# Patient Record
Sex: FEMALE | Race: WHITE | NOT HISPANIC OR LATINO | Employment: OTHER | URBAN - METROPOLITAN AREA
[De-identification: names, ages, dates, MRNs, and addresses within clinical notes are randomized per-mention and may not be internally consistent; named-entity substitution may affect disease eponyms.]

---

## 2018-11-26 ENCOUNTER — HOSPITAL ENCOUNTER (INPATIENT)
Facility: HOSPITAL | Age: 63
LOS: 3 days | Discharge: HOME | DRG: 392 | End: 2018-11-29
Attending: EMERGENCY MEDICINE | Admitting: INTERNAL MEDICINE
Payer: MEDICARE

## 2018-11-26 ENCOUNTER — APPOINTMENT (INPATIENT)
Dept: MRI IMAGING | Facility: HOSPITAL | Age: 63
DRG: 392 | End: 2018-11-26
Payer: MEDICARE

## 2018-11-26 ENCOUNTER — APPOINTMENT (EMERGENCY)
Dept: CT IMAGING | Facility: HOSPITAL | Age: 63
DRG: 392 | End: 2018-11-26
Payer: MEDICARE

## 2018-11-26 DIAGNOSIS — R11.2 INTRACTABLE VOMITING WITH NAUSEA, UNSPECIFIED VOMITING TYPE: ICD-10-CM

## 2018-11-26 DIAGNOSIS — R10.9 ABDOMINAL PAIN: ICD-10-CM

## 2018-11-26 DIAGNOSIS — K86.89 PANCREATIC MASS: ICD-10-CM

## 2018-11-26 DIAGNOSIS — G43.909 HEADACHE, MIGRAINE: ICD-10-CM

## 2018-11-26 DIAGNOSIS — R10.9 ABDOMINAL PAIN: Primary | ICD-10-CM

## 2018-11-26 PROBLEM — D72.829 LEUKOCYTOSIS: Status: ACTIVE | Noted: 2018-11-26

## 2018-11-26 PROBLEM — E86.0 DEHYDRATION: Status: ACTIVE | Noted: 2018-11-26

## 2018-11-26 LAB
ALBUMIN SERPL BCP-MCNC: 3.3 G/DL (ref 3.5–5)
ALP SERPL-CCNC: 72 U/L (ref 46–116)
ALT SERPL W P-5'-P-CCNC: 64 U/L (ref 12–78)
ANION GAP SERPL CALCULATED.3IONS-SCNC: 10 MMOL/L (ref 4–13)
APTT PPP: 32 SECONDS (ref 26–38)
AST SERPL W P-5'-P-CCNC: 27 U/L (ref 5–45)
BACTERIA UR QL AUTO: ABNORMAL /HPF
BASE EX.OXY STD BLDV CALC-SCNC: 67.3 % (ref 60–80)
BASE EXCESS BLDV CALC-SCNC: -0.4 MMOL/L
BASOPHILS # BLD AUTO: 0.05 THOUSANDS/ΜL (ref 0–0.1)
BASOPHILS NFR BLD AUTO: 0 % (ref 0–1)
BILIRUB SERPL-MCNC: 0.39 MG/DL (ref 0.2–1)
BILIRUB UR QL STRIP: NEGATIVE
BUN SERPL-MCNC: 15 MG/DL (ref 5–25)
CALCIUM SERPL-MCNC: 9.1 MG/DL (ref 8.3–10.1)
CHLORIDE SERPL-SCNC: 103 MMOL/L (ref 100–108)
CLARITY UR: CLEAR
CO2 SERPL-SCNC: 26 MMOL/L (ref 21–32)
COLOR UR: YELLOW
COLOR, POC: YELLOW
CREAT SERPL-MCNC: 0.69 MG/DL (ref 0.6–1.3)
EOSINOPHIL # BLD AUTO: 0.04 THOUSAND/ΜL (ref 0–0.61)
EOSINOPHIL NFR BLD AUTO: 0 % (ref 0–6)
ERYTHROCYTE [DISTWIDTH] IN BLOOD BY AUTOMATED COUNT: 13.5 % (ref 11.6–15.1)
GFR SERPL CREATININE-BSD FRML MDRD: 92 ML/MIN/1.73SQ M
GLUCOSE SERPL-MCNC: 120 MG/DL (ref 65–140)
GLUCOSE UR STRIP-MCNC: NEGATIVE MG/DL
HCO3 BLDV-SCNC: 25.1 MMOL/L (ref 24–30)
HCT VFR BLD AUTO: 41.6 % (ref 34.8–46.1)
HGB BLD-MCNC: 13.2 G/DL (ref 11.5–15.4)
HGB UR QL STRIP.AUTO: ABNORMAL
IMM GRANULOCYTES # BLD AUTO: 0.05 THOUSAND/UL (ref 0–0.2)
IMM GRANULOCYTES NFR BLD AUTO: 0 % (ref 0–2)
INR PPP: 1 (ref 0.86–1.17)
KETONES UR STRIP-MCNC: ABNORMAL MG/DL
LEUKOCYTE ESTERASE UR QL STRIP: NEGATIVE
LIPASE SERPL-CCNC: 339 U/L (ref 73–393)
LYMPHOCYTES # BLD AUTO: 0.75 THOUSANDS/ΜL (ref 0.6–4.47)
LYMPHOCYTES NFR BLD AUTO: 6 % (ref 14–44)
MAGNESIUM SERPL-MCNC: 1.9 MG/DL (ref 1.6–2.6)
MCH RBC QN AUTO: 29.8 PG (ref 26.8–34.3)
MCHC RBC AUTO-ENTMCNC: 31.7 G/DL (ref 31.4–37.4)
MCV RBC AUTO: 94 FL (ref 82–98)
MONOCYTES # BLD AUTO: 0.77 THOUSAND/ΜL (ref 0.17–1.22)
MONOCYTES NFR BLD AUTO: 6 % (ref 4–12)
NEUTROPHILS # BLD AUTO: 11.99 THOUSANDS/ΜL (ref 1.85–7.62)
NEUTS SEG NFR BLD AUTO: 88 % (ref 43–75)
NITRITE UR QL STRIP: NEGATIVE
NON-SQ EPI CELLS URNS QL MICRO: ABNORMAL /HPF
NRBC BLD AUTO-RTO: 0 /100 WBCS
O2 CT BLDV-SCNC: 12.3 ML/DL
PCO2 BLDV: 44.5 MM HG (ref 42–50)
PH BLDV: 7.37 [PH] (ref 7.3–7.4)
PH UR STRIP.AUTO: 7 [PH] (ref 4.5–8)
PLATELET # BLD AUTO: 259 THOUSANDS/UL (ref 149–390)
PMV BLD AUTO: 9.1 FL (ref 8.9–12.7)
PO2 BLDV: 33.3 MM HG (ref 35–45)
POTASSIUM SERPL-SCNC: 3.6 MMOL/L (ref 3.5–5.3)
PROT SERPL-MCNC: 7.4 G/DL (ref 6.4–8.2)
PROT UR STRIP-MCNC: NEGATIVE MG/DL
PROTHROMBIN TIME: 13.3 SECONDS (ref 11.8–14.2)
RBC # BLD AUTO: 4.43 MILLION/UL (ref 3.81–5.12)
RBC #/AREA URNS AUTO: ABNORMAL /HPF
SODIUM SERPL-SCNC: 139 MMOL/L (ref 136–145)
SP GR UR STRIP.AUTO: 1.01 (ref 1–1.03)
UROBILINOGEN UR QL STRIP.AUTO: 0.2 E.U./DL
WBC # BLD AUTO: 13.65 THOUSAND/UL (ref 4.31–10.16)
WBC #/AREA URNS AUTO: ABNORMAL /HPF

## 2018-11-26 PROCEDURE — 80053 COMPREHEN METABOLIC PANEL: CPT | Performed by: EMERGENCY MEDICINE

## 2018-11-26 PROCEDURE — 99222 1ST HOSP IP/OBS MODERATE 55: CPT | Performed by: INTERNAL MEDICINE

## 2018-11-26 PROCEDURE — 36415 COLL VENOUS BLD VENIPUNCTURE: CPT | Performed by: EMERGENCY MEDICINE

## 2018-11-26 PROCEDURE — 99223 1ST HOSP IP/OBS HIGH 75: CPT | Performed by: INTERNAL MEDICINE

## 2018-11-26 PROCEDURE — 83735 ASSAY OF MAGNESIUM: CPT | Performed by: EMERGENCY MEDICINE

## 2018-11-26 PROCEDURE — 74183 MRI ABD W/O CNTR FLWD CNTR: CPT

## 2018-11-26 PROCEDURE — 96376 TX/PRO/DX INJ SAME DRUG ADON: CPT

## 2018-11-26 PROCEDURE — 85025 COMPLETE CBC W/AUTO DIFF WBC: CPT | Performed by: EMERGENCY MEDICINE

## 2018-11-26 PROCEDURE — 83690 ASSAY OF LIPASE: CPT | Performed by: EMERGENCY MEDICINE

## 2018-11-26 PROCEDURE — 96375 TX/PRO/DX INJ NEW DRUG ADDON: CPT

## 2018-11-26 PROCEDURE — 96374 THER/PROPH/DIAG INJ IV PUSH: CPT

## 2018-11-26 PROCEDURE — 99285 EMERGENCY DEPT VISIT HI MDM: CPT

## 2018-11-26 PROCEDURE — 85610 PROTHROMBIN TIME: CPT | Performed by: EMERGENCY MEDICINE

## 2018-11-26 PROCEDURE — 96361 HYDRATE IV INFUSION ADD-ON: CPT

## 2018-11-26 PROCEDURE — C9113 INJ PANTOPRAZOLE SODIUM, VIA: HCPCS | Performed by: INTERNAL MEDICINE

## 2018-11-26 PROCEDURE — 85730 THROMBOPLASTIN TIME PARTIAL: CPT | Performed by: EMERGENCY MEDICINE

## 2018-11-26 PROCEDURE — A9585 GADOBUTROL INJECTION: HCPCS | Performed by: INTERNAL MEDICINE

## 2018-11-26 PROCEDURE — 81001 URINALYSIS AUTO W/SCOPE: CPT

## 2018-11-26 PROCEDURE — 74177 CT ABD & PELVIS W/CONTRAST: CPT

## 2018-11-26 PROCEDURE — 82805 BLOOD GASES W/O2 SATURATION: CPT | Performed by: EMERGENCY MEDICINE

## 2018-11-26 RX ORDER — MOXIFLOXACIN HYDROCHLORIDE 400 MG/1
1 TABLET ORAL DAILY
Status: ON HOLD | COMMUNITY
Start: 2014-03-17 | End: 2018-11-26

## 2018-11-26 RX ORDER — MORPHINE SULFATE 4 MG/ML
4 INJECTION, SOLUTION INTRAMUSCULAR; INTRAVENOUS ONCE
Status: COMPLETED | OUTPATIENT
Start: 2018-11-26 | End: 2018-11-26

## 2018-11-26 RX ORDER — RIZATRIPTAN BENZOATE 10 MG/1
TABLET ORAL AS NEEDED
COMMUNITY
End: 2019-07-15

## 2018-11-26 RX ORDER — ONDANSETRON 2 MG/ML
4 INJECTION INTRAMUSCULAR; INTRAVENOUS ONCE
Status: COMPLETED | OUTPATIENT
Start: 2018-11-26 | End: 2018-11-26

## 2018-11-26 RX ORDER — MORPHINE SULFATE 4 MG/ML
4 INJECTION, SOLUTION INTRAMUSCULAR; INTRAVENOUS ONCE AS NEEDED
Status: DISCONTINUED | OUTPATIENT
Start: 2018-11-26 | End: 2018-11-26

## 2018-11-26 RX ORDER — ONDANSETRON 2 MG/ML
4 INJECTION INTRAMUSCULAR; INTRAVENOUS ONCE AS NEEDED
Status: DISCONTINUED | OUTPATIENT
Start: 2018-11-26 | End: 2018-11-26

## 2018-11-26 RX ORDER — ACETAMINOPHEN 325 MG/1
650 TABLET ORAL EVERY 4 HOURS PRN
Status: DISCONTINUED | OUTPATIENT
Start: 2018-11-26 | End: 2018-11-29 | Stop reason: HOSPADM

## 2018-11-26 RX ORDER — SODIUM CHLORIDE 9 MG/ML
100 INJECTION, SOLUTION INTRAVENOUS CONTINUOUS
Status: DISCONTINUED | OUTPATIENT
Start: 2018-11-26 | End: 2018-11-29 | Stop reason: HOSPADM

## 2018-11-26 RX ORDER — IBUPROFEN 600 MG/1
1 TABLET ORAL EVERY 8 HOURS PRN
COMMUNITY
End: 2018-11-29 | Stop reason: HOSPADM

## 2018-11-26 RX ORDER — PANTOPRAZOLE SODIUM 40 MG/1
40 INJECTION, POWDER, FOR SOLUTION INTRAVENOUS EVERY 12 HOURS SCHEDULED
Status: DISCONTINUED | OUTPATIENT
Start: 2018-11-26 | End: 2018-11-29 | Stop reason: HOSPADM

## 2018-11-26 RX ORDER — NAPROXEN SODIUM 550 MG/1
TABLET ORAL AS NEEDED
COMMUNITY
End: 2018-11-29 | Stop reason: HOSPADM

## 2018-11-26 RX ORDER — METOCLOPRAMIDE HYDROCHLORIDE 5 MG/ML
10 INJECTION INTRAMUSCULAR; INTRAVENOUS EVERY 6 HOURS PRN
Status: DISCONTINUED | OUTPATIENT
Start: 2018-11-26 | End: 2018-11-29 | Stop reason: HOSPADM

## 2018-11-26 RX ADMIN — SODIUM CHLORIDE 1000 ML: 0.9 INJECTION, SOLUTION INTRAVENOUS at 09:20

## 2018-11-26 RX ADMIN — ACETAMINOPHEN 650 MG: 325 TABLET, FILM COATED ORAL at 21:03

## 2018-11-26 RX ADMIN — ONDANSETRON 4 MG: 2 INJECTION INTRAMUSCULAR; INTRAVENOUS at 09:21

## 2018-11-26 RX ADMIN — SODIUM CHLORIDE 125 ML/HR: 0.9 INJECTION, SOLUTION INTRAVENOUS at 14:23

## 2018-11-26 RX ADMIN — MORPHINE SULFATE 4 MG: 4 INJECTION, SOLUTION INTRAMUSCULAR; INTRAVENOUS at 12:02

## 2018-11-26 RX ADMIN — ENOXAPARIN SODIUM 40 MG: 40 INJECTION SUBCUTANEOUS at 14:29

## 2018-11-26 RX ADMIN — ONDANSETRON 4 MG: 2 INJECTION INTRAMUSCULAR; INTRAVENOUS at 12:02

## 2018-11-26 RX ADMIN — IOHEXOL 100 ML: 350 INJECTION, SOLUTION INTRAVENOUS at 11:24

## 2018-11-26 RX ADMIN — GADOBUTROL 7 ML: 604.72 INJECTION INTRAVENOUS at 23:19

## 2018-11-26 RX ADMIN — METOCLOPRAMIDE 10 MG: 5 INJECTION, SOLUTION INTRAMUSCULAR; INTRAVENOUS at 23:26

## 2018-11-26 RX ADMIN — MORPHINE SULFATE 4 MG: 4 INJECTION, SOLUTION INTRAMUSCULAR; INTRAVENOUS at 09:23

## 2018-11-26 RX ADMIN — PANTOPRAZOLE SODIUM 40 MG: 40 INJECTION, POWDER, FOR SOLUTION INTRAVENOUS at 14:29

## 2018-11-26 RX ADMIN — SODIUM CHLORIDE 125 ML/HR: 0.9 INJECTION, SOLUTION INTRAVENOUS at 23:04

## 2018-11-26 RX ADMIN — METOCLOPRAMIDE 10 MG: 5 INJECTION, SOLUTION INTRAMUSCULAR; INTRAVENOUS at 16:21

## 2018-11-26 NOTE — H&P
H&P Exam - Brigette Parks 58 y o  female MRN: [id number]    Unit/Bed#: [place] 2 -01 Encounter: [id number]      Cc: abdominal pain, n/v x 2 days    History of Present Illness     HPI:  Brigette Parks is a 58 y o  female who presents to the ER for evaluation of a 2 day history of abdominal pain, nausea, and vomiting  History taken from patient and her  at the bedside  Patient relates that she has been her new her usual state of health  Prior to 2 days ago she was not having any abdominal discomfort  She notes she has mild GERD which is controlled  She denies any worsening in the intensity, severity, or frequency of her GERD  She denies any postprandial abdominal pain  Patient is 2 days ago she began having abdominal pain  She notes generalized anterior abdominal pain that radiates across to both flanks, as well as straight through to her lower back bilaterally  She also noted nausea and vomiting  She relates she has not had any p o  Intake for the past 2 days  Her mouth feels dry and she feels dehydrated  She notes she is vomiting up green liquid  She denies any hematemesis or coffee-ground emesis  Patient denies any diarrhea or constipation  She notes she is passing her bowels without any difficulty  She denies any melena or hematochezia  Patient is taking NSAIDs  She takes ibuprofen once, or multiple times a day as needed, as well as Naprosyn once or multiple times a day as needed for her arthritis pain  She also takes Tylenol arthritis  She denies any alcohol use  Patient denies any postprandial abdominal pain, or abdominal pain with fasting, prior to this current episode  She denies any previous history of peptic ulcer disease  Patient relates she has a history of migraines  She takes Maxalt as needed  She denies any current migraine  She denies any current pain anywhere else  She denies any headache, chest pain, or any other back pain    She notes at baseline she has a history of chronic low back pain, which is not currently present  Patient relates she continues to have nausea  She relates that 2 doses of Zofran she received in the ER did not help her nausea  Patient relates the morphine did help her abdominal pain, however made her sleepy  Patient denies any recent fevers  She notes she did have chills x2 days  She denies any bleeding, melena, hematochezia, hematemesis  She notes she is passing her urine without any difficulty  She denies any shortness of breath, chest congestion or cough  She denies any focal weakness or numbness  She noted generalized weakness and lightheadedness with ambulating today  She denies any sick contacts          Historical Information   Past Medical History:   Diagnosis Date    Arthritis     GERD (gastroesophageal reflux disease)     Headache, migraine     Low back pain      Patient Active Problem List   Diagnosis    Headache, migraine    Low back pain    GERD (gastroesophageal reflux disease)    Arthritis    Abdominal pain    Pancreatic mass    Nausea & vomiting    Leukocytosis     Past Surgical History:   Procedure Laterality Date     SECTION      EYE SURGERY         Social History   History   Alcohol Use No     History   Drug Use No     History   Smoking Status    Never Smoker   Smokeless Tobacco    Never Used       Family History:   Family History   Problem Relation Age of Onset    Hypertension Mother     Diabetes Father        Meds/Allergies       Current Facility-Administered Medications:     acetaminophen (TYLENOL) tablet 650 mg, 650 mg, Oral, Q4H PRN, Aubrey Russell MD    enoxaparin (LOVENOX) subcutaneous injection 40 mg, 40 mg, Subcutaneous, Daily, Aubrey Russell MD    metoclopramide (REGLAN) injection 10 mg, 10 mg, Intravenous, Q6H PRN, Aubrey Russell MD    morphine injection 2 mg, 2 mg, Intravenous, Q4H PRN, Aubrey Russell MD    pantoprazole (PROTONIX) injection 40 mg, 40 mg, Intravenous, Q12H [address], Tim Calhoun MD    sodium chloride 0 9 % infusion, 125 mL/hr, Intravenous, Continuous, Glenys Mcintyre MD, Last Rate: 125 mL/hr at 18 1423, 125 mL/hr at 18 1423    Allergies   Allergen Reactions    Erythromycin        Review of Systems  A detailed 12 point review of systems was conducted and is negative apart from those mentioned in the HPI  Objective   Vitals: Blood pressure 134/76, pulse 80, temperature 98 3 °F (36 8 °C), temperature source Tympanic, resp  rate 16, weight 76 7 kg (169 lb), SpO2 94 %  Physical Exam   HEENT: PERRLA, EOMI, sclera anicteric, dry mucous membranes, tongue mucosa dry without lesions  Neck: supple, no JVD, lymphadenopathy, thyromegaly  Heart: Regular rate and rhythm, S1S2 present  No murmur, rub or gallop  Lungs; Clear to auscultation bilaterally  No wheezing, crackles or rhonchi  No accessory muscle use or respiratory distress  Abdomen: soft, non-tender with gentle palpation, non-distended, NABS  No guarding or rebound  No peritoneal sound or mass  Extremities: no clubbing, cyanosis, or edema  2+ pedal pulses bilaterally  Full range of motion  Neurologic:  Cranial nerves II-XII intact  Strength  globally intact  Speech fluent and goal directed  Awake, alert  Fluent speech  Skin: warm and dry  No petechiae, purpura or rash  Lab Results:     Results from last 7 days  Lab Units 18  0916   WBC Thousand/uL 13 65*   HEMOGLOBIN g/dL 13 2   HEMATOCRIT % 41 6   PLATELETS Thousands/uL 259       Results from last 7 days  Lab Units 18  0916   POTASSIUM mmol/L 3 6   CHLORIDE mmol/L 103   CO2 mmol/L 26   BUN mg/dL 15   CREATININE mg/dL 0 69   CALCIUM mg/dL 9 1         Imagin/26:  CT abdomen/pelvis:  Questionable ill-defined lesion within the pancreatic head    Given presence of normal pancreatic enzymes, consider MRI follow-up to exclude a mass lesion    Lipase 339  LFTs within normal limits apart from albumin 3 3  Urinalysis:  negative nitrate/leukocyte esterase   0 WBC    Assessment/Plan   1-abdominal pain:  Nausea/vomiting:  Patient presented to the ER complaining of a 2 day history of constant abdominal pain across her anterior abdomen with radiation to both flanks, and straight through to her lower back bilaterally  She also noted 2 days of constant nausea/vomiting    -patient will be admitted to the hospital to undergo further evaluation  She had a CT scan of her abdomen pelvis did not reveal any distinct etiology for her symptoms  Patient's LFTs and lipase are essentially unremarkable    -patient's presentation is concerning for peptic ulcer disease, especially as she takes multiple NSAIDs daily  Will start IV proton pump inhibitor  Her case has been discussed with the GI team who will see her in consultation, and evaluate for EGD  -patient does not have any evidence of anemia  She denies any melena, hematochezia, or coffee-ground emesis  Will continue to monitor closely  2-possible pancreatic lesion:  Patient's CT scan revealed an ill-defined lesion within the pancreatic head  Her lipase is unremarkable    -will confer with the GI team regarding further investigation, such as MRCP  -lipase is unremarkable  Will check a CA 19-9     3-dehydration/volume depletion:  Patient has not been able to tolerate any p o  Intake for the past 2 days  She appears clinically dehydrated with dry mucous membranes  Her BUN:  Creatinine ratio was greater than 20  Continue IV fluids and monitor    4-intractable nausea/vomiting:  Patient relates she has had 2 days of constant nausea/vomiting  She is vomiting green bilious liquid  She received Zofran 4 mg IV x2 doses in the ER without any relief  Will change Zofran to Reglan and continue to monitor  5-leukocytosis:  Patient has a mild leukocytosis of 13  She is currently undergoing evaluation for this    Her urinalysis is unremarkable without any evidence of pyuria or hematuria  No evidence of a UTI  Her CT scan of the abdomen/pelvis did not reveal any acute infection  Continue to monitor  This may be secondary to stress response  6-chronic pain/arthritis:  Patient relates she has a history of chronic pain from arthritis for which she takes ibuprofen or Naprosyn multiple times a day, alternating  NSAIDs currently on hold due to concerns over possible peptic ulcer disease  7-family:  Updated patient and her  at length at the bedside  Reviewed all test results and treatment plan    Updated patient's nurse at the bedside  Discussed with GI team:  They will see her in consultation and evaluate for further testing  Disposition:  Due to patient's abdominal pain, requiring IV analgesics, and the persistence of her intractable nausea/vomiting despite multiple doses of IV antiemetics, and her current dehydration requiring IV fluids, patient be admitted to the hospital   Is anticipated that her length of stay will be greater than 2 midnights and she will be placed on inpatient status  Code Status: Level 1 - Full Code        Portions of the record may have been created with voice recognition software

## 2018-11-26 NOTE — ED PROVIDER NOTES
History  Chief Complaint   Patient presents with    Abdominal Pain     pt c/o abdominal pain since yesterday feeling nausea and chills, denies cp,sob       History provided by:  Patient and spouse   used: No    Abdominal Pain   Pain location: Upper abdominal pain  Pain quality: aching, dull and gnawing    Pain radiates to:  R flank and L flank  Pain severity:  Severe  Onset quality:  Gradual  Duration:  2 days  Timing:  Constant  Progression:  Worsening  Chronicity:  New  Context: not sick contacts, not suspicious food intake and not trauma    Context comment:  The patient currently doing a ketone diet  Relieved by:  Nothing  Exacerbated by: Decreased appetite not drinking a lot a water  Ineffective treatments:  None tried  Associated symptoms: anorexia, chills and nausea    Associated symptoms: no chest pain, no constipation, no cough, no diarrhea, no dysuria, no fever, no shortness of breath, no sore throat and no vomiting    Risk factors comment:   in the past       Prior to Admission Medications   Prescriptions Last Dose Informant Patient Reported? Taking?   ibuprofen (MOTRIN) 600 mg tablet   Yes No   Sig: Take 1 tablet by mouth every 8 (eight) hours as needed   moxifloxacin (AVELOX) 400 MG tablet   Yes Yes   Sig: Take 1 tablet by mouth daily   naproxen sodium (ANAPROX) 550 mg tablet   Yes No   Sig: Take by mouth   rizatriptan (MAXALT) 10 MG tablet   Yes No   Sig: Take by mouth      Facility-Administered Medications: None       Past Medical History:   Diagnosis Date    Hypertension        Past Surgical History:   Procedure Laterality Date    EYE SURGERY         History reviewed  No pertinent family history  I have reviewed and agree with the history as documented  Social History   Substance Use Topics    Smoking status: Never Smoker    Smokeless tobacco: Never Used    Alcohol use No        Review of Systems   Constitutional: Positive for appetite change and chills  Negative for fever  HENT: Negative for congestion and sore throat  Respiratory: Negative for cough, chest tightness and shortness of breath  Cardiovascular: Negative for chest pain  Gastrointestinal: Positive for abdominal pain, anorexia and nausea  Negative for constipation, diarrhea and vomiting  Genitourinary: Positive for flank pain  Negative for dysuria  Musculoskeletal: Positive for back pain  Negative for neck pain  Neurological: Negative for headaches  All other systems reviewed and are negative  Physical Exam  Physical Exam   Constitutional: She appears well-developed and well-nourished  She is cooperative  Non-toxic appearance  She does not have a sickly appearance  She does not appear ill  No distress  HENT:   Head: Normocephalic and atraumatic  Right Ear: Hearing normal  No drainage or swelling  Left Ear: Hearing normal  No drainage or swelling  Mouth/Throat: Mucous membranes are dry  Eyes: Conjunctivae, EOM and lids are normal  Right eye exhibits no discharge  Left eye exhibits no discharge  Neck: Trachea normal and normal range of motion  No JVD present  Cardiovascular: Normal rate, regular rhythm, normal heart sounds, intact distal pulses and normal pulses  Exam reveals no gallop and no friction rub  No murmur heard  Pulmonary/Chest: Effort normal and breath sounds normal  No stridor  No respiratory distress  She has no wheezes  She has no rales  Abdominal: Soft  Normal appearance  She exhibits no ascites and no mass  There is no hepatosplenomegaly  There is tenderness in the epigastric area and left upper quadrant  There is no rebound, no guarding, no CVA tenderness and no tenderness at McBurney's point  Musculoskeletal: Normal range of motion  She exhibits no edema  Lymphadenopathy:        Right: No inguinal adenopathy present  Left: No inguinal adenopathy present  Neurological: She is alert  She has normal strength   She exhibits normal muscle tone  Gait normal  GCS eye subscore is 4  GCS verbal subscore is 5  GCS motor subscore is 6  Skin: Skin is warm, dry and intact  No rash noted  She is not diaphoretic  No pallor  Psychiatric: She has a normal mood and affect  Her speech is normal  Cognition and memory are normal    Nursing note and vitals reviewed        Vital Signs  ED Triage Vitals [11/26/18 0849]   Temperature Pulse Respirations Blood Pressure SpO2   (!) 97 3 °F (36 3 °C) 90 16 123/70 98 %      Temp Source Heart Rate Source Patient Position - Orthostatic VS BP Location FiO2 (%)   Oral Monitor Sitting Left arm --      Pain Score       Worst Possible Pain           Vitals:    11/26/18 0928 11/26/18 1045 11/26/18 1207 11/26/18 1229   BP: 136/70 116/58 128/67 118/62   Pulse: 80 85 85 83   Patient Position - Orthostatic VS: Sitting Lying Lying Lying       Visual Acuity      ED Medications  Medications   ondansetron (ZOFRAN) injection 4 mg (4 mg Intravenous Given 11/26/18 0921)   morphine (PF) 4 mg/mL injection 4 mg (4 mg Intravenous Given 11/26/18 0923)   sodium chloride 0 9 % bolus 1,000 mL (0 mL Intravenous Stopped 11/26/18 1057)   iohexol (OMNIPAQUE) 350 MG/ML injection (MULTI-DOSE) 100 mL (100 mL Intravenous Given 11/26/18 1124)   morphine (PF) 4 mg/mL injection 4 mg (4 mg Intravenous Given 11/26/18 1202)   ondansetron (ZOFRAN) injection 4 mg (4 mg Intravenous Given 11/26/18 1202)       Diagnostic Studies  Results Reviewed     Procedure Component Value Units Date/Time    Urine Microscopic [[id number]]  (Abnormal) Collected:  11/26/18 1157    Lab Status:  Final result Specimen:  Urine from Urine, Clean Catch Updated:  11/26/18 1227     RBC, UA 2-4 (A) /hpf      WBC, UA None Seen /hpf      Epithelial Cells None Seen /hpf      Bacteria, UA None Seen /hpf     POCT urinalysis dipstick [[id number]]  (Abnormal) Resulted:  11/26/18 1144    Lab Status:  Final result Specimen:  Urine Updated:  11/26/18 1144     Color, UA yellow    ED Urine Macroscopic [[id number]]  (Abnormal) Collected:  11/26/18 1157    Lab Status:  Final result Specimen:  Urine Updated:  11/26/18 1143     Color, UA Yellow     Clarity, UA Clear     pH, UA 7 0     Leukocytes, UA Negative     Nitrite, UA Negative     Protein, UA Negative mg/dl      Glucose, UA Negative mg/dl      Ketones, UA 40 (2+) (A) mg/dl      Urobilinogen, UA 0 2 E U /dl      Bilirubin, UA Negative     Blood, UA Trace (A)     Specific [place], UA 1 010    Narrative:       CLINITEK RESULT    Comprehensive metabolic panel [[id number]]  (Abnormal) Collected:  11/26/18 0916    Lab Status:  Final result Specimen:  Blood from Arm, Right Updated:  11/26/18 0949     Sodium 139 mmol/L      Potassium 3 6 mmol/L      Chloride 103 mmol/L      CO2 26 mmol/L      ANION GAP 10 mmol/L      BUN 15 mg/dL      Creatinine 0 69 mg/dL      Glucose 120 mg/dL      Calcium 9 1 mg/dL      AST 27 U/L      ALT 64 U/L      Alkaline Phosphatase 72 U/L      Total Protein 7 4 g/dL      Albumin 3 3 (L) g/dL      Total Bilirubin 0 39 mg/dL      eGFR 92 ml/min/1 73sq m     Narrative:         National Kidney Disease Education Program recommendations are as follows:  GFR calculation is accurate only with a steady state creatinine  Chronic Kidney disease less than 60 ml/min/1 73 sq  meters  Kidney failure less than 15 ml/min/1 73 sq  meters      Lipase [[id number]]  (Normal) Collected:  11/26/18 0916    Lab Status:  Final result Specimen:  Blood from Arm, Right Updated:  11/26/18 0949     Lipase 339 u/L     Magnesium [[id number]]  (Normal) Collected:  11/26/18 0916    Lab Status:  Final result Specimen:  Blood from Arm, Right Updated:  11/26/18 0949     Magnesium 1 9 mg/dL     Blood gas, venous [[id number]]  (Abnormal) Collected:  11/26/18 0933    Lab Status:  Final result Specimen:  Blood from Arm, Right Updated:  11/26/18 0941     pH, Colt 7 370     pCO2, Colt 44 5 mm Hg      pO2, Col[place] 33 3 (L) mm Hg      HCO3, Colt 25 1 mmol/L      Base Excess, Colt -0 4 mmol/L      O2 Content, Colt 12 3 ml/dL      O2 HGB, VENOUS 67 3 %     Protime-INR [[id number]]  (Normal) Collected:  11/26/18 0916    Lab Status:  Final result Specimen:  Blood from Arm, Right Updated:  11/26/18 0941     Protime 13 3 seconds      INR 1 00    APTT [[id number]]  (Normal) Collected:  11/26/18 0916    Lab Status:  Final result Specimen:  Blood from Arm, Right Updated:  11/26/18 0941     PTT 32 seconds     CBC and differential [[id number]]  (Abnormal) Collected:  11/26/18 0916    Lab Status:  Final result Specimen:  Blood from Arm, Right Updated:  11/26/18 0928     WBC 13 65 (H) Thousand/uL      RBC 4 43 Million/uL      Hemoglobin 13 2 g/dL      Hematocrit 41 6 %      MCV 94 fL      MCH 29 8 pg      MCHC 31 7 g/dL      RDW 13 5 %      MPV 9 1 fL      Platelets 898 Thousands/uL      nRBC 0 /100 WBCs      Neutrophils Relative 88 (H) %      Immat GRANS % 0 %      Lymphocytes Relative 6 (L) %      Monocytes Relative 6 %      Eosinophils Relative 0 %      Basophils Relative 0 %      Neutrophils Absolute 11 99 (H) Thousands/µL      Immature Grans Absolute 0 05 Thousand/uL      Lymphocytes Absolute 0 75 Thousands/µL      Monocytes Absolute 0 77 Thousand/µL      Eosinophils Absolute 0 04 Thousand/µL      Basophils Absolute 0 05 Thousands/µL                  CT abdomen pelvis with contrast   Final Result by Racheal Wyatt MD (11/26 1148)      Questionable ill-defined lesion within the pancreatic head  Given presence of normal pancreatic enzymes, consider MRI follow-up to exclude a mass lesion  Workstation performed: [id number]                    Procedures  Procedures       Phone Contacts  ED Phone Contact    ED Course                               MDM  Number of Diagnoses or Management Options  Abdominal pain:   Pancreatic mass:   Diagnosis management comments: Patient still with significant amount of pain and nausea  Despite multiple rounds of medication    CT shows ill-defined pancreatic mass and is going to require MRI to rule out the possibility of a pancreatic malignancy as a possible explanation of her pain and symptoms  Laboratory studies were unremarkable other than she has ketones in the urine which may be due to her ketone diet  Case was discussed with Internal Medicine agree on admission  Amount and/or Complexity of Data Reviewed  Clinical lab tests: reviewed and ordered  Tests in the radiology section of CPT®: ordered and reviewed  Obtain history from someone other than the patient: yes  Discuss the patient with other providers: yes    Patient Progress  Patient progress: stable    CritCare Time    Disposition  Final diagnoses:   Abdominal pain - intractable   Pancreatic mass     Time reflects when diagnosis was documented in both MDM as applicable and the Disposition within this note     Time User Action Codes Description Comment    11/26/2018 12:26 PM Kaya Norton [R10 [id number]] Abdominal pain     11/26/2018 12:26 PM Kaya Norton [[id number]] Pancreatic mass     11/26/2018 12:26 PM Mariposa Sandoval [R10 [id number]] Abdominal pain intractable      ED Disposition     ED Disposition Condition Comment    Admit  Case was discussed with Bob Quezada and the patient's admission status was agreed to be Admission Status: inpatient status to the service of Dr Bob Quezada   Follow-up Information    None         Patient's Medications   Discharge Prescriptions    No medications on file     No discharge procedures on file      ED Provider  Electronically Signed by           Leobardo Ortega MD  11/26/18 3980

## 2018-11-27 ENCOUNTER — ANESTHESIA EVENT (INPATIENT)
Dept: GASTROENTEROLOGY | Facility: HOSPITAL | Age: 63
DRG: 392 | End: 2018-11-27
Payer: MEDICARE

## 2018-11-27 LAB
ALBUMIN SERPL BCP-MCNC: 2.7 G/DL (ref 3.5–5)
ALP SERPL-CCNC: 71 U/L (ref 46–116)
ALT SERPL W P-5'-P-CCNC: 43 U/L (ref 12–78)
ANION GAP SERPL CALCULATED.3IONS-SCNC: 12 MMOL/L (ref 4–13)
AST SERPL W P-5'-P-CCNC: 18 U/L (ref 5–45)
BILIRUB DIRECT SERPL-MCNC: 0.18 MG/DL (ref 0–0.2)
BILIRUB SERPL-MCNC: 0.42 MG/DL (ref 0.2–1)
BUN SERPL-MCNC: 10 MG/DL (ref 5–25)
CALCIUM SERPL-MCNC: 8.4 MG/DL (ref 8.3–10.1)
CHLORIDE SERPL-SCNC: 106 MMOL/L (ref 100–108)
CO2 SERPL-SCNC: 23 MMOL/L (ref 21–32)
CREAT SERPL-MCNC: 0.66 MG/DL (ref 0.6–1.3)
ERYTHROCYTE [DISTWIDTH] IN BLOOD BY AUTOMATED COUNT: 14.1 % (ref 11.6–15.1)
GFR SERPL CREATININE-BSD FRML MDRD: 94 ML/MIN/1.73SQ M
GLUCOSE SERPL-MCNC: 107 MG/DL (ref 65–140)
HCT VFR BLD AUTO: 40.2 % (ref 34.8–46.1)
HGB BLD-MCNC: 12.6 G/DL (ref 11.5–15.4)
LIPASE SERPL-CCNC: 142 U/L (ref 73–393)
MCH RBC QN AUTO: 29.9 PG (ref 26.8–34.3)
MCHC RBC AUTO-ENTMCNC: 31.3 G/DL (ref 31.4–37.4)
MCV RBC AUTO: 95 FL (ref 82–98)
PLATELET # BLD AUTO: 236 THOUSANDS/UL (ref 149–390)
PMV BLD AUTO: 9.4 FL (ref 8.9–12.7)
POTASSIUM SERPL-SCNC: 3.6 MMOL/L (ref 3.5–5.3)
PROT SERPL-MCNC: 6.7 G/DL (ref 6.4–8.2)
RBC # BLD AUTO: 4.22 MILLION/UL (ref 3.81–5.12)
SODIUM SERPL-SCNC: 141 MMOL/L (ref 136–145)
WBC # BLD AUTO: 15.39 THOUSAND/UL (ref 4.31–10.16)

## 2018-11-27 PROCEDURE — 83690 ASSAY OF LIPASE: CPT | Performed by: INTERNAL MEDICINE

## 2018-11-27 PROCEDURE — 99232 SBSQ HOSP IP/OBS MODERATE 35: CPT | Performed by: INTERNAL MEDICINE

## 2018-11-27 PROCEDURE — C9113 INJ PANTOPRAZOLE SODIUM, VIA: HCPCS | Performed by: INTERNAL MEDICINE

## 2018-11-27 PROCEDURE — 85027 COMPLETE CBC AUTOMATED: CPT | Performed by: INTERNAL MEDICINE

## 2018-11-27 PROCEDURE — 80076 HEPATIC FUNCTION PANEL: CPT | Performed by: INTERNAL MEDICINE

## 2018-11-27 PROCEDURE — 86301 IMMUNOASSAY TUMOR CA 19-9: CPT | Performed by: INTERNAL MEDICINE

## 2018-11-27 PROCEDURE — 80048 BASIC METABOLIC PNL TOTAL CA: CPT | Performed by: INTERNAL MEDICINE

## 2018-11-27 RX ADMIN — ACETAMINOPHEN 650 MG: 325 TABLET, FILM COATED ORAL at 18:56

## 2018-11-27 RX ADMIN — ACETAMINOPHEN 650 MG: 325 TABLET, FILM COATED ORAL at 12:37

## 2018-11-27 RX ADMIN — SODIUM CHLORIDE 125 ML/HR: 0.9 INJECTION, SOLUTION INTRAVENOUS at 07:15

## 2018-11-27 RX ADMIN — PANTOPRAZOLE SODIUM 40 MG: 40 INJECTION, POWDER, FOR SOLUTION INTRAVENOUS at 21:15

## 2018-11-27 RX ADMIN — ENOXAPARIN SODIUM 40 MG: 40 INJECTION SUBCUTANEOUS at 08:39

## 2018-11-27 RX ADMIN — METOCLOPRAMIDE 10 MG: 5 INJECTION, SOLUTION INTRAMUSCULAR; INTRAVENOUS at 05:33

## 2018-11-27 RX ADMIN — METOCLOPRAMIDE 10 MG: 5 INJECTION, SOLUTION INTRAMUSCULAR; INTRAVENOUS at 17:58

## 2018-11-27 RX ADMIN — ACETAMINOPHEN 650 MG: 325 TABLET, FILM COATED ORAL at 04:53

## 2018-11-27 RX ADMIN — PANTOPRAZOLE SODIUM 40 MG: 40 INJECTION, POWDER, FOR SOLUTION INTRAVENOUS at 08:40

## 2018-11-27 RX ADMIN — SODIUM CHLORIDE 100 ML/HR: 0.9 INJECTION, SOLUTION INTRAVENOUS at 17:58

## 2018-11-27 NOTE — UTILIZATION REVIEW
Initial Clinical Review    Admission: Date/Time/Statement: 11/26/18 @ 1228     Orders Placed This Encounter   Procedures    Inpatient Admission (expected length of stay for this patient is greater than two midnights)     Standing Status:   Standing     Number of Occurrences:   1     Order Specific Question:   Admitting Physician     Answer:   Eugenio Atkinson [[id number]]     Order Specific Question:   Level of Care     Answer:   Med Surg [[id number]]     Order Specific Question:   Estimated length of stay     Answer:   More than 2 Midnights     Order Specific Question:   Certification     Answer:   I certify that inpatient services are medically necessary for this patient for a duration of greater than two midnights  See H&P and MD Progress Notes for additional information about the patient's course of treatment           ED: Date/Time/Mode of Arrival:   ED Arrival Information     Expected Arrival Acuity Means of Arrival Escorted By Service Admission Type    - 11/26/2018 08:41 Urgent Walk-In Family Member General Medicine Urgent    Arrival Complaint    abdominal pain          Chief Complaint:   Chief Complaint   Patient presents with    Abdominal Pain     pt c/o abdominal pain since yesterday feeling nausea and chills, denies cp,sob       History of Illness: 58 yr old female to ed with abd pain for the past 2 days  Along with nausea and vomiting -- anterior abd pain and stright through to lower back--has not po intake for 2 days    ED Vital Signs:   ED Triage Vitals [11/26/18 0849]   Temperature Pulse Respirations Blood Pressure SpO2   (!) 97 3 °F (36 3 °C) 90 16 123/70 98 %      Temp Source Heart Rate Source Patient Position - Orthostatic VS BP Location FiO2 (%)   Oral Monitor Sitting Left arm --      Pain Score       Worst Possible Pain        Wt Readings from Last 1 Encounters:   11/26/18 76 7 kg (169 lb)       Vital Signs (abnormal):99 3    Abnormal Labs/Diagnostic Test Results: albumin 3 3--odette bg po 2--33 3  Wbc 13 65  Ct of abd-Questionable ill-defined lesion within the pancreatic head   Given presence of normal pancreatic enzymes, consider MRI follow-up to exclude a mass lesion  ED Treatment:   Medication Administration from 11/26/2018 0841 to 11/26/2018 1313       Date/Time Order Dose Route Action Action by Comments     11/26/2018 0921 ondansetron (ZOFRAN) injection 4 mg 4 mg Intravenous Given Laura Armando RN      11/26/2018 5039 morphine (PF) 4 mg/mL injection 4 mg 4 mg Intravenous Given Laura Armando RN      11/26/2018 1057 sodium chloride 0 9 % bolus 1,000 mL 0 mL Intravenous Stopped Laura Armando RN      11/26/2018 0920 sodium chloride 0 9 % bolus 1,000 mL 1,000 mL Intravenous New Bag Laura Armando, [facility]      11/26/2018 1124 iohexol (OMNIPAQUE) 350 MG/ML injection (MULTI-DOSE) 100 mL 100 mL Intravenous Given Catia Munozbrod      11/26/2018 1202 morphine (PF) 4 mg/mL injection 4 mg 4 mg Intravenous Given Laura Armando RN      11/26/2018 1202 ondansetron (ZOFRAN) injection 4 mg 4 mg Intravenous Given Laura Armando RN           Past Medical/Surgical History:    Active Ambulatory Problems     Diagnosis Date Noted    No Active Ambulatory Problems     Resolved Ambulatory Problems     Diagnosis Date Noted    No Resolved Ambulatory Problems     Past Medical History:   Diagnosis Date    Arthritis     GERD (gastroesophageal reflux disease)     Headache, migraine     Low back pain        Admitting Diagnosis: Abdominal pain [R10 9]  Pancreatic mass [K86 9]    Age/Sex: 58 y o  female    Assessment/Plan: abd pain  Nausea and vomiting - poss pud as takes multiple nsaids --iv proton inhibitor  Possible pancreatic lesion  Dehydration iv fluids  Intractable vomiting - zofran  In ed and reglan on unit    Admission Orders:  Scheduled Meds:   Current Facility-Administered Medications:  acetaminophen 650 mg Oral Q4H PRN Janelle Garcia MD    enoxaparin 40 mg Subcutaneous Daily Janelle Garcia MD    influenza vaccine 0 5 mL Intramuscular Prior to discharge Demond Lester MD    metoclopramide 10 mg Intravenous Q6H PRN Demond Lester MD    morphine injection 2 mg Intravenous Q4H PRN Demond Lester MD    pantoprazole 40 mg Intravenous Q12H [address] Demond Lester MD    sodium chloride 100 mL/hr Intravenous Continuous Demond Lester MD Last Rate: 100 mL/hr (11/27/18 0939)     Continuous Infusions:   sodium chloride 100 mL/hr Last Rate: 100 mL/hr (11/27/18 0939)     PRN Meds:   acetaminophen    influenza vaccine    metoclopramide    morphine injection     Pulse ox q shift  Cancer antigen   Reg   Diet        Gi- Patient was admitted with nausea, vomiting and abdominal pain  She was found to have an ill-defined mass in the head of the pancreas  She underwent MRCP for further evaluation which showed mild inflammatory changes around the duodenum and pancreaticoduodenal groove with a question of soft tissue fullness arising from the junction of the 2nd and 3rd portion of the duodenum, parenchymal heterogeneity within the pancreatic head consistent with fatty infiltration  Endoscopy and possibly EUS recommended  Her symptoms have improved with medication, however given findings on MRI could consider upper endoscopy in versus outpatient for further evaluation   -continue clear liquids and advance as tolerated  -consider EGD tomorrow    Mri of abd--mrcp1   Mild inflammatory changes around the duodenum and pancreaticoduodenal groove   There is questionable small focus of soft tissue fullness arising from the junction of the 2nd and 3rd portion of the duodenum   Given that the patient had a normal   lipase, inflammatory findings may suggest duodenal diverticulitis, duodenitis without diverticulum, paraduodenal phlegmonous change, or (much less likely) duodenal or mesenteric mass   Consider endoscopy and possibly EUS, if warranted   Recommend   follow-up MRI in 3 months      2   Parenchymal heterogeneity within the pancreatic head, itself, has signal characteristics consistent with focal fatty infiltration   No suspicious pancreatic parenchymal

## 2018-11-27 NOTE — PROGRESS NOTES
Progress Note - Dionicio Quintanilla 58 y o  female MRN: [id number]    Unit/Bed#: [place] [address] -02 Encounter: [id number]         Assessment/ Plan:  Nausea/vomiting  Abdominal pain  Pancreatic mass    Patient was admitted with nausea, vomiting and abdominal pain  She was found to have an ill-defined mass in the head of the pancreas  She underwent MRCP for further evaluation which showed mild inflammatory changes around the duodenum and pancreaticoduodenal groove with a question of soft tissue fullness arising from the junction of the 2nd and 3rd portion of the duodenum, parenchymal heterogeneity within the pancreatic head consistent with fatty infiltration  Endoscopy and possibly EUS recommended  Her symptoms have improved with medication, however given findings on MRI could consider upper endoscopy in versus outpatient for further evaluation   -continue clear liquids and advance as tolerated  -consider EGD tomorrow      Subjective:   Patient complains of mild nausea but states overall she is feeling better  She has tolerated clear liquids  Objective:     Vitals: Blood pressure 118/65, pulse 80, temperature 99 3 °F (37 4 °C), temperature source Temporal, resp  rate 13, weight 76 7 kg (169 lb), SpO2 91 %  ,Body mass index is 31 93 kg/m²  Physical Exam: General appearance: alert and oriented, in no acute distress  Lungs: clear to auscultation bilaterally  Heart: regular rate and rhythm  Abdomen: Positive bowel sounds, soft, mildly tender to palpation epigastric  Skin: Skin color, texture, turgor normal  No rashes or lesions     Invasive Devices     Peripheral Intravenous Line            Peripheral IV 11/26/18 Right Antecubital 1 day                Lab, Imaging and other studies: I have personally reviewed pertinent reports       CBC:   Lab Results   Component Value Date    WBC 15 39 (H) 11/27/2018    HGB 12 6 11/27/2018    HCT 40 2 11/27/2018    MCV 95 11/27/2018     11/27/2018    MCH 29 9 11/27/2018 MCHC 31 3 (L) 11/27/2018    RDW 14 1 11/27/2018    MPV 9 4 11/27/2018   ,   CMP:   Lab Results   Component Value Date    K 3 6 11/27/2018     11/27/2018    CO2 23 11/27/2018    BUN 10 11/27/2018    CREATININE 0 66 11/27/2018    CALCIUM 8 4 11/27/2018    AST 18 11/27/2018    ALT 43 11/27/2018    ALKPHOS 71 11/27/2018    EGFR 94 11/27/2018   ,   Lipase:   Lab Results   Component Value Date    LIPASE 142 11/27/2018   ,

## 2018-11-27 NOTE — PROGRESS NOTES
Progress Note - Anabell Galindo 58 y o  female MRN: [id number]    Unit/Bed#: [place] [address] -02 Encounter: [id number]      Assessment/Plan:  1-abdominal pain:  Nausea/vomiting:  Patient presented to the ER complaining of a 2 day history of constant abdominal pain across her anterior abdomen with radiation to both flanks, and straight through to her lower back bilaterally  She also noted 2 days of constant nausea/vomiting               -on admission she had a CT scan of her abdomen pelvis did not reveal any distinct etiology for her symptoms  there was an ill-defined mass in the head of the pancreas  Patient's LFTs and lipase were essentially unremarkable               -patient's presentation is concerning for peptic ulcer disease, especially as she takes multiple NSAIDs daily  she was started on a proton pump inhibitor               -patient does not have any evidence of anemia  She denies any melena, hematochezia, or coffee-ground emesis  Will continue to monitor closely  -will advanced her diet today as tolerated  She is still not tolerating full p o   Will continue IV fluids for her dehydration  Continue antiemetics and analgesics      2-possible pancreatic lesion:  Patient's CT scan revealed an ill-defined lesion within the pancreatic head  Her lipase is unremarkable  -MRI result pending              -lipase is unremarkable  -CA 19-9 pending      3-dehydration/volume depletion:  Patient had not been able to tolerate any p o  Intake for the past 2 days prior to admission  She was clinically dehydrated with dry mucous membranes and an elevated BUN:  Creatinine ratio     -nausea/vomiting slightly improved today with IV antiemetics  Continue IV fluids, antiemetics, advanced diet as tolerated  Continue to monitor        4-intractable nausea/vomiting:  Patient relates she had 2 days of constant nausea/vomiting  her symptoms not improve with IV Zofran    Patient's changed IV Reglan with some relief  Continue to monitor and continue IV antiemetics as needed     5-leukocytosis:  Patient had a mild leukocytosis of 13 on admission, and increased at 15 3 today    -patient does not currently have any signs or symptoms of acute bacterial infection  Her urinalysis did not have evidence for UTI  Her CT scan of the abdomen/pelvis is not reveal evidence of acute infection  This is possibly secondary to stress response, or possibly an underlying viral gastroenteritis  Continue to monitor      6-chronic pain/arthritis:  Patient relates she has a history of chronic pain from arthritis for which she takes ibuprofen or Naprosyn multiple times a day, alternating  NSAIDs currently on hold due to concerns over possible peptic ulcer disease      7-family:   called patient's   Left a message to call my cell phone for update  VTE Pharmacologic Prophylaxis: Enoxaparin (Lovenox)  VTE Mechanical Prophylaxis: sequential compression device    Certification Statement: The patient will continue to require additional inpatient hospital stay due to need for further acute intervention for intractable nausea/vomiting, dehydration requiring IV fluids and IV antiemetics  Status: inpatient     Discussed with patient's nurse at the bedside  Discussed with GI team    ===================================================================    Subjective:  Patient relates that she feels slightly better today  She did have nausea in the evening requiring IV antiemetic  She notes she is tolerating very small amounts of clear liquid diet  She relates she has not had any vomiting this morning  She denies any current nausea at this time, but notes she has no appetite  She relates her abdominal pain is markedly improved  She has a slight discomfort in the epigastric region  She notes she does not currently have any pain in her flanks or her back  Patient denies any pain anywhere else    She denies any headache, chest pain, or any current back pain  She denies any shortness of breath or cough  Patient notes she has generalized weakness and fatigue  She denies any focal weakness  She notes she does feel slightly lightheaded when standing and walking  She notes overall she feels better than yesterday  Physical Exam:   Temp:  [98 3 °F (36 8 °C)-99 9 °F (37 7 °C)] 99 3 °F (37 4 °C)  HR:  [79-85] 80  Resp:  [13-18] 13  BP: (115-136)/(58-76) 118/65    Gen:  Pleasant, non-tachypnic, non-dyspnic  Conversant  Heart: regular rate and rhythm, S1S2 present, no murmur, rub or gallop  Lungs: clear to ausculatation bilaterally  No wheezing, crackles, or rhonchi  No accessory muscle use or respiratory distress  Abd: soft  Mildly tender with palpation in the epigastric and suprapubic region  No other tenderness elicited with palpation  non-distended  NABS, no guarding, rebound or peritoneal signs  Extremities: no clubbing, cyanosis or edema  2+pedal pulses bilaterally  Full range of motion  Neuro: awake, alert and oriented  Fluent and goal directed speech  Strength globally intact  Skin: warm and dry: no petechiae, purpura and rash  LABS:     Results from last 7 days  Lab Units 11/27/18  0436 11/26/18  0916   WBC Thousand/uL 15 39* 13 65*   HEMOGLOBIN g/dL 12 6 13 2   HEMATOCRIT % 40 2 41 6   PLATELETS Thousands/uL 236 259       Results from last 7 days  Lab Units 11/27/18  0436 11/26/18  0916   POTASSIUM mmol/L 3 6 3 6   CHLORIDE mmol/L 106 103   CO2 mmol/L 23 26   BUN mg/dL 10 15   CREATININE mg/dL 0 66 0 69   CALCIUM mg/dL 8 4 9 1       Hospital Data:  11/26 MRI abdomen:  Pending    11/26:  CT abdomen/pelvis:  Questionable ill-defined lesion within the pancreatic head   Given presence of normal pancreatic enzymes, consider MRI follow-up to exclude a mass lesion      Urinalysis:  negative nitrate/leukocyte esterase    0 WBC        ---------------------------------------------------------------------------------------------------------------  This note has been constructed using a voice recognition system

## 2018-11-27 NOTE — ANESTHESIA PREPROCEDURE EVALUATION
Review of Systems/Medical History  Patient summary reviewed  Chart reviewed      Cardiovascular  Negative cardio ROS    Pulmonary  Negative pulmonary ROS        GI/Hepatic    GERD , Pancreatic problem,        Negative  ROS        Endo/Other  Negative endo/other ROS      GYN       Hematology      Comment: leukocytosis Musculoskeletal    Arthritis     Neurology    Headaches,    Psychology   Negative psychology ROS              Physical Exam    Airway    Mallampati score: I  TM Distance: <3 FB  Neck ROM: full     Dental   No notable dental hx     Cardiovascular  Comment: Negative ROS, Rhythm: regular, Rate: normal, Cardiovascular exam normal    Pulmonary  Pulmonary exam normal     Other Findings        Anesthesia Plan  ASA Score- 2     Anesthesia Type- IV sedation with anesthesia with ASA Monitors  Additional Monitors:   Airway Plan:     Comment: Consent obtained and placed in chart 11/27/18  18:14 questions answered   Plan Factors-Patient not instructed to abstain from smoking on day of procedure  Patient did not smoke on day of surgery  Induction- intravenous  Postoperative Plan-     Informed Consent- Anesthetic plan and risks discussed with patient

## 2018-11-28 ENCOUNTER — ANESTHESIA (INPATIENT)
Dept: GASTROENTEROLOGY | Facility: HOSPITAL | Age: 63
DRG: 392 | End: 2018-11-28
Payer: MEDICARE

## 2018-11-28 LAB — CANCER AG19-9 SERPL-ACNC: 14 U/ML (ref 0–35)

## 2018-11-28 PROCEDURE — 0DB98ZX EXCISION OF DUODENUM, VIA NATURAL OR ARTIFICIAL OPENING ENDOSCOPIC, DIAGNOSTIC: ICD-10-PCS | Performed by: INTERNAL MEDICINE

## 2018-11-28 PROCEDURE — C9113 INJ PANTOPRAZOLE SODIUM, VIA: HCPCS | Performed by: INTERNAL MEDICINE

## 2018-11-28 PROCEDURE — 88305 TISSUE EXAM BY PATHOLOGIST: CPT | Performed by: PATHOLOGY

## 2018-11-28 PROCEDURE — 99232 SBSQ HOSP IP/OBS MODERATE 35: CPT | Performed by: INTERNAL MEDICINE

## 2018-11-28 PROCEDURE — 43239 EGD BIOPSY SINGLE/MULTIPLE: CPT | Performed by: INTERNAL MEDICINE

## 2018-11-28 RX ORDER — DICYCLOMINE HYDROCHLORIDE 10 MG/1
10 CAPSULE ORAL 4 TIMES DAILY PRN
Status: DISCONTINUED | OUTPATIENT
Start: 2018-11-28 | End: 2018-11-29 | Stop reason: HOSPADM

## 2018-11-28 RX ORDER — PROPOFOL 10 MG/ML
INJECTION, EMULSION INTRAVENOUS AS NEEDED
Status: DISCONTINUED | OUTPATIENT
Start: 2018-11-28 | End: 2018-11-28 | Stop reason: SURG

## 2018-11-28 RX ADMIN — ACETAMINOPHEN 650 MG: 325 TABLET, FILM COATED ORAL at 08:24

## 2018-11-28 RX ADMIN — PANTOPRAZOLE SODIUM 40 MG: 40 INJECTION, POWDER, FOR SOLUTION INTRAVENOUS at 08:25

## 2018-11-28 RX ADMIN — SODIUM CHLORIDE 100 ML/HR: 0.9 INJECTION, SOLUTION INTRAVENOUS at 22:51

## 2018-11-28 RX ADMIN — PANTOPRAZOLE SODIUM 40 MG: 40 INJECTION, POWDER, FOR SOLUTION INTRAVENOUS at 20:50

## 2018-11-28 RX ADMIN — PROPOFOL 200 MG: 10 INJECTION, EMULSION INTRAVENOUS at 14:55

## 2018-11-28 RX ADMIN — SODIUM CHLORIDE 100 ML/HR: 0.9 INJECTION, SOLUTION INTRAVENOUS at 03:43

## 2018-11-28 RX ADMIN — ACETAMINOPHEN 650 MG: 325 TABLET, FILM COATED ORAL at 19:12

## 2018-11-28 NOTE — OP NOTE
OPERATIVE REPORT  PATIENT NAME: Ghualm Soares    :  1955  MRN: [id number]  Pt Location: [facility] ROOM 01    SURGERY DATE: 2018    Surgeon(s) and Role:     * Rich Yadav DO - Primary    Preop Diagnosis:  Abdominal pain [R10 9]  Intractable vomiting with nausea, unspecified vomiting type [R11 2]    Post-Op Diagnosis Codes:     * Abdominal pain [R10 9]     * Intractable vomiting with nausea, unspecified vomiting type [R11 2]    Procedure(s) (LRB):  ESOPHAGOGASTRODUODENOSCOPY (EGD) with push enteroscopy and bx (N/A)    Specimen(s):  ID Type Source Tests Collected by Time Destination   1 : abnormal fold bx in 3rd part of duodenum Tissue Duodenum TISSUE EXAM Rich Yadav DO 2018 1500        Estimated Blood Loss:   Minimal    Drains:     Anesthesia Type:   IV Sedation with Anesthesia    Operative Indications:  Abdominal pain [R10 9]  Intractable vomiting with nausea, unspecified vomiting type [R11 2]      Operative Findings:    ESOPHAGOGASTRODUODENOSCOPY    PROCEDURE: EGD    SEDATION: Monitored anesthesia care, check anesthesia records    ASA Class: 2    INDICATIONS:  Abnormal imaging, nausea vomiting    CONSENT:  Informed consent was obtained for the procedure, including sedation after explaining the risks and benefits of the procedure  Risks including but not limited to bleeding, perforation, infection, and missed lesion  PREPARATION:   Telemetry, pulse oximetry, blood pressure were monitored throughout the procedure  Patient was identified by myself both verbally and by visual inspection of ID band  DESCRIPTION:   Patient was placed in the left lateral decubitus position and was sedated with the above medication  The gastroscope was introduced in to the oropharynx and the esophagus was intubated under direct visualization  Scope was passed down the esophagus up to 3rdnd part of the duodenum   A careful inspection was made as the gastroscope was withdrawn, including a retroflexed view of the stomach; findings and interventions are described below  FINDINGS:    #1  Esophagus- normal esophagus    #2  Stomach- normal stomach on direct and retroflexed views    #3  Duodenum- normal 1st and 2nd part of the duodenum, in the the 3rd part of the duodenum there was some fullness and enlarged folds seen without a distinct mass or lesion, this was biopsied with cold biopsy forceps         IMPRESSIONS:      Duodenal fullness and enlarged folds but without a distinct lesion, this was biopsied  RECOMMENDATIONS:     Await biopsy results  Follow-up as an outpatient after biopsy results for consideration of endoscopic ultrasound  COMPLICATIONS:  None; patient tolerated the procedure well            DISPOSITION: PACU           CONDITION: Stable          SIGNATURE: Sj Lee DO  DATE: November 28, 2018  TIME: 3:05 PM

## 2018-11-28 NOTE — PROGRESS NOTES
Progress Note - Bharath Olivas 58 y o  female MRN: [id number]    Unit/Bed#: [place] 2 -02 Encounter: [id number]      Assessment/Plan:  1-abdominal pain:  Nausea/vomiting:  Patient presented to the ER complaining of a 2 day history of constant abdominal pain across her anterior abdomen with radiation to both flanks, and straight through to her lower back bilaterally   She also noted 2 days of constant nausea/vomiting               -on admission she had a CT scan of her abdomen pelvis did not reveal any distinct etiology for her symptoms    there was an ill-defined mass in the head of the pancreas  Patient's LFTs and lipase were essentially unremarkable               -patient's presentation is concerning for peptic ulcer disease, especially as she takes multiple NSAIDs daily    she was started on a proton pump inhibitor               -YMZACH does not have any evidence of anemia   She denies any melena, hematochezia, or coffee-ground emesis   Will continue to monitor closely  -her MRI of the abdomen revealed mild inflammatory changes around the duodenum and cristofer pancreatic or duodenal groove with a questionable small focus of soft tissue fullness arising from the junction of the 2nd and 3rd portion of the duodenum  -patient is scheduled for EGD and possible biopsy today   -continue proton pump inhibitor     2-ruled out pancreatic lesion:  Patient's CT scan revealed an ill-defined lesion within the pancreatic head   Her lipase is unremarkable               -MRI noted focal fatty infiltrate within the pancreatic head  There is no evidence of suspicious pancreatic parenchymal mass  Patient's CA 19 9 was within normal limits  -patient is scheduled for an EGD with biopsy of the possible area of fullness around the duodenum  She may require further EUS and biopsy     3-dehydration/volume depletion:  Patient had not been able to tolerate any p o   Intake for the past 2 days prior to admission   She was clinically dehydrated with dry mucous membranes and an elevated BUN:  Creatinine ratio                -nausea/vomiting slightly improved with IV antiemetics  Patient however is not yet tolerating any significant solid intake  Continue IV fluids, antiemetics, advanced diet as tolerated  Continue to monitor        4-intractable nausea/vomiting:  Patient relates she had 2 days of constant nausea/vomiting    her symptoms not improve with IV Zofran  Patient's changed IV Reglan with some relief  Continue to monitor and continue IV antiemetics as needed      5-leukocytosis:  Patient had a mild leukocytosis of 13 on admission, and increased at 15 3 today               -patient does not currently have any signs or symptoms of acute bacterial infection  Her urinalysis did not have evidence for UTI  Her CT scan of the abdomen/pelvis is not reveal evidence of acute infection  This is possibly secondary to stress response, or possibly an underlying viral gastroenteritis    -Continue to monitor      6-chronic pain/arthritis:  Patient relates she has a history of chronic pain from arthritis for which she takes ibuprofen or Naprosyn multiple times a day, alternating   NSAIDs currently on hold due to concerns over possible peptic ulcer disease      7-family:   updated pt and  at bedside  Reviewed all test results and treatment plan    Discussed with patient's nurse     VTE Pharmacologic Prophylaxis: Enoxaparin (Lovenox)  VTE Mechanical Prophylaxis: sequential compression device    Certification Statement: The patient will continue to require additional inpatient hospital stay due to need for further acute intervention for IV fluids, IV antiemetics, EGD with biopsy    Status: inpatient     ===================================================================    Subjective:  Patient she continues to have pain in the epigastric region  She notes it is markedly improved since admission however    She denies any pain radiating through to her flanks or to her lower back  She notes she has only her chronic low back pain at this time  She denies any pain anywhere else  She denies any headache, chest pain  She denies any nausea or vomiting at this time  She relates she had nausea last evening that resolved with IV medication  Patient she tolerated only a scant amount of p o  Yesterday which included pudding  She has not tolerated any solid diet yet  The patient denies any shortness of breath, chest congestion or cough  She denies any dizziness or lightheadedness  She notes she has had a mild headache since admission  She relates she however drink copious amounts of coffee daily and this might be a caffeine withdrawal headache  She denies any dizziness or lightheadedness  She denies any other complaints  Physical Exam:   Temp:  [98 2 °F (36 8 °C)-99 4 °F (37 4 °C)] 98 2 °F (36 8 °C)  HR:  [80-85] 80  Resp:  [16-17] 17  BP: (113-121)/(65-73) 121/73    Gen:  Pleasant, non-tachypnic, non-dyspnic  Conversant  Heart: regular rate and rhythm, S1S2 present, no murmur, rub or gallop  Lungs: clear to ausculatation bilaterally  No wheezing, crackles, or rhonchi  No accessory muscle use or respiratory distress  Abd: soft  Mildly tender with palpation in the epigastric region and bilateral upper quadrants  No other tenderness elicited  , non-distended  NABS, no guarding, rebound or peritoneal signs  Extremities: no clubbing, cyanosis or edema  2+pedal pulses bilaterally  Full range of motion  Neuro: awake, alert and oriented  Cranial nerves 2-12 intact  Strength grossly intact  Fluent speech  Skin: warm and dry: no petechiae, purpura and rash      LABS:     Results from last 7 days  Lab Units 11/27/18 0436 11/26/18  0916   WBC Thousand/uL 15 39* 13 65*   HEMOGLOBIN g/dL 12 6 13 2   HEMATOCRIT % 40 2 41 6   PLATELETS Thousands/uL 236 259       Results from last 7 days  Lab Units 11/27/18 0436 11/26/18  0916   POTASSIUM mmol/L 3 6 3 6   CHLORIDE mmol/L 106 103   CO2 mmol/L 23 26   BUN mg/dL 10 15   CREATININE mg/dL 0 66 0 69   CALCIUM mg/dL 8 4 9 1       Hospital Data:    11/26 MRI abdomen:  "1   Mild inflammatory changes around the duodenum and pancreaticoduodenal groove   There is questionable small focus of soft tissue fullness arising from the junction of the 2nd and 3rd portion of the duodenum   Given that the patient had a normal   lipase, inflammatory findings may suggest duodenal diverticulitis, duodenitis without diverticulum, paraduodenal phlegmonous change, or (much less likely) duodenal or mesenteric mass   Consider endoscopy and possibly EUS, if warranted   Recommend   follow-up MRI in 3 months  2   Parenchymal heterogeneity within the pancreatic head, itself, has signal characteristics consistent with focal fatty infiltration   No suspicious pancreatic parenchymal masses  "     11/26:  CT abdomen/pelvis:  "Questionable ill-defined lesion within the pancreatic head   Given presence of normal pancreatic enzymes, consider MRI follow-up to exclude a mass lesio"        Urinalysis:  negative nitrate/leukocyte esterase   0 WBC        ---------------------------------------------------------------------------------------------------------------  This note has been constructed using a voice recognition system

## 2018-11-28 NOTE — PROGRESS NOTES
Progress Note- Chelsey Martin 58 y o  female MRN: [id number]    Unit/Bed#: ENDO POOL Encounter: [id number]      Assessment and Plan:    58-year-old female with abdominal pain, nausea, and vomiting  She has had a CT scan and MRI  There has been some discrepancy in the reading  We will do an EGD with small bowel enteroscopy today to assess D2 and D3  She will follow up as an outpatient as if upper endoscopy negative will consider EUS  Can likely be discharged home today after procedure     ______________________________________________________________________    Subjective:     Patient is symptomatically improving  She was NPO for procedure today  Medication Administration - last 24 hours from 11/27/2018 1446 to 11/28/2018 1446       Date/Time Order Dose Route Action Action by     11/28/2018 0343 sodium chloride 0 9 % infusion 100 mL/hr Intravenous New [id number] [place] [address], RN     11/27/2018 1758 sodium chloride 0 9 % infusion 100 mL/hr Intravenous New [facility], RN     11/28/2018 0825 pantoprazole (PROTONIX) injection 40 mg 40 mg Intravenous Given Johnnie Chance RN     11/27/2018 2115 pantoprazole (PROTONIX) injection 40 mg 40 mg Intravenous Given Sara Mares RN     11/28/2018 0818 enoxaparin (LOVENOX) subcutaneous injection 40 mg 40 mg Subcutaneous Not Given Johnnie Chance RN     11/27/2018 1758 metoclopramide (REGLAN) injection 10 mg 10 mg Intravenous Given Johnnie Chance RN     11/28/2018 0879 acetaminophen (TYLENOL) tablet 650 mg 650 mg Oral Given Johnnie Chance RN     11/27/2018 9669 acetaminophen (TYLENOL) tablet 650 mg 650 mg Oral Given Johnnie Chance RN          Objective:     Vitals: Blood pressure 134/71, pulse 75, temperature 99 2 °F (37 3 °C), temperature source Temporal, resp  rate 20, height 5' 2" (1 575 m), weight 76 7 kg (169 lb 1 5 oz), SpO2 96 %  ,Body mass index is 30 93 kg/m²        Intake/Output Summary (Last 24 hours) at 11/28/18 7678  Last data filed at 11/28/18 0343   Gross per 24 hour   Intake              990 ml   Output                0 ml   Net              990 ml       Physical Exam:   General Appearance: Awake and alert, in no acute distress  Abdomen: Soft, non-tender, non-distended; bowel sounds normal; no masses or no organomegaly    Invasive Devices     Peripheral Intravenous Line            Peripheral IV 11/28/18 Left Antecubital less than 1 day                Lab Results:  Admission on 11/26/2018   Component Date Value    WBC 11/26/2018 13 65*    RBC 11/26/2018 4 43     Hemoglobin 11/26/2018 13 2     Hematocrit 11/26/2018 41 6     MCV 11/26/2018 94     MCH 11/26/2018 29 8     MCHC 11/26/2018 31 7     RDW 11/26/2018 13 5     MPV 11/26/2018 9 1     Platelets 92/33/6163 259     nRBC 11/26/2018 0     Neutrophils Relative 11/26/2018 88*    Immat GRANS % 11/26/2018 0     Lymphocytes Relative 11/26/2018 6*    Monocytes Relative 11/26/2018 6     Eosinophils Relative 11/26/2018 0     Basophils Relative 11/26/2018 0     Neutrophils Absolute 11/26/2018 11 99*    Immature Grans Absolute 11/26/2018 0 05     Lymphocytes Absolute 11/26/2018 0 75     Monocytes Absolute 11/26/2018 0 77     Eosinophils Absolute 11/26/2018 0 04     Basophils Absolute 11/26/2018 0 05     Protime 11/26/2018 13 3     INR 11/26/2018 1 00     PTT 11/26/2018 32     Sodium 11/26/2018 139     Potassium 11/26/2018 3 6     Chloride 11/26/2018 103     CO2 11/26/2018 26     ANION GAP 11/26/2018 10     BUN 11/26/2018 15     Creatinine 11/26/2018 0 69     Glucose 11/26/2018 120     Calcium 11/26/2018 9 1     AST 11/26/2018 27     ALT 11/26/2018 64     Alkaline Phosphatase 11/26/2018 72     Total Protein 11/26/2018 7 4     Albumin 11/26/2018 3 3*    Total Bilirubin 11/26/2018 0 39     eGFR 11/26/2018 92     Lipase 11/26/2018 339     Magnesium 11/26/2018 1 9     pH, Colt 11/26/2018 7 370     pCO2, Colt 11/26/2018 44 5     pO2, Colt 11/26/2018 33 3*    HCO3, Colt 11/26/2018 25 1     Base Excess, Colt 11/26/2018 -0 4     O2 Content, Colt 11/26/2018 12 3     O2 HGB, VENOUS 11/26/2018 67 3     Color, UA 11/26/2018 yellow     Color, UA 11/26/2018 Yellow     Clarity, UA 11/26/2018 Clear     pH, UA 11/26/2018 7 0     Leukocytes, UA 11/26/2018 Negative     Nitrite, UA 11/26/2018 Negative     Protein, UA 11/26/2018 Negative     Glucose, UA 11/26/2018 Negative     Ketones, UA 11/26/2018 40 (2+)*    Urobilinogen, UA 11/26/2018 0 2     Bilirubin, UA 11/26/2018 Negative     Blood, UA 11/26/2018 Trace*    Specific Gravity, UA 11/26/2018 1 010     RBC, UA 11/26/2018 2-4*    WBC, UA 11/26/2018 None Seen     Epithelial Cells 11/26/2018 None Seen     Bacteria, UA 11/26/2018 None Seen     Sodium 11/27/2018 141     Potassium 11/27/2018 3 6     Chloride 11/27/2018 106     CO2 11/27/2018 23     ANION GAP 11/27/2018 12     BUN 11/27/2018 10     Creatinine 11/27/2018 0 66     Glucose 11/27/2018 107     Calcium 11/27/2018 8 4     eGFR 11/27/2018 94     WBC 11/27/2018 15 39*    RBC 11/27/2018 4 22     Hemoglobin 11/27/2018 12 6     Hematocrit 11/27/2018 40 2     MCV 11/27/2018 95     MCH 11/27/2018 29 9     MCHC 11/27/2018 31 3*    RDW 11/27/2018 14 1     Platelets 49/27/0762 236     MPV 11/27/2018 9 4     Total Bilirubin 11/27/2018 0 42     Bilirubin, Direct 11/27/2018 0 18     Alkaline Phosphatase 11/27/2018 71     AST 11/27/2018 18     ALT 11/27/2018 43     Total Protein 11/27/2018 6 7     Albumin 11/27/2018 2 7*    Lipase 11/27/2018 142     CA 19-9 11/27/2018 14        Imaging Studies: I have personally reviewed pertinent imaging studies

## 2018-11-29 VITALS
BODY MASS INDEX: 31.12 KG/M2 | DIASTOLIC BLOOD PRESSURE: 74 MMHG | HEIGHT: 62 IN | WEIGHT: 169.09 LBS | HEART RATE: 68 BPM | TEMPERATURE: 98.6 F | RESPIRATION RATE: 18 BRPM | OXYGEN SATURATION: 94 % | SYSTOLIC BLOOD PRESSURE: 125 MMHG

## 2018-11-29 PROBLEM — K86.89 PANCREATIC MASS: Status: RESOLVED | Noted: 2018-11-26 | Resolved: 2018-11-29

## 2018-11-29 PROBLEM — R10.9 ABDOMINAL PAIN: Status: RESOLVED | Noted: 2018-11-26 | Resolved: 2018-11-29

## 2018-11-29 PROBLEM — K31.9: Status: ACTIVE | Noted: 2018-11-29

## 2018-11-29 PROBLEM — E86.0 DEHYDRATION: Status: RESOLVED | Noted: 2018-11-26 | Resolved: 2018-11-29

## 2018-11-29 PROBLEM — R11.2 INTRACTABLE VOMITING WITH NAUSEA: Status: RESOLVED | Noted: 2018-11-26 | Resolved: 2018-11-29

## 2018-11-29 PROBLEM — D72.829 LEUKOCYTOSIS: Status: RESOLVED | Noted: 2018-11-26 | Resolved: 2018-11-29

## 2018-11-29 LAB
ANION GAP SERPL CALCULATED.3IONS-SCNC: 11 MMOL/L (ref 4–13)
BUN SERPL-MCNC: 7 MG/DL (ref 5–25)
CALCIUM SERPL-MCNC: 8.2 MG/DL (ref 8.3–10.1)
CHLORIDE SERPL-SCNC: 110 MMOL/L (ref 100–108)
CO2 SERPL-SCNC: 22 MMOL/L (ref 21–32)
CREAT SERPL-MCNC: 0.63 MG/DL (ref 0.6–1.3)
ERYTHROCYTE [DISTWIDTH] IN BLOOD BY AUTOMATED COUNT: 13.8 % (ref 11.6–15.1)
GFR SERPL CREATININE-BSD FRML MDRD: 95 ML/MIN/1.73SQ M
GLUCOSE SERPL-MCNC: 106 MG/DL (ref 65–140)
HCT VFR BLD AUTO: 34.3 % (ref 34.8–46.1)
HGB BLD-MCNC: 10.8 G/DL (ref 11.5–15.4)
MCH RBC QN AUTO: 29.4 PG (ref 26.8–34.3)
MCHC RBC AUTO-ENTMCNC: 31.5 G/DL (ref 31.4–37.4)
MCV RBC AUTO: 94 FL (ref 82–98)
PLATELET # BLD AUTO: 227 THOUSANDS/UL (ref 149–390)
PMV BLD AUTO: 9.3 FL (ref 8.9–12.7)
POTASSIUM SERPL-SCNC: 3 MMOL/L (ref 3.5–5.3)
RBC # BLD AUTO: 3.67 MILLION/UL (ref 3.81–5.12)
SODIUM SERPL-SCNC: 143 MMOL/L (ref 136–145)
WBC # BLD AUTO: 9.26 THOUSAND/UL (ref 4.31–10.16)

## 2018-11-29 PROCEDURE — 80048 BASIC METABOLIC PNL TOTAL CA: CPT | Performed by: INTERNAL MEDICINE

## 2018-11-29 PROCEDURE — C9113 INJ PANTOPRAZOLE SODIUM, VIA: HCPCS | Performed by: INTERNAL MEDICINE

## 2018-11-29 PROCEDURE — 85027 COMPLETE CBC AUTOMATED: CPT | Performed by: INTERNAL MEDICINE

## 2018-11-29 PROCEDURE — 90682 RIV4 VACC RECOMBINANT DNA IM: CPT | Performed by: INTERNAL MEDICINE

## 2018-11-29 PROCEDURE — 99239 HOSP IP/OBS DSCHRG MGMT >30: CPT | Performed by: INTERNAL MEDICINE

## 2018-11-29 RX ORDER — ACETAMINOPHEN 325 MG/1
650 TABLET ORAL EVERY 6 HOURS PRN
Qty: 30 TABLET | Refills: 0 | Status: SHIPPED | OUTPATIENT
Start: 2018-11-29

## 2018-11-29 RX ORDER — PANTOPRAZOLE SODIUM 40 MG/1
40 TABLET, DELAYED RELEASE ORAL
Qty: 30 TABLET | Refills: 0 | Status: SHIPPED | OUTPATIENT
Start: 2018-11-29 | End: 2019-01-03 | Stop reason: SDUPTHER

## 2018-11-29 RX ORDER — PANTOPRAZOLE SODIUM 40 MG/1
40 TABLET, DELAYED RELEASE ORAL
Status: DISCONTINUED | OUTPATIENT
Start: 2018-11-29 | End: 2018-11-29 | Stop reason: HOSPADM

## 2018-11-29 RX ORDER — POTASSIUM CHLORIDE 20 MEQ/1
40 TABLET, EXTENDED RELEASE ORAL ONCE
Status: COMPLETED | OUTPATIENT
Start: 2018-11-29 | End: 2018-11-29

## 2018-11-29 RX ORDER — ONDANSETRON 4 MG/1
4 TABLET, ORALLY DISINTEGRATING ORAL EVERY 6 HOURS PRN
Qty: 10 TABLET | Refills: 0 | Status: SHIPPED | OUTPATIENT
Start: 2018-11-29 | End: 2019-10-15

## 2018-11-29 RX ADMIN — ACETAMINOPHEN 650 MG: 325 TABLET, FILM COATED ORAL at 09:14

## 2018-11-29 RX ADMIN — SODIUM CHLORIDE 100 ML/HR: 0.9 INJECTION, SOLUTION INTRAVENOUS at 09:22

## 2018-11-29 RX ADMIN — POTASSIUM CHLORIDE 40 MEQ: 1500 TABLET, EXTENDED RELEASE ORAL at 11:26

## 2018-11-29 RX ADMIN — INFLUENZA A VIRUS A/MICHIGAN/45/2015 (H1N1) RECOMBINANT HEMAGGLUTININ ANTIGEN, INFLUENZA A VIRUS A/SINGAPORE/INFIMH-16-0019/2016 (H3N2) RECOMBINANT HEMAGGLUTININ ANTIGEN, INFLUENZA B VIRUS B/MARYLAND/15/2016 RECOMBINANT HEMAGGLUTININ ANTIGEN, AND INFLUENZA B VIRUS B/PHUKET/3073/2013 RECOMBINANT HEMAGGLUTININ ANTIGEN 0.5 ML: 45; 45; 45; 45 INJECTION INTRAMUSCULAR at 11:27

## 2018-11-29 RX ADMIN — ENOXAPARIN SODIUM 40 MG: 40 INJECTION SUBCUTANEOUS at 09:14

## 2018-11-29 RX ADMIN — PANTOPRAZOLE SODIUM 40 MG: 40 INJECTION, POWDER, FOR SOLUTION INTRAVENOUS at 09:14

## 2018-11-29 NOTE — DISCHARGE SUMMARY
Discharge Summary - [address] [place] [address] 58 y o  female MRN: [id number]    [facility] 67 2 MED SURG Room / Bed: [place] [address] 2 [facility] 2 M* Encounter: [id number]    BRIEF OVERVIEW      Admission Date: 11/26/2018       Discharge Date:  11/29/2018    Primary Diagnoses  Principal Problem:    Abdominal pain  Active Problems:    Headache, migraine    Low back pain    GERD (gastroesophageal reflux disease)    Arthritis    Pancreatic mass    Intractable vomiting with nausea    Leukocytosis    Dehydration  Resolved Problems:    * No resolved hospital problems  *      Service:  [facility] Internal Medicine, [facility]  Consulting Providers   GI: Dr Tj Marrero    Procedures Performed   #1  Esophagus- normal esophagus  #2  Stomach- normal stomach on direct and retroflexed views  #3  Duodenum- normal 1st and 2nd part of the duodenum, in the the 3rd part of the duodenum there was some fullness and enlarged folds seen without a distinct mass or lesion, this was biopsied with cold biopsy forceps    Hospital Studies:  11/26 MRI abdomen:  "1   Mild inflammatory changes around the duodenum and pancreaticoduodenal groove   There is questionable small focus of soft tissue fullness arising from the junction of the 2nd and 3rd portion of the duodenum   Given that the patient had a normal   lipase, inflammatory findings may suggest duodenal diverticulitis, duodenitis without diverticulum, paraduodenal phlegmonous change, or (much less likely) duodenal or mesenteric mass   Consider endoscopy and possibly EUS, if warranted   Recommend   follow-up MRI in 3 months  2   Parenchymal heterogeneity within the pancreatic head, itself, has signal characteristics consistent with focal fatty infiltration   No suspicious pancreatic parenchymal masses  "     11/26:  CT abdomen/pelvis:  "Questionable ill-defined lesion within the pancreatic head   Given presence of normal pancreatic enzymes, consider MRI follow-up to exclude a mass lesio"        Urinalysis:  negative nitrate/leukocyte esterase   0 WBC    Results from last 7 days  Lab Units 11/29/18  0533 11/27/18  0436 11/26/18  0916   WBC Thousand/uL 9 26 15 39* 13 65*   HEMOGLOBIN g/dL 10 8* 12 6 13 2   HEMATOCRIT % 34 3* 40 2 41 6   PLATELETS Thousands/uL 227 236 259       Results from last 7 days  Lab Units 11/29/18  0533 11/27/18  0436 11/26/18  0916   POTASSIUM mmol/L 3 0* 3 6 3 6   CHLORIDE mmol/L 110* 106 103   CO2 mmol/L 22 23 26   BUN mg/dL 7 10 15   CREATININE mg/dL 0 63 0 66 0 69   CALCIUM mg/dL 8 2* 8 4 9 1       History and Physical Exam:  Please refer to the Admission H&P note    Hospital Course by Problem  1-abdominal pain:  Nausea/vomiting:  Patient presented to the ER complaining of a 2 day history of constant abdominal pain across her anterior abdomen with radiation to both flanks, and straight through to her lower back bilaterally   She also noted 2 days of constant nausea/vomiting               -on admission she had a CT scan of her abdomen pelvis did not reveal any distinct etiology for her symptoms  Ma Fer was an ill-defined mass in the head of the pancreas   Patient's LFTs and lipase were essentially unremarkable               -patient's presentation was concerning for peptic ulcer disease, especially as she takes multiple NSAIDs daily  Diptisonia Landeros was started on a proton pump inhibitor               -BQDJQKE did not have any evidence of anemia   She denies any melena, hematochezia, or coffee-ground emesis              -her MRI of the abdomen revealed mild inflammatory changes around the duodenum and cristofer pancreatic or duodenal groove with a questionable small focus of soft tissue fullness arising from the junction of the 2nd and 3rd portion of the duodenum               -she underwent an EGD that revealed duodenal fullness and enlarged falls, but no distinct lesion  This area was biopsied   There are no abnormalities in the esophagus or stomach  She will follow up with the GI service in the office               -continue proton pump inhibitor   -patient's epigastric pain has markedly improved  She is tolerating a solid diet  Her previous nausea and vomiting have completely resolved  Patient be discharged home to continue outpatient follow-up with the GI team   She was instructed to avoid all NSAIDs, and continue a proton pump inhibitor  She will be given a prescription for Zofran ODT, if needed  It is that patient's epigastric pain and intractable nausea/vomiting were possibly due to a viral gastroenteritis, especially as she has transient leukocytosis associated  All symptoms have markedly improved and she will be discharged home to continue follow-up as an outpatient     2-ruled out pancreatic lesion:  Patient's CT scan revealed an ill-defined lesion within the pancreatic head   Her lipase is unremarkable               -MRI noted focal fatty infiltrate within the pancreatic head  There is no evidence of suspicious pancreatic parenchymal mass  Patient's CA 19 9 was within normal limits              -HHBMPRP was noted to have areas of thickening around the duodenum  She underwent an EGD and had biopsy of these areas  There are no discrete lesions or masses noted  She will follow up with the GI team in the office  She may require further EUS and biopsy     3-status post dehydration/volume depletion:  Patient had not been able to tolerate any p o  Intake for the past 2 days prior to admission   She was clinically dehydrated with dry mucous membranes and an elevated BUN:  Creatinine ratio                -TAMEKA was admitted, placed on IV fluids, as well as IV antiemetics  She has markedly improved  She notes her previous nausea and vomiting have resolved    She has tolerated a solid diet, and her previous dehydration has resolved      4-status post intractable nausea/vomiting:  Patient relates she had 2 days of constant nausea/vomiting    Patient was given IV fluids, and antiemetics and her symptoms have resolved  She is tolerating a solid diet  She will be discharged home      5-status post leukocytosis:  Patient had a mild leukocytosis of 13 on admission, and that increased to 15  3               -BWJMLMB does not currently have any signs or symptoms of acute bacterial infection   Her urinalysis did not have evidence for UTI   Her CT scan of the abdomen/pelvis is not reveal evidence of acute infection  patient was treated with supportive measures and monitor off antibiotics  Her leukocytosis normalized prior to discharge   The it was most likely secondary to stress response, or possibly an underlying viral gastroenteritis         6-chronic pain/arthritis:  Patient relates she has a history of chronic pain from arthritis for which she takes ibuprofen or Naprosyn multiple times a day, alternating   NSAIDs currently on hold due to concerns over possible peptic ulcer disease  7-hypokalemia:  Due to previous nausea/vomiting  Will replete  Patient currently tolerating a solid diet      8-family:   updated pt and  at bedside  Reviewed all test results and treatment plan     Discussed with patient's nurse     VTE Pharmacologic Prophylaxis: Enoxaparin (Lovenox)  VTE Mechanical Prophylaxis: sequential compression device    Discharge Condition: Improved  Discharge Disposition:  Home    Discharge Note and Physical Exam:   Patient relates she feels much better  She notes her abdominal pain has markedly improved  She notes her epigastric pain has almost completely resolved  It is much improved  She notes she is tolerating p o  Without any worsening pain  She relates her previous nausea and vomiting have completely resolved  She has tolerated a solid diet without any difficulties  She denies any diarrhea  She denies any current pain anywhere at all  She denies any shortness of breath, or chest congestion    She is ambulating  Denies any dizziness or lightheadedness  She relates she feels markedly improved, and is eager for discharge home today  Vitals:    11/29/18 0738   BP: 125/74   Pulse: 68   Resp: 18   Temp: 98 6 °F (37 °C)   SpO2: 94%     General:  Pleasant, non-tachypnic, non-dyspnic  Conversant   at the bedside  Heart: Regular rate and rhythm, S1S2 present  No murmur, rub or gallop  Lungs: Clear to auscultation bilaterally, no wheezing, rhonchi, or crackles  Good air movement  No accessory muscle use or respiratory distress  Abdomen: soft, non-tender with palpation, non-distended, NABS  No rebound or guarding  No mass or peritoneal signs  Extremities: no clubbing, cyanosis or edema  2+ pedal pulses bilaterally  Neurologic:  Awake and alert  Oriented  Fluent and goal directed speech  Moving all 4 extremities     Skin: warm and dry  No petechiae, purpura or rash  Discharge Medications   Please see Medical Reconciliation Discharge Form    Discharge Follow Up Appointments:   PCP: 1 week  Dr Prince Diagonal: 2 weeks    Discharge  Statement   Total Time Spent today including physical exam, discussion with patient and , her nurse, and discharge arrangements/care = 40 minutes      This note has been constructed using a voice recognition system

## 2018-11-29 NOTE — DISCHARGE INSTRUCTIONS
============================================  Discharge instructions:    Please take your medication as directed  Please avoid all NSAIDs such as ibuprofen, Advil, Aleve, Motrin, Naprosyn, as well as aspirin, as they can upset your stomach  You may take Tylenol as directed for pain  May continue your Maxalt for your migraines, as directed  Please see your family doctor in 1 week for a checkup as well as the GI doctor in approximately 2 weeks for a checkup, as well as to review your biopsy results  Please return to the ER with any problems at all, especially with any fever or chills, pain, nausea, vomiting, diarrhea, dizziness, lightheadedness, or any other problems at all

## 2018-11-29 NOTE — NURSING NOTE
Pt was given discharge instructions, denied any questions at the time  IV was pulled   Pt ambulated to the vehicle with spouse

## 2018-12-04 ENCOUNTER — OFFICE VISIT (OUTPATIENT)
Dept: FAMILY MEDICINE CLINIC | Facility: CLINIC | Age: 63
End: 2018-12-04
Payer: MEDICARE

## 2018-12-04 VITALS
OXYGEN SATURATION: 95 % | WEIGHT: 164.8 LBS | DIASTOLIC BLOOD PRESSURE: 80 MMHG | HEART RATE: 83 BPM | BODY MASS INDEX: 31.11 KG/M2 | SYSTOLIC BLOOD PRESSURE: 124 MMHG | HEIGHT: 61 IN | TEMPERATURE: 98.4 F

## 2018-12-04 DIAGNOSIS — Z12.39 SCREENING FOR BREAST CANCER: ICD-10-CM

## 2018-12-04 DIAGNOSIS — E78.5 HYPERLIPIDEMIA, UNSPECIFIED HYPERLIPIDEMIA TYPE: ICD-10-CM

## 2018-12-04 DIAGNOSIS — K21.9 GASTROESOPHAGEAL REFLUX DISEASE WITHOUT ESOPHAGITIS: ICD-10-CM

## 2018-12-04 DIAGNOSIS — G43.909 MIGRAINE WITHOUT STATUS MIGRAINOSUS, NOT INTRACTABLE, UNSPECIFIED MIGRAINE TYPE: Primary | ICD-10-CM

## 2018-12-04 DIAGNOSIS — K31.9: ICD-10-CM

## 2018-12-04 DIAGNOSIS — Z11.59 NEED FOR HEPATITIS C SCREENING TEST: ICD-10-CM

## 2018-12-04 PROCEDURE — 99204 OFFICE O/P NEW MOD 45 MIN: CPT | Performed by: FAMILY MEDICINE

## 2018-12-04 RX ORDER — IBUPROFEN 800 MG/1
TABLET ORAL EVERY 8 HOURS PRN
COMMUNITY

## 2018-12-04 RX ORDER — NAPROXEN 500 MG/1
TABLET ORAL EVERY 12 HOURS
COMMUNITY
End: 2019-05-02 | Stop reason: ALTCHOICE

## 2018-12-04 NOTE — PATIENT INSTRUCTIONS
She appears to have viral URI symptoms as well  Recommended use of Mucinex DM and increased hydration  There is a new shingles vaccine available called Shingrix  It is recommended after age 48, even if you have already had the Zostavax shingles vaccine  First you should contact your insurance to make sure it is covered, and then you can go to the pharmacy for the vaccine  There are 2 doses of vaccine  by 2-6 months

## 2018-12-04 NOTE — PROGRESS NOTES
Assessment/Plan:    She appears to have viral URI symptoms as well  Recommended use of Mucinex DM and increased hydration  Migraine headache  Migraines are well controlled with use of Maxalt as needed  Duodenum disorder  Recent biopsy results revealed duodenitis, but no other abnormalities  Urged her to try to avoid ibuprofen and naproxen as much as possible  Schedule GI follow-up appointment  Esophageal reflux  Stable on protonix  Hyperlipidemia  Patient had a remote history of hyperlipidemia  We will check fasting lipid profile  Diagnoses and all orders for this visit:    Migraine without status migrainosus, not intractable, unspecified migraine type    Duodenum disorder    Gastroesophageal reflux disease without esophagitis    Hyperlipidemia, unspecified hyperlipidemia type  -     Lipid panel; Future  -     Comprehensive metabolic panel; Future    Screening for breast cancer  -     Mammo screening bilateral w 3d & cad; Future    Need for hepatitis C screening test  -     Hepatitis C antibody; Future    Other orders  -     naproxen (NAPROSYN) 500 mg tablet; Every 12 hours    -     ibuprofen (MOTRIN) 800 mg tablet; every 8 (eight) hours            Subjective:      Patient ID: Moira Dickerson is a 58 y o  female  First visit for pt to establish care  She is here with her   PMH migraine headaches, remote hx anemia  Hospital admission 11/26-11/29 for abdominal pain and nausea and vomiting  Eventually she was diagnosed with probable viral gastroenteritis  At 1st the CT scan showed a pancreatic mass, but then any pathology was ruled out with a repeat study  Thankfully her abdominal pain, nausea, vomiting and reflux symptoms have essentially resolved  She is taking the pantoprazole  40 mg every day  And she and her  have lived in [facility] for over 20 years, but she previously worked in [place] for the [employer]    Her former medical providers were located in [facility] because of her health insurance  She was seeing a neurologist in [place], but recently was told she does not have to come back unless she is having further problems  She has 1 son who lives [place]  Surgery cataracts 2015,  Age 22 ulnar nerve surgery  No residual but bilateral thumb arthritis  Recently she has had milder headaches which she thinks were related to caffeine withdrawal   She usually takes Maxalt for full-blown migraine and this is very effective  The following portions of the patient's history were reviewed and updated as appropriate: allergies, current medications, past family history, past medical history, past social history, past surgical history and problem list     Review of Systems   Constitutional: Negative for activity change, chills, fatigue and fever  HENT: Positive for congestion  Negative for ear pain, sinus pressure and sore throat  Eyes: Negative for pain and visual disturbance  Respiratory: Positive for cough  Negative for chest tightness, shortness of breath and wheezing  Cardiovascular: Negative for chest pain, palpitations and leg swelling  Gastrointestinal: Negative for abdominal pain, blood in stool, constipation, diarrhea, nausea and vomiting  Endocrine: Negative for polydipsia and polyuria  Genitourinary: Negative for difficulty urinating, dysuria, frequency and urgency  Musculoskeletal: Negative for arthralgias, joint swelling and myalgias  Skin: Negative for rash  Neurological: Positive for headaches  Negative for dizziness, weakness and numbness  Hematological: Negative for adenopathy  Does not bruise/bleed easily  Psychiatric/Behavioral: Negative for dysphoric mood  The patient is not nervous/anxious            Objective:      /80   Pulse 83   Temp 98 4 °F (36 9 °C) (Tympanic)   Ht 5' 0 5" (1 537 m)   Wt 74 8 kg (164 lb 12 8 oz)   SpO2 95%   BMI 31 66 kg/m²          Physical Exam   Constitutional: She is oriented to person, place, and time  She appears well-developed and well-nourished  No distress  HENT:   Head: Normocephalic and atraumatic  Right Ear: External ear normal    Left Ear: External ear normal    Mouth/Throat: Oropharynx is clear and moist    Mild mucosal edema  Eyes: Pupils are equal, round, and reactive to light  Conjunctivae and EOM are normal  No scleral icterus  Neck: Normal range of motion  Neck supple  No thyromegaly present  Cardiovascular: Normal rate, regular rhythm and normal heart sounds  No murmur heard  Pulmonary/Chest: Effort normal and breath sounds normal  She has no wheezes  She has no rales  Occasional coughing  Abdominal: Soft  Bowel sounds are normal  She exhibits no mass  There is no tenderness  Musculoskeletal: She exhibits no tenderness or deformity  Lymphadenopathy:     She has no cervical adenopathy  Neurological: She is alert and oriented to person, place, and time  No cranial nerve deficit  Skin: Skin is warm and dry  No rash noted  No erythema  Psychiatric: She has a normal mood and affect  Her behavior is normal    Nursing note and vitals reviewed

## 2018-12-04 NOTE — ASSESSMENT & PLAN NOTE
Recent biopsy results revealed duodenitis, but no other abnormalities  Urged her to try to avoid ibuprofen and naproxen as much as possible  Schedule GI follow-up appointment

## 2018-12-11 ENCOUNTER — APPOINTMENT (OUTPATIENT)
Dept: LAB | Facility: HOSPITAL | Age: 63
End: 2018-12-11
Attending: FAMILY MEDICINE
Payer: MEDICARE

## 2018-12-11 DIAGNOSIS — Z11.59 NEED FOR HEPATITIS C SCREENING TEST: ICD-10-CM

## 2018-12-11 DIAGNOSIS — E78.5 HYPERLIPIDEMIA, UNSPECIFIED HYPERLIPIDEMIA TYPE: ICD-10-CM

## 2018-12-11 LAB
ALBUMIN SERPL BCP-MCNC: 3.2 G/DL (ref 3.5–5)
ALP SERPL-CCNC: 97 U/L (ref 46–116)
ALT SERPL W P-5'-P-CCNC: 33 U/L (ref 12–78)
ANION GAP SERPL CALCULATED.3IONS-SCNC: 10 MMOL/L (ref 4–13)
AST SERPL W P-5'-P-CCNC: 16 U/L (ref 5–45)
BILIRUB SERPL-MCNC: 0.38 MG/DL (ref 0.2–1)
BUN SERPL-MCNC: 19 MG/DL (ref 5–25)
CALCIUM SERPL-MCNC: 9.7 MG/DL (ref 8.3–10.1)
CHLORIDE SERPL-SCNC: 103 MMOL/L (ref 100–108)
CHOLEST SERPL-MCNC: 240 MG/DL (ref 50–200)
CO2 SERPL-SCNC: 27 MMOL/L (ref 21–32)
CREAT SERPL-MCNC: 0.95 MG/DL (ref 0.6–1.3)
GFR SERPL CREATININE-BSD FRML MDRD: 63 ML/MIN/1.73SQ M
GLUCOSE P FAST SERPL-MCNC: 117 MG/DL (ref 65–99)
HCV AB SER QL: NORMAL
HDLC SERPL-MCNC: 47 MG/DL (ref 40–60)
LDLC SERPL CALC-MCNC: 165 MG/DL (ref 0–100)
NONHDLC SERPL-MCNC: 193 MG/DL
POTASSIUM SERPL-SCNC: 4.1 MMOL/L (ref 3.5–5.3)
PROT SERPL-MCNC: 8 G/DL (ref 6.4–8.2)
SODIUM SERPL-SCNC: 140 MMOL/L (ref 136–145)
TRIGL SERPL-MCNC: 138 MG/DL

## 2018-12-11 PROCEDURE — 80053 COMPREHEN METABOLIC PANEL: CPT

## 2018-12-11 PROCEDURE — 36415 COLL VENOUS BLD VENIPUNCTURE: CPT

## 2018-12-11 PROCEDURE — 86803 HEPATITIS C AB TEST: CPT

## 2018-12-11 PROCEDURE — 80061 LIPID PANEL: CPT

## 2019-01-03 DIAGNOSIS — R10.9 ABDOMINAL PAIN, UNSPECIFIED ABDOMINAL LOCATION: Primary | ICD-10-CM

## 2019-01-03 RX ORDER — PANTOPRAZOLE SODIUM 40 MG/1
40 TABLET, DELAYED RELEASE ORAL
Qty: 30 TABLET | Refills: 0 | Status: SHIPPED | OUTPATIENT
Start: 2019-01-03 | End: 2019-01-08 | Stop reason: HOSPADM

## 2019-01-08 ENCOUNTER — TELEPHONE (OUTPATIENT)
Dept: GASTROENTEROLOGY | Facility: MEDICAL CENTER | Age: 64
End: 2019-01-08

## 2019-01-08 ENCOUNTER — OFFICE VISIT (OUTPATIENT)
Dept: GASTROENTEROLOGY | Facility: MEDICAL CENTER | Age: 64
End: 2019-01-08
Payer: MEDICARE

## 2019-01-08 VITALS
BODY MASS INDEX: 32.35 KG/M2 | SYSTOLIC BLOOD PRESSURE: 112 MMHG | DIASTOLIC BLOOD PRESSURE: 78 MMHG | TEMPERATURE: 97.8 F | WEIGHT: 168.4 LBS | HEART RATE: 96 BPM

## 2019-01-08 DIAGNOSIS — Z12.11 COLON CANCER SCREENING: ICD-10-CM

## 2019-01-08 DIAGNOSIS — K59.04 CHRONIC IDIOPATHIC CONSTIPATION: ICD-10-CM

## 2019-01-08 DIAGNOSIS — R93.5 ABNORMAL MRI OF ABDOMEN: ICD-10-CM

## 2019-01-08 DIAGNOSIS — R19.8 ABNORMAL FINDINGS ON ESOPHAGOGASTRODUODENOSCOPY (EGD): Primary | ICD-10-CM

## 2019-01-08 DIAGNOSIS — K21.9 GASTROESOPHAGEAL REFLUX DISEASE WITHOUT ESOPHAGITIS: ICD-10-CM

## 2019-01-08 PROCEDURE — 99214 OFFICE O/P EST MOD 30 MIN: CPT | Performed by: INTERNAL MEDICINE

## 2019-01-08 RX ORDER — DOCUSATE SODIUM 100 MG/1
100 CAPSULE, LIQUID FILLED ORAL DAILY
Qty: 30 CAPSULE | Refills: 5 | Status: SHIPPED | OUTPATIENT
Start: 2019-01-08

## 2019-01-08 RX ORDER — POLYETHYLENE GLYCOL 3350 17 G/17G
17 POWDER, FOR SOLUTION ORAL DAILY
Qty: 30 EACH | Refills: 5 | Status: SHIPPED | OUTPATIENT
Start: 2019-01-08

## 2019-01-08 RX ORDER — CYCLOBENZAPRINE HCL 5 MG
5 TABLET ORAL
COMMUNITY
End: 2019-10-15

## 2019-01-08 NOTE — LETTER
January 10, 2019     Patty Riley MD  [id number]  [address] [facility]    Patient: Dionicio Quintanilla   YOB: 1955   Date of Visit: 1/8/2019       Dear Dr Shabbir Sol: Thank you for referring Dionicio Quintanilla to me for evaluation  Below are my notes for this consultation  If you have questions, please do not hesitate to call me  I look forward to following your patient along with you  Sincerely,        Krystina Pang,         CC: No Recipients  Dareen Sanam  1/8/2019 11:13 AM  Sign at close encounter  [facility] - Outpatient Follow-up Note  Dionicio Quintanilla 58 y o  female MRN: [id number]  Encounter: [id number]          ASSESSMENT AND PLAN:    Dionicio Quintanilla is a 58 y o  female here for a follow-up regarding abnormal EGD results  1  Gastroesophageal reflux disease without esophagitis  Patient was on Pantoprazole 40 mg which was taking once daily  She ran out of pills 1 week ago, but her reflux symptoms have not returned  No refill needed  If heartburn returns, I instructcd her to take Tums  If heartburn returns, I advised her to call us for a Pantoprazole refill  2  Abnormal findings on esophagogastroduodenoscopy (EGD)  11/28/18 EGD showed duodenal fullness and enlarged folds but without a distinct lesion  Biopsies of the duodenum were negative  3  Colon cancer screening  Prior colonoscopy in 2015  Hemorrhoids were found, but no polyps  4  Chronic idiopathic constipation  Occasional constipation since November 2018  I will prescribe Colase to be taken 1 tablet once a day  Continue Miralax every other day  5  Abnormal MRI of abdomen  Inflammatory findings on 11/26/18 MRI, which may suggest duodenal diverticulitis, duodenitis without diverticulum, paraduodenal phlegmonous change, or (much less likely) duodenal or mesenteric mass  I will schedule an endoscopic US test to further assess her pancreas  ______________________________________________________________________    SUBJECTIVE:  Luis Padilla is a 58 y o  female here for a follow-up regarding abnormal EGD results  Today's symptoms include constipation with onset in 2018, and she strains when she has a bowel movement  Patient denies weight loss  She is currently on Miralax  She is out of the Pantoprazole 40 mg which was taking once daily  Her reflux symptoms have not returned despite being off the medication for the past week      She is a former smoker; she stopped smoking 20 years ago  Occasional ETOH use  REVIEW OF SYSTEMS IS OTHERWISE NEGATIVE  Historical Information   Past Medical History:   Diagnosis Date    Abdominal discomfort     Anemia     Arthritis     Bronchitis     Dyslipidemia     Last Assessed 2012    GERD (gastroesophageal reflux disease)     Headache, migraine     Low back pain     Low back pain      Past Surgical History:   Procedure Laterality Date     SECTION      ESOPHAGOGASTRODUODENOSCOPY N/A 2018    Procedure: ESOPHAGOGASTRODUODENOSCOPY (EGD) with push enteroscopy and bx;  Surgeon: Lora Mann DO;  Location: [facility];   Service: Gastroenterology    EYE SURGERY      UPPER GASTROINTESTINAL ENDOSCOPY       Social History   History   Alcohol Use No     Comment: Per [employer]     History   Drug Use No     History   Smoking Status    Never Smoker   Smokeless Tobacco    Never Used     Family History   Problem Relation Age of Onset    Hypertension Mother     Other Mother         Dyslipidemia    Diabetes Father         Mellitus       Meds/Allergies       Current Outpatient Prescriptions:     acetaminophen (TYLENOL) 325 mg tablet    cyclobenzaprine (FLEXERIL) 5 mg tablet    ibuprofen (MOTRIN) 800 mg tablet    Magnesium 400 MG CAPS    naproxen (NAPROSYN) 500 mg tablet    ondansetron (ZOFRAN-ODT) 4 mg disintegrating tablet    pantoprazole (PROTONIX) 40 mg tablet    rizatriptan (MAXALT) 10 MG tablet    Allergies   Allergen Reactions    Erythromycin            Objective     Blood pressure 112/78, pulse 96, temperature 97 8 °F (36 6 °C), temperature source Tympanic, weight 76 4 kg (168 lb 6 4 oz)  Body mass index is 32 35 kg/m²  PHYSICAL EXAM:      General Appearance:   Alert, cooperative, no distress   HEENT:   Normocephalic, atraumatic, anicteric      Neck:  Supple, symmetrical, trachea midline   Lungs:   Clear to auscultation bilaterally; no rales, rhonchi or wheezing; respirations unlabored    Heart[de-identified]   Regular rate and rhythm; no murmur, rub, or gallop  Abdomen:   Soft, non-tender, non-distended; normal bowel sounds; no masses, no organomegaly    Genitalia:   Deferred    Rectal:   Deferred    Extremities:  No cyanosis, clubbing or edema    Pulses:  2+ and symmetric    Skin:  No jaundice, rashes, or lesions    Lymph nodes:  No palpable cervical lymphadenopathy        Lab Results:   No visits with results within 1 Day(s) from this visit  Latest known visit with results is:   Appointment on 12/11/2018   Component Date Value    Cholesterol 12/11/2018 240*    Triglycerides 12/11/2018 138     HDL, Direct 12/11/2018 47     LDL Calculated 12/11/2018 165*    Non-HDL-Chol (CHOL-HDL) 12/11/2018 193     Sodium 12/11/2018 140     Potassium 12/11/2018 4 1     Chloride 12/11/2018 103     CO2 12/11/2018 27     ANION GAP 12/11/2018 10     BUN 12/11/2018 19     Creatinine 12/11/2018 0 95     Glucose, Fasting 12/11/2018 117*    Calcium 12/11/2018 9 7     AST 12/11/2018 16     ALT 12/11/2018 33     Alkaline Phosphatase 12/11/2018 97     Total Protein 12/11/2018 8 0     Albumin 12/11/2018 3 2*    Total Bilirubin 12/11/2018 0 38     eGFR 12/11/2018 63     Hepatitis C Ab 12/11/2018 Non-reactive          Radiology Results:   No results found      Attestation:   By signing my name below, Ismael Olszewski, attest that this documentation has been prepared under the direction and in the presence of Yvonne Colby DO  Electronically Signed: Enrike Stearns  1/8/19       I, Yvonne Colby, personally performed the services described in this documentation  All medical record entries made by the scribe were at my direction and in my presence  I have reviewed the chart and discharge instructions and agree that the record reflects my personal performance and is accurate and complete   Yvonne Colby DO  1/8/19

## 2019-01-08 NOTE — PROGRESS NOTES
[facility] - Outpatient Follow-up Note  Ekta Ferro 58 y o  female MRN: [id number]  Encounter: [id number]      ASSESSMENT AND PLAN:    Ekta Ferro is a 58 y o  female here for a follow-up regarding abnormal EGD results  1  Gastroesophageal reflux disease without esophagitis  Patient was on Pantoprazole 40 mg which was taking once daily  She ran out of pills 1 week ago, but her reflux symptoms have not returned  No refill needed  If heartburn returns, I instructcd her to take Tums  If heartburn returns, I advised her to call us for a Pantoprazole refill  2  Abnormal findings on esophagogastroduodenoscopy (EGD)  11/28/18 EGD showed duodenal fullness and enlarged folds but without a distinct lesion  Biopsies of the duodenum were negative  3  Colon cancer screening  Prior colonoscopy in 2015  Hemorrhoids were found, but no polyps  4  Chronic idiopathic constipation  Occasional constipation since November 2018  I will prescribe Colase to be taken 1 tablet once a day  Continue Miralax every other day  5  Abnormal MRI of abdomen  Inflammatory findings on 11/26/18 MRI, which may suggest duodenal diverticulitis, duodenitis without diverticulum, paraduodenal phlegmonous change, or (much less likely) duodenal or mesenteric mass  I will schedule an endoscopic US test to further assess her pancreas  Follow up in a few months time      ______________________________________________________________________    SUBJECTIVE:  Ekta Ferro is a 58 y o  female here for a follow-up regarding abnormal EGD results  Today's symptoms include constipation with onset in November 2018, and she strains when she has a bowel movement  Patient denies weight loss  She is currently on Miralax  She is out of the Pantoprazole 40 mg which was taking once daily  Her reflux symptoms have not returned despite being off the medication for the past week      She is a former smoker; she stopped smoking 20 years ago  Occasional ETOH use  REVIEW OF SYSTEMS IS OTHERWISE NEGATIVE  Historical Information   Past Medical History:   Diagnosis Date    Abdominal discomfort     Anemia     Arthritis     Bronchitis     Dyslipidemia     Last Assessed 2012    GERD (gastroesophageal reflux disease)     Headache, migraine     Low back pain     Low back pain      Past Surgical History:   Procedure Laterality Date     SECTION      ESOPHAGOGASTRODUODENOSCOPY N/A 2018    Procedure: ESOPHAGOGASTRODUODENOSCOPY (EGD) with push enteroscopy and bx;  Surgeon: Jareth Saucedo DO;  Location: [facility]; Service: Gastroenterology    EYE SURGERY      UPPER GASTROINTESTINAL ENDOSCOPY       Social History   History   Alcohol Use No     Comment: Per [employer]     History   Drug Use No     History   Smoking Status    Never Smoker   Smokeless Tobacco    Never Used     Family History   Problem Relation Age of Onset    Hypertension Mother     Other Mother         Dyslipidemia    Diabetes Father         Mellitus       Meds/Allergies       Current Outpatient Prescriptions:     acetaminophen (TYLENOL) 325 mg tablet    cyclobenzaprine (FLEXERIL) 5 mg tablet    ibuprofen (MOTRIN) 800 mg tablet    Magnesium 400 MG CAPS    naproxen (NAPROSYN) 500 mg tablet    ondansetron (ZOFRAN-ODT) 4 mg disintegrating tablet    pantoprazole (PROTONIX) 40 mg tablet    rizatriptan (MAXALT) 10 MG tablet    Allergies   Allergen Reactions    Erythromycin            Objective     Blood pressure 112/78, pulse 96, temperature 97 8 °F (36 6 °C), temperature source Tympanic, weight 76 4 kg (168 lb 6 4 oz)  Body mass index is 32 35 kg/m²        PHYSICAL EXAM:      General Appearance:   Alert, cooperative, no distress   HEENT:   Normocephalic, atraumatic, anicteric      Neck:  Supple, symmetrical, trachea midline   Lungs:   Clear to auscultation bilaterally; no rales, rhonchi or wheezing; respirations unlabored    Heart[de-identified]   Regular rate and rhythm; no murmur, rub, or gallop  Abdomen:   Soft, non-tender, non-distended; normal bowel sounds; no masses, no organomegaly    Genitalia:   Deferred    Rectal:   Deferred    Extremities:  No cyanosis, clubbing or edema    Pulses:  2+ and symmetric    Skin:  No jaundice, rashes, or lesions    Lymph nodes:  No palpable cervical lymphadenopathy        Lab Results:   No visits with results within 1 Day(s) from this visit  Latest known visit with results is:   Appointment on 12/11/2018   Component Date Value    Cholesterol 12/11/2018 240*    Triglycerides 12/11/2018 138     HDL, Direct 12/11/2018 47     LDL Calculated 12/11/2018 165*    Non-HDL-Chol (CHOL-HDL) 12/11/2018 193     Sodium 12/11/2018 140     Potassium 12/11/2018 4 1     Chloride 12/11/2018 103     CO2 12/11/2018 27     ANION GAP 12/11/2018 10     BUN 12/11/2018 19     Creatinine 12/11/2018 0 95     Glucose, Fasting 12/11/2018 117*    Calcium 12/11/2018 9 7     AST 12/11/2018 16     ALT 12/11/2018 33     Alkaline Phosphatase 12/11/2018 97     Total Protein 12/11/2018 8 0     Albumin 12/11/2018 3 2*    Total Bilirubin 12/11/2018 0 38     eGFR 12/11/2018 63     Hepatitis C Ab 12/11/2018 Non-reactive          Radiology Results:   No results found  Attestation:   By signing my name below, El Brandt, attest that this documentation has been prepared under the direction and in the presence of Olga Rivera DO  Electronically Signed: Enrike Patel  1/8/19       I, Olga Rivera, personally performed the services described in this documentation  All medical record entries made by the lauraibbyron were at my direction and in my presence  I have reviewed the chart and discharge instructions and agree that the record reflects my personal performance and is accurate and complete   Olga Rivera DO  1/8/19

## 2019-01-09 ENCOUNTER — OFFICE VISIT (OUTPATIENT)
Dept: FAMILY MEDICINE CLINIC | Facility: CLINIC | Age: 64
End: 2019-01-09
Payer: MEDICARE

## 2019-01-09 VITALS
SYSTOLIC BLOOD PRESSURE: 120 MMHG | HEART RATE: 82 BPM | RESPIRATION RATE: 22 BRPM | OXYGEN SATURATION: 97 % | BODY MASS INDEX: 31.34 KG/M2 | DIASTOLIC BLOOD PRESSURE: 80 MMHG | TEMPERATURE: 97 F | WEIGHT: 166 LBS | HEIGHT: 61 IN

## 2019-01-09 DIAGNOSIS — F32.A DEPRESSION, UNSPECIFIED DEPRESSION TYPE: ICD-10-CM

## 2019-01-09 DIAGNOSIS — Z00.00 MEDICARE WELCOME EXAM: Primary | ICD-10-CM

## 2019-01-09 DIAGNOSIS — R73.9 HYPERGLYCEMIA: ICD-10-CM

## 2019-01-09 DIAGNOSIS — M81.0 POSTMENOPAUSAL OSTEOPOROSIS: ICD-10-CM

## 2019-01-09 DIAGNOSIS — E78.5 HYPERLIPIDEMIA, UNSPECIFIED HYPERLIPIDEMIA TYPE: ICD-10-CM

## 2019-01-09 DIAGNOSIS — G43.909 MIGRAINE WITHOUT STATUS MIGRAINOSUS, NOT INTRACTABLE, UNSPECIFIED MIGRAINE TYPE: ICD-10-CM

## 2019-01-09 PROCEDURE — G0403 EKG FOR INITIAL PREVENT EXAM: HCPCS | Performed by: FAMILY MEDICINE

## 2019-01-09 PROCEDURE — G0402 INITIAL PREVENTIVE EXAM: HCPCS | Performed by: FAMILY MEDICINE

## 2019-01-09 NOTE — ASSESSMENT & PLAN NOTE
Fasting labs revealed mildly elevated blood sugar of 117  We will check hemoglobin A1c at the next visit

## 2019-01-09 NOTE — PROGRESS NOTES
Assessment and Plan:  Problem List Items Addressed This Visit     None        Health Maintenance Due   Topic Date Due    Depression Screening PHQ  1955    Medicare Annual Wellness Visit (AWV)  1955    CRC Screening: Colonoscopy  1955    DTaP,Tdap,and Td Vaccines (1 - Tdap) 1975         HPI:  Patient Active Problem List   Diagnosis    Migraine headache    Low back pain    Esophageal reflux    Arthritis    Duodenum disorder    Hyperlipidemia     Past Medical History:   Diagnosis Date    Abdominal discomfort     Anemia     Arthritis     Bronchitis     Dyslipidemia     Last Assessed 2012    GERD (gastroesophageal reflux disease)     Headache, migraine     Low back pain     Low back pain      Past Surgical History:   Procedure Laterality Date     SECTION      ESOPHAGOGASTRODUODENOSCOPY N/A 2018    Procedure: ESOPHAGOGASTRODUODENOSCOPY (EGD) with push enteroscopy and bx;  Surgeon: Josh Sy DO;  Location: [facility];   Service: Gastroenterology    EYE SURGERY      UPPER GASTROINTESTINAL ENDOSCOPY       Family History   Problem Relation Age of Onset    Hypertension Mother     Other Mother         Dyslipidemia    Diabetes Father         Mellitus     History   Smoking Status    Never Smoker   Smokeless Tobacco    Never Used     History   Alcohol Use No     Comment: Per [employer]      History   Drug Use No         Current Outpatient Prescriptions   Medication Sig Dispense Refill    acetaminophen (TYLENOL) 325 mg tablet Take 2 tablets (650 mg total) by mouth every 6 (six) hours as needed for mild pain or fever 30 tablet 0    cyclobenzaprine (FLEXERIL) 5 mg tablet Take 5 mg by mouth daily at bedtime      docusate sodium (COLACE) 100 mg capsule Take 1 capsule (100 mg total) by mouth daily 30 capsule 5    ibuprofen (MOTRIN) 800 mg tablet every 8 (eight) hours        Magnesium 400 MG CAPS Take 400 mg by mouth daily      naproxen (NAPROSYN) 500 mg tablet Every 12 hours        ondansetron (ZOFRAN-ODT) 4 mg disintegrating tablet Take 1 tablet (4 mg total) by mouth every 6 (six) hours as needed for nausea or vomiting 10 tablet 0    polyethylene glycol (MIRALAX) 17 g packet Take 17 g by mouth daily 30 each 5    rizatriptan (MAXALT) 10 MG tablet Take by mouth as needed         No current facility-administered medications for this visit  Allergies   Allergen Reactions    Erythromycin      Immunization History   Administered Date(s) Administered    Influenza, recombinant, quadrivalent,injectable, preservative free 2018       Patient Care Team:  Lorne Holland MD as PCP - General (Family Medicine)    Medicare Screening Tests and Risk Assessments:  Rebecca is here for her Welcome to Medicare visit  Health Risk Assessment:  Patient rates overall health as good  Patient feels that their physical health rating is Same  Eyesight was rated as Slightly worse  Hearing was rated as Same  Patient feels that their emotional and mental health rating is Slightly worse  Pain experienced by patient in the last 7 days has been Some  Patient's pain rating has been 8/10  Patient states that she has experienced no weight loss or gain in last 6 months  Emotional/Mental Health:  Patient has been feeling nervous/anxious  PHQ-9 Depression Screening:    Frequency of the following problems over the past two weeks:      1  Little interest or pleasure in doing things: 3 - nearly every day      2  Feeling down, depressed, or hopeless: 3 - nearly every day      3  Trouble falling or staying asleep, or sleeping too much: 3 - nearly every day      4  Feeling tired or having little energy: 3 - nearly every day      5  Poor appetite or overeatin - not at all      6  Feeling bad about yourself - or that you are a failure or have let yourself or your family down: 0 - not at all      7   Trouble concentrating on things, such as reading the newspaper or watching television: 3 - nearly every day      8  Moving or speaking so slowly that other people could have noticed  Or the opposite - being so fidgety or restless that you have been moving around a lot more than usual: 0 - not at all      9  Thoughts that you would be better off dead, or of hurting yourself in some way: 0 - not at all  PHQ-2 Score: 6  PHQ-9 Score: 15    Broken Bones/Falls: Fall Risk Assessment:    In the past year, patient has experienced: No history of falling in past year          Bladder/Bowel:  Patient has not leaked urine accidently in the last six months  Patient reports no loss of bowel control  Immunizations:  Patient has had a flu vaccination within the last year  Patient has received a pneumonia shot  Patient has not received a shingles shot  Home Safety:  Patient has trouble with stairs inside or outside of their home  Patient currently reports that there are no safety hazards present in home, working smoke alarms, working carbon monoxide detectors  Preventative Screenings:   No breast cancer screening performed, colon cancer screen completed, cholesterol screen completed, glaucoma eye exam completed,     Nutrition:  Current diet: Regular, Low Carb and Limited junk food with servings of the following:    Medications:  Patient is currently taking over-the-counter supplements  List of OTC medications includes: multivitamins  Patient is able to manage medications  Lifestyle Choices:  Patient reports no tobacco use  Patient has smoked or used tobacco in the past   Patient has stopped her tobacco use  Patient reports no alcohol use  Patient drives a vehicle  Patient wears seat belt  Current level of exercise of physical activity described by patient as: no exercise because of back issues          Activities of Daily Living:  Can get out of bed by his or her self, able to dress self, able to make own meals, unable to do own shopping, able to bathe self, can do own laundry/housekeeping, can manage own money, pay bills and track expenses    Previous Hospitalizations:  Hospitalization or ED visit in past 12 months  Number of hospitalizations within the last year: 1-2        Advanced Directives:  Patient has decided on a power of   Patient has spoken to designated power of   Patient has not completed advanced directive  No exam data present    Physical Exam:  Review of Systems   Gastrointestinal: Negative for bowel incontinence  Psychiatric/Behavioral: The patient is not nervous/anxious  Vitals:    01/09/19 0855   BP: 120/80   Pulse: 82   Resp: 22   Temp: (!) 97 °F (36 1 °C)   TempSrc: Tympanic   SpO2: 97%   Weight: 75 3 kg (166 lb)   Height: 5' 0 6" (1 539 m)   Body mass index is 31 78 kg/m²      Physical Exam

## 2019-01-09 NOTE — ASSESSMENT & PLAN NOTE
Her [place] heart disease risk is just over 3%  We discussed need to try to improve lipid panel with healthy diet and exercise  She is planning to join [employer] in the near future

## 2019-01-09 NOTE — ASSESSMENT & PLAN NOTE
She is suffering from some mild depression which appears to be situational   We discussed coping strategies and she prefers not starting medication at this time

## 2019-01-09 NOTE — PROGRESS NOTES
Assessment and Plan:  Problem List Items Addressed This Visit     Migraine headache - Primary     She has been going to her neurologist in [place], but I have referred her to a local neurologist          Relevant Orders    Ambulatory referral to Neurology    Hyperlipidemia     Her [place] heart disease risk is just over 3%  We discussed need to try to improve lipid panel with healthy diet and exercise  She is planning to join [employer] in the near future  Health Maintenance Due   Topic Date Due    Medicare Annual Wellness Visit (AWV)  1955    CRC Screening: Colonoscopy  1955    DTaP,Tdap,and Td Vaccines (1 - Tdap) 1975         HPI:  Patient Active Problem List   Diagnosis    Migraine headache    Low back pain    Esophageal reflux    Arthritis    Duodenum disorder    Hyperlipidemia     Past Medical History:   Diagnosis Date    Abdominal discomfort     Anemia     Arthritis     Bronchitis     Dyslipidemia     Last Assessed 2012    GERD (gastroesophageal reflux disease)     Headache, migraine     Low back pain     Low back pain      Past Surgical History:   Procedure Laterality Date     SECTION      ESOPHAGOGASTRODUODENOSCOPY N/A 2018    Procedure: ESOPHAGOGASTRODUODENOSCOPY (EGD) with push enteroscopy and bx;  Surgeon: Sanjay Augustin DO;  Location: [facility];   Service: Gastroenterology    EYE SURGERY      UPPER GASTROINTESTINAL ENDOSCOPY       Family History   Problem Relation Age of Onset    Hypertension Mother     Other Mother         Dyslipidemia    Diabetes Father         Mellitus     History   Smoking Status    Never Smoker   Smokeless Tobacco    Never Used     History   Alcohol Use No     Comment: Per [employer]      History   Drug Use No         Current Outpatient Prescriptions   Medication Sig Dispense Refill    acetaminophen (TYLENOL) 325 mg tablet Take 2 tablets (650 mg total) by mouth every 6 (six) hours as needed for mild pain or fever 30 tablet 0    cyclobenzaprine (FLEXERIL) 5 mg tablet Take 5 mg by mouth daily at bedtime      docusate sodium (COLACE) 100 mg capsule Take 1 capsule (100 mg total) by mouth daily 30 capsule 5    ibuprofen (MOTRIN) 800 mg tablet every 8 (eight) hours        Magnesium 400 MG CAPS Take 400 mg by mouth daily      naproxen (NAPROSYN) 500 mg tablet Every 12 hours        ondansetron (ZOFRAN-ODT) 4 mg disintegrating tablet Take 1 tablet (4 mg total) by mouth every 6 (six) hours as needed for nausea or vomiting 10 tablet 0    polyethylene glycol (MIRALAX) 17 g packet Take 17 g by mouth daily 30 each 5    rizatriptan (MAXALT) 10 MG tablet Take by mouth as needed         No current facility-administered medications for this visit  Allergies   Allergen Reactions    Erythromycin      Immunization History   Administered Date(s) Administered    Influenza, recombinant, quadrivalent,injectable, preservative free 11/29/2018       Patient Care Team:  Chrissy Ryan MD as PCP - General (Family Medicine)    Medicare Screening Tests and Risk Assessments:  Rebecca is here for her Welcome to Medicare visit  Emotional/Mental Health:  Patient has been feeling nervous/anxious  Broken Bones/Falls: Fall Risk Assessment:    In the past year, patient has experienced: visual disturbance    Preventative Screening/Counseling:      Colorectal Cancer:      General: Screening Current          Osteoporosis:      Due for studies: DXA Axial          AAA:      General: Screening Not Indicated          Glaucoma:      General: Screening Current              No exam data present    Physical Exam:  Review of Systems   Constitutional: Negative for activity change, chills, fatigue and fever  HENT: Negative for congestion, ear pain, sinus pressure and sore throat  Eyes: Negative for pain and visual disturbance     Respiratory: Negative for cough, chest tightness, shortness of breath and wheezing  Cardiovascular: Negative for chest pain, palpitations and leg swelling  Gastrointestinal: Negative for abdominal pain, blood in stool, constipation, diarrhea, nausea and vomiting  Endocrine: Negative for polydipsia and polyuria  Genitourinary: Negative for difficulty urinating, dysuria, frequency and urgency  Musculoskeletal: Positive for back pain  Negative for arthralgias, joint swelling and myalgias  Skin: Negative for rash  Neurological: Negative for dizziness, weakness, numbness and headaches  Hematological: Negative for adenopathy  Does not bruise/bleed easily  Psychiatric/Behavioral: Positive for dysphoric mood  The patient is not nervous/anxious  Vitals:    01/09/19 0855   BP: 120/80   Pulse: 82   Resp: 22   Temp: (!) 97 °F (36 1 °C)   TempSrc: Tympanic   SpO2: 97%   Weight: 75 3 kg (166 lb)   Height: 5' 0 6" (1 539 m)   Body mass index is 31 78 kg/m²  Physical Exam   Constitutional: She is oriented to person, place, and time  She appears well-developed and well-nourished  HENT:   Head: Normocephalic and atraumatic  Right Ear: External ear normal    Left Ear: External ear normal    Mouth/Throat: Oropharynx is clear and moist    Eyes: Pupils are equal, round, and reactive to light  EOM are normal    Neck: Normal range of motion  Neck supple  No thyromegaly present  Carotid pulses 2+ bilaterally without bruit   Cardiovascular: Normal rate and regular rhythm  Pulmonary/Chest: Effort normal and breath sounds normal    Abdominal: Soft  Bowel sounds are normal    Musculoskeletal: Normal range of motion  Lymphadenopathy:     She has no cervical adenopathy  Neurological: She is alert and oriented to person, place, and time  She displays normal reflexes  No cranial nerve deficit  Skin: Skin is warm and dry  Psychiatric: She has a normal mood and affect  Her behavior is normal    Nursing note and vitals reviewed

## 2019-01-21 PROBLEM — R93.5 ABNORMAL MRI OF ABDOMEN: Status: ACTIVE | Noted: 2019-01-21

## 2019-02-19 ENCOUNTER — TELEPHONE (OUTPATIENT)
Dept: GASTROENTEROLOGY | Facility: CLINIC | Age: 64
End: 2019-02-19

## 2019-02-19 ENCOUNTER — TELEPHONE (OUTPATIENT)
Dept: FAMILY MEDICINE CLINIC | Facility: CLINIC | Age: 64
End: 2019-02-19

## 2019-02-19 DIAGNOSIS — K21.9 GASTROESOPHAGEAL REFLUX DISEASE, ESOPHAGITIS PRESENCE NOT SPECIFIED: Primary | ICD-10-CM

## 2019-02-19 RX ORDER — PANTOPRAZOLE SODIUM 40 MG/1
40 TABLET, DELAYED RELEASE ORAL DAILY
Qty: 30 TABLET | Refills: 3 | Status: SHIPPED | OUTPATIENT
Start: 2019-02-19 | End: 2019-06-07 | Stop reason: SDUPTHER

## 2019-02-19 NOTE — TELEPHONE ENCOUNTER
Patient with GERD  She is no longer taking the pantoprazole she was given in the hospital  She ran out and needs a refill  She is stiiing up to sleep and has a burning in her chest  Even the weight on her chest from her breasts hurts  It "feels like something in her throat " She states " she never felt her reflux this much before "    Suggested to continue to sleeping upright, stay upright after eating and eat small meals  Avoid tomato sauce, citrus and other acid foods  She has tried Zantac, Gavascon and Rolaids with very little improvement In symptoms  Please order pantoprazole for patient  Shall she start out with 2 x/day? Pharmacy verified    Any other suggestions  Linear Endoscopy U/S scheduled for 3/6/2019   Last OV was 1/8/2019

## 2019-02-19 NOTE — TELEPHONE ENCOUNTER
Patient aware of Protonix sent to pharmacy  She will start with once daily and call us in 1 week to report on her symptoms   Dosage may be increased if symptoms still persist

## 2019-02-19 NOTE — TELEPHONE ENCOUNTER
I will start her on once daily pantoprazole as she had done well in the past, have her update us in 1 week if the symptoms remain and we can recommend increasing at that time   I will send this now

## 2019-02-26 NOTE — PROGRESS NOTES
[facility] Consult  PATIENT:  Taylor Ashford  MRN:  [id number]  :  1955  DATE OF SERVICE:  2019  REFERRED BY: Lorrie Soto MD  PMD: Swati Mancuso MD    Assessment/Plan:     Taylor Ashford is a very pleasant 58 y o  female Past medical history includes migraines, GERD, hyperlipidemia, depression, arthritis, low blood pressure, bilateral carpal tunnel, abdominal pain, anemia, glaucoma, cataracts referred here for evaluation of headache  Migraine with aura and without status migrainosus, not intractable  Retinal migraine  The patient reports headaches/migraines since her teenage years  She previously followed with a neurologist in [place] and is transitioning care here  She reports migraines currently her about twice a month, but in January she had an increase in visual auras/possible retinal migraine that was new in concerning for her and occur daily for a week  She reports various locations of her migraines typically left-sided, quality varies  Typical associated migrainous features      Work up:  - due to increasing frequency of visual symptoms/retinal migraine will start by ordering CTA head and neck to evaluate vasculature, necessary to evaluate for significant vascular disease  - follows closely with Ophthalmology every 4 months due to other eye issues including history of glaucoma and cataracts  - routine labs to further evaluate  - will also consider MRI Brain and TTE as well pending work up, otherwise denies symptoms of stroke, asked her to try and get old MRI Brain from 10-15 years ago, but she doubts she would be able to     Preventive:  - discussed headache hygiene and lifestyle factors that could improve her headaches including mental Health Care, sleep quality and quantity, drastically increasing hydration  - headache preventative supplements:  Continue magnesium 400 mg which she started around 2019, recommended considering adding riboflavin and gi  - patient not interested in prescription preventative at this time  - future options:  She has been on nortriptyline 10 mg in the past which she reports decreased frequency and severity of her headaches, but then she decided she wanted to get off of it and stopped 6 months ago, has had an increase in symptoms since  Does not want to go back on it at this time, but will consider in the future  Could also consider venlafaxine  Abortive:  - previously patient was taking pain medication daily, however and now no more than twice a week  We discussed medication overuse/rebound headache and recommended no more than 3 days per week  - she takes rizatriptan 10 mg to 3 times a month for migraines, has been on this for years prescribed by previous neurologist, discussed proper use, possible side effects and risks  - she takes acetaminophen 4-5 days a week for back pain  - naproxen 500 mg once a week  - has ondansetron 4 mg for nausea    Current moderate episode of major depressive disorder without prior episode (HCC)  Anxiety disorder, unspecified type   Denies history of depression until about 6 months ago when her mother passed away  Does have history of anxiety and requesting Ativan prior to imaging  PHQ-9 depression screen score of 17 today suggestive of moderately severe depression  Patient denies suicidal ideation  Otherwise typical symptoms, fatigue, depressed mood, less motivation  - recommend establishing care with a therapist and our  contact her with a list covered by her insurance  - she is not interested in medications at this time  -     TSH, 3rd generation;  Future  -     LORazepam (ATIVAN) 1 mg tablet; 0 5 mg tab 20-30 mins before scan, may repeat in 30 minutes if needed for anxiety    Nutritional anemia, possible   -     history of chronic anemia of unknown etiology per patient, last check mildly low hg at 10 8 on 11/29/2018  - Vitamin B12; Future    Vitamin D deficiency  -     will check level and adjust repletion if indicated  - currently takes 5000 units daily    Patient instructions      Additional Testing:   Neurodiagnostic workup:   CT angiogram of head and neck to make sure no clotting in vessels   - get labs within 2 months prior to the scan   - take ativan for anxiety if needed prior to scan     Headache/migraine treatment:   Abortive medications (for immediate treatment of a headache): It is ok to take ibuprofen, acetaminophen or naproxen (Advil, Tylenol,  Aleve, Excedrin) if they help your headaches you should limit these to No more than 3 times a week to avoid medication overuse/rebound headaches  For nausea  Ondansetron 4 mg ok to take as needed for nausea     For your more moderate to severe migraines take this medication early:  Continue Maxalt (rizatriptan) 10mg tabs - take one at the onset of headache  May repeat one time after 1-2 hours if pain has not resolved  (Max 2 a day and 9 a month)     Over the counter preventive supplements for headaches/migraines   (to take every day to help prevent headaches - not to take at the time of headache):   - Mind ease can be found online and all of these   [x] Magnesium 500mg daily   [x] Riboflavin (Vitamin B2)  400mg daily   (FYI B2 may make your urine bright/neon yellow)  AND/OR  [x] Herbal medication: Petasites/Butterbur 50mg (kids) - 150 mg (adults) daily  (When choosing your Butterbur online or in the store, beware that there are some poor preps containing pyrrolizidine alkaloids (PAs) that can be harmful to the liver  Therefore, do not use butterbur products that are not certified and labeled as hepatotoxic PA-free )    Prescription preventive medications for headaches/migraines   (to take every day to help prevent headaches - not to take at the time of headache):  [x] Could consider in the future     Sleep:   [x] Melatonin - you may take 3 mg nightly for sleep   You should take this 1 hour prior to bedtime consistently every night for it to work  It works by gradually helping to adjust your sleep time over days to weeks, rather than immediately making you feel sleepy  Lifestyle Recommendations:  [x] SLEEP - Maintain a regular sleep schedule: Adults need at least 7-8 hours of uninterrupted a night  Maintain good sleep hygiene:  Going to bed and waking up at consistent times, avoiding excessive daytime naps, avoiding caffeinated beverages in the evening, avoid excessive stimulation in the evening and generally using bed primarily for sleeping  One hour before bedtime would recommend turning lights down lower, decreasing your activity (may read quietly, listen to music at a low volume)  When you get into bed, should eliminate all technology (no texting, emailing, playing with your phone, iPad or tablet in bed)  [x] HYDRATION - Maintain good hydration  Drink  2L of fluid a day (4 typical small water bottles)  [x] DIET - Maintain good nutrition  In particular don't skip meals and try and eat healthy balanced meals regularly  [x] TRIGGERS - Look for other triggers and avoid them: Limit caffeine to 1-2 cups a day or less  Avoid dietary triggers that you have noticed bring on your headaches (this could include aged cheese, peanuts, MSG, aspartame and nitrates)  [x] EXERCISE - physical exercise as we all know is good for you in many ways, and not only is good for your heart, but also is beneficial for your mental health, cognitive health and  chronic pain/headaches  I would encourage at the least 5 days of physical exercise weekly for at least 30 minutes  Education and Follow-up  [x] Please call with any questions or concerns  [x] Return in about 8 weeks (around 4/27/2019)  CC:   We had the pleasure of evaluating Jack Cheek in neurological consultation today  Jack Cheek is a 58 y o    right handed female who presents today for evaluation of headaches       History of Present Illness: Previously was following with neurologist in [place]     Past medical history of migraines, GERD, hyperlipidemia, depression, arthritis, low blood pressure, bilateral carpal tunnel, abdominal pain, anemia     What is your current pain level - 0    Headaches started at what age? teenager  How often do the headaches occur? 2/month  What time of the day do the headaches start? Can wake up with them or anytime during the day   How long do the headaches last? 3-4 hours because she takes medicine, up to all day   Are you ever headache free? Yes    Aura? with aura  Visual auras have become more frequent    Jan 2019: For a week straight 1-2 times a day had increase in visual aura/retinal migraine  Zigzags, decreased peripheral vision, blurry without strong migraine, milder headache - for 2-3 hours   - seems to be in both eyes, not like a curtain coming down   - gradually faded out and this month she is fine, started mag a few weeks ago   - would take medicine and go away  *last saw an eye doctor 2 months ago, goes every 4 months for glaucoma, cataracts, dry eye      Prior to that once in a while that would happen      Describe your usual headache pain quality? Dull all day sometimes, sharp and pulsating, shooting   Where is your headache located?    When full blown more on left side, frontal to parietal and then to the back  Lately zig zag around head  Numbness change in sensation to bioccipital region   Sinus pressure    What is the intensity of pain? 4-5/10, up to higher   Associated symptoms:   [x] Nausea       [x] Vomiting        [] Diarrhea  [x] Insomnia    [] Stiff or sore neck   [x] Problems with concentration  [x] Photophobia     [x]Phonophobia      [] Osmophobia  [x] Blurred vision   [x] Prefer quiet, dark room  [] Light-headed or dizzy     [] Tinnitus   [] Hands or feet tingle or feel numb/paresthesias      [] Red ear      [] Ptosis      [] Facial droop  [] Lacrimation  [] Nasal congestion/rhinorrhea   [] Flushing of face  [] Change in pupil size     Things that make the headache worse? Bending down, any movement    Headache triggers:  Pressure when it rains/dr and cold  What time of the year do headaches occur more frequently? More in Oct/Nov    Have you seen someone else for headaches or pain? Neurology   Have you had trigger point injection performed and how often? No  Have you had Botox injection performed and how often? No   Have you had epidural injections or transforaminal injections performed? Yes for lower back slipped disc   Have you used CBD or THC for your headaches and how often? No  Are you current pregnant or planning on getting pregnant? No  Have you ever had any Brain imaging? MRI Brain - maybe over 15-20 years ago     What medications do you take or have you taken for your headaches?    ABORTIVE:    Twice a week some sort of pain medication lately, previously was taking daily     Acetaminophen - 4-5 days a week for back pain  Ibuprofen 800 mg - not as much now, takes naproxen instead  Rizatriptan 10 mg - 2-3 times a month - Feb didn't take it   Naproxen 500 mg - takes once a week, used to take every day     Diclofenac gel - for back   Lidocaine 5% gel - for back     Past for headache and back pain   Cyclobenzaprine 5 mg  - not taking (for back)  Tramadol 50 mg   - not taking (for back)  Meloxicam 15 mg - not taking   Ondansetron - does not take it     PREVENTIVE:   Magnesium 400 mg - started a couple of weeks     Past:  Nortriptyline 10 mg - for 2 years, and did well as far as headaches not being as bad, then wanted to get off it, came off 6 months ago, increase in symptoms    Alternative therapies used in the past for headaches? no other headache interventions have been tried    LIFESTYLE  Sleep - averages 6-7 hours   - takes naps     Physical activity:  tries to get her to the gym twice a week   Water: a bottle a week   Mood:   - lately feels tired mentally tired  and depressed   - do not want to get up   - never has talked to a counselor - she used to do mentoring herself, never had depression in the past   - thinks going on for past 6 months, started after mom passed away   PHQ-9 Depression Screening    PHQ-9:    Frequency of the following problems over the past two weeks:       Little interest or pleasure in doing things:  3 - nearly every day  Feeling down, depressed, or hopeless:  2 - more than half the days  Trouble falling or staying asleep, or sleeping too much:  3 - nearly every day  Feeling tired or having little energy:  3 - nearly every day  Poor appetite or overeatin - not at all  Feeling bad about yourself - or that you are a failure or have let yourself or your family down:  3 - nearly every day  Trouble concentrating on things, such as reading the newspaper or watching television:  3 - nearly every day  Moving or speaking so slowly that other people could have noticed   Or the opposite - being so fidgety or restless that you have been moving around a lot more than usual:  0 - not at all  Thoughts that you would be better off dead, or of hurting yourself in some way:  0 - not at all  PHQ-2 Score:  5  PHQ-9 Score:  17           The following portions of the patient's history were reviewed and updated as appropriate: allergies, current medications, past family history, past medical history, past social history, past surgical history and problem list     Past Medical History:     Past Medical History:   Diagnosis Date    Abdominal discomfort     Anemia     Arthritis     Bronchitis     Dyslipidemia     Last Assessed 2012    GERD (gastroesophageal reflux disease)     Headache, migraine     Low back pain     Low back pain        Patient Active Problem List   Diagnosis    Migraine headache    Low back pain    Esophageal reflux    Arthritis    Duodenum disorder    Hyperlipidemia    Depression    Hyperglycemia    Abnormal MRI of abdomen       Medications: Current Outpatient Medications   Medication Sig Dispense Refill    acetaminophen (TYLENOL) 325 mg tablet Take 2 tablets (650 mg total) by mouth every 6 (six) hours as needed for mild pain or fever 30 tablet 0    cyclobenzaprine (FLEXERIL) 5 mg tablet Take 5 mg by mouth daily at bedtime      docusate sodium (COLACE) 100 mg capsule Take 1 capsule (100 mg total) by mouth daily 30 capsule 5    ibuprofen (MOTRIN) 800 mg tablet every 8 (eight) hours        Magnesium 400 MG CAPS Take 400 mg by mouth daily      naproxen (NAPROSYN) 500 mg tablet Every 12 hours        ondansetron (ZOFRAN-ODT) 4 mg disintegrating tablet Take 1 tablet (4 mg total) by mouth every 6 (six) hours as needed for nausea or vomiting 10 tablet 0    pantoprazole (PROTONIX) 40 mg tablet Take 1 tablet (40 mg total) by mouth daily 30 tablet 3    polyethylene glycol (MIRALAX) 17 g packet Take 17 g by mouth daily 30 each 5    rizatriptan (MAXALT) 10 MG tablet Take by mouth as needed         No current facility-administered medications for this visit  Allergies:       Allergies   Allergen Reactions    Erythromycin        Family History:   Family history of headaches:  no known family members with significant headaches  Any family history of aneurysms  No  Family History   Problem Relation Age of Onset    Hypertension Mother     Other Mother         Dyslipidemia    Diabetes Father         Mellitus       Social History:   Work:  Retired  Education: some college  Lives with   One trial    Illicit Drugs: denies  Alcohol/tobacco: Denies tobacco use, alcohol intake: social drinker    Social History     Socioeconomic History    Marital status: /Civil Union     Spouse name: Not on file    Number of children: Not on file    Years of education: Not on file    Highest education level: Not on file   Occupational History    Not on file   Social Needs    Financial resource strain: Not on file    Food insecurity:     Worry: Not on file     Inability: Not on file    Transportation needs:     Medical: Not on file     Non-medical: Not on file   Tobacco Use    Smoking status: Never Smoker    Smokeless tobacco: Never Used   Substance and Sexual Activity    Alcohol use: No     Comment: Per [employer] Social Drinker    Drug use: No    Sexual activity: Not on file   Lifestyle    Physical activity:     Days per week: Not on file     Minutes per session: Not on file    Stress: Not on file   Relationships    Social connections:     Talks on phone: Not on file     Gets together: Not on file     Attends Worship service: Not on file     Active member of club or organization: Not on file     Attends meetings of clubs or organizations: Not on file     Relationship status: Not on file    Intimate partner violence:     Fear of current or ex partner: Not on file     Emotionally abused: Not on file     Physically abused: Not on file     Forced sexual activity: Not on file   Other Topics Concern    Not on file   Social History Narrative    Use Safety Equipment - Seatbelts         Objective:     Physical Exam:                                                                 Vitals:            Constitutional:    /74 (BP Location: Right arm, Patient Position: Sitting, Cuff Size: Large)   Pulse 72   Wt 76 2 kg (168 lb)   BMI 32 16 kg/m²   BP Readings from Last 3 Encounters:   02/27/19 108/74   01/09/19 120/80   01/08/19 112/78     Pulse Readings from Last 3 Encounters:   02/27/19 72   01/09/19 82   01/08/19 96         Well developed, well nourished, well groomed  No dysmorphic features  HEENT:  Normocephalic atraumatic  No meningismus  Oropharynx is clear and moist  No oral mucosal lesions  Chest:  Respirations regular and unlabored  Cardiovascular:  Regular rate, intact distal pulses  Distal extremities warm without palpable edema or tenderness, no observed significant swelling  Musculoskeletal:  Full range of motion   (see below under neurologic exam for evaluation of motor function and gait)   Skin:  warm and dry, not diaphoretic  No apparent birthmarks or stigmata of neurocutaneous disease  Psychiatric:  Normal behavior and appropriate affect        Neurological Examination:     Mental status/cognitive function:   Orientated to time, place and person  Recent and remote memory intact  Attention span and concentration as well as fund of knowledge are appropriate for age  Normal language and spontaneous speech  Cranial Nerves:  II-visual fields full  Fundi poorly visualized due to pupillary constriction (follows closely every 4 months with ophtho)  III, IV, VI-Pupils were equal, round, and reactive to light and accomodation  Extraocular movements were full and conjugate without nystagmus  conjugate gaze, normal smooth pursuits, normal saccades   V-facial sensation symmetric  VII-facial expression symmetric, intact forehead wrinkle, strong eye closure, symmetric smile    VIII-hearing grossly intact bilaterally   IX, X-palate elevation symmetric, no dysarthria  XI-shoulder shrug strength intact    XII-tongue protrusion midline  Motor Exam: symmetric bulk and tone throughout, no pronator drift  Power/strength 5/5 bilateral upper and lower extremities, no atrophy, fasciculations or abnormal movements noted  Sensory: grossly intact light touch in all extremities  Reflexes: brachioradialis 2+, biceps 2+, knee 2+, ankle 2+ bilaterally  No ankle clonus  Coordination: Finger nose finger intact bilaterally, no apparent dysmetria, ataxia or tremor noted  Gait: steady casual and tandem gait  Romberg Negative  Pertinent lab results:   12/11/18: CMP unremarkable   11/29/18: CBC significant for hgb 10 8    Imaging:   No head imaging noted in system        Review of Systems:   ROS obtained by medical assistant Personally reviewed and updated if indicated  Review of Systems   Constitutional: Negative  HENT: Negative  Eyes: Positive for visual disturbance  Respiratory: Negative  Cardiovascular: Negative  Gastrointestinal: Positive for nausea  Endocrine: Negative  Genitourinary: Negative  Musculoskeletal: Negative  Skin: Negative  Allergic/Immunologic: Negative  Neurological: Positive for headaches  Hematological: Negative  Psychiatric/Behavioral: Positive for decreased concentration  All other systems reviewed and are negative  I have spent 60 minutes with Patient and family today in which greater than 50% of this time was spent in counseling/coordination of care regarding Prognosis, Risks and benefits of tx options, Intructions for management, Importance of tx compliance, Risk factor reductions and Impressions        Author:  Karly Motley MD 2/26/2019 5:51 PM

## 2019-02-27 ENCOUNTER — OFFICE VISIT (OUTPATIENT)
Dept: NEUROLOGY | Facility: CLINIC | Age: 64
End: 2019-02-27
Payer: MEDICARE

## 2019-02-27 ENCOUNTER — TELEPHONE (OUTPATIENT)
Dept: NEUROLOGY | Facility: CLINIC | Age: 64
End: 2019-02-27

## 2019-02-27 VITALS
DIASTOLIC BLOOD PRESSURE: 74 MMHG | BODY MASS INDEX: 32.16 KG/M2 | SYSTOLIC BLOOD PRESSURE: 108 MMHG | HEART RATE: 72 BPM | WEIGHT: 168 LBS

## 2019-02-27 DIAGNOSIS — F32.1 CURRENT MODERATE EPISODE OF MAJOR DEPRESSIVE DISORDER WITHOUT PRIOR EPISODE (HCC): ICD-10-CM

## 2019-02-27 DIAGNOSIS — G43.109 MIGRAINE WITH AURA AND WITHOUT STATUS MIGRAINOSUS, NOT INTRACTABLE: Primary | ICD-10-CM

## 2019-02-27 DIAGNOSIS — E55.9 VITAMIN D DEFICIENCY: ICD-10-CM

## 2019-02-27 DIAGNOSIS — G43.109 RETINAL MIGRAINE: ICD-10-CM

## 2019-02-27 DIAGNOSIS — F41.9 ANXIETY DISORDER, UNSPECIFIED TYPE: ICD-10-CM

## 2019-02-27 DIAGNOSIS — D53.9 NUTRITIONAL ANEMIA: ICD-10-CM

## 2019-02-27 PROCEDURE — 99204 OFFICE O/P NEW MOD 45 MIN: CPT | Performed by: PSYCHIATRY & NEUROLOGY

## 2019-02-27 RX ORDER — LORAZEPAM 1 MG/1
TABLET ORAL
Qty: 1 TABLET | Refills: 0 | Status: SHIPPED | OUTPATIENT
Start: 2019-02-27 | End: 2019-05-28 | Stop reason: SDUPTHER

## 2019-02-27 NOTE — PATIENT INSTRUCTIONS
Additional Testing:   Neurodiagnostic workup:   CT angiogram of head and neck to make sure no clotting in vessels   - get labs within 2 months prior to the scan   - take ativan for anxiety if needed prior to scan     Headache/migraine treatment:    Abortive medications (for immediate treatment of a headache): It is ok to take ibuprofen, acetaminophen or naproxen (Advil, Tylenol,  Aleve, Excedrin) if they help your headaches you should limit these to No more than 3 times a week to avoid medication overuse/rebound headaches  For nausea  Ondansetron 4 mg ok to take as needed for nausea     For your more moderate to severe migraines take this medication early:  Continue Maxalt (rizatriptan) 10mg tabs - take one at the onset of headache  May repeat one time after 1-2 hours if pain has not resolved  (Max 2 a day and 9 a month)     Over the counter preventive supplements for headaches/migraines   (to take every day to help prevent headaches - not to take at the time of headache):   - Mind ease can be found online and all of these   [x] Magnesium 500mg daily   [x] Riboflavin (Vitamin B2)  400mg daily   (FYI B2 may make your urine bright/neon yellow)  AND/OR  [x] Herbal medication: Petasites/Butterbur 50mg (kids) - 150 mg (adults) daily  (When choosing your Butterbur online or in the store, beware that there are some poor preps containing pyrrolizidine alkaloids (PAs) that can be harmful to the liver  Therefore, do not use butterbur products that are not certified and labeled as hepatotoxic PA-free )    Prescription preventive medications for headaches/migraines   (to take every day to help prevent headaches - not to take at the time of headache):  [x] Could consider in the future     Sleep:   [x] Melatonin - you may take 3 mg nightly for sleep  You should take this 1 hour prior to bedtime consistently every night for it to work   It works by gradually helping to adjust your sleep time over days to weeks, rather than immediately making you feel sleepy  Lifestyle Recommendations:  [x] SLEEP - Maintain a regular sleep schedule: Adults need at least 7-8 hours of uninterrupted a night  Maintain good sleep hygiene:  Going to bed and waking up at consistent times, avoiding excessive daytime naps, avoiding caffeinated beverages in the evening, avoid excessive stimulation in the evening and generally using bed primarily for sleeping  One hour before bedtime would recommend turning lights down lower, decreasing your activity (may read quietly, listen to music at a low volume)  When you get into bed, should eliminate all technology (no texting, emailing, playing with your phone, iPad or tablet in bed)  [x] HYDRATION - Maintain good hydration  Drink  2L of fluid a day (4 typical small water bottles)  [x] DIET - Maintain good nutrition  In particular don't skip meals and try and eat healthy balanced meals regularly  [x] TRIGGERS - Look for other triggers and avoid them: Limit caffeine to 1-2 cups a day or less  Avoid dietary triggers that you have noticed bring on your headaches (this could include aged cheese, peanuts, MSG, aspartame and nitrates)  [x] EXERCISE - physical exercise as we all know is good for you in many ways, and not only is good for your heart, but also is beneficial for your mental health, cognitive health and  chronic pain/headaches  I would encourage at the least 5 days of physical exercise weekly for at least 30 minutes  Education and Follow-up  [x] Please call with any questions or concerns  [x] Return in about 8 weeks (around 4/27/2019)

## 2019-02-27 NOTE — PROGRESS NOTES
Patient ID: Emmanuelle Conklin is a 58 y o  female  Assessment/Plan:    No problem-specific Assessment & Plan notes found for this encounter  {Assess/PlanSmartLinks:61808}       Subjective:    HPI    {[facility] HPI texts:16941}    {Common ambulatory SmartLinks:06475}         Objective:    Blood pressure 108/74, pulse 72, weight 76 2 kg (168 lb)  Physical Exam    Neurological Exam      ROS:    Review of Systems   Constitutional: Negative  HENT: Negative  Eyes: Positive for visual disturbance  Respiratory: Negative  Cardiovascular: Negative  Gastrointestinal: Positive for nausea  Endocrine: Negative  Genitourinary: Negative  Musculoskeletal: Negative  Skin: Negative  Allergic/Immunologic: Negative  Neurological: Positive for headaches  Hematological: Negative  Psychiatric/Behavioral: Positive for decreased concentration  All other systems reviewed and are negative

## 2019-02-27 NOTE — TELEPHONE ENCOUNTER
Cm left vm to patient, explained resources will be placed in the mail  The below providers are therapist who accept the patient's insurance and are able to provide Cognitive Behavioral Therapy  Amairani Cannon  Clinical Psychologist   [phone]    Jorge Luis Siegel   Clinical Psychologist   [phone]    [facility] psychological services   [facility] Psychologist  [address] for [address]   [address] [place] [address] Psychologist   Jimmy Vela - Clinical Psychologist   Faith Arana- " "  Filomena Fresh " "  [place]  "  "  [phone]    [facility]   Amanda Longo- clinical psych  [phone]    [facility] [address] [place] [address] [facility]  [phone]    Judy BOBBY [address] [place] [address] [address] [place] [phone]    [facility]   [address] [place] [address] Psych  [phone]

## 2019-02-27 NOTE — TELEPHONE ENCOUNTER
MD Silvina Davidson             Could you please contact the patient with a list of therapist for depression covered by your insurance?

## 2019-03-05 ENCOUNTER — ANESTHESIA EVENT (OUTPATIENT)
Dept: GASTROENTEROLOGY | Facility: HOSPITAL | Age: 64
End: 2019-03-05
Payer: MEDICARE

## 2019-03-06 ENCOUNTER — HOSPITAL ENCOUNTER (OUTPATIENT)
Facility: HOSPITAL | Age: 64
Setting detail: OUTPATIENT SURGERY
Discharge: HOME | End: 2019-03-06
Attending: INTERNAL MEDICINE | Admitting: INTERNAL MEDICINE
Payer: MEDICARE

## 2019-03-06 ENCOUNTER — ANESTHESIA (OUTPATIENT)
Dept: GASTROENTEROLOGY | Facility: HOSPITAL | Age: 64
End: 2019-03-06
Payer: MEDICARE

## 2019-03-06 VITALS
DIASTOLIC BLOOD PRESSURE: 65 MMHG | OXYGEN SATURATION: 97 % | WEIGHT: 168 LBS | HEIGHT: 61 IN | HEART RATE: 79 BPM | SYSTOLIC BLOOD PRESSURE: 109 MMHG | TEMPERATURE: 96.7 F | BODY MASS INDEX: 31.72 KG/M2 | RESPIRATION RATE: 16 BRPM

## 2019-03-06 DIAGNOSIS — R93.5 ABNORMAL MRI OF ABDOMEN: Primary | ICD-10-CM

## 2019-03-06 PROCEDURE — 43237 ENDOSCOPIC US EXAM ESOPH: CPT | Performed by: INTERNAL MEDICINE

## 2019-03-06 RX ORDER — SODIUM CHLORIDE 9 MG/ML
INJECTION, SOLUTION INTRAVENOUS CONTINUOUS PRN
Status: DISCONTINUED | OUTPATIENT
Start: 2019-03-06 | End: 2019-03-06 | Stop reason: SURG

## 2019-03-06 RX ORDER — OMEGA-3-ACID ETHYL ESTERS 1 G/1
2 CAPSULE, LIQUID FILLED ORAL DAILY
COMMUNITY

## 2019-03-06 RX ORDER — MAG HYDROX/ALUMINUM HYD/SIMETH 400-400-40
5000 SUSPENSION, ORAL (FINAL DOSE FORM) ORAL DAILY
COMMUNITY

## 2019-03-06 RX ORDER — PROPOFOL 10 MG/ML
INJECTION, EMULSION INTRAVENOUS AS NEEDED
Status: DISCONTINUED | OUTPATIENT
Start: 2019-03-06 | End: 2019-03-06 | Stop reason: SURG

## 2019-03-06 RX ORDER — GLYCOPYRROLATE 0.2 MG/ML
INJECTION INTRAMUSCULAR; INTRAVENOUS AS NEEDED
Status: DISCONTINUED | OUTPATIENT
Start: 2019-03-06 | End: 2019-03-06 | Stop reason: SURG

## 2019-03-06 RX ORDER — PROPOFOL 10 MG/ML
INJECTION, EMULSION INTRAVENOUS CONTINUOUS PRN
Status: DISCONTINUED | OUTPATIENT
Start: 2019-03-06 | End: 2019-03-06 | Stop reason: SURG

## 2019-03-06 RX ADMIN — PROPOFOL 120 MCG/KG/MIN: 10 INJECTION, EMULSION INTRAVENOUS at 08:03

## 2019-03-06 RX ADMIN — SODIUM CHLORIDE: 9 INJECTION, SOLUTION INTRAVENOUS at 08:01

## 2019-03-06 RX ADMIN — LIDOCAINE HYDROCHLORIDE 50 MG: 20 INJECTION, SOLUTION INTRAVENOUS at 08:03

## 2019-03-06 RX ADMIN — LIDOCAINE HYDROCHLORIDE 50 MG: 20 INJECTION, SOLUTION INTRAVENOUS at 08:01

## 2019-03-06 RX ADMIN — GLYCOPYRROLATE 0.2 MG: 0.2 INJECTION, SOLUTION INTRAMUSCULAR; INTRAVENOUS at 08:01

## 2019-03-06 RX ADMIN — PROPOFOL 150 MG: 10 INJECTION, EMULSION INTRAVENOUS at 08:03

## 2019-03-06 NOTE — H&P
History and Physical - [facility]  Meena Ayoub 58 y o  female MRN: [id number]    HPI: Meena Ayoub is a 58y o  year old female who presents with pancreatic lesion seen on imaging studies back in November  At that time she was having a lot of pain  She was also taking multiple NSAIDs  Since then she has discontinue the use of NSAIDs and her pain has also improved  Her last imaging study was in 2018  She did have an endoscopy done as well which showed fullness in the 3rd portion the duodenum but biopsies showed acute inflammation  She is not losing any weight  Her appetite is well  Her CA 19-9 is normal       Review of Systems    Historical Information   Past Medical History:   Diagnosis Date    Abdominal discomfort     Anemia     Arthritis     Bronchitis     Dyslipidemia     Last Assessed 2012    GERD (gastroesophageal reflux disease)     Headache, migraine     Hypotension     Low back pain     Low back pain      Past Surgical History:   Procedure Laterality Date    CATARACT EXTRACTION       SECTION      ESOPHAGOGASTRODUODENOSCOPY N/A 2018    Procedure: ESOPHAGOGASTRODUODENOSCOPY (EGD) with push enteroscopy and bx;  Surgeon: Jaelyn Tyler DO;  [facility];   Service: Gastroenterology    EYE SURGERY      NERVE SURGERY Right     Hand    UPPER GASTROINTESTINAL ENDOSCOPY       Social History   Social History     Substance and Sexual Activity   Alcohol Use Yes    Frequency: Monthly or less    Comment: Per [employer]     Social History     Substance and Sexual Activity   Drug Use No     Social History     Tobacco Use   Smoking Status Never Smoker   Smokeless Tobacco Never Used     Family History   Problem Relation Age of Onset    Hypertension Mother     Other Mother         Dyslipidemia    Diabetes Father         Mellitus       Meds/Allergies     Medications Prior to Admission   Medication    acetaminophen (TYLENOL) 325 mg tablet    Cholecalciferol (VITAMIN D3) 5000 units CAPS    Cyanocobalamin (VITAMIN B-12) 5000 MCG SUBL    cyclobenzaprine (FLEXERIL) 5 mg tablet    docusate sodium (COLACE) 100 mg capsule    Magnesium 400 MG CAPS    naproxen (NAPROSYN) 500 mg tablet    omega-3-acid ethyl esters (LOVAZA) 1 g capsule    ondansetron (ZOFRAN-ODT) 4 mg disintegrating tablet    pantoprazole (PROTONIX) 40 mg tablet    polyethylene glycol (MIRALAX) 17 g packet    rizatriptan (MAXALT) 10 MG tablet    ibuprofen (MOTRIN) 800 mg tablet    LORazepam (ATIVAN) 1 mg tablet       Allergies   Allergen Reactions    Erythromycin        Objective     Blood pressure 116/60, pulse 60, temperature (!) 96 7 °F (35 9 °C), temperature source Oral, resp  rate 16, height 5' 1" (1 549 m), weight 76 2 kg (168 lb), SpO2 99 %  PHYSICAL EXAM    Gen: NAD  CV: RRR  CHEST: Clear  ABD: soft, NT/ND  EXT: no edema  Neuro: AAO      ASSESSMENT/PLAN:  This is a 58y o  year old female here for EUS for evaluation of pancreatic head lesion seen on imaging in November 2018  PLAN:   Procedure:  EUS

## 2019-03-06 NOTE — DISCHARGE INSTR - AVS FIRST PAGE
OPERATIVE REPORT  PATIENT NAME: Emmanuelle Conklin    :  1955  MRN: [id number]  Pt Location: [facility]    SURGERY DATE: 3/6/2019    Surgeon(s) and Role:     Christian Serrano MD - Primary    Preop Diagnosis:  Abnormal MRI of abdomen [R93 5]    Post-Op Diagnosis Codes:     * Abnormal MRI of abdomen [R93 5]    Procedure(s) (LRB):  LINEAR ENDOSCOPIC U/S (N/A)    ENDOSCOPIC ULTRASOUND    INDICATIONS:  Pancreatic lesion seen on previous imaging study  IMPRESSIONS:      1  No pancreatic mass was seen  Pancreatic duct was normal   It is possible that the lesion that she had previously could be secondary to peptic ulcer disease/duodenal ulcer since she was taking multiple NSAIDs at that time  2  Small 7 mm lymph node seen adjacent to the 3rd portion of the duodenum  RECOMMENDATIONS:     1  Repeat MRI in 3 months  2  Follow up with Dr Tj Marrero  PROCEDURE DETAILS :     SEDATION: Monitored anesthesia care, check anesthesia records    ASA Class: 2    CONSENT:  Informed consent was obtained for the procedure, including sedation after explaining the risks and benefits of the procedure  Risks including but not limited to bleeding, perforation, infection, and missed lesion  PREPARATION:   Telemetry, pulse oximetry, blood pressure were monitored throughout the procedure  Patient was identified by myself both verbally and by visual inspection of ID band  DESCRIPTION:   Patient was placed in the left lateral decubitus position and was sedated with the above medication  The gastroscope was introduced in to the oropharynx and the esophagus was intubated under direct visualization  Scope was passed down the esophagus up to 2nd part of the duodenum  A careful inspection was made as the gastroscope was withdrawn, including a retroflexed view of the stomach; findings and interventions are described below  FINDINGS:    EGD FINDINGS:  Limited endoscopic view with the echoendoscope was unremarkable      #1  Esophagus- normal     #2  Stomach- normal     #3  Duodenum- normal   Ampulla looked normal     EUS FINDINGS:      Pancreas : The pancreatic parenchyma slightly hyperechoic but otherwise unremarkable  The pancreatic duct was normal caliber throughout  No pancreatic mass was seen  Common bile duct :  Normal   Gallbladder :  Slightly distended but otherwise normal  Visualized areas of the liver :  Normal   Celiac axis :  Normal without any lymphadenopathy  Lymphadenopathy :  1 small 7 mm lymph node was seen in the cristofer duodenal area in the 3rd portion of the duodenum  This was oval and hypoechoic  No other lymph nodes were seen  COMPLICATIONS:  None; patient tolerated the procedure well            SPECIMENS:  * No specimens in log *    ESTIMATED BLOOD LOSS:  Minimal

## 2019-03-06 NOTE — ANESTHESIA POSTPROCEDURE EVALUATION
Post-Op Assessment Note    CV Status:  Stable  Pain Score: 0    Pain management: adequate     Mental Status:  Alert, awake and arousable   Hydration Status:  Euvolemic   PONV Controlled:  Controlled   Airway Patency:  Patent   Post Op Vitals Reviewed: Yes      Staff: CRNA           /58 (03/06/19 0837)    Temp     Pulse 90 (03/06/19 0837)   Resp 16 (03/06/19 0837)    SpO2 97 % (03/06/19 0837)

## 2019-03-06 NOTE — ANESTHESIA PREPROCEDURE EVALUATION
Review of Systems/Medical History  Patient summary reviewed  Chart reviewed  No history of anesthetic complications     Cardiovascular  Exercise tolerance (METS): >4,  Hyperlipidemia,    Pulmonary       GI/Hepatic    GERD well controlled,             Endo/Other    Obesity    GYN       Hematology   Musculoskeletal    Arthritis     Neurology    Headaches,    Psychology   Anxiety, Depression , being treated for depression,              Physical Exam    Airway    Mallampati score: II  TM Distance: >3 FB  Neck ROM: full     Dental   No notable dental hx     Cardiovascular      Pulmonary      Other Findings        Anesthesia Plan  ASA Score- 2     Anesthesia Type- IV sedation with anesthesia with ASA Monitors  Additional Monitors:   Airway Plan:         Plan Factors-    Induction- intravenous  Postoperative Plan-     Informed Consent- Anesthetic plan and risks discussed with patient  I personally reviewed this patient with the CRNA  Discussed and agreed on the Anesthesia Plan with the CRNA  Mary Tierney

## 2019-03-06 NOTE — OP NOTE
OPERATIVE REPORT  PATIENT NAME: Bharath Olivas    :  1955  MRN: [id number]  Pt Location: [facility]    SURGERY DATE: 3/6/2019    Surgeon(s) and Role:     Sabrina Schlatter, MD - Primary    Preop Diagnosis:  Abnormal MRI of abdomen [R93 5]    Post-Op Diagnosis Codes:     * Abnormal MRI of abdomen [R93 5]    Procedure(s) (LRB):  LINEAR ENDOSCOPIC U/S (N/A)    ENDOSCOPIC ULTRASOUND    INDICATIONS:  Pancreatic lesion seen on previous imaging study  IMPRESSIONS:      1  No pancreatic mass was seen  Pancreatic duct was normal   It is possible that the lesion that she had previously could be secondary to peptic ulcer disease/duodenal ulcer since she was taking multiple NSAIDs at that time  2  Small 7 mm lymph node seen adjacent to the 3rd portion of the duodenum  RECOMMENDATIONS:     1  Repeat MRI in 3 months  2  Follow up with Dr Dmitriy Hairston  PROCEDURE DETAILS :     SEDATION: Monitored anesthesia care, check anesthesia records    ASA Class: 2    CONSENT:  Informed consent was obtained for the procedure, including sedation after explaining the risks and benefits of the procedure  Risks including but not limited to bleeding, perforation, infection, and missed lesion  PREPARATION:   Telemetry, pulse oximetry, blood pressure were monitored throughout the procedure  Patient was identified by myself both verbally and by visual inspection of ID band  DESCRIPTION:   Patient was placed in the left lateral decubitus position and was sedated with the above medication  The gastroscope was introduced in to the oropharynx and the esophagus was intubated under direct visualization  Scope was passed down the esophagus up to 2nd part of the duodenum  A careful inspection was made as the gastroscope was withdrawn, including a retroflexed view of the stomach; findings and interventions are described below  FINDINGS:    EGD FINDINGS:  Limited endoscopic view with the echoendoscope was unremarkable      #1  Esophagus- normal     #2  Stomach- normal     #3  Duodenum- normal   Ampulla looked normal     EUS FINDINGS:      Pancreas : The pancreatic parenchyma slightly hyperechoic but otherwise unremarkable  The pancreatic duct was normal caliber throughout  No pancreatic mass was seen  Common bile duct :  Normal   Gallbladder :  Slightly distended but otherwise normal  Visualized areas of the liver :  Normal   Celiac axis :  Normal without any lymphadenopathy  Lymphadenopathy :  1 small 7 mm lymph node was seen in the cristofer duodenal area in the 3rd portion of the duodenum  This was oval and hypoechoic  No other lymph nodes were seen  COMPLICATIONS:  None; patient tolerated the procedure well            SPECIMENS:  * No specimens in log *    ESTIMATED BLOOD LOSS:  Minimal

## 2019-03-08 ENCOUNTER — APPOINTMENT (OUTPATIENT)
Dept: LAB | Facility: HOSPITAL | Age: 64
End: 2019-03-08
Attending: PSYCHIATRY & NEUROLOGY
Payer: MEDICARE

## 2019-03-08 DIAGNOSIS — F41.9 ANXIETY DISORDER, UNSPECIFIED TYPE: ICD-10-CM

## 2019-03-08 DIAGNOSIS — G43.109 MIGRAINE WITH AURA AND WITHOUT STATUS MIGRAINOSUS, NOT INTRACTABLE: ICD-10-CM

## 2019-03-08 DIAGNOSIS — E55.9 VITAMIN D DEFICIENCY: ICD-10-CM

## 2019-03-08 DIAGNOSIS — D53.9 NUTRITIONAL ANEMIA: ICD-10-CM

## 2019-03-08 LAB
25(OH)D3 SERPL-MCNC: 44.2 NG/ML (ref 30–100)
ANION GAP SERPL CALCULATED.3IONS-SCNC: 8 MMOL/L (ref 4–13)
BASOPHILS # BLD AUTO: 0.03 THOUSANDS/ΜL (ref 0–0.1)
BASOPHILS NFR BLD AUTO: 1 % (ref 0–1)
BUN SERPL-MCNC: 17 MG/DL (ref 5–25)
CALCIUM SERPL-MCNC: 8.9 MG/DL (ref 8.3–10.1)
CHLORIDE SERPL-SCNC: 107 MMOL/L (ref 100–108)
CO2 SERPL-SCNC: 28 MMOL/L (ref 21–32)
CREAT SERPL-MCNC: 0.85 MG/DL (ref 0.6–1.3)
EOSINOPHIL # BLD AUTO: 0.06 THOUSAND/ΜL (ref 0–0.61)
EOSINOPHIL NFR BLD AUTO: 1 % (ref 0–6)
ERYTHROCYTE [DISTWIDTH] IN BLOOD BY AUTOMATED COUNT: 13 % (ref 11.6–15.1)
GFR SERPL CREATININE-BSD FRML MDRD: 73 ML/MIN/1.73SQ M
GLUCOSE P FAST SERPL-MCNC: 107 MG/DL (ref 65–99)
HCT VFR BLD AUTO: 42.7 % (ref 34.8–46.1)
HGB BLD-MCNC: 13.1 G/DL (ref 11.5–15.4)
IMM GRANULOCYTES # BLD AUTO: 0.01 THOUSAND/UL (ref 0–0.2)
IMM GRANULOCYTES NFR BLD AUTO: 0 % (ref 0–2)
LYMPHOCYTES # BLD AUTO: 1.36 THOUSANDS/ΜL (ref 0.6–4.47)
LYMPHOCYTES NFR BLD AUTO: 21 % (ref 14–44)
MCH RBC QN AUTO: 28.9 PG (ref 26.8–34.3)
MCHC RBC AUTO-ENTMCNC: 30.7 G/DL (ref 31.4–37.4)
MCV RBC AUTO: 94 FL (ref 82–98)
MONOCYTES # BLD AUTO: 0.33 THOUSAND/ΜL (ref 0.17–1.22)
MONOCYTES NFR BLD AUTO: 5 % (ref 4–12)
NEUTROPHILS # BLD AUTO: 4.6 THOUSANDS/ΜL (ref 1.85–7.62)
NEUTS SEG NFR BLD AUTO: 72 % (ref 43–75)
NRBC BLD AUTO-RTO: 0 /100 WBCS
PLATELET # BLD AUTO: 263 THOUSANDS/UL (ref 149–390)
PMV BLD AUTO: 9.5 FL (ref 8.9–12.7)
POTASSIUM SERPL-SCNC: 3.9 MMOL/L (ref 3.5–5.3)
RBC # BLD AUTO: 4.53 MILLION/UL (ref 3.81–5.12)
SODIUM SERPL-SCNC: 143 MMOL/L (ref 136–145)
TSH SERPL DL<=0.05 MIU/L-ACNC: 2.02 UIU/ML (ref 0.36–3.74)
VIT B12 SERPL-MCNC: 2557 PG/ML (ref 100–900)
WBC # BLD AUTO: 6.39 THOUSAND/UL (ref 4.31–10.16)

## 2019-03-08 PROCEDURE — 82306 VITAMIN D 25 HYDROXY: CPT

## 2019-03-08 PROCEDURE — 80048 BASIC METABOLIC PNL TOTAL CA: CPT

## 2019-03-08 PROCEDURE — 84443 ASSAY THYROID STIM HORMONE: CPT

## 2019-03-08 PROCEDURE — 85025 COMPLETE CBC W/AUTO DIFF WBC: CPT

## 2019-03-08 PROCEDURE — 82607 VITAMIN B-12: CPT

## 2019-03-08 PROCEDURE — 36415 COLL VENOUS BLD VENIPUNCTURE: CPT

## 2019-03-11 ENCOUNTER — TELEPHONE (OUTPATIENT)
Dept: GASTROENTEROLOGY | Facility: MEDICAL CENTER | Age: 64
End: 2019-03-11

## 2019-03-11 NOTE — TELEPHONE ENCOUNTER
The patient is scheduled for a follow up on 4/25/19 10:00 with Dr Zoran Patterson in [facility] office   Appointment card mailed

## 2019-03-13 ENCOUNTER — HOSPITAL ENCOUNTER (OUTPATIENT)
Dept: CT IMAGING | Facility: HOSPITAL | Age: 64
Discharge: HOME | End: 2019-03-13
Attending: PSYCHIATRY & NEUROLOGY
Payer: MEDICARE

## 2019-03-13 DIAGNOSIS — G43.109 MIGRAINE WITH AURA AND WITHOUT STATUS MIGRAINOSUS, NOT INTRACTABLE: ICD-10-CM

## 2019-03-13 DIAGNOSIS — G43.109 RETINAL MIGRAINE: ICD-10-CM

## 2019-03-13 PROCEDURE — 70496 CT ANGIOGRAPHY HEAD: CPT

## 2019-03-13 PROCEDURE — 70498 CT ANGIOGRAPHY NECK: CPT

## 2019-03-13 RX ADMIN — IOHEXOL 85 ML: 350 INJECTION, SOLUTION INTRAVENOUS at 13:02

## 2019-03-14 ENCOUNTER — TELEPHONE (OUTPATIENT)
Dept: NEUROLOGY | Facility: CLINIC | Age: 64
End: 2019-03-14

## 2019-03-14 NOTE — TELEPHONE ENCOUNTER
----- Message from Clifton Doe MD sent at 3/14/2019  3:28 PM EDT -----  Neurology clinical team could you call this patient and let her know that there was an incidental thyroid nodule found on the imaging  Imaging was otherwise normal  Could you please have her follow up with her primary provider regarding this since radiology recommended considering a thyroid ultra sound if she has not already had this looked in to

## 2019-04-09 ENCOUNTER — OFFICE VISIT (OUTPATIENT)
Dept: FAMILY MEDICINE CLINIC | Facility: CLINIC | Age: 64
End: 2019-04-09
Payer: MEDICARE

## 2019-04-09 VITALS
BODY MASS INDEX: 31.72 KG/M2 | DIASTOLIC BLOOD PRESSURE: 80 MMHG | HEART RATE: 79 BPM | TEMPERATURE: 95.9 F | WEIGHT: 168 LBS | SYSTOLIC BLOOD PRESSURE: 114 MMHG | HEIGHT: 61 IN | OXYGEN SATURATION: 94 %

## 2019-04-09 DIAGNOSIS — F32.A DEPRESSION, UNSPECIFIED DEPRESSION TYPE: ICD-10-CM

## 2019-04-09 DIAGNOSIS — Z12.4 SCREENING FOR CERVICAL CANCER: ICD-10-CM

## 2019-04-09 DIAGNOSIS — E04.1 THYROID NODULE: Primary | ICD-10-CM

## 2019-04-09 DIAGNOSIS — R73.9 HYPERGLYCEMIA: ICD-10-CM

## 2019-04-09 DIAGNOSIS — E66.09 CLASS 1 OBESITY DUE TO EXCESS CALORIES WITH SERIOUS COMORBIDITY AND BODY MASS INDEX (BMI) OF 32.0 TO 32.9 IN ADULT: ICD-10-CM

## 2019-04-09 DIAGNOSIS — H61.23 EXCESSIVE CERUMEN IN BOTH EAR CANALS: ICD-10-CM

## 2019-04-09 DIAGNOSIS — M54.5 CHRONIC BILATERAL LOW BACK PAIN, WITH SCIATICA PRESENCE UNSPECIFIED: ICD-10-CM

## 2019-04-09 DIAGNOSIS — K21.9 GASTROESOPHAGEAL REFLUX DISEASE WITHOUT ESOPHAGITIS: ICD-10-CM

## 2019-04-09 DIAGNOSIS — R93.5 ABNORMAL MRI OF ABDOMEN: ICD-10-CM

## 2019-04-09 DIAGNOSIS — G89.29 CHRONIC BILATERAL LOW BACK PAIN, WITH SCIATICA PRESENCE UNSPECIFIED: ICD-10-CM

## 2019-04-09 LAB — SL AMB POCT HEMOGLOBIN AIC: 5.7 (ref ?–6.5)

## 2019-04-09 PROCEDURE — 69210 REMOVE IMPACTED EAR WAX UNI: CPT | Performed by: FAMILY MEDICINE

## 2019-04-09 PROCEDURE — 99214 OFFICE O/P EST MOD 30 MIN: CPT | Performed by: FAMILY MEDICINE

## 2019-04-09 PROCEDURE — 83036 HEMOGLOBIN GLYCOSYLATED A1C: CPT | Performed by: FAMILY MEDICINE

## 2019-04-09 RX ORDER — MELOXICAM 15 MG/1
15 TABLET ORAL DAILY PRN
COMMUNITY
End: 2019-05-02 | Stop reason: ALTCHOICE

## 2019-04-17 ENCOUNTER — HOSPITAL ENCOUNTER (OUTPATIENT)
Dept: ULTRASOUND IMAGING | Facility: HOSPITAL | Age: 64
Discharge: HOME | End: 2019-04-17
Attending: FAMILY MEDICINE
Payer: MEDICARE

## 2019-04-17 DIAGNOSIS — E04.1 THYROID NODULE: ICD-10-CM

## 2019-04-17 PROCEDURE — 76536 US EXAM OF HEAD AND NECK: CPT

## 2019-04-25 ENCOUNTER — OFFICE VISIT (OUTPATIENT)
Dept: GASTROENTEROLOGY | Facility: CLINIC | Age: 64
End: 2019-04-25
Payer: MEDICARE

## 2019-04-25 VITALS
HEIGHT: 61 IN | HEART RATE: 65 BPM | WEIGHT: 170.6 LBS | BODY MASS INDEX: 32.21 KG/M2 | SYSTOLIC BLOOD PRESSURE: 111 MMHG | TEMPERATURE: 97.5 F | DIASTOLIC BLOOD PRESSURE: 76 MMHG

## 2019-04-25 DIAGNOSIS — K21.9 GASTROESOPHAGEAL REFLUX DISEASE, ESOPHAGITIS PRESENCE NOT SPECIFIED: ICD-10-CM

## 2019-04-25 DIAGNOSIS — R93.5 ABNORMAL MRI OF ABDOMEN: ICD-10-CM

## 2019-04-25 DIAGNOSIS — Z12.11 COLON CANCER SCREENING: Primary | ICD-10-CM

## 2019-04-25 DIAGNOSIS — K59.04 CHRONIC IDIOPATHIC CONSTIPATION: ICD-10-CM

## 2019-04-25 PROCEDURE — 99213 OFFICE O/P EST LOW 20 MIN: CPT | Performed by: INTERNAL MEDICINE

## 2019-04-29 ENCOUNTER — CONSULT (OUTPATIENT)
Dept: BARIATRICS | Facility: CLINIC | Age: 64
End: 2019-04-29
Payer: MEDICARE

## 2019-04-29 VITALS
DIASTOLIC BLOOD PRESSURE: 74 MMHG | SYSTOLIC BLOOD PRESSURE: 102 MMHG | WEIGHT: 169.5 LBS | HEIGHT: 61 IN | BODY MASS INDEX: 32 KG/M2 | TEMPERATURE: 96.3 F | RESPIRATION RATE: 16 BRPM | HEART RATE: 77 BPM

## 2019-04-29 DIAGNOSIS — R73.01 IMPAIRED FASTING GLUCOSE: ICD-10-CM

## 2019-04-29 DIAGNOSIS — K21.9 GASTROESOPHAGEAL REFLUX DISEASE WITHOUT ESOPHAGITIS: ICD-10-CM

## 2019-04-29 DIAGNOSIS — E66.9 CLASS 1 OBESITY WITH SERIOUS COMORBIDITY IN ADULT, UNSPECIFIED BMI, UNSPECIFIED OBESITY TYPE: ICD-10-CM

## 2019-04-29 DIAGNOSIS — E78.5 HYPERLIPIDEMIA, UNSPECIFIED HYPERLIPIDEMIA TYPE: ICD-10-CM

## 2019-04-29 DIAGNOSIS — R63.5 ABNORMAL WEIGHT GAIN: Primary | ICD-10-CM

## 2019-04-29 DIAGNOSIS — E66.9 CLASS 1 OBESITY: ICD-10-CM

## 2019-04-29 DIAGNOSIS — G43.109 MIGRAINE WITH AURA AND WITHOUT STATUS MIGRAINOSUS, NOT INTRACTABLE: ICD-10-CM

## 2019-04-29 PROBLEM — E66.811 CLASS 1 OBESITY: Status: ACTIVE | Noted: 2019-04-29

## 2019-04-29 PROCEDURE — 99204 OFFICE O/P NEW MOD 45 MIN: CPT | Performed by: PHYSICIAN ASSISTANT

## 2019-05-02 ENCOUNTER — CONSULT (OUTPATIENT)
Dept: PAIN MEDICINE | Facility: MEDICAL CENTER | Age: 64
End: 2019-05-02
Attending: FAMILY MEDICINE
Payer: MEDICARE

## 2019-05-02 ENCOUNTER — APPOINTMENT (OUTPATIENT)
Dept: RADIOLOGY | Facility: MEDICAL CENTER | Age: 64
End: 2019-05-02
Payer: MEDICARE

## 2019-05-02 VITALS
HEART RATE: 69 BPM | WEIGHT: 169.8 LBS | HEIGHT: 61 IN | RESPIRATION RATE: 16 BRPM | SYSTOLIC BLOOD PRESSURE: 116 MMHG | DIASTOLIC BLOOD PRESSURE: 78 MMHG | BODY MASS INDEX: 32.06 KG/M2

## 2019-05-02 DIAGNOSIS — G89.29 CHRONIC BILATERAL LOW BACK PAIN WITHOUT SCIATICA: ICD-10-CM

## 2019-05-02 DIAGNOSIS — M43.16 SPONDYLOLISTHESIS OF LUMBAR REGION: ICD-10-CM

## 2019-05-02 DIAGNOSIS — M54.50 CHRONIC BILATERAL LOW BACK PAIN WITHOUT SCIATICA: ICD-10-CM

## 2019-05-02 DIAGNOSIS — M47.816 LUMBAR SPONDYLOSIS: Primary | ICD-10-CM

## 2019-05-02 DIAGNOSIS — M47.816 LUMBAR SPONDYLOSIS: ICD-10-CM

## 2019-05-02 PROCEDURE — 72120 X-RAY BEND ONLY L-S SPINE: CPT

## 2019-05-02 PROCEDURE — 99204 OFFICE O/P NEW MOD 45 MIN: CPT | Performed by: PHYSICAL MEDICINE & REHABILITATION

## 2019-05-06 ENCOUNTER — TELEPHONE (OUTPATIENT)
Dept: RADIOLOGY | Facility: MEDICAL CENTER | Age: 64
End: 2019-05-06

## 2019-05-07 ENCOUNTER — TELEPHONE (OUTPATIENT)
Dept: RADIOLOGY | Facility: MEDICAL CENTER | Age: 64
End: 2019-05-07

## 2019-05-09 ENCOUNTER — TELEPHONE (OUTPATIENT)
Dept: PAIN MEDICINE | Facility: CLINIC | Age: 64
End: 2019-05-09

## 2019-05-20 ENCOUNTER — OFFICE VISIT (OUTPATIENT)
Dept: BARIATRICS | Facility: CLINIC | Age: 64
End: 2019-05-20

## 2019-05-20 VITALS — HEIGHT: 61 IN | BODY MASS INDEX: 31.78 KG/M2 | WEIGHT: 168.3 LBS

## 2019-05-20 DIAGNOSIS — R63.5 ABNORMAL WEIGHT GAIN: ICD-10-CM

## 2019-05-20 PROCEDURE — WMDI30

## 2019-05-20 PROCEDURE — RECHECK

## 2019-05-22 ENCOUNTER — OFFICE VISIT (OUTPATIENT)
Dept: NEUROLOGY | Facility: CLINIC | Age: 64
End: 2019-05-22
Payer: MEDICARE

## 2019-05-22 VITALS
HEART RATE: 74 BPM | SYSTOLIC BLOOD PRESSURE: 118 MMHG | HEIGHT: 61 IN | WEIGHT: 170.5 LBS | RESPIRATION RATE: 16 BRPM | BODY MASS INDEX: 32.19 KG/M2 | DIASTOLIC BLOOD PRESSURE: 74 MMHG

## 2019-05-22 DIAGNOSIS — E53.8 DISORDER OF VITAMIN B12: ICD-10-CM

## 2019-05-22 DIAGNOSIS — F41.9 ANXIETY DISORDER, UNSPECIFIED TYPE: ICD-10-CM

## 2019-05-22 DIAGNOSIS — G43.109 MIGRAINE WITH AURA AND WITHOUT STATUS MIGRAINOSUS, NOT INTRACTABLE: Primary | ICD-10-CM

## 2019-05-22 DIAGNOSIS — E55.9 VITAMIN D DEFICIENCY: ICD-10-CM

## 2019-05-22 PROCEDURE — 99215 OFFICE O/P EST HI 40 MIN: CPT | Performed by: PSYCHIATRY & NEUROLOGY

## 2019-05-22 RX ORDER — NAPROXEN 500 MG/1
TABLET ORAL
Qty: 20 TABLET | Refills: 3 | Status: SHIPPED | OUTPATIENT
Start: 2019-05-22 | End: 2021-02-18 | Stop reason: SDUPTHER

## 2019-05-28 DIAGNOSIS — F41.9 ANXIETY DISORDER, UNSPECIFIED TYPE: ICD-10-CM

## 2019-05-28 RX ORDER — LORAZEPAM 1 MG/1
TABLET ORAL
Qty: 1 TABLET | Refills: 0 | Status: SHIPPED | OUTPATIENT
Start: 2019-05-28 | End: 2019-10-15 | Stop reason: SDUPTHER

## 2019-06-06 ENCOUNTER — HOSPITAL ENCOUNTER (OUTPATIENT)
Dept: MRI IMAGING | Facility: HOSPITAL | Age: 64
Discharge: HOME | End: 2019-06-06
Attending: INTERNAL MEDICINE
Payer: MEDICARE

## 2019-06-06 DIAGNOSIS — R93.5 ABNORMAL MRI OF ABDOMEN: ICD-10-CM

## 2019-06-06 PROCEDURE — 76376 3D RENDER W/INTRP POSTPROCES: CPT

## 2019-06-06 PROCEDURE — 74183 MRI ABD W/O CNTR FLWD CNTR: CPT

## 2019-06-06 PROCEDURE — A9585 GADOBUTROL INJECTION: HCPCS | Performed by: INTERNAL MEDICINE

## 2019-06-06 RX ADMIN — GADOBUTROL 8 ML: 604.72 INJECTION INTRAVENOUS at 09:34

## 2019-06-07 DIAGNOSIS — K21.9 GASTROESOPHAGEAL REFLUX DISEASE, ESOPHAGITIS PRESENCE NOT SPECIFIED: ICD-10-CM

## 2019-06-10 ENCOUNTER — OFFICE VISIT (OUTPATIENT)
Dept: BARIATRICS | Facility: CLINIC | Age: 64
End: 2019-06-10
Payer: MEDICARE

## 2019-06-10 VITALS
BODY MASS INDEX: 31.83 KG/M2 | SYSTOLIC BLOOD PRESSURE: 100 MMHG | HEIGHT: 61 IN | HEART RATE: 74 BPM | DIASTOLIC BLOOD PRESSURE: 74 MMHG | RESPIRATION RATE: 18 BRPM | TEMPERATURE: 97.9 F | WEIGHT: 168.6 LBS

## 2019-06-10 DIAGNOSIS — R73.01 IMPAIRED FASTING GLUCOSE: ICD-10-CM

## 2019-06-10 DIAGNOSIS — E66.9 CLASS 1 OBESITY: ICD-10-CM

## 2019-06-10 DIAGNOSIS — R63.5 ABNORMAL WEIGHT GAIN: Primary | ICD-10-CM

## 2019-06-10 DIAGNOSIS — F32.A DEPRESSION, UNSPECIFIED DEPRESSION TYPE: ICD-10-CM

## 2019-06-10 PROCEDURE — 99214 OFFICE O/P EST MOD 30 MIN: CPT | Performed by: PHYSICIAN ASSISTANT

## 2019-06-10 PROCEDURE — 1124F ACP DISCUSS-NO DSCNMKR DOCD: CPT | Performed by: PHYSICIAN ASSISTANT

## 2019-06-10 RX ORDER — PANTOPRAZOLE SODIUM 40 MG/1
TABLET, DELAYED RELEASE ORAL
Qty: 30 TABLET | Refills: 5 | Status: SHIPPED | OUTPATIENT
Start: 2019-06-10 | End: 2019-12-16 | Stop reason: SDUPTHER

## 2019-06-18 ENCOUNTER — TELEPHONE (OUTPATIENT)
Dept: GASTROENTEROLOGY | Facility: CLINIC | Age: 64
End: 2019-06-18

## 2019-06-28 ENCOUNTER — TELEPHONE (OUTPATIENT)
Dept: GASTROENTEROLOGY | Facility: CLINIC | Age: 64
End: 2019-06-28

## 2019-07-02 ENCOUNTER — ANNUAL EXAM (OUTPATIENT)
Dept: OBGYN CLINIC | Facility: MEDICAL CENTER | Age: 64
End: 2019-07-02
Payer: MEDICARE

## 2019-07-02 VITALS — DIASTOLIC BLOOD PRESSURE: 62 MMHG | BODY MASS INDEX: 31.72 KG/M2 | SYSTOLIC BLOOD PRESSURE: 100 MMHG | WEIGHT: 167.9 LBS

## 2019-07-02 DIAGNOSIS — Z01.419 ENCOUNTER FOR GYNECOLOGICAL EXAMINATION WITH PAPANICOLAOU SMEAR OF CERVIX: Primary | ICD-10-CM

## 2019-07-02 DIAGNOSIS — Z12.31 ENCOUNTER FOR SCREENING MAMMOGRAM FOR MALIGNANT NEOPLASM OF BREAST: ICD-10-CM

## 2019-07-02 PROCEDURE — G0145 SCR C/V CYTO,THINLAYER,RESCR: HCPCS | Performed by: OBSTETRICS & GYNECOLOGY

## 2019-07-02 PROCEDURE — G0101 CA SCREEN;PELVIC/BREAST EXAM: HCPCS | Performed by: OBSTETRICS & GYNECOLOGY

## 2019-07-02 PROCEDURE — 87624 HPV HI-RISK TYP POOLED RSLT: CPT | Performed by: OBSTETRICS & GYNECOLOGY

## 2019-07-02 RX ORDER — ASPIRIN 325 MG
325 TABLET ORAL DAILY
COMMUNITY
End: 2019-07-11

## 2019-07-02 NOTE — PROGRESS NOTES
ASSESSMENT & PLAN: Lynn Holcomb is a 71 y o   with normal gynecologic exam     1   Routine well woman exam done today  2  Pap and HPV:  The patient's last pap and hpv was unsure  It was normal     Pap and cotesting was done today  Current ASCCP Guidelines reviewed  3   Mammogram ordered  4  Colonoscopy   5  DEXA ordered  6  The following were reviewed in today's visit: breast self exam and mammography screening ordered  CC:  Annual Gynecologic Examination    HPI: Lynn Holcomb is a 71 y o  Emiliano Rakers who presents for annual gynecologic examination  She has the following concerns:  Reports decreased libido, discussed using Testosterone cream  Accept script  Called into [facility]  States she gets occasional spotting after intercourse  No bleeding otherwise  Reports dryness  Uses lubicants  Health Maintenance:    She wears her seatbelt routinely  She does perform regular monthly self breast exams  She feels safe at home  Last PAP & HPV: ?  Last mammogram: ? Last colonoscopy:     Past Medical History:   Diagnosis Date    Abdominal discomfort     Anemia     Arthritis     Bronchitis     Chronic pain disorder     Dyslipidemia     Last Assessed 2012    GERD (gastroesophageal reflux disease)     Headache, migraine     Hypotension     Low back pain     Low back pain     Rheumatoid arthritis ([facility] [address] )     Thyroid nodule 2019       Past Surgical History:   Procedure Laterality Date    CATARACT EXTRACTION       SECTION      ESOPHAGOGASTRODUODENOSCOPY N/A 2018    Procedure: ESOPHAGOGASTRODUODENOSCOPY (EGD) with push enteroscopy and bx;  Surgeon: Lauren Begum DO;  [facility]; Service: Gastroenterology    EYE SURGERY      NERVE SURGERY Right     Hand    LA EDG US EXAM SURGICAL ALTER STOM DUODENUM/JEJUNUM N/A 3/6/2019    Procedure: LINEAR ENDOSCOPIC U/S;  Surgeon: Gina Boone MD;  [facility];   Service: Gastroenterology    UPPER GASTROINTESTINAL ENDOSCOPY         Past OB/Gyn History:  OB History        1    Para   1    Term           1    AB        Living   1       SAB        TAB        Ectopic        Multiple        Live Births   1               History of abnormal pap smears: No        Family History   Problem Relation Age of Onset    Hypertension Mother     Other Mother         Dyslipidemia    Diabetes Father         Mellitus    No Known Problems Brother     No Known Problems Brother     Cancer Neg Hx     Heart disease Neg Hx     Stroke Neg Hx        Social History:  Social History     Socioeconomic History    Marital status: /Civil Union     Spouse name: Not on file    Number of children: Not on file    Years of education: Not on file    Highest education level: Not on file   Occupational History    Occupation: retired   Social Needs    Financial resource strain: Not on file    Food insecurity:     Worry: Not on file     Inability: Not on file   Gigwalk needs:     Medical: Not on file     Non-medical: Not on file   Tobacco Use    Smoking status: Former Smoker    Smokeless tobacco: Never Used   Substance and Sexual Activity    Alcohol use: Yes     Comment: occasional    Drug use: No    Sexual activity: Yes     Partners: Male     Birth control/protection: Post-menopausal   Lifestyle    Physical activity:     Days per week: Not on file     Minutes per session: Not on file    Stress: Not on file   Relationships    Social connections:     Talks on phone: Not on file     Gets together: Not on file     Attends [employer] service: Not on file     Active member of club or organization: Not on file     Attends meetings of clubs or organizations: Not on file     Relationship status: Not on file    Intimate partner violence:     Fear of current or ex partner: Not on file     Emotionally abused: Not on file     Physically abused: Not on file     Forced sexual activity: Not on file   Other Topics Concern    Not on file   Social History Narrative    Use Safety Equipment - Seatbelts        Lives with spouse         Allergies   Allergen Reactions    Erythromycin Rash         Current Outpatient Medications:     acetaminophen (TYLENOL) 325 mg tablet, Take 2 tablets (650 mg total) by mouth every 6 (six) hours as needed for mild pain or fever, Disp: 30 tablet, Rfl: 0    aspirin 325 mg tablet, Take 325 mg by mouth daily, Disp: , Rfl:     Cholecalciferol (VITAMIN D3) 5000 units CAPS, Take by mouth, Disp: , Rfl:     cyclobenzaprine (FLEXERIL) 5 mg tablet, Take 5 mg by mouth daily at bedtime, Disp: , Rfl:     docusate sodium (COLACE) 100 mg capsule, Take 1 capsule (100 mg total) by mouth daily, Disp: 30 capsule, Rfl: 5    ibuprofen (MOTRIN) 800 mg tablet, every 8 (eight) hours as needed , Disp: , Rfl:     Magnesium 400 MG CAPS, Take 400 mg by mouth daily, Disp: , Rfl:     naproxen (NAPROSYN) 500 mg tablet, Take one tab up to twice a day, no more than 3 times a week, Disp: 20 tablet, Rfl: 3    omega-3-acid ethyl esters (LOVAZA) 1 g capsule, Take 2 g by mouth 2 (two) times a day, Disp: , Rfl:     ondansetron (ZOFRAN-ODT) 4 mg disintegrating tablet, Take 1 tablet (4 mg total) by mouth every 6 (six) hours as needed for nausea or vomiting, Disp: 10 tablet, Rfl: 0    pantoprazole (PROTONIX) 40 mg tablet, TAKE ONE TABLET BY MOUTH ONCE DAILY, Disp: 30 tablet, Rfl: 5    polyethylene glycol (MIRALAX) 17 g packet, Take 17 g by mouth daily, Disp: 30 each, Rfl: 5    Riboflavin (B2 PO), Take by mouth, Disp: , Rfl:     rizatriptan (MAXALT) 10 MG tablet, Take by mouth as needed  , Disp: , Rfl:     UNKNOWN TO PATIENT, COMPOUNDED MEDICATION  Lidocaine 5% and Diclofenac 3%  Apply 1-3 grams to the affected area 2-4 times per day as needed  , Disp: , Rfl:     LORazepam (ATIVAN) 1 mg tablet, 0 5 mg tab 20-30 mins before scan, may repeat in 30 minutes if needed for anxiety (Patient not taking: Reported on 6/10/2019), Disp: 1 tablet, Rfl: 0    Review of Systems  Constitutional :no fever, feels well, no tiredness, no recent weight gain or loss  ENT: no ear ache, no loss of hearing, no nosebleeds or nasal discharge, no sore throat or hoarseness  Cardiovascular: no complaints of slow or fast heart beat, no chest pain, no palpitations, no leg claudication or lower extremity edema  Respiratory: no complaints of shortness of shortness of breath, no MINAYA  Breasts:no complaints of breast pain, breast lump, or nipple discharge  Gastrointestinal: no complaints of abdominal pain, constipation, nausea, vomiting, or diarrhea or bloody stools  Genitourinary : no complaints of dysuria, incontinence, pelvic pain, no dysmenorrhea, vaginal discharge or abnormal vaginal bleeding and as noted in HPI  Musculoskeletal: no complaints of arthralgia, no myalgia, no joint swelling or stiffness, no limb pain or swelling  Integumentary: no complaints of skin rash or lesion, itching or dry skin  Neurological: no complaints of headache, no confusion, no numbness or tingling, no dizziness or fainting    Objective      /62   Wt 76 2 kg (167 lb 14 4 oz)   BMI 31 72 kg/m²   General:   appears stated age, cooperative, alert normal mood and affect   Neck: normal, supple,trachea midline, no masses   Heart: regular rate and rhythm, S1, S2 normal, no murmur, click, rub or gallop   Lungs: clear to auscultation bilaterally   Breasts: normal appearance, no masses or tenderness, Inspection negative, No nipple retraction or dimpling, No nipple discharge or bleeding, No axillary or supraclavicular adenopathy, Normal to palpation without dominant masses   Abdomen: soft, non-tender, without masses or organomegaly   Vulva: normal female genitalia, Bartholin's, Urethra, [place] normal, no lesions, normal female hair distribution   Vagina: normal vagina, no discharge, exudate, lesion, or erythema   Urethra: normal   Cervix: Normal, no discharge  PAP done  Uterus: normal size, contour, position, consistency, mobility, non-tender   Adnexa: normal adnexa   Lymphatic palpation of lymph nodes in neck, axilla, groin and/or other locations: no lymphadenopathy or masses noted   Skin normal skin turgor and no rashes     Psychiatric orientation to person, place, and time: normal  mood and affect: normal

## 2019-07-02 NOTE — PATIENT INSTRUCTIONS
Thank you for your confidence in our team    We appreciate you and welcome your feedback  If you receive a survey from us, please take a few moments to let us know how we are doing     Sincerely,   Tami Roque MD

## 2019-07-03 LAB
HPV HR 12 DNA CVX QL NAA+PROBE: NEGATIVE
HPV16 DNA CVX QL NAA+PROBE: NEGATIVE
HPV18 DNA CVX QL NAA+PROBE: NEGATIVE

## 2019-07-09 LAB
LAB AP GYN PRIMARY INTERPRETATION: NORMAL
Lab: NORMAL

## 2019-07-10 ENCOUNTER — OFFICE VISIT (OUTPATIENT)
Dept: BARIATRICS | Facility: CLINIC | Age: 64
End: 2019-07-10

## 2019-07-10 VITALS — HEIGHT: 61 IN | WEIGHT: 168.4 LBS | BODY MASS INDEX: 31.79 KG/M2

## 2019-07-10 DIAGNOSIS — R63.5 ABNORMAL WEIGHT GAIN: ICD-10-CM

## 2019-07-10 PROCEDURE — RECHECK

## 2019-07-10 PROCEDURE — WMDI30

## 2019-07-10 NOTE — PROGRESS NOTES
Weight Management Medical Nutrition Assessment  Is here for menu planning session  Current wt:  168 4 lbs  Stable x just under 2 months  Last seen 6/10/19 by PA   Still having a lot of trouble in the evening  Tracking but still finds it difficult to stick to 4159-0209 calories/day  Thinks she is consuming 1400 calories but based on her evening snack I think she is consuming more  She has started seeing a therapist and did bring up this trigger time to her  Suggest she also try going into her room earlier to watch tv rather than staying in living room or busying herself with a puzzle etc  She was receptive to this  Will f/u with PA in 3 wks      Anthropometric Measurements  Start Weight (lbs): 169 5 lbs  Current Weight (lbs): 168 4   TBW % Change from start weight: 1%  Ideal Body Weight (lbs): 105 lbs  Goal Weight (lbs): 135 lbs  Highest: 171 lbs  Lowest: 115-120 lbs  UBW: 150 lbs    Weight Loss History  Previous weight loss attempts: Commercial Programs ([employer], [employer], etc )  High Protein/Low CHO diets (Atkins, [employer], etc )  Self Created Diets (Portion Control, Healthy Food Choices, etc )    Food and Nutrition Related History  Wake up: 7-8  Bed Time: 11:30-12    Food Recall  Breakfast: 8:00: 1-2 eggs, 3 turkey sausage links, cheese, 2 cups coffee w/half & half (~3 tbsp)    Snack: skip  Lunch: 12-1:00: hamburger (turkey at home) or deli meat, swiss cheese OR 1/2 can tuna, mason w/avocado, 8 crackers  Snack: cheese stick, fruit   Dinner: 4:30-6:30: chili w/beans, veggies  Snack: crackers w/peanut butter OR apple, yogurt, atkins bar, yasso bar or halo top    Beverages: water and coffee/tea  Volume of beverage intake: 32 oz water, 3 cups coffee    Weekends: Same  Cravings: sweet  Trouble area of day: night    Frequency of Eating out: 2x/wk  Food restrictions: n/a  Cooking:   Food Shopping:     Physical Activity Intake  Activity:Walking  Frequency:when camping walks dogs 3x/day  Physical limitations/barriers to exercise: n/a    Estimated Needs  Energy  [employer] Energy Needs: BMR :1246 , 0 5-1 lb loss weekly sedentary:  995-1245, 0 5-1 lb loss weekly lightly active: 3474-8816  Protein:57-72 gm     (1 2-1 5g/kg IBW)  Fluid: 56 oz    (35mL/kg IBW)    Nutrition Diagnosis  Yes; Overweight/obesity  related to Excess energy intake as evidenced by  BMI more than normative standard for age and sex (obesity-grade I 26-30  9)       Nutrition Intervention    Nutrition Prescription  Calories:8150-6411  Protein: ~100 gm    Meal Plan  Breakfast: 400-450, 30  Snack: skip  Lunch: 200-300, 21-28  Snack 100-150, 5-10  Dinner: 300-400, 21-28  Snack: 260, 27    Nutrition Education:  Calorie controlled menu  Lean protein food choices  Healthy snack options  Food journaling tips  Hydration  Behavior change    Nutrition Counseling:  Strategies: meal planning, portion sizes, healthy snack choices, hydration, fiber intake, protein intake, exercise, food journal    Monitoring and Evaluation:  Evaluation criteria:  Energy Intake  Meet protein needs  Maintain adequate hydration  Monitor weekly weight  Meal planning/preparation  Food journal   Decreased portions at mealtimes and snacks  Physical activity     Barriers to learning:none  Readiness to change: Preparation  Comprehension: very good  Expected Compliance: good

## 2019-07-11 ENCOUNTER — OFFICE VISIT (OUTPATIENT)
Dept: FAMILY MEDICINE CLINIC | Facility: CLINIC | Age: 64
End: 2019-07-11
Payer: MEDICARE

## 2019-07-11 VITALS
OXYGEN SATURATION: 97 % | SYSTOLIC BLOOD PRESSURE: 120 MMHG | TEMPERATURE: 96.4 F | BODY MASS INDEX: 31.64 KG/M2 | HEIGHT: 61 IN | HEART RATE: 100 BPM | DIASTOLIC BLOOD PRESSURE: 84 MMHG | WEIGHT: 167.6 LBS

## 2019-07-11 DIAGNOSIS — F41.9 ANXIETY DISORDER, UNSPECIFIED TYPE: ICD-10-CM

## 2019-07-11 DIAGNOSIS — F32.A DEPRESSION, UNSPECIFIED DEPRESSION TYPE: ICD-10-CM

## 2019-07-11 DIAGNOSIS — M25.512 CHRONIC LEFT SHOULDER PAIN: Primary | ICD-10-CM

## 2019-07-11 DIAGNOSIS — G89.29 CHRONIC LEFT SHOULDER PAIN: Primary | ICD-10-CM

## 2019-07-11 DIAGNOSIS — G43.109 MIGRAINE WITH AURA AND WITHOUT STATUS MIGRAINOSUS, NOT INTRACTABLE: ICD-10-CM

## 2019-07-11 PROCEDURE — 99214 OFFICE O/P EST MOD 30 MIN: CPT | Performed by: FAMILY MEDICINE

## 2019-07-11 NOTE — PROGRESS NOTES
Assessment/Plan:    Chronic left shoulder pain  She has been having left shoulder symptoms for about 2 months  There was no injury  Will obtain x-ray and I have given her a physical therapy referral   Also recommended trial of ice packs  Anxiety disorder  She is doing well and only uses lorazepam for flying or if she needs an MRI scan  Depression  She has been having some situational depression  She is noting some help with therapy  Migraine headache  She still gets migraine auras but not full blown headaches  She will cont neuro follow up       Diagnoses and all orders for this visit:    Chronic left shoulder pain    Anxiety disorder, unspecified type    Depression, unspecified depression type    Migraine with aura and without status migrainosus, not intractable          Subjective:      Patient ID: Hamzah Blount is a 71 y o  female  She is here for follow-up and she has new complaint of left shoulder pain which started couple months ago  There was no injury  She has pain with certain movements and notices decreased range of motion as well as night pain  She has history of frozen shoulder on the right-hand side many years ago  She is right-hand dominant  Her migraine headaches auras have calmed down  Her neurologist had referred her for some counseling and she started that a few weeks ago  This has helped her with the arm situational depression  She is also going for weight management  The following portions of the patient's history were reviewed and updated as appropriate: allergies, current medications, past family history, past medical history, past social history, past surgical history and problem list     Review of Systems   Constitutional: Negative for activity change, chills, fatigue and fever  HENT: Negative for congestion, ear pain, sinus pressure and sore throat  Eyes: Negative for pain and visual disturbance     Respiratory: Negative for cough, chest tightness, shortness of breath and wheezing  Cardiovascular: Negative for chest pain, palpitations and leg swelling  Gastrointestinal: Negative for abdominal pain, blood in stool, constipation, diarrhea, nausea and vomiting  Endocrine: Negative for polydipsia and polyuria  Genitourinary: Negative for difficulty urinating, dysuria, frequency and urgency  Musculoskeletal: Positive for arthralgias  Negative for joint swelling and myalgias  Skin: Negative for rash  Neurological: Positive for headaches  Negative for dizziness, weakness and numbness  Hematological: Negative for adenopathy  Does not bruise/bleed easily  Psychiatric/Behavioral: Negative for dysphoric mood  The patient is not nervous/anxious  Objective:      /84 (BP Location: Right arm, Patient Position: Sitting, Cuff Size: Standard)   Pulse 100   Temp (!) 96 4 °F (35 8 °C) (Tympanic)   Ht 5' 0 6" (1 539 m)   Wt 76 kg (167 lb 9 6 oz)   SpO2 97%   BMI 32 09 kg/m²          Physical Exam   Constitutional: She appears well-developed and well-nourished  obese   HENT:   Head: Normocephalic and atraumatic  Eyes: Pupils are equal, round, and reactive to light  EOM are normal    Neck: Normal range of motion  Neck supple  Cardiovascular: Normal rate, regular rhythm, normal heart sounds and intact distal pulses  Pulmonary/Chest: Effort normal and breath sounds normal    Musculoskeletal: She exhibits no edema or deformity  Left shoulder is without deformity or swelling  There is mild tenderness posteriorly  There was decreased range of motion  Abduction was about 30° of above shoulder level  Forward flexion was normal with pain at full flexion  Extension was normal with mild pain at full extension  There is decreased internal rotation  Negative empty cans and negative impingement                                 Skin: Skin is warm and dry  Psychiatric: She has a normal mood and affect  Nursing note and vitals reviewed

## 2019-07-11 NOTE — PATIENT INSTRUCTIONS
Obesity   AMBULATORY CARE:   Obesity  is when your body mass index (BMI) is greater than 30  Your healthcare provider will use your height and weight to measure your BMI  The risks of obesity include  many health problems, such as injuries or physical disability  You may need tests to check for the following:  · Diabetes     · High blood pressure or high cholesterol     · Heart disease     · Gallbladder or liver disease     · Cancer of the colon, breast, prostate, liver, or kidney     · Sleep apnea     · Arthritis or gout  Seek care immediately if:   · You have a severe headache, confusion, or difficulty speaking  · You have weakness on one side of your body  · You have chest pain, sweating, or shortness of breath  Contact your healthcare provider if:   · You have symptoms of gallbladder or liver disease, such as pain in your upper abdomen  · You have knee or hip pain and discomfort while walking  · You have symptoms of diabetes, such as intense hunger and thirst, and frequent urination  · You have symptoms of sleep apnea, such as snoring or daytime sleepiness  · You have questions or concerns about your condition or care  Treatment for obesity  focuses on helping you lose weight to improve your health  Even a small decrease in BMI can reduce the risk for many health problems  Your healthcare provider will help you set a weight-loss goal   · Lifestyle changes  are the first step in treating obesity  These include making healthy food choices and getting regular physical activity  Your healthcare provider may suggest a weight-loss program that involves coaching, education, and therapy  · Medicine  may help you lose weight when it is used with a healthy diet and physical activity  · Surgery  can help you lose weight if you are very obese and have other health problems  There are several types of weight-loss surgery  Ask your healthcare provider for more information    Be successful losing weight:   · Set small, realistic goals  An example of a small goal is to walk for 20 minutes 5 days a week  Anther goal is to lose 5% of your body weight  · Tell friends, family members, and coworkers about your goals  and ask for their support  Ask a friend to lose weight with you, or join a weight-loss support group  · Identify foods or triggers that may cause you to overeat , and find ways to avoid them  Remove tempting high-calorie foods from your home and workplace  Place a bowl of fresh fruit on your kitchen counter  If stress causes you to eat, then find other ways to cope with stress  · Keep a diary to track what you eat and drink  Also write down how many minutes of physical activity you do each day  Weigh yourself once a week and record it in your diary  Eating changes: You will need to eat 500 to 1,000 fewer calories each day than you currently eat to lose 1 to 2 pounds a week  The following changes will help you cut calories:  · Eat smaller portions  Use small plates, no larger than 9 inches in diameter  Fill your plate half full of fruits and vegetables  Measure your food using measuring cups until you know what a serving size looks like  · Eat 3 meals and 1 or 2 snacks each day  Plan your meals in advance  Kat Stalls and eat at home most of the time  Eat slowly  · Eat fruits and vegetables at every meal   They are low in calories and high in fiber, which makes you feel full  Do not add butter, margarine, or cream sauce to vegetables  Use herbs to season steamed vegetables  · Eat less fat and fewer fried foods  Eat more baked or grilled chicken and fish  These protein sources are lower in calories and fat than red meat  Limit fast food  Dress your salads with olive oil and vinegar instead of bottled dressing  · Limit the amount of sugar you eat  Do not drink sugary beverages  Limit alcohol  Activity changes:  Physical activity is good for your body in many ways   It helps you burn calories and build strong muscles  It decreases stress and depression, and improves your mood  It can also help you sleep better  Talk to your healthcare provider before you begin an exercise program   · Exercise for at least 30 minutes 5 days a week  Start slowly  Set aside time each day for physical activity that you enjoy and that is convenient for you  It is best to do both weight training and an activity that increases your heart rate, such as walking, bicycling, or swimming  · Find ways to be more active  Do yard work and housecleaning  Walk up the stairs instead of using elevators  Spend your leisure time going to events that require walking, such as outdoor festivals or fairs  This extra physical activity can help you lose weight and keep it off  Follow up with your healthcare provider as directed: You may need to meet with a dietitian  Write down your questions so you remember to ask them during your visits  © 2017 [employer] Information is for End User's use only and may not be sold, redistributed or otherwise used for commercial purposes  All illustrations and images included in CareNotes® are the copyrighted property of [employer]  or Rajan Salazar  The above information is an  only  It is not intended as medical advice for individual conditions or treatments  Talk to your doctor, nurse or pharmacist before following any medical regimen to see if it is safe and effective for you  Urinary Incontinence   WHAT YOU NEED TO KNOW:   What is urinary incontinence? Urinary incontinence (UI) is when you lose control of your bladder  What causes UI? UI occurs because your bladder cannot store or empty urine properly  The following are the most common types of UI:  · Stress incontinence  is when you leak urine due to increased bladder pressure  This may happen when you cough, sneeze, or exercise       · Urge incontinence  is when you feel the need to urinate right away and leak urine accidentally  · Mixed incontinence  is when you have both stress and urge UI  What are the signs and symptoms of UI?   · You feel like your bladder does not empty completely when you urinate  · You urinate often and need to urinate immediately  · You leak urine when you sleep, or you wake up with the urge to urinate  · You leak urine when you cough, sneeze, exercise, or laugh  How is UI diagnosed? Your healthcare provider will ask how often you leak urine and whether you have stress or urge symptoms  Tell him which medicines you take, how often you urinate, and how much liquid you drink each day  You may need any of the following tests:  · Urine tests  may show infection or kidney function  · A pelvic exam  may be done to check for blockages  A pelvic exam will also show if your bladder, uterus, or other organs have moved out of place  · An x-ray, ultrasound, or CT  may show problems with parts of your urinary system  You may be given contrast liquid to help your organs show up better in the pictures  Tell the healthcare provider if you have ever had an allergic reaction to contrast liquid  Do not enter the MRI room with anything metal  Metal can cause serious injury  Tell the healthcare provider if you have any metal in or on your body  · A bladder scan  will show how much urine is left in your bladder after you urinate  You will be asked to urinate and then healthcare providers will use a small ultrasound machine to check the urine left in your bladder  · Cystometry  is used to check the function of your urinary system  Your healthcare provider checks the pressure in your bladder while filling it with fluid  Your bladder pressure may also be tested when your bladder is full and while you urinate  How is UI treated? · Medicines  can help strengthen your bladder control      · Electrical stimulation  is used to send a small amount of electrical energy to your pelvic floor muscles  This helps control your bladder function  Electrodes may be placed outside your body or in your rectum  For women, the electrodes may be placed in the vagina  · A bulking agent  may be injected into the wall of your urethra to make it thicker  This helps keep your urethra closed and decreases urine leakage  · Devices  such as a clamp, pessary, or tampon may help stop urine leaks  Ask your healthcare provider for more information about these and other devices  · Surgery  may be needed if other treatments do not work  Several types of surgery can help improve your bladder control  Ask your healthcare provider for more information about the surgery you may need  How can I manage my symptoms? · Do pelvic muscle exercises often  Your pelvic muscles help you stop urinating  Squeeze these muscles tight for 5 seconds, then relax for 5 seconds  Gradually work up to squeezing for 10 seconds  Do 3 sets of 15 repetitions a day, or as directed  This will help strengthen your pelvic muscles and improve bladder control  · A catheter  may be used to help empty your bladder  A catheter is a tiny, plastic tube that is put into your bladder to drain your urine  Your healthcare provider may tell you to use a catheter to prevent your bladder from getting too full and leaking urine  · Keep a UI record  Write down how often you leak urine and how much you leak  Make a note of what you were doing when you leaked urine  · Train your bladder  Go to the bathroom at set times, such as every 2 hours, even if you do not feel the urge to go  You can also try to hold your urine when you feel the urge to go  For example, hold your urine for 5 minutes when you feel the urge to go  As that becomes easier, hold your urine for 10 minutes  · Drink liquids as directed  Ask your healthcare provider how much liquid to drink each day and which liquids are best for you   You may need to limit the amount of liquid you drink to help control your urine leakage  Limit or do not have drinks that contain caffeine or alcohol  Do not drink any liquid right before you go to bed  · Prevent constipation  Eat a variety of high-fiber foods  Good examples are high-fiber cereals, beans, vegetables, and whole-grain breads  Prune juice may help make your bowel movement softer  Walking is the best way to trigger your intestines to have a bowel movement  · Exercise regularly and maintain a healthy weight  Ask your healthcare provider how much you should weigh and about the best exercise plan for you  Weight loss and exercise will decrease pressure on your bladder and help you control your leakage  Ask him to help you create a weight loss plan if you are overweight  When should I seek immediate care? · You have severe pain  · You are confused or cannot think clearly  When should I contact my healthcare provider? · You have a fever  · You see blood in your urine  · You have pain when you urinate  · You have new or worse pain, even after treatment  · Your mouth feels dry or you have vision changes  · Your urine is cloudy or smells bad  · You have questions or concerns about your condition or care  CARE AGREEMENT:   You have the right to help plan your care  Learn about your health condition and how it may be treated  Discuss treatment options with your caregivers to decide what care you want to receive  You always have the right to refuse treatment  The above information is an  only  It is not intended as medical advice for individual conditions or treatments  Talk to your doctor, nurse or pharmacist before following any medical regimen to see if it is safe and effective for you  © 2017 [employer] [employer] Information is for End User's use only and may not be sold, redistributed or otherwise used for commercial purposes   All illustrations and images included in CareNotes® are the copyrighted property of [employer]  or Rajan Salazar  Cigarette Smoking and Your Health   AMBULATORY CARE:   Risks to your health if you smoke:  Nicotine and other chemicals found in tobacco damage every cell in your body  Even if you are a light smoker, you have an increased risk for cancer, heart disease, and lung disease  If you are pregnant or have diabetes, smoking increases your risk for complications  Benefits to your health if you stop smoking:   · You decrease respiratory symptoms such as coughing, wheezing, and shortness of breath  · You reduce your risk for cancers of the lung, mouth, throat, kidney, bladder, pancreas, stomach, and cervix  If you already have cancer, you increase the benefits of chemotherapy  You also reduce your risk for cancer returning or a second cancer from developing  · You reduce your risk for heart disease, blood clots, heart attack, and stroke  · You reduce your risk for lung infections, and diseases such as pneumonia, asthma, chronic bronchitis, and emphysema  · Your circulation improves  More oxygen can be delivered to your body  If you have diabetes, you lower your risk for complications, such as kidney, artery, and eye diseases  You also lower your risk for nerve damage  Nerve damage can lead to amputations, poor vision, and blindness  · You improve your body's ability to heal and to fight infections  Benefits to the health of others if you stop smoking:  Tobacco is harmful to nonsmokers who breathe in your secondhand smoke  The following are ways the health of others around you may improve when you stop smoking:  · You lower the risks for lung cancer and heart disease in nonsmoking adults  · If you are pregnant, you lower the risk for miscarriage, early delivery, low birth weight, and stillbirth  You also lower your baby's risk for SIDS, obesity, developmental delay, and neurobehavioral problems, such as ADHD  · If you have children, you lower their risk for ear infections, colds, pneumonia, bronchitis, and asthma  For more information and support to stop smoking:   · [employer]  [employer]  Phone: 5- 170 - 684-2023  Web Address: www smokefree  gov  Follow up with your healthcare provider as directed:  Write down your questions so you remember to ask them during your visits  © 2017 [address] [employer] Information is for End User's use only and may not be sold, redistributed or otherwise used for commercial purposes  All illustrations and images included in CareNotes® are the copyrighted property of [employer]  or Rajan Slaazar  The above information is an  only  It is not intended as medical advice for individual conditions or treatments  Talk to your doctor, nurse or pharmacist before following any medical regimen to see if it is safe and effective for you  Fall Prevention   AMBULATORY CARE:   Fall prevention  includes ways to make your home and other areas safer  It also includes ways you can move more carefully to prevent a fall  Health conditions that cause changes in your blood pressure, vision, or muscle strength and coordination may increase your risk for falls  Medicines may also increase your risk for falls if they make you dizzy, weak, or sleepy  Call 911 or have someone else call if:   · You have fallen and are unconscious  · You have fallen and cannot move part of your body  Contact your healthcare provider if:   · You have fallen and have pain or a headache  · You have questions or concerns about your condition or care  Fall prevention tips:   · Stand or sit up slowly  This may help you keep your balance and prevent falls  · Use assistive devices as directed  Your healthcare provider may suggest that you use a cane or walker to help you keep your balance  You may need to have grab bars put in your bathroom near the toilet or in the shower      · Wear shoes that fit well and have soles that   Wear shoes both inside and outside  Use slippers with good   Do not wear shoes with high heels  · Wear a personal alarm  This is a device that allows you to call 911 if you fall and need help  Ask your healthcare provider for more information  · Stay active  Exercise can help strengthen your muscles and improve your balance  Your healthcare provider may recommend water aerobics or walking  He or she may also recommend physical therapy to improve your coordination  Never start an exercise program without talking to your healthcare provider first      · Manage your medical conditions  Keep all appointments with your healthcare providers  Visit your eye doctor as directed  Home safety tips:   · Add items to prevent falls in the bathroom  Put nonslip strips on your bath or shower floor to prevent you from slipping  Use a bath mat if you do not have carpet in the bathroom  This will prevent you from falling when you step out of the bath or shower  Use a shower seat so you do not need to stand while you shower  Sit on the toilet or a chair in your bathroom to dry yourself and put on clothing  This will prevent you from losing your balance from drying or dressing yourself while you are standing  · Keep paths clear  Remove books, shoes, and other objects from walkways and stairs  Place cords for telephones and lamps out of the way so that you do not need to walk over them  Tape them down if you cannot move them  Remove small rugs  If you cannot remove a rug, secure it with double-sided tape  This will prevent you from tripping  · Install bright lights in your home  Use night lights to help light paths to the bathroom or kitchen  Always turn on the light before you start walking  · Keep items you use often on shelves within reach  Do not use a step stool to help you reach an item  · Paint or place reflective tape on the edges of your stairs    This will help you see the stairs better  Follow up with your healthcare provider as directed:  Write down your questions so you remember to ask them during your visits  © 2017 [employer] [employer] Information is for End User's use only and may not be sold, redistributed or otherwise used for commercial purposes  All illustrations and images included in CareNotes® are the copyrighted property of [employer]  or Rajan Salazar  The above information is an  only  It is not intended as medical advice for individual conditions or treatments  Talk to your doctor, nurse or pharmacist before following any medical regimen to see if it is safe and effective for you  Advance Directives   WHAT YOU NEED TO KNOW:   What are advance directives? Advance directives are legal documents that state your wishes and plans for medical care  These plans are made ahead of time in case you lose your ability to make decisions for yourself  Advance directives can apply to any medical decision, such as the treatments you want, and if you want to donate organs  What are the types of advance directives? There are many types of advance directives, and each state has rules about how to use them  You may choose a combination of any of the following:  · Living will: This is a written record of the treatment you want  You can also choose which treatments you do not want, which to limit, and which to stop at a certain time  This includes surgery, medicine, IV fluid, and tube feedings  · Durable power of  for healthcare [facility]): This is a written record that states who you want to make healthcare choices for you when you are unable to make them for yourself  This person, called a proxy, is usually a family member or a friend  You may choose more than 1 proxy  · Do not resuscitate (DNR) order:  A DNR order is used in case your heart stops beating or you stop breathing   It is a request not to have certain forms of treatment, such as CPR  A DNR order may be included in other types of advance directives  · Medical directive: This covers the care that you want if you are in a coma, near death, or unable to make decisions for yourself  You can list the treatments you want for each condition  Treatment may include pain medicine, surgery, blood transfusions, dialysis, IV or tube feedings, and a ventilator (breathing machine)  · Values history: This document has questions about your views, beliefs, and how you feel and think about life  This information can help others choose the care that you would choose  Why are advance directives important? An advance directive helps you control your care  Although spoken wishes may be used, it is better to have your wishes written down  Spoken wishes can be misunderstood, or not followed  Treatments may be given even if you do not want them  An advance directive may make it easier for your family to make difficult choices about your care  How do I decide what to put in my advance directives? · Make informed decisions:  Make sure you fully understand treatments or care you may receive  Think about the benefits and problems your decisions could cause for you or your family  Talk to healthcare providers if you have concerns or questions before you write down your wishes  You may also want to talk with your Taoism or , or a   Check your state laws to make sure that what you put in your advance directive is legal      · Sign all forms:  Sign and date your advance directive when you have finished  You may also need 2 witnesses to sign the forms  Witnesses cannot be your doctor or his staff, your spouse, heirs or beneficiaries, people you owe money to, or your chosen proxy  Talk to your family, proxy, and healthcare providers about your advance directive  Give each person a copy, and keep one for yourself in a place you can get to easily   Do not keep it hidden or locked away  · Review and revise your plans: You can revise your advance directive at any time, as long as you are able to make decisions  Review your plan every year, and when there are changes in your life, or your health  When you make changes, let your family, proxy, and healthcare providers know  Give each a new copy  Where can I find more information? · [employer]  [address] , [place]  Phone: 8- [phone] - [phone]  Phone: 3- 475 - [phone]  Web Address: http://www  aafp org  · [address] [facility])  [address] [address] [facility], [address] [facility] , [address] [place] [address]  Phone: [phone]  Phone: [phone]  Web Address: Luis Angel davison  CARE AGREEMENT:   You have the right to help plan your care  To help with this plan, you must learn about your health condition and treatment options  You must also learn about advance directives and how they are used  Work with your healthcare providers to decide what care will be used to treat you  You always have the right to refuse treatment  The above information is an  only  It is not intended as medical advice for individual conditions or treatments  Talk to your doctor, nurse or pharmacist before following any medical regimen to see if it is safe and effective for you  © 2017 [address] [employer] Information is for End User's use only and may not be sold, redistributed or otherwise used for commercial purposes  All illustrations and images included in CareNotes® are the copyrighted property of [employer]  or Rajan Salazar

## 2019-07-11 NOTE — ASSESSMENT & PLAN NOTE
She has been having left shoulder symptoms for about 2 months  There was no injury  Will obtain x-ray and I have given her a physical therapy referral   Also recommended trial of ice packs

## 2019-07-15 ENCOUNTER — TELEPHONE (OUTPATIENT)
Dept: NEUROLOGY | Facility: CLINIC | Age: 64
End: 2019-07-15

## 2019-07-15 DIAGNOSIS — G43.109 MIGRAINE WITH AURA AND WITHOUT STATUS MIGRAINOSUS, NOT INTRACTABLE: Primary | ICD-10-CM

## 2019-07-15 RX ORDER — RIZATRIPTAN BENZOATE 10 MG/1
10 TABLET ORAL ONCE AS NEEDED
Qty: 9 TABLET | Refills: 3 | Status: SHIPPED | OUTPATIENT
Start: 2019-07-15 | End: 2020-01-06 | Stop reason: SDUPTHER

## 2019-07-15 NOTE — TELEPHONE ENCOUNTER
pt called asking if you could prescribe rizatriptan 10mg    this was prescribed by her previous neurologist   If agreeable, please send script to [place] on file  [phone]

## 2019-08-09 ENCOUNTER — HOSPITAL ENCOUNTER (OUTPATIENT)
Dept: RADIOLOGY | Facility: HOSPITAL | Age: 64
Discharge: HOME | End: 2019-08-09
Attending: FAMILY MEDICINE
Payer: MEDICARE

## 2019-08-09 DIAGNOSIS — M25.512 CHRONIC LEFT SHOULDER PAIN: ICD-10-CM

## 2019-08-09 DIAGNOSIS — G89.29 CHRONIC LEFT SHOULDER PAIN: ICD-10-CM

## 2019-08-09 PROCEDURE — 73030 X-RAY EXAM OF SHOULDER: CPT

## 2019-08-14 ENCOUNTER — EVALUATION (OUTPATIENT)
Dept: PHYSICAL THERAPY | Facility: MEDICAL CENTER | Age: 64
End: 2019-08-14
Attending: FAMILY MEDICINE
Payer: MEDICARE

## 2019-08-14 DIAGNOSIS — G89.29 CHRONIC LEFT SHOULDER PAIN: ICD-10-CM

## 2019-08-14 DIAGNOSIS — M25.512 CHRONIC LEFT SHOULDER PAIN: ICD-10-CM

## 2019-08-14 PROCEDURE — 97161 PT EVAL LOW COMPLEX 20 MIN: CPT | Performed by: PHYSICAL THERAPIST

## 2019-08-14 PROCEDURE — 97140 MANUAL THERAPY 1/> REGIONS: CPT | Performed by: PHYSICAL THERAPIST

## 2019-08-14 NOTE — PROGRESS NOTES
PT Evaluation     Today's date: 2019  Patient name: Vinicius Phelps  : 1955  MRN: [id number]  Referring provider: Kenan Ramires MD  Dx:   Encounter Diagnosis     ICD-10-CM    1  Chronic left shoulder pain M25 512 Ambulatory referral to Physical Therapy    G89 29                   Assessment  Assessment details: Vinicius Phelps is a 71 y o  female who came to outpatient PT on 19  Patient presents with complaints of L shoulder pain  The pain began about 2 months ago and she does not recall a JESSA  The patient's greatest concerns are  the pain she is experiencing, worry over not knowing what's wrong, concern at no signs of improvement and fear of not being able to keep active  No further referral appears necessary at this time based upon examination results  Vinicius Phelps has the impairments listed below resulting in functional impairment and are having a negative impact on her quality of life  Patient is appropriate and would benefit from skilled PT intervention to have an optimal return to function and achieve patient specific goals  No Red Flags  All of the patient's questions were answered to their satisfaction and the patient agreed to the plan of care  Patient's greatest impairments are:  1 ) generalized shoulder weakness - to be addressed with progressive resistance training and neuromuscular reeducation  2 ) dec shoulder ROM - to be addressed with joint mobilizations, self and therapist stretching and soft tissue mobilizations  3 ) shoulder pain - to be addressed with joint mobilizations, neuromuscular reeducation and soft tissue mobilizations, ice    Primary movement impairment diagnosis of decreased shoulder motor coordination resulting in pathoanatomical symptoms of tendinopathy  The encounter diagnosis was Chronic left shoulder pain   and limiting her ability to exercise or recreation, lift, reach overhead, sleep, wash behind the back and undo bra, scratch behind head  Etiologic factors include none recalled by the patient  Impairments: abnormal muscle firing, abnormal or restricted ROM, activity intolerance, impaired physical strength, lacks appropriate home exercise program and poor posture   Understanding of Dx/Px/POC: good   Prognosis: good    Goals  Patient will successfully transition to HEP  Patient will be able to manage symptoms independently  Patient will regain full ROM  Patient will regain full strength  Patient will report no limitations in ADL's including reaching and lifting overhead, gardening, etc      Plan  Plan details: 1-2 times a week for 12 weeks  Patient would benefit from: skilled physical therapy  Referral necessary: No  Planned modality interventions: cryotherapy and thermotherapy: hydrocollator packs  Planned therapy interventions: functional ROM exercises, joint mobilization, manual therapy, massage, neuromuscular re-education, patient education, strengthening, stretching, therapeutic exercise, therapeutic activities and home exercise program  Treatment plan discussed with: patient        Subjective Evaluation    History of Present Illness  Mechanism of injury: Rian Dorsey is a 71 y o  female who came to outpatient PT on 8/14/19  Patient presents with complaints of L shoulder pain  The pain began about 2 months ago and she does not recall a JESSA  She also reported experiencing some paresthesias upon waking in the morning, but she has a h/o carpal tunnel  The patient is not unable to sleep due to not finding a comfortable position for her shoulder  The patient's greatest concerns are  the pain she is experiencing, worry over not knowing what's wrong, concern at no signs of improvement and fear of not being able to keep active  Denied any Red Flags      Current: 4/10  Pain at best: 0/10 when not moving arm  Pain at worst: 10/10 raising arms overhead    Patient finds aggravating factors are:  1 ) reaching/ lifting overhead  2 ) undoing her bra, reaching behind her head    Patients finds most relieving factors are:  1 ) resting arm/not moving it    No further referral appears necessary at this time based upon examination results      Patient's goals for therapy: dec pain, inc ROM  Patient's goals: be able to reach and lift overhead, get back to the gym, be more active    Pain  Quality: throbbing and dull ache    Patient Goals  Patient goal: unscrew a light bulb overhead        Objective     General Comments:      Shoulder Comments   Reflexes  Biceps 2+ B/L  Brachioradialis 2+ B/L  Triceps 2+ B/L    Light Touch: intact B/L UE    Cervical AROM:  Flexion min francois  Extension mod francois  Rotation min francois B/L, min pain w/ L rot on L sup shoulder, lat cervical spine  Side Bending min francois B/L, min pain w/ L rot on L sup shoulder, lat cervical spine    Cervical Clinical Tests:  Spurling's: (-) B/L  Compression: (-) B/L  Distraction: (-) B/L    MMT:  Shoulder Flexion R 4 L4-  Shoulder Abd R 4 L 3+  Shoulder ER R 4- L 4-  Mid Trap R 3+ L 3-  Low Trap R 3+ L unable to test secondary to pain  Biceps R 4 L 4  Triceps R 4- L 4-  Wrist Flex R 4- L 4-  Wrist Ext R 4- L 4-  T1 R 4- L 4-  T2 R 4- L 4-    AROM:  Shoulder Flexion R min francois L mod francois, painful at top of motion, compensation w/ UT  Shoulder Abd R min francois L mod francois, painful, compensation w/ UT    PROM:   Shoulder Flexion R min francois L mod francois (more PROM than AROM)  Shoulder ER R min francois L min francois    Clinical Tests:  Hawkin's R (-) L (+)  Cailin's Empty Can R weak L weak and painful, no change with shoulder reposition  Drop Arm (-) B/L  ER Lag @ 0 (-) B/L                 Precautions: Anxiety, Hypotension, GERD      Manual              [facility] GRI-II mobs (ant, sup, post, inf)             Scapular mobs                                                        Exercise Diary              TB Rows             TB Shoulder Ext             Sidelying ER Modalities

## 2019-08-19 ENCOUNTER — OFFICE VISIT (OUTPATIENT)
Dept: PHYSICAL THERAPY | Facility: MEDICAL CENTER | Age: 64
End: 2019-08-19
Attending: FAMILY MEDICINE
Payer: MEDICARE

## 2019-08-19 DIAGNOSIS — M25.512 CHRONIC LEFT SHOULDER PAIN: Primary | ICD-10-CM

## 2019-08-19 DIAGNOSIS — G89.29 CHRONIC LEFT SHOULDER PAIN: Primary | ICD-10-CM

## 2019-08-19 PROCEDURE — 97110 THERAPEUTIC EXERCISES: CPT

## 2019-08-19 PROCEDURE — 97140 MANUAL THERAPY 1/> REGIONS: CPT

## 2019-08-19 NOTE — PROGRESS NOTES
Daily Note     Today's date: 2019  Patient name: Vinicius Phelps  : 1955  MRN: [id number]  Referring provider: Kenan Ramires MD  Dx:   Encounter Diagnosis     ICD-10-CM    1  Chronic left shoulder pain M25 512     G89 29        Start Time: 0900  Stop Time: 1000  Total time in clinic (min): 60 minutes    Subjective: Patient reports slight irritation after the initial eval that she believes is from all the exam tests  She notes it only lasted for a day or two  She did note relief post PT session today  Objective: See treatment diary below    Assessment: Tolerated treatment fair  Patient would benefit from continued PT  Patient required mod verbal cueing to learn the new TE this visit  She demonstrated understanding of the current exercise program and should have good carry over into next visit  Patient was unable to complete Y this visit secondary to lack of ROM  Patient has shoulder ROM limitations secondary to pain in all cardinal planes  Plan: Continue per plan of care        Precautions: Anxiety, Hypotension, GERD    Manual              [facility] GRI-II mobs (ant, sup, post, inf)             Scapular mobs             Gentle PROM 8'            Cervical side glides AF            TPR levator  AF            AC mobs AF              Exercise Diary              TB Rows RTB  3x10            TB Shoulder Ext RTB  3x10            TB ER YTB  3x10            S/L ER 1# 3x10            Y and T T 2x10  Y  lackedROM                                                                                                                                                                                                                 Modalities

## 2019-08-22 ENCOUNTER — OFFICE VISIT (OUTPATIENT)
Dept: PHYSICAL THERAPY | Facility: MEDICAL CENTER | Age: 64
End: 2019-08-22
Attending: FAMILY MEDICINE
Payer: MEDICARE

## 2019-08-22 DIAGNOSIS — G89.29 CHRONIC LEFT SHOULDER PAIN: Primary | ICD-10-CM

## 2019-08-22 DIAGNOSIS — M25.512 CHRONIC LEFT SHOULDER PAIN: Primary | ICD-10-CM

## 2019-08-22 PROCEDURE — 97140 MANUAL THERAPY 1/> REGIONS: CPT | Performed by: PHYSICAL THERAPIST

## 2019-08-22 PROCEDURE — 97110 THERAPEUTIC EXERCISES: CPT | Performed by: PHYSICAL THERAPIST

## 2019-08-22 PROCEDURE — 97112 NEUROMUSCULAR REEDUCATION: CPT | Performed by: PHYSICAL THERAPIST

## 2019-08-22 NOTE — PROGRESS NOTES
Daily Note     Today's date: 2019  Patient name: Cinthia Baker  : 1955  MRN: [id number]  Referring provider: Mikal George MD  Dx:   Encounter Diagnosis     ICD-10-CM    1  Chronic left shoulder pain M25 512     G89 29                   Subjective: Rebecca reports she had pain at night that has since gone away  She reports overall things are better than before she came in        Objective: See treatment diary below      Assessment: Patient presents with high irritability of shoulder and decreased ROM of shoulder that was addressed with PROM and mobs to patient's tolerance  Patient also presents with restrictions in cervical spine on L seen during cervical mobs  Patient had no pain with TB exercises or with prone extension, but prone Y and T were painful and did not have enough motion to perform  Patient was instructed to include TB rows, ext, and self levator scapulae stretch to HEP  Patient was educated on the importance of performing HEP as instructed to dec symptoms and for long term benefits  Tolerated treatment well  Patient demonstrated fatigue post treatment and would benefit from continued PT for an optimal return to function  Plan: Continue per plan of care        Precautions: Anxiety, Hypotension, GERD    Manual             [facility] GRI-II mobs (ant, sup, post, inf)             Scapular mobs             Gentle PROM 8' KD           Cervical side glides AF KD           TPR levator  AF            AC mobs AF KD            Cervical Side Bend w/ Rot   KD           UT and LS stretch  KD                          Exercise Diary             TB Rows RTB  3x10 3x10 Red           TB Shoulder Ext RTB  3x10 3x10 Red           TB ER YTB  3x10            S/L ER 1# 3x10 1# 3x10           Y and T T 2x10  Y  lackedROM Pain and lacks ROM           Prone Ext  3x10           Thoracic Ext over 1/2 foam  20x Modalities

## 2019-08-28 ENCOUNTER — OFFICE VISIT (OUTPATIENT)
Dept: PHYSICAL THERAPY | Facility: MEDICAL CENTER | Age: 64
End: 2019-08-28
Attending: FAMILY MEDICINE
Payer: MEDICARE

## 2019-08-28 DIAGNOSIS — G89.29 CHRONIC LEFT SHOULDER PAIN: Primary | ICD-10-CM

## 2019-08-28 DIAGNOSIS — M25.512 CHRONIC LEFT SHOULDER PAIN: Primary | ICD-10-CM

## 2019-08-28 PROCEDURE — 97110 THERAPEUTIC EXERCISES: CPT | Performed by: PHYSICAL THERAPIST

## 2019-08-28 PROCEDURE — 97112 NEUROMUSCULAR REEDUCATION: CPT | Performed by: PHYSICAL THERAPIST

## 2019-08-28 PROCEDURE — 97140 MANUAL THERAPY 1/> REGIONS: CPT | Performed by: PHYSICAL THERAPIST

## 2019-08-28 NOTE — PROGRESS NOTES
Daily Note     Today's date: 2019  Patient name: Robby Mckeon  : 1955  MRN: [id number]  Referring provider: Rolo Cavanaugh MD  Dx:   Encounter Diagnosis     ICD-10-CM    1  Chronic left shoulder pain M25 512     G89 29                   Subjective: Rebecca reports that she is having trouble reaching behind her body      Objective: See treatment diary below      Assessment: Tolerated treatment well  Patient demonstrated fatigue post treatment, exhibited good technique with therapeutic exercises and with focus on non-resisted scapular mechanics  Will progress as able      Plan: Continue per plan of care        Precautions: Anxiety, Hypotension, GERD    Manual            [address] GRI-II mobs (ant, sup, post, inf)             Scapular mobs             Gentle PROM 8' KD           Cervical side glides AF KD AF          TPR levator  AF            AC mobs AF KD            Cervical Side Bend w/ Rot   KD           UT and LS stretch  KD AF          SL Manual resistance on scap   AF            Exercise Diary             TB Rows RTB  3x10 3x10 Red           TB Shoulder Ext RTB  3x10 3x10 Red           TB ER YTB  3x10            S/L ER 1# 3x10 1# 3x10           Y and T T 2x10  Y  lackedROM Pain and lacks ROM           Prone Ext  3x10           Thoracic Ext over 1/2 foam  20x           Supine belly press  5 sec  20           Isometric extensio n 5 sec  X20                                                                                                                                                            Modalities

## 2019-08-29 ENCOUNTER — OFFICE VISIT (OUTPATIENT)
Dept: PHYSICAL THERAPY | Facility: MEDICAL CENTER | Age: 64
End: 2019-08-29
Attending: FAMILY MEDICINE
Payer: MEDICARE

## 2019-08-29 DIAGNOSIS — G89.29 CHRONIC LEFT SHOULDER PAIN: Primary | ICD-10-CM

## 2019-08-29 DIAGNOSIS — M25.512 CHRONIC LEFT SHOULDER PAIN: Primary | ICD-10-CM

## 2019-08-29 PROCEDURE — 97112 NEUROMUSCULAR REEDUCATION: CPT | Performed by: PHYSICAL THERAPIST

## 2019-08-29 PROCEDURE — 97140 MANUAL THERAPY 1/> REGIONS: CPT | Performed by: PHYSICAL THERAPIST

## 2019-08-29 NOTE — PROGRESS NOTES
Daily Note     Today's date: 2019  Patient name: Ronna Medrano  : 1955  MRN: [id number]  Referring provider: Siobhan Abdul MD  Dx:   Encounter Diagnosis     ICD-10-CM    1  Chronic left shoulder pain M25 512     G89 29                   Subjective: Rebecca reports that she felt good after last session and did not have more soreness afterward       Objective: See treatment diary below      Assessment: Tolerated treatment well  Patient exhibited good technique with therapeutic exercises and needed cueing for proper scapular control  Plan: Continue per plan of care        Precautions: Anxiety, Hypotension, GERD    Manual           [address] GRI-II mobs (ant, sup, post, inf)             Scapular mobs             Gentle PROM 8' KD           Cervical side glides AF KD AF AF         TPR levator  AF            AC mobs AF KD            Cervical Side Bend w/ Rot   KD           UT and LS stretch  KD AF AF         SL Manual resistance on scap   AF AF           Exercise Diary             TB Rows RTB  3x10 3x10 Red           TB Shoulder Ext RTB  3x10 3x10 Red           TB ER YTB  3x10            S/L ER 1# 3x10 1# 3x10           Y and T T 2x10  Y  lackedROM Pain and lacks ROM           Prone Ext  3x10  3X10         Thoracic Ext over 1/2 foam  20x           Supine belly press  5 sec  20  5 sec  X20         Isometric extensio n 5 sec  X20  5 sec  X20                                                                                                                                                          Modalities

## 2019-08-30 ENCOUNTER — APPOINTMENT (OUTPATIENT)
Dept: PHYSICAL THERAPY | Facility: MEDICAL CENTER | Age: 64
End: 2019-08-30
Attending: FAMILY MEDICINE
Payer: MEDICARE

## 2019-09-04 ENCOUNTER — OFFICE VISIT (OUTPATIENT)
Dept: PHYSICAL THERAPY | Facility: MEDICAL CENTER | Age: 64
End: 2019-09-04
Attending: FAMILY MEDICINE
Payer: MEDICARE

## 2019-09-04 DIAGNOSIS — M25.512 CHRONIC LEFT SHOULDER PAIN: Primary | ICD-10-CM

## 2019-09-04 DIAGNOSIS — G89.29 CHRONIC LEFT SHOULDER PAIN: Primary | ICD-10-CM

## 2019-09-04 PROCEDURE — 97140 MANUAL THERAPY 1/> REGIONS: CPT | Performed by: PHYSICAL THERAPIST

## 2019-09-04 PROCEDURE — 97110 THERAPEUTIC EXERCISES: CPT | Performed by: PHYSICAL THERAPIST

## 2019-09-04 NOTE — PROGRESS NOTES
Daily Note     Today's date: 2019  Patient name: Ritesh Porter  : 1955  MRN: [id number]  Referring provider: Mckay Flanagan MD  Dx:   Encounter Diagnosis     ICD-10-CM    1  Chronic left shoulder pain M25 512     G89 29                   Subjective: Rebecca reports she is still dealing with difficulty reaching for things      Objective: See treatment diary below      Assessment: Tolerated treatment well  Patient continues to benefit from cervical mobilizations and soft tissue work around scapular region with slow progression of TE       Plan: Continue per plan of care        Precautions: Anxiety, Hypotension, GERD    Manual          [facility] GRI-II mobs (ant, sup, post, inf)             Scapular mobs             Gentle PROM 8' KD           Cervical side glides AF KD AF AF AF        TPR levator  AF            AC mobs AF KD            Cervical Side Bend w/ Rot   KD           UT and LS stretch  KD AF AF AF        SL Manual resistance on scap   AF AF af          Exercise Diary             TB Rows RTB  3x10 3x10 Red           TB Shoulder Ext RTB  3x10 3x10 Red           TB ER YTB  3x10            S/L ER 1# 3x10 1# 3x10           Y and T T 2x10  Y  lackedROM Pain and lacks ROM           Prone Ext  3x10  3X10 3X10        Thoracic Ext over 1/2 foam  20x           Supine belly press  5 sec  20  5 sec  X20 5 sec  X20        Isometric extensio n 5 sec  X20  5 sec  X20 5 sec  X20        Hand clasped serratus punch     30        Supine AAROM cane     20                                                                                                                               Modalities

## 2019-09-06 ENCOUNTER — OFFICE VISIT (OUTPATIENT)
Dept: PHYSICAL THERAPY | Facility: MEDICAL CENTER | Age: 64
End: 2019-09-06
Attending: FAMILY MEDICINE
Payer: MEDICARE

## 2019-09-06 DIAGNOSIS — G89.29 CHRONIC LEFT SHOULDER PAIN: Primary | ICD-10-CM

## 2019-09-06 DIAGNOSIS — M25.512 CHRONIC LEFT SHOULDER PAIN: Primary | ICD-10-CM

## 2019-09-06 PROCEDURE — 97140 MANUAL THERAPY 1/> REGIONS: CPT | Performed by: PHYSICAL THERAPIST

## 2019-09-09 NOTE — PROGRESS NOTES
Daily Note     Today's date: 2019  Patient name: Samir Pathak  : 1955  MRN: [id number]  Referring provider: Ambreen Celaya MD  Dx:   Encounter Diagnosis     ICD-10-CM    1  Chronic left shoulder pain M25 512     G89 29                   Subjective: Rebecca reports that she is still having difficulty reaching at this time       Objective: See treatment diary below      Assessment: Tolerated treatment fair  Patient referred for consult with ortho/sport med regarding injection to reduce shoulder irritability prior to continued PT      Plan: Continue per plan of care        Precautions: Anxiety, Hypotension, GERD    Manual         [address] GRI-II mobs (ant, sup, post, inf)             Scapular mobs             Gentle PROM 8' KD           Cervical side glides AF KD AF AF AF AF       TPR levator  AF            AC mobs AF KD            Cervical Side Bend w/ Rot   KD           UT and LS stretch  KD AF AF AF AF       SL Manual resistance on scap   AF AF  AF         Exercise Diary             TB Rows RTB  3x10 3x10 Red           TB Shoulder Ext RTB  3x10 3x10 Red           TB ER YTB  3x10            S/L ER 1# 3x10 1# 3x10           Y and T T 2x10  Y  lackedROM Pain and lacks ROM           Prone Ext  3x10  3X10 3X10        Thoracic Ext over 1/2 foam  20x           Supine belly press  5 sec  20  5 sec  X20 5 sec  X20        Isometric extensio n 5 sec  X20  5 sec  X20 5 sec  X20        Hand clasped serratus punch     30        Supine AAROM cane     20                                                                                                                               Modalities

## 2019-09-20 ENCOUNTER — OFFICE VISIT (OUTPATIENT)
Dept: OBGYN CLINIC | Facility: MEDICAL CENTER | Age: 64
End: 2019-09-20
Payer: MEDICARE

## 2019-09-20 VITALS
HEART RATE: 70 BPM | BODY MASS INDEX: 31.72 KG/M2 | SYSTOLIC BLOOD PRESSURE: 116 MMHG | DIASTOLIC BLOOD PRESSURE: 80 MMHG | WEIGHT: 168 LBS | HEIGHT: 61 IN

## 2019-09-20 DIAGNOSIS — M75.02 ADHESIVE CAPSULITIS OF LEFT SHOULDER: Primary | ICD-10-CM

## 2019-09-20 PROCEDURE — 99203 OFFICE O/P NEW LOW 30 MIN: CPT | Performed by: ORTHOPAEDIC SURGERY

## 2019-09-20 PROCEDURE — 20610 DRAIN/INJ JOINT/BURSA W/O US: CPT | Performed by: ORTHOPAEDIC SURGERY

## 2019-09-20 RX ORDER — METHYLPREDNISOLONE ACETATE 40 MG/ML
1 INJECTION, SUSPENSION INTRA-ARTICULAR; INTRALESIONAL; INTRAMUSCULAR; SOFT TISSUE
Status: COMPLETED | OUTPATIENT
Start: 2019-09-20 | End: 2019-09-20

## 2019-09-20 RX ORDER — BUPIVACAINE HYDROCHLORIDE 2.5 MG/ML
4 INJECTION, SOLUTION INFILTRATION; PERINEURAL
Status: COMPLETED | OUTPATIENT
Start: 2019-09-20 | End: 2019-09-20

## 2019-09-20 RX ORDER — BUPIVACAINE HYDROCHLORIDE 7.5 MG/ML
4 INJECTION, SOLUTION EPIDURAL; RETROBULBAR
Status: COMPLETED | OUTPATIENT
Start: 2019-09-20 | End: 2019-09-20

## 2019-09-20 RX ADMIN — BUPIVACAINE HYDROCHLORIDE 4 ML: 2.5 INJECTION, SOLUTION INFILTRATION; PERINEURAL at 11:01

## 2019-09-20 RX ADMIN — BUPIVACAINE HYDROCHLORIDE 4 ML: 7.5 INJECTION, SOLUTION EPIDURAL; RETROBULBAR at 11:01

## 2019-09-20 RX ADMIN — METHYLPREDNISOLONE ACETATE 1 ML: 40 INJECTION, SUSPENSION INTRA-ARTICULAR; INTRALESIONAL; INTRAMUSCULAR; SOFT TISSUE at 11:01

## 2019-09-24 ENCOUNTER — OFFICE VISIT (OUTPATIENT)
Dept: NEUROLOGY | Facility: CLINIC | Age: 64
End: 2019-09-24
Payer: MEDICARE

## 2019-09-24 VITALS
WEIGHT: 167 LBS | DIASTOLIC BLOOD PRESSURE: 67 MMHG | BODY MASS INDEX: 31.53 KG/M2 | HEIGHT: 61 IN | HEART RATE: 75 BPM | SYSTOLIC BLOOD PRESSURE: 120 MMHG

## 2019-09-24 DIAGNOSIS — G43.109 MIGRAINE WITH AURA AND WITHOUT STATUS MIGRAINOSUS, NOT INTRACTABLE: Primary | ICD-10-CM

## 2019-09-24 DIAGNOSIS — F41.9 ANXIETY DISORDER, UNSPECIFIED TYPE: ICD-10-CM

## 2019-09-24 DIAGNOSIS — E55.9 VITAMIN D DEFICIENCY: ICD-10-CM

## 2019-09-24 PROCEDURE — 99214 OFFICE O/P EST MOD 30 MIN: CPT | Performed by: PSYCHIATRY & NEUROLOGY

## 2019-09-24 NOTE — PROGRESS NOTES
[facility] Consult - Follow up   PATIENT:  Danica Richardson  MRN:  [id number]  :  1955  DATE OF SERVICE:  2019  REFERRED BY: No ref  provider found  PMD: Amanda Quevedo MD    Assessment/Plan:     Maeve Lynch a very pleasant 76 y  o  female Past medical history includes migraines, GERD, hyperlipidemia, depression, arthritis, low blood pressure, bilateral carpal tunnel, abdominal pain, anemia, glaucoma, cataracts referred here for evaluation of headache  She previously followed with a neurologist in [place] and is transitioning care here  My initial evaluation 2019  Follow-up 2019, 2019     Migraine with aura and without status migrainosus, not intractable  The patient reports headaches/migraines since her teenage years      She reports various locations of her migraines typically left-sided, quality varies   Typical associated migrainous features  - as of 2019: She reports migraines currently her about twice a month, but in January she had an increase in visual auras/possible retinal migraine that was new and concerning for her and occured daily for a week  - as of 2019: Only 2 migraines in the past 3 months which is improvement, she has been keeping track of her visual symptoms and she was worried they have increased although it seems to me they are occurring less often - and there seems to be a component of anxiety triggering them in some cases  - as of 2019: No auras or migraines at least the past 2 months  Overall improved  Mood and anxiety improved with therapist  Taking mg, b2  Sleep not great due to frozen shoulder pain  Will consider melatonin   Also discussed considering venlafaxine low dose if needed for mood/headaches at next visit       Work up:  - CTA head and neck with without contrast 2019: I have personally reviewed imaging and radiology read   1   No acute findings   Unremarkable CT appearance of the brain  2   Unremarkable CT angiogram of the head and neck  - follows closely with Ophthalmology every 4 months due to other eye issues including history of glaucoma and cataracts  - will also consider MRI Brain, patient anxious about it at this time and will readdress next visit, otherwise denies symptoms of stroke, asked her to try and get old MRI Brain from 10-15 years ago, but she doubts she would be able to      Preventive:  - discussed headache hygiene and lifestyle factors that could improve her headaches including mental Health Care, sleep quality and quantity, drastically increasing hydration - as of 05/22/2019 she has not done much of these  - headache preventative supplements:  Continue magnesium 400 mg which she started around January 2019,  continue riboflavin  - patient not interested in prescription preventative at this time  - future options: Philip Valdovinos has been on nortriptyline 10 mg in the past which she reports decreased frequency and severity of her headaches, but then she decided she wanted to get off of it and stopped 6 months ago, has had an increase in symptoms since   Does not want to go back on it at this time, but will consider in the future  Likely would consider venlafaxine  vs cyproheptadine      Abortive:  - previously patient was taking pain medication daily, however now no more than twice a week   We discussed medication overuse/rebound headache and recommended no more than 3 days per week  Used naproxen, acetaminophen  - she takes rizatriptan 10 mg to 3 times a month for migraines, has been on this for years prescribed by previous neurologist, discussed proper use, possible side effects and risks  - has ondansetron 4 mg for nausea     Current moderate episode of major depressive disorder without prior episode (HCC)  Anxiety disorder, unspecified type   Denies history of depression until about 9 months ago when her mother passed away   PHQ-9 depression screen score of 17 on 02/2019 suggestive of moderately severe depression   Patient denies suicidal ideation   Otherwise typical symptoms, fatigue, depressed mood, less motivation    -as of 9/24/2019 has established care with counselor and improved mood  - she is not interested in medications at this time, but may consider venlafaxine if needed in the future       Vitamin D deficiency  -    03/08/2019 level was 44 2  - currently takes 5000 units daily  - PCP follow-up     Elevated B12  - likely secondary to repletion, recommended she stop replacing B12 since her level is fairly high for some reason on 3/19 was 2,557  - PCP follow-up   - as of 9/24/19, stopped taking it     Patient instructions       - baby aspirin every day 81 mg      Headache/migraine treatment:   Abortive medications (for immediate treatment of a headache): It is ok to take ibuprofen, acetaminophen or naproxen (Advil, Tylenol,  Aleve, Excedrin) if they help your headaches you should limit these to No more than 3 times a week to avoid medication overuse/rebound headaches       For nausea  Ondansetron 4 mg ok to take as needed for nausea      For your more moderate to severe migraines take this medication early:  Continue Maxalt (rizatriptan) 10mg tabs - take one at the onset of headache  May repeat one time after 1-2 hours if pain has not resolved     (Max 2 a day and 9 a month)      Naproxen 500 mg as needed, no more than twice a day, 3 days a week      Over the counter preventive supplements for headaches/migraines   (to take every day to help prevent headaches - not to take at the time of headache):   - Mind ease can be found online and all of these   [x] Magnesium 500mg daily   [x] Riboflavin (Vitamin B2)  400mg daily   (FYI B2 may make your urine bright/neon yellow)     Prescription preventive medications for headaches/migraines   (to take every day to help prevent headaches - not to take at the time of headache):  [x] Could consider in the future if needed  - If you were to consider one I would recommend venlafaxine 37 5 mg up to 75 mg daily or could further discuss with your primary care provider      Sleep:   [x] Melatonin - you may take 3 mg nightly for sleep  You should take this 1 hour prior to bedtime consistently every night for it to work  It works by gradually helping to adjust your sleep time over days to weeks, rather than immediately making you feel sleepy        Lifestyle Recommendations:  [x] SLEEP - Maintain a regular sleep schedule: Adults need at least 7-8 hours of uninterrupted a night  Maintain good sleep hygiene:  Going to bed and waking up at consistent times, avoiding excessive daytime naps, avoiding caffeinated beverages in the evening, avoid excessive stimulation in the evening and generally using bed primarily for sleeping   One hour before bedtime would recommend turning lights down lower, decreasing your activity (may read quietly, listen to music at a low volume)  When you get into bed, should eliminate all technology (no texting, emailing, playing with your phone, iPad or tablet in bed)  [x] HYDRATION - Maintain good hydration   Drink  2L of fluid a day (4 typical small water bottles)  [x] DIET - Maintain good nutrition  In particular don't skip meals and try and eat healthy balanced meals regularly  [x] TRIGGERS - Look for other triggers and avoid them: Limit caffeine to 1-2 cups a day or less  Avoid dietary triggers that you have noticed bring on your headaches (this could include aged cheese, peanuts, MSG, aspartame and nitrates)  [x] EXERCISE - physical exercise as we all know is good for you in many ways, and not only is good for your heart, but also is beneficial for your mental health, cognitive health and  chronic pain/headaches  I would encourage at the least 5 days of physical exercise weekly for at least 30 minutes       Education and Follow-up  [x] Please call with any questions or concerns     [x] Follow up 3-4 months, sooner as needed     CC: We had the pleasure of evaluating Kaylan Young in neurological consultation today  Kaylan Young is C 49 y  o    right handed female who presents today for evaluation of headaches       History of Present Illness:   Interval history as of 09/24/2019:  - 07/15/2019 patient called in requesting rizatriptan that was previously prescribed by her past neurologist and works well for her migraines  [id number] since history no longer sounds like retinal migraine - both eyes   - following with orthopedics for frozen shoulder, doing therapies  - sometimes the therapies with PT was causing cervicogenic headaches   - had some floaters a few weeks ago and saw eye doctor who confirmed floaters and nothing to worry about     - worried about this time of year because may get sinus headaches that turn into migraines     No auras or migraines at least the past 2 months  Saw counselor and helping mood - her therapist suggested considering low dose mood medications   discussed with patient and may consider in the future   - wearing glasses less, realizing that she may have been hiding behind them     Going to start yoga class soon      - Supplements - taking magnesium and B2     Interval history as of 05/22/2019:  - CTA head and neck 03/13/2019 was unremarkable except for incidental thyroid nodule with follow-up ultrasound by primary care  - has followed up with Pain Medicine and weight management  - 5/8/19 - dilated eye exam was unremarkable except for dry eyes   - our  provider a list of therapists, mood improved since wheather improved     - Rizatriptan - continues to help  - Supplements - taking magnesium and B2   - Prescription medication - not interested although         Headaches started at what age? teen[age]  How often do the headaches occur?   - as of 3/2019: 2/month  - as of 5/22/19: 2 migraines in past 3 ,   - Acephalgic Migraine with aura more often than migraine - vs nonspecific visual symptoms due to other reason 4/14, 21, 23, 25, and 5/8, 21 - she reports these seem to be even triggered by stress/anxiety  - as of 09/24/2019:  No auras or migraines at least the past 2 months  What time of the day do the headaches start? Can wake up with them or anytime during the day   How long do the headaches last? 3-4 hours because she takes medicine, up to all day   Are you ever headache free? Yes     Aura? with aura  Acephalgic Migraine with aura more often than migraine     Jan 2019:   For a week straight 1-2 times a day had increase in visual aura/retinal migraine  Zigzags, decreased peripheral vision, blurry without strong migraine, milder headache - for 2-3 hours   - seems to be in both eyes, not like a curtain coming down (both eyes - not retinal migraine)  - gradually faded out and this month she is fine, started mag a few weeks ago   - would take medicine and go away  *last saw an eye doctor 2 months ago, goes every 4 months for glaucoma, cataracts, dry eye       Prior to that once in a while that would happen      Describe your usual headache pain quality? Dull all day sometimes, sharp and pulsating, shooting   Where is your headache located?   When full blown more on left side, frontal to parietal and then to the back  Lately zig zag around head  Numbness change in sensation to bioccipital region   Sinus pressure     What is the intensity of pain? 4-5/10, up to higher   Associated symptoms:   [x] Nausea       [x] Vomiting        [] Diarrhea  [x] Insomnia    [] Stiff or sore neck   [x] Problems with concentration  [x] Photophobia     [x]Phonophobia      [] Osmophobia  [x] Blurred vision   [x] Prefer quiet, dark room  [] Light-headed or dizzy     [] Tinnitus   [] Hands or feet tingle or feel numb/paresthesias       [] Red ear      [] Ptosis      [] Facial droop  [] Lacrimation  [] Nasal congestion/rhinorrhea   [] Flushing of face  [] Change in pupil size      Things that make the headache worse? Bending down, any movement     Headache triggers:  Pressure when it rains/dr and cold  What time of the year do headaches occur more frequently?   More in Oct/Nov     Have you seen someone else for headaches or pain? Neurology   Have you had trigger point injection performed and how often? No  Have you had Botox injection performed and how often? No   Have you had epidural injections or transforaminal injections performed? Yes for lower back slipped disc   Have you used CBD or THC for your headaches and how often? No  Are you current pregnant or planning on getting pregnant? No  Have you ever had any Brain imaging? MRI Brain - maybe over 15-20 years ago      What medications do you take or have you taken for your headaches?    ABORTIVE:    2-3 times a week some sort of pain medication lately, previously was taking daily      Acetaminophen - 4-5 days a week for back pain  Ibuprofen 800 mg - not as much now, takes naproxen instead  Rizatriptan 10 mg - 2-3 times in past 3 months   Naproxen 500 mg - takes once a week, used to take every day      Diclofenac gel - for back   Lidocaine 5% gel - for back      Past for headache and back pain   Cyclobenzaprine 5 mg  - not taking (for back)  Tramadol 50 mg   - not taking (for back)  Meloxicam 15 mg - not taking   Ondansetron - does not take it      PREVENTIVE:   Magnesium 400 mg  Riboflavin 400 mg      Past:  Nortriptyline 10 mg - for 2 years, and did well as far as headaches not being as bad, then wanted to get off it, came off 6 months ago, increase in symptoms     Alternative therapies used in the past for headaches? no other headache interventions have been tried     LIFESTYLE  Sleep - averages 6-7 hours   - takes naps      Physical activity:  tries to get her to the gym twice a week   Water: a bottle a week   Mood:   - lately feels tired mentally tired  and depressed   - do not want to get up   - never has talked to a counselor - she used to do mentoring herself, never had depression in the past   - thinks going on for past 6 months, started after mom passed away   - PHQ-9 depression screen 02/27/2019:  Score of 17, denies suicidal ideation        The following portions of the patient's history were reviewed and updated as appropriate: allergies, current medications, past family history, past medical history, past social history, past surgical history and problem list         Pertinent family history:    Family history of headaches:  no known family members with significant headaches  Any family history of aneurysms - No     Pertinent social history:  Work:  Retired  Education: some college  Lives with   One trial     Illicit Drugs: denies  Alcohol/tobacco: Denies tobacco use, alcohol intake: social drinker      Past Medical History:     Past Medical History:   Diagnosis Date    Abdominal discomfort     Anemia     Arthritis     Bronchitis     Chronic pain disorder     Dyslipidemia     Last Assessed 07/09/2012    GERD (gastroesophageal reflux disease)     Headache, migraine     Hypotension     Low back pain     Low back pain     [facility] arthritis ([facility] [address] )     Thyroid nodule 4/9/2019       Patient Active Problem List   Diagnosis    Migraine headache    Low back pain    Esophageal reflux    Arthritis    Duodenum disorder    Hyperlipidemia    Depression    Hyperglycemia    Abnormal MRI of abdomen    Vitamin D deficiency    Anxiety disorder    Retinal migraine    Thyroid nodule    Class 1 obesity    Impaired fasting glucose    Chronic left shoulder pain       Medications:      Current Outpatient Medications   Medication Sig Dispense Refill    acetaminophen (TYLENOL) 325 mg tablet Take 2 tablets (650 mg total) by mouth every 6 (six) hours as needed for mild pain or fever 30 tablet 0    Cholecalciferol (VITAMIN D3) 5000 units CAPS Take by mouth      cyclobenzaprine (FLEXERIL) 5 mg tablet Take 5 mg by mouth daily at bedtime as needed       docusate sodium (COLACE) 100 mg capsule Take 1 capsule (100 mg total) by mouth daily 30 capsule 5    ibuprofen (MOTRIN) 800 mg tablet every 8 (eight) hours as needed       LORazepam (ATIVAN) 1 mg tablet 0 5 mg tab 20-30 mins before scan, may repeat in 30 minutes if needed for anxiety 1 tablet 0    Magnesium 400 MG CAPS Take 400 mg by mouth daily      naproxen (NAPROSYN) 500 mg tablet Take one tab up to twice a day, no more than 3 times a week 20 tablet 3    omega-3-acid ethyl esters (LOVAZA) 1 g capsule Take 2 g by mouth daily       ondansetron (ZOFRAN-ODT) 4 mg disintegrating tablet Take 1 tablet (4 mg total) by mouth every 6 (six) hours as needed for nausea or vomiting 10 tablet 0    pantoprazole (PROTONIX) 40 mg tablet TAKE ONE TABLET BY MOUTH ONCE DAILY 30 tablet 5    polyethylene glycol (MIRALAX) 17 g packet Take 17 g by mouth daily 30 each 5    Riboflavin (B2 PO) Take by mouth      rizatriptan (MAXALT) 10 MG tablet Take 1 tablet (10 mg total) by mouth once as needed for migraine May repeat in 2 hours if needed  Max 2/24 hours, 9/month  9 tablet 3    UNKNOWN TO PATIENT COMPOUNDED MEDICATION   Lidocaine 5% and Diclofenac 3%   Apply 1-3 grams to the affected area 2-4 times per day as needed   pyridoxine (B-6) 500 MG tablet Take 400 mg by mouth daily       No current facility-administered medications for this visit  Allergies:       Allergies   Allergen Reactions    Erythromycin Rash       Family History:     Family History   Problem Relation Age of Onset    Hypertension Mother     Other Mother         Dyslipidemia    Diabetes Father         Mellitus    No Known Problems Brother     No Known Problems Brother     Cancer Neg Hx     Heart disease Neg Hx     Stroke Neg Hx        Social History:     Social History     Socioeconomic History    Marital status: /Civil Union     Spouse name: Not on file    Number of children: Not on file    Years of education: Not on file    Highest education level: Not on file   Occupational History    Occupation: retired   Social Needs    Financial resource strain: Not on file    Food insecurity:     Worry: Not on file     Inability: Not on file   GÃ©nie NumÃ©rique needs:     Medical: Not on file     Non-medical: Not on file   Tobacco Use    Smoking status: Former Smoker     Last attempt to quit: 1985     Years since quittin 7    Smokeless tobacco: Never Used   Substance and Sexual Activity    Alcohol use: Yes     Comment: occasional  Maybe 5x per year    Drug use: No    Sexual activity: Yes     Partners: Male     Birth control/protection: Post-menopausal   Lifestyle    Physical activity:     Days per week: Not on file     Minutes per session: Not on file    Stress: Not on file   Relationships    Social connections:     Talks on phone: Not on file     Gets together: Not on file     Attends Synagogue service: Not on file     Active member of club or organization: Not on file     Attends meetings of clubs or organizations: Not on file     Relationship status: Not on file    Intimate partner violence:     Fear of current or ex partner: Not on file     Emotionally abused: Not on file     Physically abused: Not on file     Forced sexual activity: Not on file   Other Topics Concern    Not on file   Social History Narrative    Use Safety Equipment - [address]        Lives with spouse         Objective:     Physical Exam:                                                                 Vitals:            Constitutional:    /67 (BP Location: Right arm, Patient Position: Sitting, Cuff Size: Large)   Pulse 75   Ht 5' 1" (1 549 m)   Wt 75 8 kg (167 lb)   BMI 31 55 kg/m²   BP Readings from Last 3 Encounters:   19 120/67   19 116/80   19 120/84     Pulse Readings from Last 3 Encounters:   19 75   19 70   19 100         Well developed, well nourished, well groomed  No dysmorphic features         HEENT: Normocephalic atraumatic  No meningismus  Oropharynx is clear and moist  No oral mucosal lesions  Chest:  Respirations regular and unlabored  Cardiovascular:  Regular rate, intact distal pulses  Distal extremities warm without palpable edema or tenderness, no observed significant swelling  Musculoskeletal:  Full range of motion  (see below under neurologic exam for evaluation of motor function and gait)   Skin:  warm and dry, not diaphoretic  No apparent birthmarks or stigmata of neurocutaneous disease  Psychiatric:  Normal behavior and appropriate affect        Neurological Examination:     Mental status/cognitive function:   Orientated to time, place and person  Recent and remote memory intact  Attention span and concentration as well as fund of knowledge are appropriate for age  Normal language and spontaneous speech  Cranial Nerves:  III, IV, VI-Pupils were equal, round, and reactive to light  Extraocular movements were full and conjugate without nystagmus  VII-facial expression symmetric, intact forehead wrinkle, strong eye closure, symmetric smile    VIII-hearing grossly intact bilaterally   Motor Exam: symmetric bulk throughout  no atrophy, fasciculations or abnormal movements noted  Coordination:  no apparent dysmetria, ataxia or tremor noted  Gait: steady casual gait            Pertinent lab results:   04/09/2019:  A1c 5 7     03/08/2019: BMP and CBC unremarkable  Vitamin-D 44 2  B12 2, 557  TSH 2 02     12/11/18: CMP unremarkable   11/29/18: CBC significant for hgb 10 8     Imaging:   CTA head and neck with without contrast 03/13/2019: I have personally reviewed imaging and radiology read   1   No acute findings   Unremarkable CT appearance of the brain  2   Unremarkable CT angiogram of the head and neck           Review of Systems:   ROS Obtained by MA and Personally reviewed and corrected if needed  Review of Systems   Constitutional: Negative   Negative for appetite change and fever    HENT: Negative  Negative for hearing loss, tinnitus, trouble swallowing and voice change  Eyes: Negative  Negative for photophobia and pain  Respiratory: Negative  Negative for shortness of breath  Cardiovascular: Negative  Negative for palpitations  Gastrointestinal: Negative  Negative for nausea and vomiting  Endocrine: Negative  Negative for cold intolerance and heat intolerance  Genitourinary: Negative  Negative for dysuria, frequency and urgency  Musculoskeletal: Negative  Negative for myalgias and neck pain  Skin: Negative  Negative for rash  Allergic/Immunologic: Negative  Neurological: Negative  Negative for dizziness, tremors, seizures, syncope, facial asymmetry, speech difficulty, weakness, light-headedness, numbness and headaches  Hematological: Negative  Does not bruise/bleed easily  Psychiatric/Behavioral: Negative  Negative for confusion, hallucinations and sleep disturbance  I have spent 27 minutes with Patient  today in which greater than 50% of this time was spent in counseling/coordination of care regarding Prognosis, Risks and benefits of tx options, Intructions for management, Importance of tx compliance, Risk factor reductions and Impressions        Author:  Epifanio Ulrich MD 9/24/2019 11:01 AM

## 2019-09-24 NOTE — PATIENT INSTRUCTIONS
- baby aspirin every day 81 mg      Headache/migraine treatment:   Abortive medications (for immediate treatment of a headache): It is ok to take ibuprofen, acetaminophen or naproxen (Advil, Tylenol,  Aleve, Excedrin) if they help your headaches you should limit these to No more than 3 times a week to avoid medication overuse/rebound headaches       For nausea  Ondansetron 4 mg ok to take as needed for nausea      For your more moderate to severe migraines take this medication early:  Continue Maxalt (rizatriptan) 10mg tabs - take one at the onset of headache  May repeat one time after 1-2 hours if pain has not resolved  (Max 2 a day and 9 a month)      Naproxen 500 mg as needed, no more than twice a day, 3 days a week      Over the counter preventive supplements for headaches/migraines   (to take every day to help prevent headaches - not to take at the time of headache):   - Mind ease can be found online and all of these   [x] Magnesium 500mg daily   [x] Riboflavin (Vitamin B2)  400mg daily   (FYI B2 may make your urine bright/neon yellow)     Prescription preventive medications for headaches/migraines   (to take every day to help prevent headaches - not to take at the time of headache):  [x] Could consider in the future if needed  - If you were to consider one I would recommend venlafaxine 37 5 mg up to 75 mg daily or could further discuss with your primary care provider      Sleep:   [x] Melatonin - you may take 3 mg nightly for sleep  You should take this 1 hour prior to bedtime consistently every night for it to work  It works by gradually helping to adjust your sleep time over days to weeks, rather than immediately making you feel sleepy        Lifestyle Recommendations:  [x] SLEEP - Maintain a regular sleep schedule: Adults need at least 7-8 hours of uninterrupted a night   Maintain good sleep hygiene:  Going to bed and waking up at consistent times, avoiding excessive daytime naps, avoiding caffeinated beverages in the evening, avoid excessive stimulation in the evening and generally using bed primarily for sleeping   One hour before bedtime would recommend turning lights down lower, decreasing your activity (may read quietly, listen to music at a low volume)  When you get into bed, should eliminate all technology (no texting, emailing, playing with your phone, iPad or tablet in bed)  [x] HYDRATION - Maintain good hydration   Drink  2L of fluid a day (4 typical small water bottles)  [x] DIET - Maintain good nutrition  In particular don't skip meals and try and eat healthy balanced meals regularly  [x] TRIGGERS - Look for other triggers and avoid them: Limit caffeine to 1-2 cups a day or less  Avoid dietary triggers that you have noticed bring on your headaches (this could include aged cheese, peanuts, MSG, aspartame and nitrates)  [x] EXERCISE - physical exercise as we all know is good for you in many ways, and not only is good for your heart, but also is beneficial for your mental health, cognitive health and  chronic pain/headaches  I would encourage at the least 5 days of physical exercise weekly for at least 30 minutes       Education and Follow-up  [x] Please call with any questions or concerns     [x] Follow up 3-4 months, sooner as needed

## 2019-10-11 ENCOUNTER — HOSPITAL ENCOUNTER (EMERGENCY)
Facility: HOSPITAL | Age: 64
Discharge: HOME | End: 2019-10-11
Attending: EMERGENCY MEDICINE | Admitting: EMERGENCY MEDICINE
Payer: MEDICARE

## 2019-10-11 VITALS
DIASTOLIC BLOOD PRESSURE: 73 MMHG | SYSTOLIC BLOOD PRESSURE: 127 MMHG | HEART RATE: 83 BPM | BODY MASS INDEX: 31.66 KG/M2 | TEMPERATURE: 98.2 F | OXYGEN SATURATION: 97 % | WEIGHT: 167.55 LBS | RESPIRATION RATE: 18 BRPM

## 2019-10-11 DIAGNOSIS — M54.31 SCIATICA OF RIGHT SIDE: Primary | ICD-10-CM

## 2019-10-11 PROCEDURE — 99284 EMERGENCY DEPT VISIT MOD MDM: CPT | Performed by: PHYSICIAN ASSISTANT

## 2019-10-11 PROCEDURE — 99283 EMERGENCY DEPT VISIT LOW MDM: CPT

## 2019-10-11 RX ORDER — CYCLOBENZAPRINE HCL 10 MG
5 TABLET ORAL ONCE
Status: COMPLETED | OUTPATIENT
Start: 2019-10-11 | End: 2019-10-11

## 2019-10-11 RX ORDER — HYDROCODONE BITARTRATE AND ACETAMINOPHEN 5; 325 MG/1; MG/1
1 TABLET ORAL ONCE
Status: COMPLETED | OUTPATIENT
Start: 2019-10-11 | End: 2019-10-11

## 2019-10-11 RX ORDER — LIDOCAINE 50 MG/G
1 PATCH TOPICAL ONCE
Status: DISCONTINUED | OUTPATIENT
Start: 2019-10-11 | End: 2019-10-11 | Stop reason: HOSPADM

## 2019-10-11 RX ORDER — METHYLPREDNISOLONE 4 MG/1
TABLET ORAL
Qty: 21 TABLET | Refills: 0 | Status: SHIPPED | OUTPATIENT
Start: 2019-10-11 | End: 2019-11-01 | Stop reason: ALTCHOICE

## 2019-10-11 RX ORDER — ONDANSETRON 4 MG/1
4 TABLET, FILM COATED ORAL EVERY 6 HOURS
Qty: 12 TABLET | Refills: 0 | Status: SHIPPED | OUTPATIENT
Start: 2019-10-11

## 2019-10-11 RX ORDER — ONDANSETRON HYDROCHLORIDE 4 MG/5ML
4 SOLUTION ORAL ONCE
Status: COMPLETED | OUTPATIENT
Start: 2019-10-11 | End: 2019-10-11

## 2019-10-11 RX ORDER — CYCLOBENZAPRINE HCL 10 MG
10 TABLET ORAL 3 TIMES DAILY
Qty: 20 TABLET | Refills: 0 | Status: SHIPPED | OUTPATIENT
Start: 2019-10-11

## 2019-10-11 RX ORDER — HYDROCODONE BITARTRATE AND ACETAMINOPHEN 5; 325 MG/1; MG/1
1 TABLET ORAL EVERY 6 HOURS PRN
Qty: 10 TABLET | Refills: 0 | Status: SHIPPED | OUTPATIENT
Start: 2019-10-11 | End: 2019-10-21

## 2019-10-11 RX ADMIN — ONDANSETRON HYDROCHLORIDE 4 MG: 4 SOLUTION ORAL at 19:58

## 2019-10-11 RX ADMIN — HYDROCODONE BITARTRATE AND ACETAMINOPHEN 1 TABLET: 5; 325 TABLET ORAL at 19:58

## 2019-10-11 RX ADMIN — CYCLOBENZAPRINE HYDROCHLORIDE 5 MG: 10 TABLET, FILM COATED ORAL at 19:58

## 2019-10-11 RX ADMIN — LIDOCAINE 1 PATCH: 50 PATCH TOPICAL at 20:05

## 2019-10-11 NOTE — ED PROVIDER NOTES
History  Chief Complaint   Patient presents with    Back Pain     Pt  reports lower back pain since tuesday  Pt  reports pain travles to both legs  Pt  denies any urinary symptoms  Patient is a 79-year-old female reported to emergency room with complaint of right-sided lower back pain with radiation to the right lower extremity for several days now  Patient has been taking naproxen, ibuprofen and Tylenol with no improvement  Patient stated that she has lower back issues intermittently  She was supposed to see pain management however pain improved and she canceled her appointment  Patient stated no injury or trauma reported  Significant tenderness to palpation of the sciatic notch  Denies fecal or urine incontinence, no paresthesia saddle distribution  Denies headache, neck stiffness, vision changes  No nausea, vomiting, diarrhea or abdominal pain  No urgency, frequency, dysuria or hematuria  Past medical history of lower back pain  Stable while in ED  Patient had recently x-rays of the lower back  Orthopedics referral provided  Lidocaine patch, p  o  Flexeril and Norco given  Supportive care advice  Prior to Admission Medications   Prescriptions Last Dose Informant Patient Reported? Taking? Cholecalciferol (VITAMIN D3) 5000 units CAPS  Self Yes No   Sig: Take by mouth   LORazepam (ATIVAN) 1 mg tablet  Self No No   Si 5 mg tab 20-30 mins before scan, may repeat in 30 minutes if needed for anxiety   Magnesium 400 MG CAPS  Self Yes No   Sig: Take 400 mg by mouth daily   Riboflavin (B2 PO)  Self Yes No   Sig: Take by mouth   UNKNOWN TO PATIENT  Self Yes No   Sig: COMPOUNDED MEDICATION   Lidocaine 5% and Diclofenac 3%   Apply 1-3 grams to the affected area 2-4 times per day as needed     acetaminophen (TYLENOL) 325 mg tablet  Self No No   Sig: Take 2 tablets (650 mg total) by mouth every 6 (six) hours as needed for mild pain or fever   cyclobenzaprine (FLEXERIL) 5 mg tablet  Self Yes No   Sig: Take 5 mg by mouth daily at bedtime as needed    docusate sodium (COLACE) 100 mg capsule  Self No No   Sig: Take 1 capsule (100 mg total) by mouth daily   ibuprofen (MOTRIN) 800 mg tablet  Self Yes No   Sig: every 8 (eight) hours as needed    naproxen (NAPROSYN) 500 mg tablet  Self No No   Sig: Take one tab up to twice a day, no more than 3 times a week   omega-3-acid ethyl esters (LOVAZA) 1 g capsule  Self Yes No   Sig: Take 2 g by mouth daily    ondansetron (ZOFRAN-ODT) 4 mg disintegrating tablet  Self No No   Sig: Take 1 tablet (4 mg total) by mouth every 6 (six) hours as needed for nausea or vomiting   pantoprazole (PROTONIX) 40 mg tablet  Self No No   Sig: TAKE ONE TABLET BY MOUTH ONCE DAILY   polyethylene glycol (MIRALAX) 17 g packet  Self No No   Sig: Take 17 g by mouth daily   pyridoxine (B-6) 500 MG tablet  Self Yes No   Sig: Take 400 mg by mouth daily   rizatriptan (MAXALT) 10 MG tablet  Self No No   Sig: Take 1 tablet (10 mg total) by mouth once as needed for migraine May repeat in 2 hours if needed  Max 2/24 hours, 9/month  Facility-Administered Medications: None       Past Medical History:   Diagnosis Date    Abdominal discomfort     Anemia     Arthritis     Bronchitis     Chronic pain disorder     Dyslipidemia     Last Assessed 2012    GERD (gastroesophageal reflux disease)     Headache, migraine     Hypotension     Low back pain     Low back pain     Rheumatoid arthritis ([address] )     Thyroid nodule 2019       Past Surgical History:   Procedure Laterality Date    CATARACT EXTRACTION       SECTION      ESOPHAGOGASTRODUODENOSCOPY N/A 2018    Procedure: ESOPHAGOGASTRODUODENOSCOPY (EGD) with push enteroscopy and bx;  Surgeon: Rosa Nova DO;  Location: [facility];   Service: Gastroenterology    EYE SURGERY      NERVE SURGERY Right     Hand    TX EDG US EXAM SURGICAL ALTER STOM DUODENUM/JEJUNUM N/A 3/6/2019    Procedure: LINEAR ENDOSCOPIC U/S; Surgeon: Drew Collazo MD;  Location:  GI LAB; Service: Gastroenterology    UPPER GASTROINTESTINAL ENDOSCOPY         Family History   Problem Relation Age of Onset    Hypertension Mother     Other Mother         Dyslipidemia    Diabetes Father         Mellitus    No Known Problems Brother     No Known Problems Brother     Cancer Neg Hx     Heart disease Neg Hx     Stroke Neg Hx      I have reviewed and agree with the history as documented  Social History     Tobacco Use    Smoking status: Former Smoker     Last attempt to quit: 1985     Years since quittin 7    Smokeless tobacco: Never Used   Substance Use Topics    Alcohol use: Yes     Comment: occasional  Maybe 5x per year    Drug use: No        Review of Systems   Constitutional: Negative  HENT: Negative  Respiratory: Negative for cough and wheezing  Cardiovascular: Negative for chest pain  Gastrointestinal: Negative for abdominal pain, diarrhea, nausea and vomiting  Musculoskeletal: Positive for arthralgias and back pain  Negative for gait problem, neck pain and neck stiffness  Neurological: Negative for dizziness, weakness, light-headedness and numbness  Physical Exam  Physical Exam   Constitutional: She is oriented to person, place, and time  She appears well-developed  No distress  HENT:   Head: Normocephalic  Eyes: Pupils are equal, round, and reactive to light  EOM are normal    Neck: Normal range of motion  Cardiovascular: Normal rate and regular rhythm  Pulmonary/Chest: Effort normal    Abdominal: Soft  Bowel sounds are normal    Musculoskeletal: Normal range of motion  She exhibits tenderness  Neurological: She is alert and oriented to person, place, and time  She displays normal reflexes  No cranial nerve deficit or sensory deficit  She exhibits normal muscle tone  Coordination normal    Skin: Skin is warm  Capillary refill takes less than 2 seconds         Vital Signs  ED Triage Vitals [10/11/19 1852]   Temperature Pulse Respirations Blood Pressure SpO2   98 2 °F (36 8 °C) 83 18 127/73 97 %      Temp Source Heart Rate Source Patient Position - Orthostatic VS BP Location FiO2 (%)   Temporal Monitor Sitting Right arm --      Pain Score       9           Vitals:    10/11/19 1852   BP: 127/73   Pulse: 83   Patient Position - Orthostatic VS: Sitting         Visual Acuity      ED Medications  Medications   lidocaine (LIDODERM) 5 % patch 1 patch (1 patch Topical Medication Applied 10/11/19 2005)   HYDROcodone-acetaminophen (NORCO) 5-325 mg per tablet 1 tablet (1 tablet Oral Given 10/11/19 1958)   cyclobenzaprine (FLEXERIL) tablet 5 mg (5 mg Oral Given 10/11/19 1958)   ondansetron (ZOFRAN) oral solution 4 mg (4 mg Oral Given 10/11/19 1958)       Diagnostic Studies  Results Reviewed     None                 No orders to display              Procedures  Procedures       ED Course                               MDM  Number of Diagnoses or Management Options  Sciatica of right side: established and improving  Diagnosis management comments: P  O  Flexeril and Norco given while in emergency room  Lidocaine patch applied to the area of pain  Patient was given orthopedic recommendation  Supportive care advice  Strict return instructions provided with worsening symptoms  Diagnosis of right-sided sciatic nerve pain  Patient Progress  Patient progress: improved      Disposition  Final diagnoses:   Sciatica of right side     Time reflects when diagnosis was documented in both MDM as applicable and the Disposition within this note     Time User Action Codes Description Comment    10/11/2019  7:47 PM Crystal Gaston Add [M54 31] Sciatica of right side       ED Disposition     ED Disposition Condition Date/Time Comment    Discharge Stable Fri Oct 11, 2019  7:47 PM Lindsey Lund discharge to home/self care              Follow-up Information     Follow up With Specialties Details Why Contact Info Additional Information [address] [facility]  If symptoms worsen [address]  [address] [facility] [address]  [address] [facility] [place], [address], [address] [facility] [place], [place], [place], [address] [phone]          Discharge Medication List as of 10/11/2019  7:56 PM      START taking these medications    Details   !! cyclobenzaprine (FLEXERIL) 10 mg tablet Take 1 tablet (10 mg total) by mouth 3 (three) times a day, Starting Fri 10/11/2019, Normal      HYDROcodone-acetaminophen (NORCO) 5-325 mg per tablet Take 1 tablet by mouth every 6 (six) hours as needed for pain for up to 10 daysMax Daily Amount: 4 tablets, Starting Fri 10/11/2019, Until Mon 10/21/2019, Print      methylPREDNISolone 4 MG tablet therapy pack Use as directed on package, Normal       !! - Potential duplicate medications found  Please discuss with provider        CONTINUE these medications which have NOT CHANGED    Details   acetaminophen (TYLENOL) 325 mg tablet Take 2 tablets (650 mg total) by mouth every 6 (six) hours as needed for mild pain or fever, Starting Thu 11/29/2018, Normal      Cholecalciferol (VITAMIN D3) 5000 units CAPS Take by mouth, Historical Med      !! cyclobenzaprine (FLEXERIL) 5 mg tablet Take 5 mg by mouth daily at bedtime as needed , Historical Med      docusate sodium (COLACE) 100 mg capsule Take 1 capsule (100 mg total) by mouth daily, Starting Tue 1/8/2019, Normal      ibuprofen (MOTRIN) 800 mg tablet every 8 (eight) hours as needed , Historical Med      LORazepam (ATIVAN) 1 mg tablet 0 5 mg tab 20-30 mins before scan, may repeat in 30 minutes if needed for anxiety, Print      Magnesium 400 MG CAPS Take 400 mg by mouth daily, Historical Med      naproxen (NAPROSYN) 500 mg tablet Take one tab up to twice a day, no more than 3 times a week, Normal      omega-3-acid ethyl esters (LOVAZA) 1 g capsule Take 2 g by mouth daily , Historical Med ondansetron (ZOFRAN-ODT) 4 mg disintegrating tablet Take 1 tablet (4 mg total) by mouth every 6 (six) hours as needed for nausea or vomiting, Starting Thu 11/29/2018, Normal      pantoprazole (PROTONIX) 40 mg tablet TAKE ONE TABLET BY MOUTH ONCE DAILY, Normal      polyethylene glycol (MIRALAX) 17 g packet Take 17 g by mouth daily, Starting Tue 1/8/2019, Normal      pyridoxine (B-6) 500 MG tablet Take 400 mg by mouth daily, Historical Med      Riboflavin (B2 PO) Take by mouth, Historical Med      rizatriptan (MAXALT) 10 MG tablet Take 1 tablet (10 mg total) by mouth once as needed for migraine May repeat in 2 hours if needed  Max 2/24 hours, 9/month , Starting Mon 7/15/2019, Normal      UNKNOWN TO PATIENT COMPOUNDED MEDICATION   Lidocaine 5% and Diclofenac 3%   Apply 1-3 grams to the affected area 2-4 times per day as needed , Historical Med       !! - Potential duplicate medications found  Please discuss with provider  No discharge procedures on file      ED Provider  Electronically Signed by           Arely Urias PA-C  10/11/19 2042

## 2019-10-11 NOTE — DISCHARGE INSTRUCTIONS
Follow-up with orthopedic specialist for further evaluation and management of your recent ER visit  Continue take Flexeril up to 3 times a day, Medrol Dosepak as prescribed  Take medication with food not an empty stomach  You will also get stronger pain medication that he can take as needed  Supportive care advice, heat therapy advice  Return to emergency room immediately with new or worsening symptoms such as fecal or urine incontinence

## 2019-10-14 ENCOUNTER — TELEPHONE (OUTPATIENT)
Dept: OBGYN CLINIC | Facility: HOSPITAL | Age: 64
End: 2019-10-14

## 2019-10-14 ENCOUNTER — NURSE TRIAGE (OUTPATIENT)
Dept: PHYSICAL THERAPY | Facility: OTHER | Age: 64
End: 2019-10-14

## 2019-10-14 DIAGNOSIS — G89.29 ACUTE EXACERBATION OF CHRONIC LOW BACK PAIN: Primary | ICD-10-CM

## 2019-10-14 DIAGNOSIS — M54.50 ACUTE EXACERBATION OF CHRONIC LOW BACK PAIN: Primary | ICD-10-CM

## 2019-10-14 NOTE — TELEPHONE ENCOUNTER
Background - Initial Assessment  Clinical complaint: Right sided low back pain radiates down right leg  "numbness in front of knee, feels funny"  Date of onset: 3 1/2 year history of back pain-  Acute on-set low back pain started on 10/8/19 w/o known injury  Frequency of pain: constant, but worsens with movement  Quality of pain: shooting, sharp, "stiffness"  Protocols used: [facility] COMPREHENSIVE SPINE PROGRAM PROTOCOL    Nurse discussed patients chronic back issues and her prior treatment with pain management in [place]  Patient has participated in physical therapy for her shoulder and ortho seen for same  Patient states her orthopedic provider "does not do backs"  Patient given the options of physical therapy eval and evaluation at [facility] with Marcin Kraus  As patient states, " I don't know what's going on and I need to know what to do"  Latest acute back pain episode warranted visit to ED  Denies any injury  Patient informed she would receive a call from PM&R office to schedule evaluation with Neil Bhatti  Patient will schedule/participate in physical therapy if this eval warrants in treatment plan  Nurse encouraged patient to call [facility] if needed in the future  Agreed and appreciative of call      No referral to close

## 2019-10-15 ENCOUNTER — OFFICE VISIT (OUTPATIENT)
Dept: FAMILY MEDICINE CLINIC | Facility: CLINIC | Age: 64
End: 2019-10-15
Payer: MEDICARE

## 2019-10-15 VITALS
SYSTOLIC BLOOD PRESSURE: 110 MMHG | WEIGHT: 170 LBS | BODY MASS INDEX: 32.12 KG/M2 | TEMPERATURE: 97.6 F | HEART RATE: 82 BPM | DIASTOLIC BLOOD PRESSURE: 78 MMHG | OXYGEN SATURATION: 98 %

## 2019-10-15 DIAGNOSIS — F41.9 ANXIETY DISORDER, UNSPECIFIED TYPE: ICD-10-CM

## 2019-10-15 DIAGNOSIS — G89.29 CHRONIC BILATERAL LOW BACK PAIN WITH RIGHT-SIDED SCIATICA: Primary | ICD-10-CM

## 2019-10-15 DIAGNOSIS — M54.41 CHRONIC BILATERAL LOW BACK PAIN WITH RIGHT-SIDED SCIATICA: Primary | ICD-10-CM

## 2019-10-15 PROCEDURE — 99213 OFFICE O/P EST LOW 20 MIN: CPT | Performed by: FAMILY MEDICINE

## 2019-10-15 RX ORDER — LORAZEPAM 1 MG/1
TABLET ORAL
Qty: 2 TABLET | Refills: 0 | Status: SHIPPED | OUTPATIENT
Start: 2019-10-15 | End: 2019-12-20 | Stop reason: SDUPTHER

## 2019-10-15 NOTE — ASSESSMENT & PLAN NOTE
She had been having low back pain which was more bilateral last spring  This gradually improved but now she has more right-sided low back pain with right sided sciatica  She is scheduled to see pain management and is trying to schedule with Orthopedics but needs MRI of the spine which I will order for her

## 2019-10-15 NOTE — PROGRESS NOTES
Assessment/Plan:    Chronic bilateral low back pain with right-sided sciatica  She had been having low back pain which was more bilateral last spring  This gradually improved but now she has more right-sided low back pain with right sided sciatica  She is scheduled to see pain management and is trying to schedule with Orthopedics but needs MRI of the spine which I will order for her  Diagnoses and all orders for this visit:    Chronic bilateral low back pain with right-sided sciatica    Anxiety disorder, unspecified type   -     MRI lumbar spine wo contrast; Future  -     LORazepam (ATIVAN) 1 mg tablet; Take 1 tablet 1 hour before procedure  May repeat x1 if needed  Other orders  -     diclofenac sodium (VOLTAREN) 1 %; Apply topically every 6 (six) hours          Subjective:      Patient ID: Lewis Valdes is a 71 y o  female  She is here with rather severe low back pain and right sciatic symptoms  The pain got severe 1 week ago  There was no injury  Things got so bad that she went to the emergency room on October 11th  She was given a prednisone taper and Lidoderm patch at that time  These have only provided minimal relief  She schedule with pain management but not until early November  She has been going to therapy for left frozen shoulder  She recently received an injection in this shoulder as well  At the emergency room, they thought the back pain may be a result of the way she is walking and carrying herself because of shoulder pain  She is having some right sided radicular symptoms which start in the buttocks and wrap around to the front of the lower leg  She has paresthesias right lower leg  No bladder or bowel incontinence  Pain is constant and worsens with change in position  She gets mainly drowsiness from the muscle relaxers        The following portions of the patient's history were reviewed and updated as appropriate: allergies, current medications, past family history, past medical history, past social history, past surgical history and problem list     Review of Systems   Constitutional: Positive for activity change and fatigue  Negative for chills and fever  Respiratory: Negative for shortness of breath  Cardiovascular: Negative for chest pain  Musculoskeletal: Positive for arthralgias and back pain  Neurological: Negative for dizziness and headaches  Psychiatric/Behavioral: Negative for dysphoric mood  The patient is not nervous/anxious  Objective:      /78 (BP Location: Right arm, Patient Position: Sitting, Cuff Size: Standard)   Pulse 82   Temp 97 6 °F (36 4 °C) (Tympanic)   Wt 77 1 kg (170 lb)   SpO2 98%   BMI 32 12 kg/m²          Physical Exam   Constitutional: She is oriented to person, place, and time  She appears well-developed and well-nourished  HENT:   Head: Normocephalic and atraumatic  Cardiovascular: Normal rate, regular rhythm and normal heart sounds  Pulmonary/Chest: Effort normal and breath sounds normal    Abdominal: Soft  Musculoskeletal: She exhibits no edema or deformity  Marked decreased ROM   Neurological: She is alert and oriented to person, place, and time  She displays normal reflexes  No sensory deficit  She exhibits normal muscle tone  Skin: Skin is warm and dry  Psychiatric: She has a normal mood and affect  Her behavior is normal    Nursing note and vitals reviewed

## 2019-10-17 ENCOUNTER — TELEPHONE (OUTPATIENT)
Dept: PHYSICAL THERAPY | Facility: OTHER | Age: 64
End: 2019-10-17

## 2019-10-17 ENCOUNTER — DOCUMENTATION (OUTPATIENT)
Dept: PHYSICAL THERAPY | Facility: OTHER | Age: 64
End: 2019-10-17

## 2019-10-17 NOTE — TELEPHONE ENCOUNTER
Patient left a  for  Comprehensive spine program requesting call back to discuss the  Call/tentative appt with PM&R  Nurse left message for patient to call back to discuss same  Per the note on Closed referral to [facility], pt scheduled with PM on 11/1/19  Will await return call to help explain

## 2019-10-17 NOTE — TELEPHONE ENCOUNTER
Patient returned nurses call  Nurse informed patient that the referral to PM&R was closed based on her appt with PM  Discussed the possible reasoning behind this decision  Patient instructed to attend her f/u with ortho as scheduled and discuss her desire to see Ortho for her lower back  Patient also has an appointment for MRI prior to PM appt  Patient was very appreciative of call and discussion on status/recommendation  Will call [facility] if needed in the future

## 2019-10-21 DIAGNOSIS — G89.29 CHRONIC RIGHT-SIDED LOW BACK PAIN WITH RIGHT-SIDED SCIATICA: Primary | ICD-10-CM

## 2019-10-21 DIAGNOSIS — M54.41 CHRONIC RIGHT-SIDED LOW BACK PAIN WITH RIGHT-SIDED SCIATICA: Primary | ICD-10-CM

## 2019-10-25 ENCOUNTER — OFFICE VISIT (OUTPATIENT)
Dept: OBGYN CLINIC | Facility: MEDICAL CENTER | Age: 64
End: 2019-10-25
Payer: MEDICARE

## 2019-10-25 VITALS
WEIGHT: 171 LBS | HEIGHT: 61 IN | HEART RATE: 85 BPM | DIASTOLIC BLOOD PRESSURE: 80 MMHG | SYSTOLIC BLOOD PRESSURE: 122 MMHG | BODY MASS INDEX: 32.28 KG/M2

## 2019-10-25 DIAGNOSIS — M54.40 ACUTE LEFT-SIDED LOW BACK PAIN WITH SCIATICA, SCIATICA LATERALITY UNSPECIFIED: ICD-10-CM

## 2019-10-25 DIAGNOSIS — M75.02 ADHESIVE CAPSULITIS OF LEFT SHOULDER: Primary | ICD-10-CM

## 2019-10-25 PROCEDURE — 99213 OFFICE O/P EST LOW 20 MIN: CPT | Performed by: ORTHOPAEDIC SURGERY

## 2019-10-25 NOTE — PROGRESS NOTES
Orthopaedic Surgery - Office Note  Lindsey Lund (71 y o  female)   : 1955   MRN: [id number]  Encounter Date: 10/25/2019    Chief Complaint   Patient presents with    Left Shoulder - Follow-up       Assessment / Plan  Left shoulder, adhesive capsulitis    · Activity as tolerated  · Continue with self-directed home stretching program   · Continue using heat to decrease pain  · I did provide her with a referral for spine in pain to treat her low back pain  Return in about 3 months (around 2020)  History of Present Illness  Lindsey Lund is a 71 y o  female who presents for follow-up evaluation of left shoulder adhesive capsulitis  At her last appointment she did receive a CSI which she reports relieved her pain for approximately 1-2 weeks  She was also given a self-directed stretching program to complete however she reports she has not been fully compliant with the stretching program because she also started to experience severe low back pain  She reports her left shoulder has decreased to the moderate constant pain with only severe pain with certain movements  She is able to experience no pain while at rest she is also able to sleep with no pain  She reports that naproxen and Tylenol to help relieve her shoulder pain she also uses heat which does help with her pain  She does also have an MRI of her low back scheduled for Monday  She denies any further injury or trauma to her left shoulder  She denies any distal paresthesias  Review of Systems  Pertinent items are noted in HPI  All other systems were reviewed and are negative  Physical Exam  /80   Pulse 85   Ht 5' 1" (1 549 m)   Wt 77 6 kg (171 lb)   BMI 32 31 kg/m²   Cons: Appears well  No apparent distress  Psych: Alert  Oriented x3  Mood and affect normal   Eyes: PERRLA, EOMI  Resp: Normal effort  No audible wheezing or stridor  CV: Palpable pulse  No discernable arrhythmia  No LE edema    Lymph:  No palpable cervical, axillary, or inguinal lymphadenopathy  Skin: Warm  No palpable masses  No visible lesions  Neuro: Normal muscle tone  Normal and symmetric DTR's  Left Shoulder Exam  Alignment / Posture:  Normal shoulder posture  Inspection:  No swelling  No edema  No erythema  No ecchymosis  No muscle atrophy  No deformity  Palpation:  Glenohumeral tenderness  No effusion  No warmth  No crepitus  ROM:  Shoulder    Shoulder ER 60  Shoulder IR sacrum  Strength:  5/5 supraspinatus, infraspinatus, and subscapularis  Stability:  No objective shoulder instability  Tests: (+) Camara  Neurovascular:  Sensation intact in Ax/R/M/U nerve distributions  2+ radial pulse  Studies Reviewed  No studies to review    Procedures  No procedures today  Medical, Surgical, Family, and Social History  The patient's medical history, family history, and social history, were reviewed and updated as appropriate  Past Medical History:   Diagnosis Date    Abdominal discomfort     Anemia     Arthritis     Bronchitis     Chronic pain disorder     Dyslipidemia     Last Assessed 2012    GERD (gastroesophageal reflux disease)     Headache, migraine     Hypotension     Low back pain     Low back pain     Rheumatoid arthritis ([facility] [address] )     Thyroid nodule 2019       Past Surgical History:   Procedure Laterality Date    CATARACT EXTRACTION       SECTION      ESOPHAGOGASTRODUODENOSCOPY N/A 2018    Procedure: ESOPHAGOGASTRODUODENOSCOPY (EGD) with push enteroscopy and bx;  Surgeon: Elfego Ho DO;  [facility]; Service: Gastroenterology    EYE SURGERY      NERVE SURGERY Right     Hand    [place] EDG US EXAM SURGICAL ALTER STOM DUODENUM/JEJUNUM N/A 3/6/2019    Procedure: LINEAR ENDOSCOPIC U/S;  Surgeon: Kami Coughlin MD;  [facility];   Service: Gastroenterology    UPPER GASTROINTESTINAL ENDOSCOPY         Family History   Problem Relation Age of Onset    Hypertension Mother    Harjinder Alexis Other Mother         Dyslipidemia    Diabetes Father         Mellitus    No Known Problems Brother     No Known Problems Brother     Cancer Neg Hx     Heart disease Neg Hx     Stroke Neg Hx        Social History     Occupational History    Occupation: retired   Tobacco Use    Smoking status: Former Smoker     Last attempt to quit: 1985     Years since quittin 8    Smokeless tobacco: Never Used   Substance and Sexual Activity    Alcohol use: Yes     Comment: occasional  Maybe 5x per year    Drug use: No    Sexual activity: Yes     Partners: Male     Birth control/protection: Post-menopausal       Allergies   Allergen Reactions    Erythromycin Rash         Current Outpatient Medications:     acetaminophen (TYLENOL) 325 mg tablet, Take 2 tablets (650 mg total) by mouth every 6 (six) hours as needed for mild pain or fever, Disp: 30 tablet, Rfl: 0    Cholecalciferol (VITAMIN D3) 5000 units CAPS, Take by mouth, Disp: , Rfl:     cyclobenzaprine (FLEXERIL) 10 mg tablet, Take 1 tablet (10 mg total) by mouth 3 (three) times a day, Disp: 20 tablet, Rfl: 0    diclofenac sodium (VOLTAREN) 1 %, Apply topically every 6 (six) hours, Disp: , Rfl:     docusate sodium (COLACE) 100 mg capsule, Take 1 capsule (100 mg total) by mouth daily, Disp: 30 capsule, Rfl: 5    ibuprofen (MOTRIN) 800 mg tablet, every 8 (eight) hours as needed , Disp: , Rfl:     LORazepam (ATIVAN) 1 mg tablet, Take 1 tablet 1 hour before procedure  May repeat x1 if needed  , Disp: 2 tablet, Rfl: 0    Magnesium 400 MG CAPS, Take 400 mg by mouth daily, Disp: , Rfl:     methylPREDNISolone 4 MG tablet therapy pack, Use as directed on package, Disp: 21 tablet, Rfl: 0    naproxen (NAPROSYN) 500 mg tablet, Take one tab up to twice a day, no more than 3 times a week, Disp: 20 tablet, Rfl: 3    omega-3-acid ethyl esters (LOVAZA) 1 g capsule, Take 2 g by mouth daily , Disp: , Rfl:     ondansetron (ZOFRAN) 4 mg tablet, Take 1 tablet (4 mg total) by mouth every 6 (six) hours, Disp: 12 tablet, Rfl: 0    pantoprazole (PROTONIX) 40 mg tablet, TAKE ONE TABLET BY MOUTH ONCE DAILY, Disp: 30 tablet, Rfl: 5    polyethylene glycol (MIRALAX) 17 g packet, Take 17 g by mouth daily, Disp: 30 each, Rfl: 5    pyridoxine (B-6) 500 MG tablet, Take 400 mg by mouth daily, Disp: , Rfl:     rizatriptan (MAXALT) 10 MG tablet, Take 1 tablet (10 mg total) by mouth once as needed for migraine May repeat in 2 hours if needed   Max 2/24 hours, 9/month , Disp: 9 tablet, Rfl: 3      Micaela Watts    Scribe Attestation    I,:   Isaura Weinberg am acting as a scribe while in the presence of the attending physician :        I,:   Lety Goddard MD personally performed the services described in this documentation    as scribed in my presence :

## 2019-10-28 ENCOUNTER — HOSPITAL ENCOUNTER (OUTPATIENT)
Dept: MRI IMAGING | Facility: HOSPITAL | Age: 64
Discharge: HOME | End: 2019-10-28
Attending: FAMILY MEDICINE
Payer: MEDICARE

## 2019-10-28 DIAGNOSIS — F41.9 ANXIETY DISORDER, UNSPECIFIED TYPE: ICD-10-CM

## 2019-10-28 PROCEDURE — 72148 MRI LUMBAR SPINE W/O DYE: CPT

## 2019-11-01 ENCOUNTER — HOSPITAL ENCOUNTER (OUTPATIENT)
Dept: RADIOLOGY | Facility: MEDICAL CENTER | Age: 64
Discharge: HOME | End: 2019-11-01
Attending: PHYSICAL MEDICINE & REHABILITATION | Admitting: PHYSICAL MEDICINE & REHABILITATION
Payer: MEDICARE

## 2019-11-01 VITALS
DIASTOLIC BLOOD PRESSURE: 82 MMHG | RESPIRATION RATE: 20 BRPM | HEART RATE: 80 BPM | TEMPERATURE: 97.5 F | SYSTOLIC BLOOD PRESSURE: 128 MMHG | OXYGEN SATURATION: 95 %

## 2019-11-01 DIAGNOSIS — G89.29 CHRONIC BILATERAL LOW BACK PAIN WITHOUT SCIATICA: ICD-10-CM

## 2019-11-01 DIAGNOSIS — M43.16 SPONDYLOLISTHESIS OF LUMBAR REGION: ICD-10-CM

## 2019-11-01 DIAGNOSIS — M47.816 LUMBAR SPONDYLOSIS: ICD-10-CM

## 2019-11-01 DIAGNOSIS — M54.50 CHRONIC BILATERAL LOW BACK PAIN WITHOUT SCIATICA: ICD-10-CM

## 2019-11-01 PROCEDURE — 64494 INJ PARAVERT F JNT L/S 2 LEV: CPT | Performed by: PHYSICAL MEDICINE & REHABILITATION

## 2019-11-01 PROCEDURE — 64493 INJ PARAVERT F JNT L/S 1 LEV: CPT | Performed by: PHYSICAL MEDICINE & REHABILITATION

## 2019-11-01 RX ADMIN — Medication 3 ML: at 09:22

## 2019-11-01 NOTE — DISCHARGE INSTRUCTIONS

## 2019-11-01 NOTE — H&P
History of Present Illness: The patient is a 71 y o  female who presents with complaints of bilateral lower back pain    Patient Active Problem List   Diagnosis    Migraine headache    Chronic bilateral low back pain with right-sided sciatica    Esophageal reflux    Arthritis    Duodenum disorder    Hyperlipidemia    Depression    Hyperglycemia    Abnormal MRI of abdomen    Vitamin D deficiency    Anxiety disorder    Retinal migraine    Thyroid nodule    Class 1 obesity    Impaired fasting glucose    Chronic left shoulder pain       Past Medical History:   Diagnosis Date    Abdominal discomfort     Anemia     Arthritis     Bronchitis     Chronic pain disorder     Dyslipidemia     Last Assessed 2012    GERD (gastroesophageal reflux disease)     Headache, migraine     Hypotension     Low back pain     Low back pain     Rheumatoid arthritis ([address] )     Thyroid nodule 2019       Past Surgical History:   Procedure Laterality Date    CATARACT EXTRACTION       SECTION      ESOPHAGOGASTRODUODENOSCOPY N/A 2018    Procedure: ESOPHAGOGASTRODUODENOSCOPY (EGD) with push enteroscopy and bx;  Surgeon: Douglas Chacon DO;  [facility]; Service: Gastroenterology    EYE SURGERY      NERVE SURGERY Right     Hand    [place] EDG US EXAM SURGICAL ALTER STOM DUODENUM/JEJUNUM N/A 3/6/2019    Procedure: LINEAR ENDOSCOPIC U/S;  Surgeon: Laurence Alonzo MD;  [facility];   Service: Gastroenterology    UPPER GASTROINTESTINAL ENDOSCOPY           Current Outpatient Medications:     acetaminophen (TYLENOL) 325 mg tablet, Take 2 tablets (650 mg total) by mouth every 6 (six) hours as needed for mild pain or fever, Disp: 30 tablet, Rfl: 0    Cholecalciferol (VITAMIN D3) 5000 units CAPS, Take by mouth, Disp: , Rfl:     diclofenac sodium (VOLTAREN) 1 %, Apply topically every 6 (six) hours, Disp: , Rfl:     docusate sodium (COLACE) 100 mg capsule, Take 1 capsule (100 mg total) by mouth daily, Disp: 30 capsule, Rfl: 5    ibuprofen (MOTRIN) 800 mg tablet, every 8 (eight) hours as needed , Disp: , Rfl:     LORazepam (ATIVAN) 1 mg tablet, Take 1 tablet 1 hour before procedure  May repeat x1 if needed  , Disp: 2 tablet, Rfl: 0    Magnesium 400 MG CAPS, Take 400 mg by mouth daily, Disp: , Rfl:     naproxen (NAPROSYN) 500 mg tablet, Take one tab up to twice a day, no more than 3 times a week, Disp: 20 tablet, Rfl: 3    omega-3-acid ethyl esters (LOVAZA) 1 g capsule, Take 2 g by mouth daily , Disp: , Rfl:     ondansetron (ZOFRAN) 4 mg tablet, Take 1 tablet (4 mg total) by mouth every 6 (six) hours, Disp: 12 tablet, Rfl: 0    pantoprazole (PROTONIX) 40 mg tablet, TAKE ONE TABLET BY MOUTH ONCE DAILY, Disp: 30 tablet, Rfl: 5    polyethylene glycol (MIRALAX) 17 g packet, Take 17 g by mouth daily, Disp: 30 each, Rfl: 5    pyridoxine (B-6) 500 MG tablet, Take 400 mg by mouth daily, Disp: , Rfl:     rizatriptan (MAXALT) 10 MG tablet, Take 1 tablet (10 mg total) by mouth once as needed for migraine May repeat in 2 hours if needed  Max 2/24 hours, 9/month , Disp: 9 tablet, Rfl: 3    cyclobenzaprine (FLEXERIL) 10 mg tablet, Take 1 tablet (10 mg total) by mouth 3 (three) times a day (Patient not taking: Reported on 11/1/2019), Disp: 20 tablet, Rfl: 0    Current Facility-Administered Medications:     lidocaine (PF) (XYLOCAINE-MPF) 2 % injection 4 mL, 4 mL, Perineural, Once, Ryan Said, DO    Allergies   Allergen Reactions    Erythromycin Rash       Physical Exam:   Vitals:    11/01/19 0901   BP: 137/86   Pulse: 81   Resp: 20   Temp: 97 5 °F (36 4 °C)   SpO2: 96%     General: Awake, Alert, Oriented x 3, Mood and affect appropriate  Respiratory: Respirations even and unlabored  Cardiovascular: Peripheral pulses intact; no edema  Musculoskeletal Exam:  Bilateral lower back pain    ASA Score:  2       Patient/Chart Verification  Patient ID Verified: Verbal  ID Band Applied: No  Consents Confirmed: Procedural  H&P( within 30 days) Verified: To be obtained in the Pre-Procedure area  Interval H&P(within 24 hr) Complete (required for Outpatients and Surgery Admit only): To be obtained in the Pre-Procedure area  Allergies Reviewed: Yes  Anticoag/NSAID held?: NA  Currently on antibiotics?: No    Assessment:   1  Chronic bilateral low back pain without sciatica    2  Lumbar spondylosis    3   Spondylolisthesis of lumbar region        Plan: (B) L3-5 MBB#1

## 2019-11-07 ENCOUNTER — TELEPHONE (OUTPATIENT)
Dept: RADIOLOGY | Facility: MEDICAL CENTER | Age: 64
End: 2019-11-07

## 2019-11-07 NOTE — TELEPHONE ENCOUNTER
Pain diary reviewed by Dr Zoe Teresa; proceed with MBB #2; I will call and coordinate       Bilateral L3-5

## 2019-11-08 NOTE — TELEPHONE ENCOUNTER
Returned call to patient and scheduled:     bilateral L3-5 MBB #2 on 12/11    Reviewed instructions: , NPO 1 hour prior, loose-fitting/comfortable clothes, if ill/fever/infx/abx to call and reschedule  Also pain level at leat 5/10 and refrain from PRN, as-needed pain meds 6h prior  Patient stated verbal understanding

## 2019-11-11 ENCOUNTER — IMMUNIZATIONS (OUTPATIENT)
Dept: FAMILY MEDICINE CLINIC | Facility: CLINIC | Age: 64
End: 2019-11-11
Payer: MEDICARE

## 2019-11-11 DIAGNOSIS — Z23 FLU VACCINE NEED: Primary | ICD-10-CM

## 2019-11-11 PROCEDURE — G0008 ADMIN INFLUENZA VIRUS VAC: HCPCS

## 2019-11-11 PROCEDURE — 90662 IIV NO PRSV INCREASED AG IM: CPT

## 2019-12-11 ENCOUNTER — HOSPITAL ENCOUNTER (OUTPATIENT)
Dept: RADIOLOGY | Facility: MEDICAL CENTER | Age: 64
Discharge: HOME | End: 2019-12-11
Attending: PHYSICAL MEDICINE & REHABILITATION
Payer: MEDICARE

## 2019-12-11 VITALS
SYSTOLIC BLOOD PRESSURE: 124 MMHG | HEART RATE: 75 BPM | DIASTOLIC BLOOD PRESSURE: 82 MMHG | OXYGEN SATURATION: 94 % | TEMPERATURE: 97.8 F | RESPIRATION RATE: 18 BRPM

## 2019-12-11 DIAGNOSIS — M47.816 LUMBAR SPONDYLOSIS: ICD-10-CM

## 2019-12-11 DIAGNOSIS — M54.50 LOW BACK PAIN: ICD-10-CM

## 2019-12-11 PROCEDURE — 64494 INJ PARAVERT F JNT L/S 2 LEV: CPT | Performed by: PHYSICAL MEDICINE & REHABILITATION

## 2019-12-11 PROCEDURE — 64493 INJ PARAVERT F JNT L/S 1 LEV: CPT | Performed by: PHYSICAL MEDICINE & REHABILITATION

## 2019-12-11 RX ORDER — BUPIVACAINE HYDROCHLORIDE 5 MG/ML
10 INJECTION, SOLUTION EPIDURAL; INTRACAUDAL ONCE
Status: COMPLETED | OUTPATIENT
Start: 2019-12-11 | End: 2019-12-11

## 2019-12-11 RX ADMIN — BUPIVACAINE HYDROCHLORIDE 3 ML: 5 INJECTION, SOLUTION EPIDURAL; INTRACAUDAL at 08:34

## 2019-12-11 NOTE — H&P
History of Present Illness: The patient is a 71 y o  female who presents with complaints of bilateral lower back pain    Patient Active Problem List   Diagnosis    Migraine headache    Chronic bilateral low back pain without sciatica    Esophageal reflux    Arthritis    Duodenum disorder    Hyperlipidemia    Depression    Hyperglycemia    Abnormal MRI of abdomen    Vitamin D deficiency    Anxiety disorder    Retinal migraine    Thyroid nodule    Class 1 obesity    Impaired fasting glucose    Chronic left shoulder pain    Lumbar spondylosis       Past Medical History:   Diagnosis Date    Abdominal discomfort     Anemia     Arthritis     Bronchitis     Chronic pain disorder     Dyslipidemia     Last Assessed 2012    GERD (gastroesophageal reflux disease)     Headache, migraine     Hypotension     Low back pain     Low back pain     Rheumatoid arthritis ([facility] [address] )     Thyroid nodule 2019       Past Surgical History:   Procedure Laterality Date    CATARACT EXTRACTION       SECTION      ESOPHAGOGASTRODUODENOSCOPY N/A 2018    Procedure: ESOPHAGOGASTRODUODENOSCOPY (EGD) with push enteroscopy and bx;  Surgeon: Handy Escobedo DO;  [facility]; Service: Gastroenterology    EYE SURGERY      NERVE SURGERY Right     Hand    CT EDG US EXAM SURGICAL ALTER STOM DUODENUM/JEJUNUM N/A 3/6/2019    Procedure: LINEAR ENDOSCOPIC U/S;  Surgeon: Betsy Elise MD;  [facility];   Service: Gastroenterology    UPPER GASTROINTESTINAL ENDOSCOPY           Current Outpatient Medications:     acetaminophen (TYLENOL) 325 mg tablet, Take 2 tablets (650 mg total) by mouth every 6 (six) hours as needed for mild pain or fever, Disp: 30 tablet, Rfl: 0    Cholecalciferol (VITAMIN D3) 5000 units CAPS, Take by mouth, Disp: , Rfl:     diclofenac sodium (VOLTAREN) 1 %, Apply topically every 6 (six) hours, Disp: , Rfl:     docusate sodium (COLACE) 100 mg capsule, Take 1 capsule (100 mg total) by mouth daily, Disp: 30 capsule, Rfl: 5    ibuprofen (MOTRIN) 800 mg tablet, every 8 (eight) hours as needed , Disp: , Rfl:     Magnesium 400 MG CAPS, Take 400 mg by mouth daily, Disp: , Rfl:     naproxen (NAPROSYN) 500 mg tablet, Take one tab up to twice a day, no more than 3 times a week, Disp: 20 tablet, Rfl: 3    omega-3-acid ethyl esters (LOVAZA) 1 g capsule, Take 2 g by mouth daily , Disp: , Rfl:     ondansetron (ZOFRAN) 4 mg tablet, Take 1 tablet (4 mg total) by mouth every 6 (six) hours, Disp: 12 tablet, Rfl: 0    pantoprazole (PROTONIX) 40 mg tablet, TAKE ONE TABLET BY MOUTH ONCE DAILY, Disp: 30 tablet, Rfl: 5    polyethylene glycol (MIRALAX) 17 g packet, Take 17 g by mouth daily, Disp: 30 each, Rfl: 5    pyridoxine (B-6) 500 MG tablet, Take 400 mg by mouth daily, Disp: , Rfl:     rizatriptan (MAXALT) 10 MG tablet, Take 1 tablet (10 mg total) by mouth once as needed for migraine May repeat in 2 hours if needed  Max 2/24 hours, 9/month , Disp: 9 tablet, Rfl: 3    cyclobenzaprine (FLEXERIL) 10 mg tablet, Take 1 tablet (10 mg total) by mouth 3 (three) times a day (Patient not taking: Reported on 11/1/2019), Disp: 20 tablet, Rfl: 0    LORazepam (ATIVAN) 1 mg tablet, Take 1 tablet 1 hour before procedure  May repeat x1 if needed  , Disp: 2 tablet, Rfl: 0    Current Facility-Administered Medications:     bupivacaine (PF) (MARCAINE) 0 5 % injection 10 mL, 10 mL, Injection, Once, Kleber Mendota, DO    Allergies   Allergen Reactions    Erythromycin Rash       Physical Exam:   Vitals:    12/11/19 0812   BP: 125/84   Pulse: 78   Resp: 18   Temp: 97 8 °F (36 6 °C)   SpO2: 96%     General: Awake, Alert, Oriented x 3, Mood and affect appropriate  Respiratory: Respirations even and unlabored  Cardiovascular: Peripheral pulses intact; no edema  Musculoskeletal Exam: bilateral lower back pain    ASA Score: 2    Patient/Chart Verification  Patient ID Verified: Verbal  Consents Confirmed: Procedural, To be obtained in the Pre-Procedure area  H&P( within 30 days) Verified: To be obtained in the Pre-Procedure area  Allergies Reviewed: Yes  Anticoag/NSAID held?: NA  Currently on antibiotics?: No  Pregnancy denied?: Yes    Assessment:   1  Lumbar spondylosis    2   Low back pain        Plan: AVERY L3-5 MBB#2

## 2019-12-11 NOTE — DISCHARGE INSTRUCTIONS

## 2019-12-13 ENCOUNTER — OFFICE VISIT (OUTPATIENT)
Dept: OBGYN CLINIC | Facility: MEDICAL CENTER | Age: 64
End: 2019-12-13
Payer: MEDICARE

## 2019-12-13 VITALS
SYSTOLIC BLOOD PRESSURE: 109 MMHG | HEART RATE: 80 BPM | BODY MASS INDEX: 32.85 KG/M2 | HEIGHT: 61 IN | WEIGHT: 174 LBS | DIASTOLIC BLOOD PRESSURE: 74 MMHG

## 2019-12-13 DIAGNOSIS — M75.02 ADHESIVE CAPSULITIS OF LEFT SHOULDER: Primary | ICD-10-CM

## 2019-12-13 PROCEDURE — 99213 OFFICE O/P EST LOW 20 MIN: CPT | Performed by: ORTHOPAEDIC SURGERY

## 2019-12-13 NOTE — PROGRESS NOTES
Orthopaedic Surgery - Office Note  Heather Hancock (71 y o  female)   : 1955   MRN: [id number]  Encounter Date: 2019    Chief Complaint   Patient presents with    Left Shoulder - Follow-up       Assessment / Plan  Left shoulder, adhesive capsulitis    · Continue with activity as tolerated  · Continue with self-directed home stretching program   · Continue using ice or heat to decrease pain  · Continue with pain management for lumbar pain with Dr Bassam Little  · If her pain or stiffness continues to increase we will consider MRI study for further evaluation  Return in about 6 weeks (around 2020)  History of Present Illness  Heather Hancock is a 71 y o  female who presents for follow-up evaluation of left shoulder adhesive capsulitis  She is coming in today for re-evaluation earlier than planned due to increasing discomfort in her shoulder  Overall she feels that her stiffness may have increased also  This is affecting her ability to sleep at night  She has continued with home exercise program for stretching  At her appointment on 2019 she did receive a CSI which she reports relieved her pain for approximately 1-2 weeks  She reports that naproxen and Tylenol to help relieve her shoulder pain she also uses heat which does help with her pain  She denies any further injury or trauma to her left shoulder  She denies any distal paresthesias at this time but occasionally feels some sharp shooting pain down to her hand  Review of Systems  Pertinent items are noted in HPI  All other systems were reviewed and are negative  Physical Exam  /74   Pulse 80   Ht 5' 1" (1 549 m)   Wt 78 9 kg (174 lb)   BMI 32 88 kg/m²   Cons: Appears well  No apparent distress  Psych: Alert  Oriented x3  Mood and affect normal   Eyes: PERRLA, EOMI  Resp: Normal effort  No audible wheezing or stridor  CV: Palpable pulse  No discernable arrhythmia  No LE edema    Lymph:  No palpable cervical, axillary, or inguinal lymphadenopathy  Skin: Warm  No palpable masses  No visible lesions  Neuro: Normal muscle tone  Normal and symmetric DTR's  Left Shoulder Exam  Alignment / Posture:  Normal shoulder posture  Inspection:  No swelling  No edema  No erythema  No ecchymosis  No muscle atrophy  No deformity  Palpation:  Glenohumeral tenderness  No effusion  No warmth  No crepitus  ROM:  Shoulder    Shoulder ER 60  Shoulder IR sacrum  Strength:  Supraspinatus 4+/5  Infraspinatus 4+/5  Subscapularis 5/5  Stability:  No objective shoulder instability  Tests: (+) Camara  Neurovascular:  Sensation intact in Ax/R/M/U nerve distributions  2+ radial pulse  Studies Reviewed  No studies to review    Procedures  No procedures today  Medical, Surgical, Family, and Social History  The patient's medical history, family history, and social history, were reviewed and updated as appropriate  Past Medical History:   Diagnosis Date    Abdominal discomfort     Anemia     Arthritis     Bronchitis     Chronic pain disorder     Dyslipidemia     Last Assessed 2012    GERD (gastroesophageal reflux disease)     Headache, migraine     Hypotension     Low back pain     Low back pain     Rheumatoid arthritis ([address]     Thyroid nodule 2019       Past Surgical History:   Procedure Laterality Date    CATARACT EXTRACTION       SECTION      ESOPHAGOGASTRODUODENOSCOPY N/A 2018    Procedure: ESOPHAGOGASTRODUODENOSCOPY (EGD) with push enteroscopy and bx;  Surgeon: Cher Peng DO;  [facility]: [facility]; Service: Gastroenterology    EYE SURGERY      NERVE SURGERY Right     Hand    [place] EDG US EXAM SURGICAL ALTER STOM DUODENUM/JEJUNUM N/A 3/6/2019    Procedure: LINEAR ENDOSCOPIC U/S;  Surgeon: Ana Kumar MD;  [facility];   Service: Gastroenterology    UPPER GASTROINTESTINAL ENDOSCOPY         Family History   Problem Relation Age of Onset    Hypertension Mother     Other Mother         Dyslipidemia    Diabetes Father         Mellitus    No Known Problems Brother     No Known Problems Brother     Cancer Neg Hx     Heart disease Neg Hx     Stroke Neg Hx        Social History     Occupational History    Occupation: retired   Tobacco Use    Smoking status: Former Smoker     Last attempt to quit: 1985     Years since quittin 9    Smokeless tobacco: Never Used   Substance and Sexual Activity    Alcohol use: Yes     Comment: occasional  Maybe 5x per year    Drug use: No    Sexual activity: Yes     Partners: Male     Birth control/protection: Post-menopausal       Allergies   Allergen Reactions    Erythromycin Rash         Current Outpatient Medications:     acetaminophen (TYLENOL) 325 mg tablet, Take 2 tablets (650 mg total) by mouth every 6 (six) hours as needed for mild pain or fever, Disp: 30 tablet, Rfl: 0    Cholecalciferol (VITAMIN D3) 5000 units CAPS, Take by mouth, Disp: , Rfl:     cyclobenzaprine (FLEXERIL) 10 mg tablet, Take 1 tablet (10 mg total) by mouth 3 (three) times a day, Disp: 20 tablet, Rfl: 0    diclofenac sodium (VOLTAREN) 1 %, Apply topically every 6 (six) hours, Disp: , Rfl:     docusate sodium (COLACE) 100 mg capsule, Take 1 capsule (100 mg total) by mouth daily, Disp: 30 capsule, Rfl: 5    ibuprofen (MOTRIN) 800 mg tablet, every 8 (eight) hours as needed , Disp: , Rfl:     LORazepam (ATIVAN) 1 mg tablet, Take 1 tablet 1 hour before procedure  May repeat x1 if needed  , Disp: 2 tablet, Rfl: 0    Magnesium 400 MG CAPS, Take 400 mg by mouth daily, Disp: , Rfl:     naproxen (NAPROSYN) 500 mg tablet, Take one tab up to twice a day, no more than 3 times a week, Disp: 20 tablet, Rfl: 3    omega-3-acid ethyl esters (LOVAZA) 1 g capsule, Take 2 g by mouth daily , Disp: , Rfl:     ondansetron (ZOFRAN) 4 mg tablet, Take 1 tablet (4 mg total) by mouth every 6 (six) hours, Disp: 12 tablet, Rfl: 0    pantoprazole (PROTONIX) 40 mg tablet, TAKE ONE TABLET BY MOUTH ONCE DAILY, Disp: 30 tablet, Rfl: 5    polyethylene glycol (MIRALAX) 17 g packet, Take 17 g by mouth daily, Disp: 30 each, Rfl: 5    pyridoxine (B-6) 500 MG tablet, Take 400 mg by mouth daily, Disp: , Rfl:     rizatriptan (MAXALT) 10 MG tablet, Take 1 tablet (10 mg total) by mouth once as needed for migraine May repeat in 2 hours if needed   Max 2/24 hours, 9/month , Disp: 9 tablet, Rfl: 3      Rhys Ferraro PA-C    Scribe Attestation    I,:    am acting as a scribe while in the presence of the attending physician :        I,:    personally performed the services described in this documentation    as scribed in my presence :

## 2019-12-16 DIAGNOSIS — K21.9 GASTROESOPHAGEAL REFLUX DISEASE, ESOPHAGITIS PRESENCE NOT SPECIFIED: ICD-10-CM

## 2019-12-16 RX ORDER — PANTOPRAZOLE SODIUM 40 MG/1
TABLET, DELAYED RELEASE ORAL
Qty: 30 TABLET | Refills: 11 | Status: SHIPPED | OUTPATIENT
Start: 2019-12-16 | End: 2022-03-29 | Stop reason: ALTCHOICE

## 2019-12-17 ENCOUNTER — TELEPHONE (OUTPATIENT)
Dept: RADIOLOGY | Facility: MEDICAL CENTER | Age: 64
End: 2019-12-17

## 2019-12-17 NOTE — TELEPHONE ENCOUNTER
Pain diary reviewed by Dr Maggie Mcconnell; proceed with RFA and f/u; I will call and coordinate       bilateral L3-5

## 2019-12-19 NOTE — TELEPHONE ENCOUNTER
Patient called and scheduled:     Right L3-5 RFA on 2/12  Left L3-5 RFA on 2/26  6w f/u with Servando Lopez on 3/25    Reviewed instructions: , NPO 1 hour prior, loose-fitting/comfortable clothing, if ill/fever/infx/abx to call and reschedule  No need to hold any meds prior  Patient stated verbal understanding

## 2019-12-20 ENCOUNTER — TELEPHONE (OUTPATIENT)
Dept: OBGYN CLINIC | Facility: MEDICAL CENTER | Age: 64
End: 2019-12-20

## 2019-12-20 DIAGNOSIS — F41.9 ANXIETY DISORDER, UNSPECIFIED TYPE: ICD-10-CM

## 2019-12-20 DIAGNOSIS — M75.02 ADHESIVE CAPSULITIS OF LEFT SHOULDER: Primary | ICD-10-CM

## 2019-12-20 DIAGNOSIS — M75.102 NONTRAUMATIC TEAR OF LEFT ROTATOR CUFF, UNSPECIFIED TEAR EXTENT: ICD-10-CM

## 2019-12-20 RX ORDER — LORAZEPAM 1 MG/1
TABLET ORAL
Qty: 2 TABLET | Refills: 0 | Status: SHIPPED | OUTPATIENT
Start: 2019-12-20 | End: 2020-01-06

## 2019-12-20 NOTE — TELEPHONE ENCOUNTER
Patient would like script ordered for MRI of her left shoulder  Patient also requesting medication for MRI to help calm her nerves  Please advise and call patient at [phone]  Thank you

## 2019-12-20 NOTE — TELEPHONE ENCOUNTER
MRI is ordered, I also sent Ativan 1 mg to the patient's pharmacy on file  Please contact the patient to help her schedule and notify our that prescription was sent to the pharmacy

## 2019-12-20 NOTE — TELEPHONE ENCOUNTER
Spoke with patient  She was provided the number for Central Scheduling and will call to set up MRI  Patient does not require prior authorization for diagnostic study

## 2020-01-02 ENCOUNTER — HOSPITAL ENCOUNTER (OUTPATIENT)
Dept: MRI IMAGING | Facility: HOSPITAL | Age: 65
Discharge: HOME | End: 2020-01-02
Payer: MEDICARE

## 2020-01-02 DIAGNOSIS — M75.02 ADHESIVE CAPSULITIS OF LEFT SHOULDER: ICD-10-CM

## 2020-01-02 DIAGNOSIS — M75.102 NONTRAUMATIC TEAR OF LEFT ROTATOR CUFF, UNSPECIFIED TEAR EXTENT: ICD-10-CM

## 2020-01-02 PROCEDURE — 73221 MRI JOINT UPR EXTREM W/O DYE: CPT

## 2020-01-06 ENCOUNTER — OFFICE VISIT (OUTPATIENT)
Dept: NEUROLOGY | Facility: CLINIC | Age: 65
End: 2020-01-06
Payer: MEDICARE

## 2020-01-06 VITALS
WEIGHT: 175.8 LBS | SYSTOLIC BLOOD PRESSURE: 119 MMHG | DIASTOLIC BLOOD PRESSURE: 57 MMHG | HEIGHT: 61 IN | RESPIRATION RATE: 16 BRPM | HEART RATE: 82 BPM | BODY MASS INDEX: 33.19 KG/M2

## 2020-01-06 DIAGNOSIS — G43.109 MIGRAINE WITH AURA AND WITHOUT STATUS MIGRAINOSUS, NOT INTRACTABLE: ICD-10-CM

## 2020-01-06 PROCEDURE — 99214 OFFICE O/P EST MOD 30 MIN: CPT | Performed by: PSYCHIATRY & NEUROLOGY

## 2020-01-06 RX ORDER — RIZATRIPTAN BENZOATE 10 MG/1
10 TABLET ORAL ONCE AS NEEDED
Qty: 9 TABLET | Refills: 6 | Status: SHIPPED | OUTPATIENT
Start: 2020-01-06 | End: 2021-01-20 | Stop reason: SDUPTHER

## 2020-01-06 NOTE — PROGRESS NOTES
Review of Systems   Constitutional: Positive for appetite change (Eating more)  Negative for fever  HENT: Negative  Negative for hearing loss, tinnitus, trouble swallowing and voice change  Eyes: Negative  Negative for photophobia and pain  Respiratory: Negative  Negative for shortness of breath  Cardiovascular: Negative  Negative for palpitations  Gastrointestinal: Negative  Negative for nausea and vomiting  Endocrine: Negative  Negative for cold intolerance and heat intolerance  Genitourinary: Negative  Negative for dysuria, frequency and urgency  Musculoskeletal: Negative  Negative for myalgias and neck pain  Skin: Negative  Negative for rash  Allergic/Immunologic: Negative  Neurological: Positive for headaches  Negative for dizziness, tremors, seizures, syncope, facial asymmetry, speech difficulty, weakness, light-headedness and numbness  Hematological: Negative  Does not bruise/bleed easily  Psychiatric/Behavioral: Negative  Negative for confusion, hallucinations and sleep disturbance

## 2020-01-06 NOTE — PATIENT INSTRUCTIONS
- baby aspirin every day 80 mg      Headache/migraine treatment:   Abortive medications (for immediate treatment of a headache): It is ok to take ibuprofen, acetaminophen or naproxen (Advil, Tylenol,  Aleve, Excedrin) if they help your headaches you should limit these to No more than 3 times a week to avoid medication overuse/rebound headaches       For nausea  Ondansetron 4 mg ok to take as needed for nausea      For your more moderate to severe migraines take this medication early:  Continue Maxalt (rizatriptan) 10mg tabs - take one at the onset of headache  May repeat one time after 1-2 hours if pain has not resolved  (Max 2 a day and 9 a month)        Over the counter preventive supplements for headaches/migraines   (to take every day to help prevent headaches - not to take at the time of headache):   - Mind ease can be found online and all of these   [x] Magnesium 500mg daily   [x] Riboflavin (Vitamin B2)  400mg daily   (FYI B2 may make your urine bright/neon yellow)     Prescription preventive medications for headaches/migraines   (to take every day to help prevent headaches - not to take at the time of headache):  [x] Could consider in the future if needed     Lifestyle Recommendations:  [x] SLEEP - Maintain a regular sleep schedule: Adults need at least 7-8 hours of uninterrupted a night  Maintain good sleep hygiene:  Going to bed and waking up at consistent times, avoiding excessive daytime naps, avoiding caffeinated beverages in the evening, avoid excessive stimulation in the evening and generally using bed primarily for sleeping   One hour before bedtime would recommend turning lights down lower, decreasing your activity (may read quietly, listen to music at a low volume)  When you get into bed, should eliminate all technology (no texting, emailing, playing with your phone, iPad or tablet in bed)    [x] HYDRATION - Maintain good hydration   Drink  2L of fluid a day (4 typical small water bottles)  [x] DIET - Maintain good nutrition  In particular don't skip meals and try and eat healthy balanced meals regularly  [x] TRIGGERS - Look for other triggers and avoid them: Limit caffeine to 1-2 cups a day or less  Avoid dietary triggers that you have noticed bring on your headaches (this could include aged cheese, peanuts, MSG, aspartame and nitrates)  [x] EXERCISE - physical exercise as we all know is good for you in many ways, and not only is good for your heart, but also is beneficial for your mental health, cognitive health and  chronic pain/headaches  I would encourage at the least 5 days of physical exercise weekly for at least 30 minutes       Education and Follow-up  [x] Please call with any questions or concerns     [x] Follow up 6 months, sooner as needed, or if not needed at 6 months, may push back to 12

## 2020-01-06 NOTE — PROGRESS NOTES
[address] [facility] Consult - Follow up   PATIENT:  Willem Copeland  MRN:  [id number]  :  1955  DATE OF SERVICE:  2020  REFERRED BY: Doreen Reagan MD  PMD: Jorge Anne MD    Assessment/Plan:   Indira Gomez a very pleasant 64 y o  female Past medical history includes migraines, GERD, hyperlipidemia, depression, arthritis, low blood pressure, bilateral carpal tunnel, abdominal pain, anemia, glaucoma, cataracts referred here for evaluation of headache  She previously followed with a neurologist in [place] and is transitioning care here  My initial evaluation 2019  Follow-up 2019, 2019, 20     Migraine with aura and without status migrainosus, not intractable  The patient reports headaches/migraines since her teenage years       She reports various locations of her migraines, but typically left-sided, quality varies   Typical associated migrainous features  - as of 2019: She reports migraines currently her about twice a month, but in January she had an increase in visual auras/possible retinal migraine that was new and concerning for her and occured daily for a week  - as of 2019:  Only 2 migraines in the past 3 months which is improvement, she has been keeping track of her visual symptoms and she was worried they have increased although it seems to me they are occurring less often - and there seems to be a component of anxiety triggering them in some cases  - as of 2019: No auras or migraines at least the past 2 months  Overall improved  Mood and anxiety improved with therapist  Taking mg, b2  Sleep not great due to frozen shoulder pain  Will consider melatonin  Also discussed considering venlafaxine low dose if needed for mood/headaches at next visit  - as of 20:  Headaches/migraines are less frequent, with weather change in Oct had 2 migraines, 3rd caught early with the rizatriptan   Aura only 2 times in the past 4-5 months  Still taking magnesium (although some nausea sometimes, other times forgets), B2      Work up:  - CTA head and neck with without contrast 03/13/2019: I have personally reviewed imaging and radiology read   1   No acute findings   Unremarkable CT appearance of the brain  2   Unremarkable CT angiogram of the head and neck  - follows closely with Ophthalmology every 4 months due to other eye issues including history of glaucoma and cataracts  - will also consider MRI Brain, patient anxious about it at this time and will readdress next visit, otherwise denies symptoms of stroke, asked her to try and get old MRI Brain from 10-15 years ago, but she doubts she would be able to      Preventive:  - discussed headache hygiene and lifestyle factors that could improve her headaches including mental Health Care, sleep quality and quantity, drastically increasing hydration - as of 05/22/2019 she has not done much of these  - headache preventative supplements:  Continue magnesium 400 mg which she started around January 2019,  continue riboflavin  - patient not interested in prescription preventative at this time  - future options: Rosa New has been on nortriptyline 10 mg in the past which she reports decreased frequency and severity of her headaches, but then she decided she wanted to get off of it and stopped 6 months ago, has had an increase in symptoms since   Does not want to go back on it at this time, but will consider in the future  Likely would consider venlafaxine  vs cyproheptadine      Abortive:  - previously patient was taking pain medication daily, however now no more than twice a week   We discussed medication overuse/rebound headache and recommended no more than 3 days per week   Used naproxen, acetaminophen  - she takes rizatriptan 10 mg to 3 times a month for migraines, has been on this for years prescribed by previous neurologist, discussed proper use, possible side effects and risks  - has ondansetron 4 mg for nausea     Current moderate episode of major depressive disorder without prior episode (HCC)  Anxiety disorder, unspecified type   Denies history of depression until about 9 months ago when her mother passed away   PHQ-9 depression screen score of 17 on 02/2019 suggestive of moderately severe depression   Patient denies suicidal ideation   Otherwise typical symptoms, fatigue, depressed mood, less motivation    -as of 9/24/2019 has established care with counselor and improved mood  - as of 1/6/20:  Seeing a therapist and it is helping a lot with multiple issues, mood much better  Not wearing sunglasses everywhere anymore  -Kathy Smith is not interested in medications at this time, but may consider venlafaxine if needed in the future       Vitamin D deficiency  -    03/08/2019 level was 44 2  - currently takes 5000 units daily  - PCP follow-up      Elevated B12  - likely secondary to repletion, recommended she stop replacing B12 since her level is fairly high for some reason on 3/19 was 2,557  - PCP follow-up   - as of 9/24/19, stopped taking it      Patient instructions        - continue baby aspirin every day 81 mg      Headache/migraine treatment:   Abortive medications (for immediate treatment of a headache): It is ok to take ibuprofen, acetaminophen or naproxen (Advil, Tylenol,  Aleve, Excedrin) if they help your headaches you should limit these to No more than 3 times a week to avoid medication overuse/rebound headaches       For nausea  Ondansetron 4 mg ok to take as needed for nausea      For your more moderate to severe migraines take this medication early:  Continue Maxalt (rizatriptan) 10mg tabs - take one at the onset of headache  May repeat one time after 1-2 hours if pain has not resolved     (Max 2 a day and 9 a month)        Over the counter preventive supplements for headaches/migraines   (to take every day to help prevent headaches - not to take at the time of headache):   - Mind ease can be found online and all of these   [x] Magnesium 500mg daily   [x] Riboflavin (Vitamin B2)  400mg daily   (FYI B2 may make your urine bright/neon yellow)     Prescription preventive medications for headaches/migraines   (to take every day to help prevent headaches - not to take at the time of headache):  [x] Could consider in the future if needed     Lifestyle Recommendations:  [x] SLEEP - Maintain a regular sleep schedule: Adults need at least 7-8 hours of uninterrupted a night  Maintain good sleep hygiene:  Going to bed and waking up at consistent times, avoiding excessive daytime naps, avoiding caffeinated beverages in the evening, avoid excessive stimulation in the evening and generally using bed primarily for sleeping   One hour before bedtime would recommend turning lights down lower, decreasing your activity (may read quietly, listen to music at a low volume)  When you get into bed, should eliminate all technology (no texting, emailing, playing with your phone, iPad or tablet in bed)  [x] HYDRATION - Maintain good hydration   Drink  2L of fluid a day (4 typical small water bottles)  [x] DIET - Maintain good nutrition  In particular don't skip meals and try and eat healthy balanced meals regularly  [x] TRIGGERS - Look for other triggers and avoid them: Limit caffeine to 1-2 cups a day or less  Avoid dietary triggers that you have noticed bring on your headaches (this could include aged cheese, peanuts, MSG, aspartame and nitrates)  [x] EXERCISE - physical exercise as we all know is good for you in many ways, and not only is good for your heart, but also is beneficial for your mental health, cognitive health and  chronic pain/headaches  I would encourage at the least 5 days of physical exercise weekly for at least 30 minutes       Education and Follow-up  [x] Please call with any questions or concerns  [x] Follow up 6 months, sooner as needed, or if not needed at 6 months, may push back to 12     CC:    We had the pleasure of evaluating Kaylan Young in neurological consultation today  Kaylan Young is a 64 y o    right handed female who presents today for evaluation of headaches       History of Present Illness:   Interval history as of 01/06/2020  She reports improvement, no concerns    Headaches/migraines are less frequent  - weather change in Oct had 2 migraines, 3rd caught early with the rizatriptan  - aura only 2 times in the past 4-5 months  - taking magnesium, B2    Sleep - still not great due to frozen shoulder pain (also may hurt all day long)  - following with orthopedics  - tried melatonin and did not help    Seeing a therapist and it is helping a lot with multiple issues, mood much better  Not wearing sunglasses everywhere anymore    Interval history as of 09/24/2019:  - 07/15/2019 patient called in requesting rizatriptan that was previously prescribed by her past neurologist and works well for her migraines  [id number] [address] since history no longer sounds like retinal migraine - both eyes   - following with orthopedics for frozen shoulder, doing therapies  - sometimes the therapies with PT was causing cervicogenic headaches   - had some floaters a few weeks ago and saw eye doctor who confirmed floaters and nothing to worry about      - worried about this time of year because may get sinus headaches that turn into migraines      No auras or migraines at least the past 2 months  Saw counselor and helping mood - her therapist suggested considering low dose mood medications   discussed with patient and may consider in the future   - wearing glasses less, realizing that she may have been hiding behind them      Going to start yoga class soon       - Supplements - taking magnesium and B2      Interval history as of 05/22/2019:  - CTA head and neck 03/13/2019 was unremarkable except for incidental thyroid nodule with follow-up ultrasound by primary care  - has followed up with Pain Medicine and weight management  - 5/8/19 - dilated eye exam was unremarkable except for dry eyes   - our  provider a list of therapists, mood improved since wheather improved     - Rizatriptan - continues to help  - Supplements - taking magnesium and B2   - Prescription medication - not interested although         Headaches started at what age? [age]  How often do the headaches occur?   - as of 3/2019: 2/month  - as of 5/22/19: 2 migraines in past 3 ,   - Acephalgic Migraine with aura more often than migraine - vs nonspecific visual symptoms due to other reason 4/14, 21, 23, 25, and 5/8, 21 - she reports these seem to be even triggered by stress/anxiety  - as of 09/24/2019:  No auras or migraines at least the past 2 months  - as of 1/6/20:  Headaches/migraines are less frequent, with weather change in Oct had 2 migraines, 3rd caught early with the rizatriptan  Aura only 2 times in the past 4-5 months  Still taking magnesium (although some nausea sometimes, other times forgets), B2  What time of the day do the headaches start? Can wake up with them or anytime during the day   How long do the headaches last? 3-4 hours because she takes medicine, up to all day   Are you ever headache free? Yes     Aura? with aura  Acephalgic Migraine with aura more often than migraine     Jan 2019:   For a week straight 1-2 times a day had increase in visual aura/retinal migraine  Zigzags, decreased peripheral vision, blurry without strong migraine, milder headache - for 2-3 hours   - seems to be in both eyes, not like a curtain coming down (both eyes - not retinal migraine)  - gradually faded out and this month she is fine, started mag a few weeks ago   - would take medicine and go away  *last saw an eye doctor 2 months ago, goes every 4 months for glaucoma, cataracts, dry eye       Prior to that once in a while that would happen      Describe your usual headache pain quality? Dull all day sometimes, sharp and pulsating, shooting   Where is your headache located?   When full blown more on left side, frontal to parietal and then to the back  Lately zig zag around head  Numbness change in sensation to bioccipital region   Sinus pressure     What is the intensity of pain? 4-5/10, up to higher   Associated symptoms:   [x] Nausea       [x] Vomiting        [] Diarrhea  [x] Insomnia    [] Stiff or sore neck   [x] Problems with concentration  [x] Photophobia     [x]Phonophobia      [] Osmophobia  [x] Blurred vision   [x] Prefer quiet, dark room  [] Light-headed or dizzy     [] Tinnitus   [] Hands or feet tingle or feel numb/paresthesias       [] Red ear      [] Ptosis      [] Facial droop  [] Lacrimation  [] Nasal congestion/rhinorrhea   [] Flushing of face  [] Change in pupil size      Things that make the headache worse? Bending down, any movement     Headache triggers:  Pressure when it rains/dr and cold  What time of the year do headaches occur more frequently?   More in Oct/Nov     Have you seen someone else for headaches or pain? Neurology   Have you had trigger point injection performed and how often? No  Have you had Botox injection performed and how often? No   Have you had epidural injections or transforaminal injections performed? Yes for lower back slipped disc   Have you used CBD or THC for your headaches and how often? No  Are you current pregnant or planning on getting pregnant? No  Have you ever had any Brain imaging? MRI Brain - maybe over 15-20 years ago      What medications do you take or have you taken for your headaches?    ABORTIVE:    2-3 times a week some sort of pain medication lately, previously was taking daily      Acetaminophen - a few days a week for back pain  Ibuprofen 800 mg - not as much now, takes naproxen instead  Rizatriptan 10 mg   Naproxen 500 mg - takes once a week, used to take every day      Diclofenac gel - for back   Lidocaine 5% gel - for back      Past for headache and back pain   Cyclobenzaprine 5 mg  - not taking (for back)  Tramadol 50 mg   - not taking (for back)  Meloxicam 15 mg - not taking   Ondansetron - does not take it      PREVENTIVE:   Magnesium 400 mg  Riboflavin 400 mg      Past:  Nortriptyline 10 mg - for 2 years, and did well as far as headaches not being as bad, then wanted to get off it, came off 6 months ago, increase in symptoms     Alternative therapies used in the past for headaches? no other headache interventions have been tried     LIFESTYLE  Sleep - averages 6-7 hours   - takes naps      Physical activity:  tries to get her to the gym twice a week   Water: a bottle a week   Mood:   - lately feels tired mentally tired  and depressed   - do not want to get up   - never has talked to a counselor - she used to do mentoring herself, never had depression in the past   - thinks going on for past 6 months, started after mom passed away   - PHQ-9 depression screen 02/27/2019:  Score of 17, denies suicidal ideation        The following portions of the patient's history were reviewed and updated as appropriate: allergies, current medications, past family history, past medical history, past social history, past surgical history and problem list         Pertinent family history:    Family history of headaches:  no known family members with significant headaches  Any family history of aneurysms - No     Pertinent social history:  Work:  Retired  Education: some college  Lives with   One trial     Illicit Drugs: denies  Alcohol/tobacco: Denies tobacco use, alcohol intake: social drinker      Past Medical History:     Past Medical History:   Diagnosis Date    Abdominal discomfort     Anemia     Arthritis     Bronchitis     Chronic pain disorder     Dyslipidemia     Last Assessed 07/09/2012    GERD (gastroesophageal reflux disease)     Headache, migraine     Hypotension     Low back pain     Low back pain     [facility] ([facility] [address] )     Thyroid nodule 4/9/2019       Patient Active Problem List Diagnosis    Migraine headache    Chronic bilateral low back pain without sciatica    Esophageal reflux    Arthritis    Duodenum disorder    Hyperlipidemia    Depression    Hyperglycemia    Abnormal MRI of abdomen    Vitamin D deficiency    Anxiety disorder    Retinal migraine    Thyroid nodule    Class 1 obesity    Impaired fasting glucose    Chronic left shoulder pain    Lumbar spondylosis    Adhesive capsulitis of left shoulder       Medications:      Current Outpatient Medications   Medication Sig Dispense Refill    acetaminophen (TYLENOL) 325 mg tablet Take 2 tablets (650 mg total) by mouth every 6 (six) hours as needed for mild pain or fever 30 tablet 0    Cholecalciferol (VITAMIN D3) 5000 units CAPS Take by mouth      cyclobenzaprine (FLEXERIL) 10 mg tablet Take 1 tablet (10 mg total) by mouth 3 (three) times a day 20 tablet 0    diclofenac sodium (VOLTAREN) 1 % Apply topically every 6 (six) hours      docusate sodium (COLACE) 100 mg capsule Take 1 capsule (100 mg total) by mouth daily 30 capsule 5    ibuprofen (MOTRIN) 800 mg tablet every 8 (eight) hours as needed       LORazepam (ATIVAN) 1 mg tablet Take 1 tablet 1 hour before procedure  May repeat x1 if needed  2 tablet 0    Magnesium 400 MG CAPS Take 400 mg by mouth daily      naproxen (NAPROSYN) 500 mg tablet Take one tab up to twice a day, no more than 3 times a week 20 tablet 3    omega-3-acid ethyl esters (LOVAZA) 1 g capsule Take 2 g by mouth daily       ondansetron (ZOFRAN) 4 mg tablet Take 1 tablet (4 mg total) by mouth every 6 (six) hours 12 tablet 0    pantoprazole (PROTONIX) 40 mg tablet TAKE ONE TABLET BY MOUTH ONCE DAILY 30 tablet 11    polyethylene glycol (MIRALAX) 17 g packet Take 17 g by mouth daily 30 each 5    pyridoxine (B-6) 500 MG tablet Take 400 mg by mouth daily      rizatriptan (MAXALT) 10 MG tablet Take 1 tablet (10 mg total) by mouth once as needed for migraine May repeat in 2 hours if needed  Max 2/24 hours, 9/month  9 tablet 3     No current facility-administered medications for this visit  Allergies:       Allergies   Allergen Reactions    Erythromycin Rash       Family History:     Family History   Problem Relation Age of Onset    Hypertension Mother     Other Mother         Dyslipidemia    Diabetes Father         Mellitus    No Known Problems Brother     No Known Problems Brother     Cancer Neg Hx     Heart disease Neg Hx     Stroke Neg Hx        Social History:     Social History     Socioeconomic History    Marital status: /Civil Union     Spouse name: Not on file    Number of children: Not on file    Years of education: Not on file    Highest education level: Not on file   Occupational History    Occupation: retired   Social Needs    Financial resource strain: Not on file    Food insecurity:     Worry: Not on file     Inability: Not on file   [employer] needs:     Medical: Not on file     Non-medical: Not on file   Tobacco Use    Smoking status: Former Smoker     Last attempt to quit: 1985     Years since quittin 0    Smokeless tobacco: Never Used   Substance and Sexual Activity    Alcohol use: Yes     Comment: occasional  Maybe 5x per year    Drug use: No    Sexual activity: Yes     Partners: Male     Birth control/protection: Post-menopausal   Lifestyle    Physical activity:     Days per week: Not on file     Minutes per session: Not on file    Stress: Not on file   Relationships    Social connections:     Talks on phone: Not on file     Gets together: Not on file     Attends Temple service: Not on file     Active member of club or organization: Not on file     Attends meetings of clubs or organizations: Not on file     Relationship status: Not on file    Intimate partner violence:     Fear of current or ex partner: Not on file     Emotionally abused: Not on file     Physically abused: Not on file     Forced sexual activity: Not on file Other Topics Concern    Not on file   Social History Narrative    Use Safety Equipment - Seatbelts        Lives with spouse         Objective:     Physical Exam:                                                                 Vitals:            Constitutional:    /57 (BP Location: Right arm, Patient Position: Sitting, Cuff Size: Adult)   Pulse 82   Resp 16   Ht 5' 1" (1 549 m)   Wt 79 7 kg (175 lb 12 8 oz)   BMI 33 22 kg/m²   BP Readings from Last 3 Encounters:   01/06/20 119/57   12/13/19 109/74   12/11/19 124/82     Pulse Readings from Last 3 Encounters:   01/06/20 82   12/13/19 80   12/11/19 75         Well developed, well nourished, well groomed  No dysmorphic features  HEENT:  Normocephalic atraumatic  No meningismus  Oropharynx is clear and moist  No oral mucosal lesions  Chest:  Respirations regular and unlabored  Cardiovascular:  Regular rate, intact distal pulses  Distal extremities warm without palpable edema or tenderness, no observed significant swelling  Musculoskeletal:  Full range of motion  (see below under neurologic exam for evaluation of motor function and gait)   Skin:  warm and dry, not diaphoretic  No apparent birthmarks or stigmata of neurocutaneous disease  Psychiatric:  Normal behavior and appropriate affect        Neurological Examination:     Mental status/cognitive function:   Orientated to time, place and person  Recent and remote memory intact  Attention span and concentration as well as fund of knowledge are appropriate for age  Normal language and spontaneous speech  Cranial Nerves:  III, IV, VI-Pupils were equal, round, and reactive to light  Extraocular movements were full and conjugate without nystagmus  conjugate gaze, normal smooth pursuits, normal saccades   VII-facial expression symmetric, intact forehead wrinkle, strong eye closure, symmetric smile    VIII-hearing grossly intact bilaterally   Motor Exam: symmetric bulk throughout   no atrophy, fasciculations or abnormal movements noted  Coordination:  no apparent dysmetria, ataxia or tremor noted  Gait: steady casual gait     Pertinent lab results:   04/09/2019:  A1c 5 7     03/08/2019:  BMP and CBC unremarkable  Vitamin-D 44 2  B12 2, 557  TSH 2 02     12/11/18: CMP unremarkable   11/29/18: CBC significant for hgb 10 8     Imaging:   CTA head and neck with without contrast 03/13/2019: I have personally reviewed imaging and radiology read   1   No acute findings   Unremarkable CT appearance of the brain  2   Unremarkable CT angiogram of the head and neck  Review of Systems:   ROS obtained by medical assistant Personally reviewed and updated if indicated  Review of Systems   Constitutional: Positive for appetite change (Eating more)  Negative for fever  HENT: Negative  Negative for hearing loss, tinnitus, trouble swallowing and voice change  Eyes: Negative  Negative for photophobia and pain  Respiratory: Negative  Negative for shortness of breath  Cardiovascular: Negative  Negative for palpitations  Gastrointestinal: Negative  Negative for nausea and vomiting  Endocrine: Negative  Negative for cold intolerance and heat intolerance  Genitourinary: Negative  Negative for dysuria, frequency and urgency  Musculoskeletal: Negative  Negative for myalgias and neck pain  Skin: Negative  Negative for rash  Allergic/Immunologic: Negative  Neurological: Positive for headaches  Negative for dizziness, tremors, seizures, syncope, facial asymmetry, speech difficulty, weakness, light-headedness and numbness  Hematological: Negative  Does not bruise/bleed easily  Psychiatric/Behavioral: Negative  Negative for confusion, hallucinations and sleep disturbance       I have spent 25 minutes with Patient  today in which greater than 50% of this time was spent in counseling/coordination of care regarding Risks and benefits of tx options, Intructions for management, Patient education, Importance of tx compliance, Impressions        Author:  Mikala Pepe MD 1/6/2020 9:19 AM

## 2020-01-08 ENCOUNTER — TELEPHONE (OUTPATIENT)
Dept: OBGYN CLINIC | Facility: MEDICAL CENTER | Age: 65
End: 2020-01-08

## 2020-01-08 NOTE — TELEPHONE ENCOUNTER
Bert Rob   #: [phone]         Patient is calling in returning phone call   Please advise, thank you

## 2020-01-08 NOTE — TELEPHONE ENCOUNTER
Spoke with the patient and reviewed the MRI study she will keep her appointment for the end of the month

## 2020-01-28 ENCOUNTER — APPOINTMENT (OUTPATIENT)
Dept: RADIOLOGY | Facility: MEDICAL CENTER | Age: 65
End: 2020-01-28
Payer: MEDICARE

## 2020-01-28 ENCOUNTER — OFFICE VISIT (OUTPATIENT)
Dept: OBGYN CLINIC | Facility: MEDICAL CENTER | Age: 65
End: 2020-01-28
Payer: MEDICARE

## 2020-01-28 VITALS
BODY MASS INDEX: 33.04 KG/M2 | WEIGHT: 175 LBS | HEART RATE: 72 BPM | SYSTOLIC BLOOD PRESSURE: 111 MMHG | HEIGHT: 61 IN | DIASTOLIC BLOOD PRESSURE: 74 MMHG

## 2020-01-28 DIAGNOSIS — M75.02 ADHESIVE CAPSULITIS OF LEFT SHOULDER: Primary | ICD-10-CM

## 2020-01-28 DIAGNOSIS — M54.2 NECK PAIN: ICD-10-CM

## 2020-01-28 PROCEDURE — 72040 X-RAY EXAM NECK SPINE 2-3 VW: CPT

## 2020-01-28 PROCEDURE — 99213 OFFICE O/P EST LOW 20 MIN: CPT | Performed by: ORTHOPAEDIC SURGERY

## 2020-01-28 NOTE — PROGRESS NOTES
Orthopaedic Surgery - Office Note  Farooq Lopez (71 y o  female)   : 1955   MRN: [id number]  Encounter Date: 2020    Chief Complaint   Patient presents with    Left Shoulder - Follow-up       Assessment / Plan  Left shoulder, adhesive capsulitis     · Continue with activity as tolerated   · Continue with self-directed home stretching program  · Continue using heat and Tylenol to decrease pain  · Referral to Dr Virgen Sevilla for cervical spine pain, follow up with him after completion of cervical spine physical therapy  Return in about 3 months (around 2020)  History of Present Illness  Farooq Lopez is a 71 y o  female who presents for an MRI review of left shoulder pain  At today's appointment she reports that she is experiencing severe constant pain especially at night  She describes her pain as throbbing  She does use Tylenol and heating pad which to provide her with mild pain relief  She has been suffering left shoulder pain since 2019  She reports she is still compliant with her home exercise program and stretching program with little improvement  At today's appointment she does report that for approximately 1-2 months she has been experiencing neck pain with a nerve pain that goes down her arm into her wrist   She reports is also the pain that she is experiencing in her left shoulder  She denies any further injury or trauma to her left shoulder  Review of Systems  Pertinent items are noted in HPI  All other systems were reviewed and are negative  Physical Exam  /74   Pulse 72   Ht 5' 1" (1 549 m)   Wt 79 4 kg (175 lb)   BMI 33 07 kg/m²   Cons: Appears well  No apparent distress  Psych: Alert  Oriented x3  Mood and affect normal   Eyes: PERRLA, EOMI  Resp: Normal effort  No audible wheezing or stridor  CV: Palpable pulse  No discernable arrhythmia  No LE edema  Lymph:  No palpable cervical, axillary, or inguinal lymphadenopathy  Skin: Warm    No palpable masses  No visible lesions  Neuro: Normal muscle tone  Normal and symmetric DTR's  Left Shoulder Exam  Alignment / Posture:  Normal shoulder posture  Inspection:  No swelling  No erythema  No ecchymosis  Palpation:  glenohumeral  tenderness  ROM:  Shoulder FE 30  Shoulder   Strength:  Supraspinatus 4+/5  Infraspinatus 4+/5  Stability:  No objective shoulder instability  Tests: (+) [place]  Neurovascular:  Sensation intact in Ax/R/M/U nerve distributions  2+ radial pulse  Studies Reviewed  XR of bilateral shoulder and cervical spine - Obtained on 2020 demonstrated decreased disc space between C5-6 and C6-7    Procedures  No procedures today  Medical, Surgical, Family, and Social History  The patient's medical history, family history, and social history, were reviewed and updated as appropriate  Past Medical History:   Diagnosis Date    Abdominal discomfort     Anemia     Arthritis     Bronchitis     Chronic pain disorder     Dyslipidemia     Last Assessed 2012    GERD (gastroesophageal reflux disease)     Headache, migraine     Hypotension     Low back pain     Low back pain     Rheumatoid arthritis ([address] )     Thyroid nodule 2019       Past Surgical History:   Procedure Laterality Date    CATARACT EXTRACTION       SECTION      ESOPHAGOGASTRODUODENOSCOPY N/A 2018    Procedure: ESOPHAGOGASTRODUODENOSCOPY (EGD) with push enteroscopy and bx;  Surgeon: Jake Guidry DO;  Location: [facility]; Service: Gastroenterology    EYE SURGERY      NERVE SURGERY Right     Hand    [place] EDG US EXAM SURGICAL ALTER STOM DUODENUM/JEJUNUM N/A 3/6/2019    Procedure: LINEAR ENDOSCOPIC U/S;  Surgeon: Shelley Álvarez MD;  Location: [facility];   Service: Gastroenterology    UPPER GASTROINTESTINAL ENDOSCOPY         Family History   Problem Relation Age of Onset    Hypertension Mother     Other Mother         Dyslipidemia    Diabetes Father Mellitus    No Known Problems Brother     No Known Problems Brother     Cancer Neg Hx     Heart disease Neg Hx     Stroke Neg Hx        Social History     Occupational History    Occupation: retired   Tobacco Use    Smoking status: Former Smoker     Last attempt to quit: 1985     Years since quittin 0    Smokeless tobacco: Never Used   Substance and Sexual Activity    Alcohol use: Yes     Comment: occasional  Maybe 5x per year    Drug use: No    Sexual activity: Yes     Partners: Male     Birth control/protection: Post-menopausal       Allergies   Allergen Reactions    Erythromycin Rash         Current Outpatient Medications:     acetaminophen (TYLENOL) 325 mg tablet, Take 2 tablets (650 mg total) by mouth every 6 (six) hours as needed for mild pain or fever, Disp: 30 tablet, Rfl: 0    Cholecalciferol (VITAMIN D3) 5000 units CAPS, Take by mouth, Disp: , Rfl:     diclofenac sodium (VOLTAREN) 1 %, Apply topically every 6 (six) hours, Disp: , Rfl:     docusate sodium (COLACE) 100 mg capsule, Take 1 capsule (100 mg total) by mouth daily, Disp: 30 capsule, Rfl: 5    ibuprofen (MOTRIN) 800 mg tablet, every 8 (eight) hours as needed , Disp: , Rfl:     Magnesium 400 MG CAPS, Take 400 mg by mouth daily, Disp: , Rfl:     naproxen (NAPROSYN) 500 mg tablet, Take one tab up to twice a day, no more than 3 times a week, Disp: 20 tablet, Rfl: 3    omega-3-acid ethyl esters (LOVAZA) 1 g capsule, Take 2 g by mouth daily , Disp: , Rfl:     ondansetron (ZOFRAN) 4 mg tablet, Take 1 tablet (4 mg total) by mouth every 6 (six) hours, Disp: 12 tablet, Rfl: 0    pantoprazole (PROTONIX) 40 mg tablet, TAKE ONE TABLET BY MOUTH ONCE DAILY, Disp: 30 tablet, Rfl: 11    polyethylene glycol (MIRALAX) 17 g packet, Take 17 g by mouth daily, Disp: 30 each, Rfl: 5    pyridoxine (B-6) 500 MG tablet, Take 400 mg by mouth daily, Disp: , Rfl:     rizatriptan (MAXALT) 10 MG tablet, Take 1 tablet (10 mg total) by mouth once as needed for migraine May repeat in 2 hours if needed   Max 2/24 hours, 9/month , Disp: 9 tablet, Rfl: 6    cyclobenzaprine (FLEXERIL) 10 mg tablet, Take 1 tablet (10 mg total) by mouth 3 (three) times a day (Patient not taking: Reported on 1/28/2020), Disp: 20 tablet, Rfl: 0      Micaela Watts    Scribe Attestation    I,:   Deann Suarez am acting as a scribe while in the presence of the attending physician :        I,:   Lawanda Lucas MD personally performed the services described in this documentation    as scribed in my presence :

## 2020-01-31 ENCOUNTER — EVALUATION (OUTPATIENT)
Dept: PHYSICAL THERAPY | Facility: MEDICAL CENTER | Age: 65
End: 2020-01-31
Attending: FAMILY MEDICINE
Payer: MEDICARE

## 2020-01-31 DIAGNOSIS — M54.2 NECK PAIN: ICD-10-CM

## 2020-01-31 PROCEDURE — 97161 PT EVAL LOW COMPLEX 20 MIN: CPT | Performed by: PHYSICAL THERAPIST

## 2020-01-31 NOTE — PROGRESS NOTES
PT Evaluation     Today's date: 2020  Patient name: Frederick Morales  : 1955  MRN: [id number]  Referring provider: Dutch Mac MD  Dx:   Encounter Diagnosis     ICD-10-CM    1  Neck pain M54 2 Ambulatory referral to Physical Therapy                  Assessment  Assessment details: Frederick Morales is a 71 y o  female was evaluated on 2020  for Neck pain  Frederick Morales has the above listed impairments resulting in functional deficits and negative impact to quality of life  Patient is appropriate for skilled PT intervention to promote maximal return to function and patient specific goals  Patient agrees with outlined treatment plan and all questions were answered to their satisfaction  Impairments: abnormal muscle firing, abnormal muscle tone, abnormal or restricted ROM, impaired physical strength, lacks appropriate home exercise program and pain with function  Understanding of Dx/Px/POC: good   Prognosis: good    Goals  Patient will successfully transition to home exercise program   Patient will be able to manage symptoms independently  Patient to sleep without pain  Patient to reach overhead without pain       Plan  Patient would benefit from: skilled PT  Referral necessary: No  Planned modality interventions: thermotherapy: hydrocollator packs  Planned therapy interventions: home exercise program, manual therapy, neuromuscular re-education, patient education, functional ROM exercises, strengthening, stretching, joint mobilization, graded activity, graded exercise, therapeutic exercise, body mechanics training, motor coordination training and activity modification  Frequency: 2x week  Duration in weeks: 12  Treatment plan discussed with: patient        Subjective Evaluation    History of Present Illness  Mechanism of injury: Frederick Morales is a 71 y  o female presenting to therapy with complaints of neck and shoulder pain    She reports that she had injections in the shoulder for adhesive capsulitis with only minor improvement  She was referred to therapy for her neck pain and reports of pain throughout UE to wrist    She admits she has only been stretching for 5 minutes most per day  Denies any numbness or tingling  Hoping to get relief of pain to improve sleep quality and daily function   Pain  Current pain ratin  At best pain rating: 3  At worst pain ratin  Quality: dull ache          Objective   Red flag screen (-)  Cervical screen: WNL with exception of moderate restriction in all directions  ULTT (-)  Shoulder motion 50% limited in IR and ER physiologically   Significant tenderness to scapular border and UT   Poor scapular mechanics and control   Spurlings (-), Compression (-)           Precautions: None      Manual                                                                                   Exercise Diary                                                                                                                                                                                                                                                                                      Modalities

## 2020-02-03 ENCOUNTER — OFFICE VISIT (OUTPATIENT)
Dept: PHYSICAL THERAPY | Facility: MEDICAL CENTER | Age: 65
End: 2020-02-03
Attending: FAMILY MEDICINE
Payer: MEDICARE

## 2020-02-03 DIAGNOSIS — M54.2 NECK PAIN: Primary | ICD-10-CM

## 2020-02-03 PROCEDURE — 97140 MANUAL THERAPY 1/> REGIONS: CPT | Performed by: PHYSICAL THERAPIST

## 2020-02-03 PROCEDURE — 97110 THERAPEUTIC EXERCISES: CPT | Performed by: PHYSICAL THERAPIST

## 2020-02-03 NOTE — PROGRESS NOTES
Daily Note     fna  Today's date: 2/3/2020  Patient name: Dionicio Acevedo  : 1955  MRN: [id number]  Referring provider: Trev Reed MD  Dx:   Encounter Diagnosis     ICD-10-CM    1  Neck pain M54 2                   Subjective: [place] reports that she is doing well with no new complaints       Objective: See treatment diary below      Assessment: Tolerated treatment well  Patient receives reduction in pain with MFR to scapular and cervical region  Plan: Continue per plan of care        Precautions: None      Manual  2/3            MFR to scalene/Scap AF                                                                    Exercise Diary              Supine cervical rotation 20            UT stretch 10 sec  X5              Scapular retraction 30                                                                                                                                                                                                                                             Modalities

## 2020-02-10 ENCOUNTER — OFFICE VISIT (OUTPATIENT)
Dept: PHYSICAL THERAPY | Facility: MEDICAL CENTER | Age: 65
End: 2020-02-10
Attending: FAMILY MEDICINE
Payer: MEDICARE

## 2020-02-10 DIAGNOSIS — M54.2 NECK PAIN: Primary | ICD-10-CM

## 2020-02-10 PROCEDURE — 97110 THERAPEUTIC EXERCISES: CPT | Performed by: PHYSICAL THERAPIST

## 2020-02-10 PROCEDURE — 97140 MANUAL THERAPY 1/> REGIONS: CPT | Performed by: PHYSICAL THERAPIST

## 2020-02-10 NOTE — PROGRESS NOTES
Daily Note     Today's date: 2/10/2020  Patient name: Farooq Lopez  : 1955  MRN: [id number]  Referring provider: Myra Rojas MD  Dx:   Encounter Diagnosis     ICD-10-CM    1  Neck pain M54 2                   Subjective: [place] reports that she is feeling better with regard to her neck and has more motion  Has been successful in increased stretching time at home  Objective: See treatment diary below      Assessment: Tolerated treatment well  Patient exhibited good technique with therapeutic exercises and would benefit from continued PT for cervical and neural mobility  Plan: Continue per plan of care        Precautions: None      Manual  2/3 2/10           MFR to scalene/Scap AF Af                                                                   Exercise Diary              Supine cervical rotation 20 20           UT stretch 10 sec  X5   10 sec  X5           Scapular retraction 30 30           Seated scapular reach with retraction  3X10           Supine median nerve glie  20                                                                                                                                                                                                                  Modalities

## 2020-02-12 ENCOUNTER — HOSPITAL ENCOUNTER (OUTPATIENT)
Dept: RADIOLOGY | Facility: MEDICAL CENTER | Age: 65
Discharge: HOME | End: 2020-02-12
Attending: PHYSICAL MEDICINE & REHABILITATION | Admitting: PHYSICAL MEDICINE & REHABILITATION
Payer: MEDICARE

## 2020-02-12 VITALS
HEART RATE: 81 BPM | TEMPERATURE: 98 F | DIASTOLIC BLOOD PRESSURE: 83 MMHG | SYSTOLIC BLOOD PRESSURE: 123 MMHG | RESPIRATION RATE: 20 BRPM | OXYGEN SATURATION: 98 %

## 2020-02-12 DIAGNOSIS — M47.816 LUMBAR SPONDYLOSIS: ICD-10-CM

## 2020-02-12 PROCEDURE — 64635 DESTROY LUMB/SAC FACET JNT: CPT | Performed by: PHYSICAL MEDICINE & REHABILITATION

## 2020-02-12 PROCEDURE — 64636 DESTROY L/S FACET JNT ADDL: CPT | Performed by: PHYSICAL MEDICINE & REHABILITATION

## 2020-02-12 RX ORDER — LIDOCAINE HYDROCHLORIDE 10 MG/ML
5 INJECTION, SOLUTION EPIDURAL; INFILTRATION; INTRACAUDAL; PERINEURAL ONCE
Status: COMPLETED | OUTPATIENT
Start: 2020-02-12 | End: 2020-02-12

## 2020-02-12 RX ORDER — BUPIVACAINE HCL/PF 2.5 MG/ML
10 VIAL (ML) INJECTION ONCE
Status: COMPLETED | OUTPATIENT
Start: 2020-02-12 | End: 2020-02-12

## 2020-02-12 RX ADMIN — Medication 4 ML: at 14:05

## 2020-02-12 RX ADMIN — LIDOCAINE HYDROCHLORIDE 3.5 ML: 10 INJECTION, SOLUTION EPIDURAL; INFILTRATION; INTRACAUDAL; PERINEURAL at 14:00

## 2020-02-12 RX ADMIN — Medication 5 ML: at 14:13

## 2020-02-12 NOTE — DISCHARGE INSTRUCTIONS

## 2020-02-12 NOTE — H&P
History of Present Illness: The patient is a 71 y o  female who presents with complaints of left low back pain    Patient Active Problem List   Diagnosis    Migraine headache    Chronic bilateral low back pain without sciatica    Esophageal reflux    Arthritis    Duodenum disorder    Hyperlipidemia    Depression    Hyperglycemia    Abnormal MRI of abdomen    Vitamin D deficiency    Anxiety disorder    Retinal migraine    Thyroid nodule    Class 1 obesity    Impaired fasting glucose    Chronic left shoulder pain    Lumbar spondylosis    Adhesive capsulitis of left shoulder       Past Medical History:   Diagnosis Date    Abdominal discomfort     Anemia     Arthritis     Bronchitis     Chronic pain disorder     Dyslipidemia     Last Assessed 2012    GERD (gastroesophageal reflux disease)     Headache, migraine     Hypotension     Low back pain     Low back pain     Rheumatoid arthritis ([address] )     Thyroid nodule 2019       Past Surgical History:   Procedure Laterality Date    CATARACT EXTRACTION       SECTION      ESOPHAGOGASTRODUODENOSCOPY N/A 2018    Procedure: ESOPHAGOGASTRODUODENOSCOPY (EGD) with push enteroscopy and bx;  Surgeon: Bruna Shen DO;  Location: [facility]; Service: Gastroenterology    EYE SURGERY      NERVE SURGERY Right     Hand    [place] EDG US EXAM SURGICAL ALTER STOM DUODENUM/JEJUNUM N/A 3/6/2019    Procedure: LINEAR ENDOSCOPIC U/S;  Surgeon: Basil Cosme MD;  [facility];   Service: Gastroenterology    UPPER GASTROINTESTINAL ENDOSCOPY           Current Outpatient Medications:     acetaminophen (TYLENOL) 325 mg tablet, Take 2 tablets (650 mg total) by mouth every 6 (six) hours as needed for mild pain or fever, Disp: 30 tablet, Rfl: 0    Cholecalciferol (VITAMIN D3) 5000 units CAPS, Take by mouth, Disp: , Rfl:     cyclobenzaprine (FLEXERIL) 10 mg tablet, Take 1 tablet (10 mg total) by mouth 3 (three) times a day (Patient not taking: Reported on 1/28/2020), Disp: 20 tablet, Rfl: 0    diclofenac sodium (VOLTAREN) 1 %, Apply topically every 6 (six) hours, Disp: , Rfl:     docusate sodium (COLACE) 100 mg capsule, Take 1 capsule (100 mg total) by mouth daily, Disp: 30 capsule, Rfl: 5    ibuprofen (MOTRIN) 800 mg tablet, every 8 (eight) hours as needed , Disp: , Rfl:     Magnesium 400 MG CAPS, Take 400 mg by mouth daily, Disp: , Rfl:     naproxen (NAPROSYN) 500 mg tablet, Take one tab up to twice a day, no more than 3 times a week, Disp: 20 tablet, Rfl: 3    omega-3-acid ethyl esters (LOVAZA) 1 g capsule, Take 2 g by mouth daily , Disp: , Rfl:     ondansetron (ZOFRAN) 4 mg tablet, Take 1 tablet (4 mg total) by mouth every 6 (six) hours, Disp: 12 tablet, Rfl: 0    pantoprazole (PROTONIX) 40 mg tablet, TAKE ONE TABLET BY MOUTH ONCE DAILY, Disp: 30 tablet, Rfl: 11    polyethylene glycol (MIRALAX) 17 g packet, Take 17 g by mouth daily, Disp: 30 each, Rfl: 5    pyridoxine (B-6) 500 MG tablet, Take 400 mg by mouth daily, Disp: , Rfl:     rizatriptan (MAXALT) 10 MG tablet, Take 1 tablet (10 mg total) by mouth once as needed for migraine May repeat in 2 hours if needed   Max 2/24 hours, 9/month , Disp: 9 tablet, Rfl: 6    Current Facility-Administered Medications:     bupivacaine (PF) (MARCAINE) 0 25 % injection 10 mL, 10 mL, Perineural, Once, Omar Pretty, DO    lidocaine (PF) (XYLOCAINE-MPF) 1 % injection 5 mL, 5 mL, Infiltration, Once, Omar Pretty, DO    lidocaine (PF) (XYLOCAINE-MPF) 2 % injection 4 mL, 4 mL, Perineural, Once, Omar Pretty, DO    Allergies   Allergen Reactions    Erythromycin Rash       Physical Exam:   Vitals:    02/12/20 1325   BP: 114/81   Pulse: 77   Resp: 20   Temp: 98 °F (36 7 °C)   SpO2: 95%     General: Awake, Alert, Oriented x 3, Mood and affect appropriate  Respiratory: Respirations even and unlabored  Cardiovascular: Peripheral pulses intact; no edema  Musculoskeletal Exam:  Left low back pain    ASA Score:  2  Patient/Chart Verification  Patient ID Verified: Verbal  ID Band Applied: No  Consents Confirmed: Procedural  H&P( within 30 days) Verified: To be obtained in the Pre-Procedure area  Interval H&P(within 24 hr) Complete (required for Outpatients and Surgery Admit only): To be obtained in the Pre-Procedure area  Allergies Reviewed: Yes  Anticoag/NSAID held?: No(takes aspirin)  Currently on antibiotics?: No    Assessment:   1   Lumbar spondylosis        Plan:LT L3-5 RFA

## 2020-02-13 ENCOUNTER — TELEPHONE (OUTPATIENT)
Dept: RADIOLOGY | Facility: MEDICAL CENTER | Age: 65
End: 2020-02-13

## 2020-02-13 NOTE — TELEPHONE ENCOUNTER
S/W pt  Pt stated needle sites look good, denies S&S of infection, denies fevers and denies sun burn like sensation  Pt stated she had pain yesterday and today she feels good  Advised pt if she does get pain to take her prescribed or OTC pain medications and/or use ice/heat and that it takes 4 to 6 weeks to see the full effect  Confirmed next appt w/ pt  Pt verbalized understanding

## 2020-02-17 ENCOUNTER — OFFICE VISIT (OUTPATIENT)
Dept: PHYSICAL THERAPY | Facility: MEDICAL CENTER | Age: 65
End: 2020-02-17
Attending: FAMILY MEDICINE
Payer: MEDICARE

## 2020-02-17 DIAGNOSIS — M54.2 NECK PAIN: Primary | ICD-10-CM

## 2020-02-17 PROCEDURE — 97140 MANUAL THERAPY 1/> REGIONS: CPT | Performed by: PHYSICAL THERAPIST

## 2020-02-17 PROCEDURE — 97110 THERAPEUTIC EXERCISES: CPT | Performed by: PHYSICAL THERAPIST

## 2020-02-17 NOTE — PROGRESS NOTES
Daily Note     Today's date: 2020  Patient name: Alexus Guzmán  : 1955  MRN: [id number]  Referring provider: Chetna Adler MD  Dx:   Encounter Diagnosis     ICD-10-CM    1  Neck pain M54 2                   Subjective:    Henrique Lucia reports that she had some pain down the arm this weekend  Feels that she is still progressing overall      Objective: See treatment diary below      Assessment: Tolerated treatment well  Patient had resolution of pain down arm post manuals  She will continue 1X per week for 2 weeks to determine further progress      Plan: Continue per plan of care        Precautions: None      Manual  2/3            MFR to scalene/Scap AF Af                                                                   Exercise Diary              Supine cervical rotation 20 20           UT stretch 10 sec  X5   10 sec  X5           Scapular retraction 30 30           Seated scapular reach with retraction  3X10           Supine median nerve glie  20                                                                                                                                                                                                                  Modalities

## 2020-02-24 ENCOUNTER — OFFICE VISIT (OUTPATIENT)
Dept: PHYSICAL THERAPY | Facility: MEDICAL CENTER | Age: 65
End: 2020-02-24
Attending: FAMILY MEDICINE
Payer: MEDICARE

## 2020-02-24 ENCOUNTER — TELEPHONE (OUTPATIENT)
Dept: FAMILY MEDICINE CLINIC | Facility: CLINIC | Age: 65
End: 2020-02-24

## 2020-02-24 DIAGNOSIS — M54.2 NECK PAIN: Primary | ICD-10-CM

## 2020-02-24 DIAGNOSIS — F41.9 ANXIETY DISORDER, UNSPECIFIED TYPE: ICD-10-CM

## 2020-02-24 PROCEDURE — 97110 THERAPEUTIC EXERCISES: CPT | Performed by: PHYSICAL THERAPIST

## 2020-02-24 PROCEDURE — 97140 MANUAL THERAPY 1/> REGIONS: CPT | Performed by: PHYSICAL THERAPIST

## 2020-02-24 RX ORDER — LORAZEPAM 1 MG/1
TABLET ORAL
Qty: 2 TABLET | Refills: 0 | Status: SHIPPED | OUTPATIENT
Start: 2020-02-24 | End: 2021-08-26

## 2020-02-24 NOTE — TELEPHONE ENCOUNTER
She is scheduled for a procedure w Dr Barrington Henry on Thurs this week  Some kind of radio frequency nerve procedure  She is requesting a 1 mg tab of lorazepam to take prior to procedure  Has taken it prior to procedures before

## 2020-02-24 NOTE — PROGRESS NOTES
Daily Note     Today's date: 2020  Patient name: Heather Hancock  : 1955  MRN: [id number]  Referring provider: Walter Villarreal MD  Dx:   Encounter Diagnosis     ICD-10-CM    1  Neck pain M54 2                   Subjective: [place] reports that she feels her shoulder has been moving better  Sleeping remains most limiting       Objective: See treatment diary below      Assessment: Tolerated treatment well  Patient benefits from shoulder motion as well as neural glides to relieve irritability of shoulder  progress as able      Plan: Continue per plan of care        Precautions: None      Manual  2/3 2/24           MFR to scalene/Scap AF Af                                                                   Exercise Diary              Supine cervical rotation 20 20           UT stretch 10 sec  X5   10 sec  X5           Scapular retraction 30 30           Seated scapular reach with retraction  3X10           Supine median nerve glie  20           Incline DNF with shoulder raise to 90  2X10                                                                                                                                                                                                     Modalities

## 2020-02-24 NOTE — TELEPHONE ENCOUNTER
I did send in 2 lorazepam, can use one 1 hour prior to visit, repeat if needed    ( Dr Zayra Newton out of office )

## 2020-02-26 ENCOUNTER — HOSPITAL ENCOUNTER (OUTPATIENT)
Dept: RADIOLOGY | Facility: MEDICAL CENTER | Age: 65
Discharge: HOME | End: 2020-02-26
Attending: PHYSICAL MEDICINE & REHABILITATION | Admitting: PHYSICAL MEDICINE & REHABILITATION
Payer: MEDICARE

## 2020-02-26 ENCOUNTER — TELEPHONE (OUTPATIENT)
Dept: RADIOLOGY | Facility: MEDICAL CENTER | Age: 65
End: 2020-02-26

## 2020-02-26 VITALS
TEMPERATURE: 97.3 F | OXYGEN SATURATION: 97 % | SYSTOLIC BLOOD PRESSURE: 112 MMHG | DIASTOLIC BLOOD PRESSURE: 74 MMHG | RESPIRATION RATE: 18 BRPM | HEART RATE: 80 BPM

## 2020-02-26 DIAGNOSIS — M47.816 LUMBAR SPONDYLOSIS: ICD-10-CM

## 2020-02-26 PROCEDURE — 64636 DESTROY L/S FACET JNT ADDL: CPT | Performed by: PHYSICAL MEDICINE & REHABILITATION

## 2020-02-26 PROCEDURE — 64635 DESTROY LUMB/SAC FACET JNT: CPT | Performed by: PHYSICAL MEDICINE & REHABILITATION

## 2020-02-26 RX ORDER — BUPIVACAINE HCL/PF 2.5 MG/ML
10 VIAL (ML) INJECTION ONCE
Status: COMPLETED | OUTPATIENT
Start: 2020-02-26 | End: 2020-02-26

## 2020-02-26 RX ORDER — LIDOCAINE HYDROCHLORIDE 10 MG/ML
5 INJECTION, SOLUTION EPIDURAL; INFILTRATION; INTRACAUDAL; PERINEURAL ONCE
Status: COMPLETED | OUTPATIENT
Start: 2020-02-26 | End: 2020-02-26

## 2020-02-26 RX ADMIN — Medication 4 ML: at 13:33

## 2020-02-26 RX ADMIN — Medication 5 ML: at 13:39

## 2020-02-26 RX ADMIN — LIDOCAINE HYDROCHLORIDE 3 ML: 10 INJECTION, SOLUTION EPIDURAL; INFILTRATION; INTRACAUDAL; PERINEURAL at 13:27

## 2020-02-26 NOTE — H&P
History of Present Illness: The patient is a 71 y o  female who presents with complaints of right low back pain    Patient Active Problem List   Diagnosis    Migraine headache    Chronic bilateral low back pain without sciatica    Esophageal reflux    Arthritis    Duodenum disorder    Hyperlipidemia    Depression    Hyperglycemia    Abnormal MRI of abdomen    Vitamin D deficiency    Anxiety disorder    Retinal migraine    Thyroid nodule    Class 1 obesity    Impaired fasting glucose    Chronic left shoulder pain    Lumbar spondylosis    Adhesive capsulitis of left shoulder       Past Medical History:   Diagnosis Date    Abdominal discomfort     Anemia     Arthritis     Bronchitis     Chronic pain disorder     Dyslipidemia     Last Assessed 2012    GERD (gastroesophageal reflux disease)     Headache, migraine     Hypotension     Low back pain     Low back pain     Rheumatoid arthritis ([address] )     Thyroid nodule 2019       Past Surgical History:   Procedure Laterality Date    CATARACT EXTRACTION       SECTION      ESOPHAGOGASTRODUODENOSCOPY N/A 2018    Procedure: ESOPHAGOGASTRODUODENOSCOPY (EGD) with push enteroscopy and bx;  Surgeon: Mervat Dlay DO;  Location: [facility]; Service: Gastroenterology    EYE SURGERY      NERVE SURGERY Right     Hand    [place] EDG US EXAM SURGICAL ALTER STOM DUODENUM/JEJUNUM N/A 3/6/2019    Procedure: LINEAR ENDOSCOPIC U/S;  Surgeon: Valeriy Nova MD;  [facility];   Service: Gastroenterology    UPPER GASTROINTESTINAL ENDOSCOPY           Current Outpatient Medications:     acetaminophen (TYLENOL) 325 mg tablet, Take 2 tablets (650 mg total) by mouth every 6 (six) hours as needed for mild pain or fever, Disp: 30 tablet, Rfl: 0    Cholecalciferol (VITAMIN D3) 5000 units CAPS, Take by mouth, Disp: , Rfl:     cyclobenzaprine (FLEXERIL) 10 mg tablet, Take 1 tablet (10 mg total) by mouth 3 (three) times a day (Patient not taking: Reported on 1/28/2020), Disp: 20 tablet, Rfl: 0    diclofenac sodium (VOLTAREN) 1 %, Apply topically every 6 (six) hours, Disp: , Rfl:     docusate sodium (COLACE) 100 mg capsule, Take 1 capsule (100 mg total) by mouth daily, Disp: 30 capsule, Rfl: 5    ibuprofen (MOTRIN) 800 mg tablet, every 8 (eight) hours as needed , Disp: , Rfl:     LORazepam (ATIVAN) 1 mg tablet, Take 1 tablet 1 hour before procedure  May repeat x1 if needed  , Disp: 2 tablet, Rfl: 0    Magnesium 400 MG CAPS, Take 400 mg by mouth daily, Disp: , Rfl:     naproxen (NAPROSYN) 500 mg tablet, Take one tab up to twice a day, no more than 3 times a week, Disp: 20 tablet, Rfl: 3    omega-3-acid ethyl esters (LOVAZA) 1 g capsule, Take 2 g by mouth daily , Disp: , Rfl:     ondansetron (ZOFRAN) 4 mg tablet, Take 1 tablet (4 mg total) by mouth every 6 (six) hours, Disp: 12 tablet, Rfl: 0    pantoprazole (PROTONIX) 40 mg tablet, TAKE ONE TABLET BY MOUTH ONCE DAILY, Disp: 30 tablet, Rfl: 11    polyethylene glycol (MIRALAX) 17 g packet, Take 17 g by mouth daily, Disp: 30 each, Rfl: 5    pyridoxine (B-6) 500 MG tablet, Take 400 mg by mouth daily, Disp: , Rfl:     rizatriptan (MAXALT) 10 MG tablet, Take 1 tablet (10 mg total) by mouth once as needed for migraine May repeat in 2 hours if needed  Max 2/24 hours, 9/month , Disp: 9 tablet, Rfl: 6    Allergies   Allergen Reactions    Erythromycin Rash       Physical Exam:   Vitals:    02/26/20 1309   BP: 104/70   Pulse: 78   Resp: 20   Temp: (!) 97 3 °F (36 3 °C)   SpO2: 97%     General: Awake, Alert, Oriented x 3, Mood and affect appropriate  Respiratory: Respirations even and unlabored  Cardiovascular: Peripheral pulses intact; no edema  Musculoskeletal Exam:  Right low back pain    ASA Score:  2    Patient/Chart Verification  Patient ID Verified: Verbal  ID Band Applied: No  Consents Confirmed: Procedural  H&P( within 30 days) Verified:  To be obtained in the Pre-Procedure area  Interval H&P(within 24 hr) Complete (required for Outpatients and Surgery Admit only): To be obtained in the Pre-Procedure area  Allergies Reviewed: Yes  Anticoag/NSAID held?: NA  Currently on antibiotics?: No    Assessment:   1   Lumbar spondylosis        Plan: RT L3-5 RFA

## 2020-02-26 NOTE — DISCHARGE INSTRUCTIONS

## 2020-02-27 NOTE — TELEPHONE ENCOUNTER
S/W pt  Pt stated needle sites look good, denies S&S of infection, denies fevers and denies sun burn like sensation  She stated she has pain with laying and sitting and has less pain when she is moving around  Advised pt if she does get pain to take her prescribed or OTC pain medications and/or use ice/heat and that it takes 4 to 6 weeks to see the full effect  Confirmed next appt w/ pt  Pt verbalized understanding  Pt aware Nat Rick will get the message on Monday when he returns to the office

## 2020-02-27 NOTE — TELEPHONE ENCOUNTER
Pt called today stating pain level is a 4-5/10  Hurts more when she is laying down and sitting      Pt can be reached at [phone] or [phone]

## 2020-03-02 ENCOUNTER — OFFICE VISIT (OUTPATIENT)
Dept: PHYSICAL THERAPY | Facility: MEDICAL CENTER | Age: 65
End: 2020-03-02
Attending: FAMILY MEDICINE
Payer: MEDICARE

## 2020-03-02 DIAGNOSIS — M54.2 NECK PAIN: Primary | ICD-10-CM

## 2020-03-02 PROCEDURE — 97140 MANUAL THERAPY 1/> REGIONS: CPT | Performed by: PHYSICAL THERAPIST

## 2020-03-02 PROCEDURE — 97110 THERAPEUTIC EXERCISES: CPT | Performed by: PHYSICAL THERAPIST

## 2020-03-02 NOTE — PROGRESS NOTES
Daily Note     Today's date: 3/2/2020  Patient name: Alex Ramirez  : 1955  MRN: [id number]  Referring provider: Veronica Chappell MD  Dx:   Encounter Diagnosis     ICD-10-CM    1  Neck pain M54 2                   Subjective: [place] reports that her neck is feeling better and not much of an issue  Continues to have underlying issues from frozen shoulder on left       Objective: See treatment diary below      Assessment: Tolerated treatment well  Patient would like to continue PT with additional focus on L UE given her upper quarter restrictions playing into neck pain  She is responding well and appropriate for continuation given progress      Plan: Continue per plan of care        Precautions: None      Manual  2/3 2/24 3/2          MFR to scalene/Scap AF Af AF                                                                  Exercise Diary              Supine cervical rotation 20 20           UT stretch 10 sec  X5   10 sec  X5           Scapular retraction 30 30           Seated scapular reach with retraction  3X10           Supine median nerve glie  20           Incline DNF with shoulder raise to 90  2X10           AAROM ER   10 sec  X10          pulleys   4 min           Supine AAROM flexion   20                                                                                                                                                             Modalities

## 2020-03-05 ENCOUNTER — OFFICE VISIT (OUTPATIENT)
Dept: FAMILY MEDICINE CLINIC | Facility: CLINIC | Age: 65
End: 2020-03-05
Payer: MEDICARE

## 2020-03-05 VITALS
DIASTOLIC BLOOD PRESSURE: 74 MMHG | SYSTOLIC BLOOD PRESSURE: 120 MMHG | HEIGHT: 61 IN | RESPIRATION RATE: 18 BRPM | WEIGHT: 175.8 LBS | HEART RATE: 74 BPM | OXYGEN SATURATION: 96 % | BODY MASS INDEX: 33.19 KG/M2

## 2020-03-05 DIAGNOSIS — Z12.11 COLON CANCER SCREENING: ICD-10-CM

## 2020-03-05 DIAGNOSIS — E66.9 OBESITY, CLASS I, BMI 30-34.9: ICD-10-CM

## 2020-03-05 DIAGNOSIS — Z00.00 MEDICARE ANNUAL WELLNESS VISIT, SUBSEQUENT: Primary | ICD-10-CM

## 2020-03-05 DIAGNOSIS — R10.9 FLANK PAIN: ICD-10-CM

## 2020-03-05 DIAGNOSIS — G43.109 MIGRAINE WITH AURA AND WITHOUT STATUS MIGRAINOSUS, NOT INTRACTABLE: ICD-10-CM

## 2020-03-05 DIAGNOSIS — Z11.4 SCREENING FOR HIV WITHOUT PRESENCE OF RISK FACTORS: ICD-10-CM

## 2020-03-05 DIAGNOSIS — F32.A DEPRESSION, UNSPECIFIED DEPRESSION TYPE: ICD-10-CM

## 2020-03-05 DIAGNOSIS — M75.02 ADHESIVE CAPSULITIS OF LEFT SHOULDER: ICD-10-CM

## 2020-03-05 DIAGNOSIS — R53.83 FATIGUE, UNSPECIFIED TYPE: ICD-10-CM

## 2020-03-05 DIAGNOSIS — E78.5 HYPERLIPIDEMIA, UNSPECIFIED HYPERLIPIDEMIA TYPE: ICD-10-CM

## 2020-03-05 DIAGNOSIS — Z23 NEED FOR VACCINATION WITH 13-POLYVALENT PNEUMOCOCCAL CONJUGATE VACCINE: ICD-10-CM

## 2020-03-05 DIAGNOSIS — G43.109 RETINAL MIGRAINE: ICD-10-CM

## 2020-03-05 DIAGNOSIS — F32.1 CURRENT MODERATE EPISODE OF MAJOR DEPRESSIVE DISORDER WITHOUT PRIOR EPISODE (HCC): ICD-10-CM

## 2020-03-05 DIAGNOSIS — G47.9 SLEEP DISORDER: ICD-10-CM

## 2020-03-05 LAB
SL AMB  POCT GLUCOSE, UA: NEGATIVE
SL AMB LEUKOCYTE ESTERASE,UA: NEGATIVE
SL AMB POCT BILIRUBIN,UA: NEGATIVE
SL AMB POCT BLOOD,UA: NEGATIVE
SL AMB POCT CLARITY,UA: CLEAR
SL AMB POCT COLOR,UA: YELLOW
SL AMB POCT KETONES,UA: NEGATIVE
SL AMB POCT NITRITE,UA: NEGATIVE
SL AMB POCT PH,UA: 6
SL AMB POCT SPECIFIC GRAVITY,UA: 1
SL AMB POCT URINE PROTEIN: NEGATIVE
SL AMB POCT UROBILINOGEN: 0.2

## 2020-03-05 PROCEDURE — 3008F BODY MASS INDEX DOCD: CPT | Performed by: FAMILY MEDICINE

## 2020-03-05 PROCEDURE — 99214 OFFICE O/P EST MOD 30 MIN: CPT | Performed by: FAMILY MEDICINE

## 2020-03-05 PROCEDURE — 1036F TOBACCO NON-USER: CPT | Performed by: FAMILY MEDICINE

## 2020-03-05 PROCEDURE — G0439 PPPS, SUBSEQ VISIT: HCPCS | Performed by: FAMILY MEDICINE

## 2020-03-05 PROCEDURE — 81002 URINALYSIS NONAUTO W/O SCOPE: CPT | Performed by: FAMILY MEDICINE

## 2020-03-05 PROCEDURE — 90670 PCV13 VACCINE IM: CPT

## 2020-03-05 PROCEDURE — 4040F PNEUMOC VAC/ADMIN/RCVD: CPT | Performed by: FAMILY MEDICINE

## 2020-03-05 PROCEDURE — G0009 ADMIN PNEUMOCOCCAL VACCINE: HCPCS

## 2020-03-05 NOTE — PATIENT INSTRUCTIONS
Medicare Preventive Visit Patient Instructions  Thank you for completing your Welcome to Medicare Visit or Medicare Annual Wellness Visit today  Your next wellness visit will be due in one year (3/5/2021)  The screening/preventive services that you may require over the next 5-10 years are detailed below  Some tests may not apply to you based off risk factors and/or age  Screening tests ordered at today's visit but not completed yet may show as past due  Also, please note that scanned in results may not display below  Preventive Screenings:  Service Recommendations Previous Testing/Comments   Colorectal Cancer Screening  * Colonoscopy    * Fecal Occult Blood Test (FOBT)/Fecal Immunochemical Test (FIT)  * Fecal DNA/Cologuard Test  * Flexible Sigmoidoscopy Age: 54-65 years old   Colonoscopy: every 10 years (may be performed more frequently if at higher risk)  OR  FOBT/FIT: every 1 year  OR  Cologuard: every 3 years  OR  Sigmoidoscopy: every 5 years  Screening may be recommended earlier than age 48 if at higher risk for colorectal cancer  Also, an individualized decision between you and your healthcare provider will decide whether screening between the ages of 74-80 would be appropriate  Colonoscopy: Not on file  FOBT/FIT: Not on file  Cologuard: Not on file  Sigmoidoscopy: Not on file         Breast Cancer Screening Age: 36 years old  Frequency: every 1-2 years  Not required if history of left and right mastectomy Mammogram: Not on file       Cervical Cancer Screening Between the ages of 21-29, pap smear recommended once every 3 years  Between the ages of 33-67, can perform pap smear with HPV co-testing every 5 years     Recommendations may differ for women with a history of total hysterectomy, cervical cancer, or abnormal pap smears in past  Pap Smear: 07/02/2019    Screening Not Indicated   Hepatitis C Screening Once for adults born between 1945 and 1965  More frequently in patients at high risk for Hepatitis C Hep C Antibody: 12/11/2018    Screening Current   Diabetes Screening 1-2 times per year if you're at risk for diabetes or have pre-diabetes Fasting glucose: 107 mg/dL   A1C: 5 7    Screening Current   Cholesterol Screening Once every 5 years if you don't have a lipid disorder  May order more often based on risk factors  Lipid panel: 12/11/2018    Screening Not Indicated  History Lipid Disorder     Other Preventive Screenings Covered by Medicare:  1  Abdominal Aortic Aneurysm (AAA) Screening: covered once if your at risk  You're considered to be at risk if you have a family history of AAA  2  Lung Cancer Screening: covers low dose CT scan once per year if you meet all of the following conditions: (1) Age 50-69; (2) No signs or symptoms of lung cancer; (3) Current smoker or have quit smoking within the last 15 years; (4) You have a tobacco smoking history of at least 30 pack years (packs per day multiplied by number of years you smoked); (5) You get a written order from a healthcare provider  3  Glaucoma Screening: covered annually if you're considered high risk: (1) You have diabetes OR (2) Family history of glaucoma OR (3)  aged 48 and older OR (3)  American aged 72 and older  3  Osteoporosis Screening: covered every 2 years if you meet one of the following conditions: (1) You're estrogen deficient and at risk for osteoporosis based off medical history and other findings; (2) Have a vertebral abnormality; (3) On glucocorticoid therapy for more than 3 months; (4) Have primary hyperparathyroidism; (5) On osteoporosis medications and need to assess response to drug therapy  · Last bone density test (DXA Scan): Not on file  5  HIV Screening: covered annually if you're between the age of 12-76  Also covered annually if you are younger than 13 and older than 72 with risk factors for HIV infection  For pregnant patients, it is covered up to 3 times per pregnancy      Immunizations:  Immunization Recommendations   Influenza Vaccine Annual influenza vaccination during flu season is recommended for all persons aged >= 6 months who do not have contraindications   Pneumococcal Vaccine (Prevnar and Pneumovax)  * Prevnar = PCV13  * Pneumovax = PPSV23   Adults 25-60 years old: 1-3 doses may be recommended based on certain risk factors  Adults 72 years old: Prevnar (PCV13) vaccine recommended followed by Pneumovax (PPSV23) vaccine  If already received PPSV23 since turning 65, then PCV13 recommended at least one year after PPSV23 dose  Hepatitis B Vaccine 3 dose series if at intermediate or high risk (ex: diabetes, end stage renal disease, liver disease)   Tetanus (Td) Vaccine - COST NOT COVERED BY MEDICARE PART B Following completion of primary series, a booster dose should be given every 10 years to maintain immunity against tetanus  Td may also be given as tetanus wound prophylaxis  Tdap Vaccine - COST NOT COVERED BY MEDICARE PART B Recommended at least once for all adults  For pregnant patients, recommended with each pregnancy  Shingles Vaccine (Shingrix) - COST NOT COVERED BY MEDICARE PART B  2 shot series recommended in those aged 48 and above     Health Maintenance Due:      Topic Date Due    MAMMOGRAM  1955    CRC Screening: Colonoscopy  1955    Hepatitis C Screening  Completed     Immunizations Due:      Topic Date Due    DTaP,Tdap,and Td Vaccines (1 - Tdap) 03/16/1965    Pneumococcal Vaccine: 65+ Years (1 of 2 - PCV13) 03/16/2019     Advance Directives   What are advance directives? Advance directives are legal documents that state your wishes and plans for medical care  These plans are made ahead of time in case you lose your ability to make decisions for yourself  Advance directives can apply to any medical decision, such as the treatments you want, and if you want to donate organs  What are the types of advance directives?   There are many types of advance directives, and each state has rules about how to use them  You may choose a combination of any of the following:  · Living will: This is a written record of the treatment you want  You can also choose which treatments you do not want, which to limit, and which to stop at a certain time  This includes surgery, medicine, IV fluid, and tube feedings  · Durable power of  for healthcare [facility]): This is a written record that states who you want to make healthcare choices for you when you are unable to make them for yourself  This person, called a proxy, is usually a family member or a friend  You may choose more than 1 proxy  · Do not resuscitate (DNR) order:  A DNR order is used in case your heart stops beating or you stop breathing  It is a request not to have certain forms of treatment, such as CPR  A DNR order may be included in other types of advance directives  · Medical directive: This covers the care that you want if you are in a coma, near death, or unable to make decisions for yourself  You can list the treatments you want for each condition  Treatment may include pain medicine, surgery, blood transfusions, dialysis, IV or tube feedings, and a ventilator (breathing machine)  · Values history: This document has questions about your views, beliefs, and how you feel and think about life  This information can help others choose the care that you would choose  Why are advance directives important? An advance directive helps you control your care  Although spoken wishes may be used, it is better to have your wishes written down  Spoken wishes can be misunderstood, or not followed  Treatments may be given even if you do not want them  An advance directive may make it easier for your family to make difficult choices about your care     Weight Management   Why it is important to manage your weight:  Being overweight increases your risk of health conditions such as heart disease, high blood pressure, type 2 diabetes, and certain types of cancer  It can also increase your risk for osteoarthritis, sleep apnea, and other respiratory problems  Aim for a slow, steady weight loss  Even a small amount of weight loss can lower your risk of health problems  How to lose weight safely:  A safe and healthy way to lose weight is to eat fewer calories and get regular exercise  You can lose up about 1 pound a week by decreasing the number of calories you eat by 500 calories each day  Healthy meal plan for weight management:  A healthy meal plan includes a variety of foods, contains fewer calories, and helps you stay healthy  A healthy meal plan includes the following:  · Eat whole-grain foods more often  A healthy meal plan should contain fiber  Fiber is the part of grains, fruits, and vegetables that is not broken down by your body  Whole-grain foods are healthy and provide extra fiber in your diet  Some examples of whole-grain foods are whole-wheat breads and pastas, oatmeal, brown rice, and bulgur  · Eat a variety of vegetables every day  Include dark, leafy greens such as spinach, kale, rebel greens, and mustard greens  Eat yellow and orange vegetables such as carrots, sweet potatoes, and winter squash  · Eat a variety of fruits every day  Choose fresh or canned fruit (canned in its own juice or light syrup) instead of juice  Fruit juice has very little or no fiber  · Eat low-fat dairy foods  Drink fat-free (skim) milk or 1% milk  Eat fat-free yogurt and low-fat cottage cheese  Try low-fat cheeses such as mozzarella and other reduced-fat cheeses  · Choose meat and other protein foods that are low in fat  Choose beans or other legumes such as split peas or lentils  Choose fish, skinless poultry (chicken or turkey), or lean cuts of red meat (beef or pork)  Before you cook meat or poultry, cut off any visible fat  · Use less fat and oil  Try baking foods instead of frying them   Add less fat, such as margarine, sour cream, regular salad dressing and mayonnaise to foods  Eat fewer high-fat foods  Some examples of high-fat foods include french fries, doughnuts, ice cream, and cakes  · Eat fewer sweets  Limit foods and drinks that are high in sugar  This includes candy, cookies, regular soda, and sweetened drinks  Exercise:  Exercise at least 30 minutes per day on most days of the week  Some examples of exercise include walking, biking, dancing, and swimming  You can also fit in more physical activity by taking the stairs instead of the elevator or parking farther away from stores  Ask your healthcare provider about the best exercise plan for you  © Copyright [employer] 2018 Information is for End User's use only and may not be sold, redistributed or otherwise used for commercial purposes   All illustrations and images included in CareNotes® are the copyrighted property of A D A M , Inc  or [address] [place] [address]

## 2020-03-05 NOTE — PROGRESS NOTES
Assessment and Plan:     Problem List Items Addressed This Visit     None        BMI Counseling: Body mass index is 33 22 kg/m²  The BMI is above normal  Nutrition recommendations include decreasing portion sizes and encouraging healthy choices of fruits and vegetables  Exercise recommendations include exercising 3-5 times per week  No pharmacotherapy was ordered  Patient referred to PCP due to patient being overweight  Preventive health issues were discussed with patient, and age appropriate screening tests were ordered as noted in patient's After Visit Summary  Personalized health advice and appropriate referrals for health education or preventive services given if needed, as noted in patient's After Visit Summary       History of Present Illness:     Patient presents for Medicare Annual Wellness visit    Patient Care Team:  Deborah Bianchi MD as PCP - General (Family Medicine)     Problem List:     Patient Active Problem List   Diagnosis    Migraine headache    Chronic bilateral low back pain without sciatica    Esophageal reflux    Arthritis    Duodenum disorder    Hyperlipidemia    Depression    Hyperglycemia    Abnormal MRI of abdomen    Vitamin D deficiency    Anxiety disorder    Retinal migraine    Thyroid nodule    Class 1 obesity    Impaired fasting glucose    Chronic left shoulder pain    Lumbar spondylosis    Adhesive capsulitis of left shoulder      Past Medical and Surgical History:     Past Medical History:   Diagnosis Date    Abdominal discomfort     Anemia     Arthritis     Bronchitis     Chronic pain disorder     Dyslipidemia     Last Assessed 2012    GERD (gastroesophageal reflux disease)     Headache, migraine     Hypotension     Low back pain     Low back pain     Rheumatoid arthritis (HCC)     Thyroid nodule 2019     Past Surgical History:   Procedure Laterality Date    CATARACT EXTRACTION       SECTION      ESOPHAGOGASTRODUODENOSCOPY N/A 2018    Procedure: ESOPHAGOGASTRODUODENOSCOPY (EGD) with push enteroscopy and bx;  Surgeon: Suzanne Cueva DO;  Location: [facility]; Service: Gastroenterology    EYE SURGERY      NERVE SURGERY Right     Hand    NC EDG US EXAM SURGICAL ALTER STOM DUODENUM/JEJUNUM N/A 3/6/2019    Procedure: LINEAR ENDOSCOPIC U/S;  Surgeon: Elizabeth Devi MD;  Location:  GI LAB;   Service: Gastroenterology    UPPER GASTROINTESTINAL ENDOSCOPY        Family History:     Family History   Problem Relation Age of Onset    Hypertension Mother     Other Mother         Dyslipidemia    Diabetes Father         Mellitus    No Known Problems Brother     No Known Problems Brother     Cancer Neg Hx     Heart disease Neg Hx     Stroke Neg Hx       Social History:        Social History     Socioeconomic History    Marital status: /Civil Union     Spouse name: Not on file    Number of children: Not on file    Years of education: Not on file    Highest education level: Not on file   Occupational History    Occupation: retired   Social Needs    Financial resource strain: Not on file    Food insecurity:     Worry: Not on file     Inability: Not on file   [employer] needs:     Medical: Not on file     Non-medical: Not on file   Tobacco Use    Smoking status: Former Smoker     Last attempt to quit: 1985     Years since quittin 1    Smokeless tobacco: Never Used   Substance and Sexual Activity    Alcohol use: Yes     Comment: occasional  Maybe 5x per year    Drug use: No    Sexual activity: Yes     Partners: Male     Birth control/protection: Post-menopausal   Lifestyle    Physical activity:     Days per week: Not on file     Minutes per session: Not on file    Stress: Not on file   Relationships    Social connections:     Talks on phone: Not on file     Gets together: Not on file     Attends [employer] service: Not on file     Active member of club or organization: Not on file     Attends meetings of clubs or organizations: Not on file     Relationship status: Not on file    Intimate partner violence:     Fear of current or ex partner: Not on file     Emotionally abused: Not on file     Physically abused: Not on file     Forced sexual activity: Not on file   Other Topics Concern    Not on file   Social History Narrative    Use Safety Equipment - [address]        Lives with spouse      Medications and Allergies:     Current Outpatient Medications   Medication Sig Dispense Refill    acetaminophen (TYLENOL) 325 mg tablet Take 2 tablets (650 mg total) by mouth every 6 (six) hours as needed for mild pain or fever 30 tablet 0    Cholecalciferol (VITAMIN D3) 5000 units CAPS Take by mouth      cyclobenzaprine (FLEXERIL) 10 mg tablet Take 1 tablet (10 mg total) by mouth 3 (three) times a day (Patient not taking: Reported on 1/28/2020) 20 tablet 0    diclofenac sodium (VOLTAREN) 1 % Apply topically every 6 (six) hours      docusate sodium (COLACE) 100 mg capsule Take 1 capsule (100 mg total) by mouth daily 30 capsule 5    ibuprofen (MOTRIN) 800 mg tablet every 8 (eight) hours as needed       LORazepam (ATIVAN) 1 mg tablet Take 1 tablet 1 hour before procedure  May repeat x1 if needed  2 tablet 0    Magnesium 400 MG CAPS Take 400 mg by mouth daily      naproxen (NAPROSYN) 500 mg tablet Take one tab up to twice a day, no more than 3 times a week 20 tablet 3    omega-3-acid ethyl esters (LOVAZA) 1 g capsule Take 2 g by mouth daily       ondansetron (ZOFRAN) 4 mg tablet Take 1 tablet (4 mg total) by mouth every 6 (six) hours 12 tablet 0    pantoprazole (PROTONIX) 40 mg tablet TAKE ONE TABLET BY MOUTH ONCE DAILY 30 tablet 11    polyethylene glycol (MIRALAX) 17 g packet Take 17 g by mouth daily 30 each 5    pyridoxine (B-6) 500 MG tablet Take 400 mg by mouth daily      rizatriptan (MAXALT) 10 MG tablet Take 1 tablet (10 mg total) by mouth once as needed for migraine May repeat in 2 hours if needed   Max 2/24 hours, 9/month  9 tablet 6     No current facility-administered medications for this visit  Allergies   Allergen Reactions    Erythromycin Rash      Immunizations:     Immunization History   Administered Date(s) Administered    Influenza, high dose seasonal 0 5 mL 11/11/2019    Influenza, recombinant, quadrivalent,injectable, preservative free 11/29/2018    Zoster Vaccine Recombinant 08/15/2019, 11/01/2019      Health Maintenance:         Topic Date Due    MAMMOGRAM  1955    CRC Screening: Colonoscopy  1955    Hepatitis C Screening  Completed         Topic Date Due    DTaP,Tdap,and Td Vaccines (1 - Tdap) 03/16/1965    Pneumococcal Vaccine: 65+ Years (1 of 2 - PCV13) 03/16/2019      Medicare Health Risk Assessment:     There were no vitals taken for this visit  Saskia Daniel is here for her Subsequent Wellness visit  Health Risk Assessment:   Patient rates overall health as good  Patient feels that their physical health rating is slightly worse  Eyesight was rated as same  Hearing was rated as same  Patient feels that their emotional and mental health rating is same  Pain experienced in the last 7 days has been a lot  Patient's pain rating has been 8/10  Patient states that she has experienced no weight loss or gain in last 6 months  Depression Screening:   PHQ-2 Score: 0  PHQ-9 Score: 5      Fall Risk Screening: In the past year, patient has experienced: no history of falling in past year      Urinary Incontinence Screening:   Patient has not leaked urine accidently in the last six months  Home Safety:  Patient does not have trouble with stairs inside or outside of their home  Patient has working smoke alarms and has working carbon monoxide detector  Home safety hazards include: none  Nutrition:   Current diet is Regular and No Added Salt  Medications:   Patient is currently taking over-the-counter supplements   OTC medications include: see medication list  Patient is able to manage medications  Activities of Daily Living (ADLs)/Instrumental Activities of Daily Living (IADLs):   Walk and transfer into and out of bed and chair?: Yes  Dress and groom yourself?: Yes    Bathe or shower yourself?: Yes    Feed yourself?  Yes  Do your laundry/housekeeping?: Yes  Manage your money, pay your bills and track your expenses?: Yes  Make your own meals?: Yes    Do your own shopping?: Yes    Previous Hospitalizations:   Any hospitalizations or ED visits within the last 12 months?: No      Advance Care Planning:   Living will: No    Durable POA for healthcare: No    Advanced directive: No    Five wishes given: Yes      PREVENTIVE SCREENINGS      Cardiovascular Screening:    General: Screening Not Indicated and History Lipid Disorder      Diabetes Screening:     General: Screening Current      Cervical Cancer Screening:    General: Screening Not Indicated      Hepatitis C Screening:    General: Screening Current      David Lee MD

## 2020-03-05 NOTE — ASSESSMENT & PLAN NOTE
She appears to have disordered sleep  She tends to say up too late at night and then she wakes early in the morning because of pain  Then she naps for an extended time in the afternoon  I recommended trying to set alarms to wake herself up earlier and take shorter naps and try to extend her nighttime sleeping hours  Also encouraged use of melatonin 3-10 mg before bedtime

## 2020-03-05 NOTE — PROGRESS NOTES
Assessment/Plan:    Retinal migraine  Thankfully the ocular migraines have decreased in frequency  Adhesive capsulitis of left shoulder  She is going to PT for left neck and shoulder pain  Depression  Stable, no SI  She will cont counseling    Obesity, Class I, BMI 30-34 9  bmi counseling done    Sleep disorder  She appears to have disordered sleep  She tends to say up too late at night and then she wakes early in the morning because of pain  Then she naps for an extended time in the afternoon  I recommended trying to set alarms to wake herself up earlier and take shorter naps and try to extend her nighttime sleeping hours  Also encouraged use of melatonin 3-10 mg before bedtime  Diagnoses and all orders for this visit:    Medicare annual wellness visit, subsequent    Obesity, Class I, BMI 30-34 9    Migraine with aura and without status migrainosus, not intractable    Adhesive capsulitis of left shoulder    Depression, unspecified depression type    Current moderate episode of major depressive disorder without prior episode (HCC)    Hyperlipidemia, unspecified hyperlipidemia type  -     Lipid panel; Future  -     Comprehensive metabolic panel; Future    Screening for HIV without presence of risk factors  -     HIV 1/2 AG-AB combo; Future    Fatigue, unspecified type  -     CBC and differential; Future  -     TSH, 3rd generation with Free T4 reflex; Future    Sleep disorder    Need for vaccination with 13-polyvalent pneumococcal conjugate vaccine  -     PNEUMOCOCCAL CONJUGATE VACCINE 13-VALENT GREATER THAN 6 MONTHS    Flank pain  -     POCT urine dip    Colon cancer screening  -     Cologuard; Future    Retinal migraine          Subjective:      Patient ID: Alex Ramirez is a 71 y o  female  She is here for AWV and f/u of chronic problems  She continues with left frozen shoulder and low back pain  She had injection then radiofrequency ablation right side last week    Temporarily it worsened her symptoms of right-sided sciatic pain  She has been continuing with a counselor about every 2 weeks  Counselor recommended she discuss her daytime napping  She frequently falls asleep for 2-3 hours in the afternoon  Her night is disrupted by pain in the shoulder and back  She has noted some pain in the left thoracolumbar area of the back  She is concerned this could be kidney pain  She denies any urinary symptoms  The following portions of the patient's history were reviewed and updated as appropriate: allergies, current medications, past family history, past medical history, past social history, past surgical history and problem list     Review of Systems   Constitutional: Negative for activity change, chills, fatigue and fever  HENT: Negative for congestion, ear pain, sinus pressure and sore throat  Eyes: Negative for pain and visual disturbance  Respiratory: Negative for cough, chest tightness, shortness of breath and wheezing  Cardiovascular: Negative for chest pain, palpitations and leg swelling  Gastrointestinal: Negative for abdominal pain, blood in stool, constipation, diarrhea, nausea and vomiting  Endocrine: Negative for polydipsia and polyuria  Genitourinary: Negative for difficulty urinating, dysuria, frequency and urgency  Musculoskeletal: Positive for arthralgias and back pain  Negative for joint swelling and myalgias  Skin: Negative for rash  Neurological: Negative for dizziness, weakness, numbness and headaches  Hematological: Negative for adenopathy  Does not bruise/bleed easily  Psychiatric/Behavioral: Positive for sleep disturbance  Negative for dysphoric mood  The patient is not nervous/anxious  Objective:      /74   Pulse 74   Resp 18   Ht 5' 1" (1 549 m)   Wt 79 7 kg (175 lb 12 8 oz)   SpO2 96%   BMI 33 22 kg/m²          Physical Exam   Constitutional: She is oriented to person, place, and time   She appears well-developed and well-nourished  No distress  obese   HENT:   Head: Normocephalic and atraumatic  Right Ear: External ear normal    Left Ear: External ear normal    Nose: Nose normal    Mouth/Throat: Oropharynx is clear and moist    Eyes: Pupils are equal, round, and reactive to light  Conjunctivae and EOM are normal  No scleral icterus  Neck: Normal range of motion  Neck supple  No thyromegaly present  Cardiovascular: Normal rate, regular rhythm, normal heart sounds and intact distal pulses  No murmur heard  Pulmonary/Chest: Effort normal and breath sounds normal  She has no wheezes  She has no rales  Abdominal: Soft  Bowel sounds are normal  She exhibits no mass  There is no tenderness  Musculoskeletal: She exhibits no edema, tenderness or deformity  Spine is nontender  Lymphadenopathy:     She has no cervical adenopathy  Neurological: She is alert and oriented to person, place, and time  She has normal reflexes  No cranial nerve deficit  Skin: Skin is warm and dry  No rash noted  No erythema  Psychiatric: She has a normal mood and affect  Her behavior is normal    Nursing note and vitals reviewed

## 2020-03-09 ENCOUNTER — OFFICE VISIT (OUTPATIENT)
Dept: PHYSICAL THERAPY | Facility: MEDICAL CENTER | Age: 65
End: 2020-03-09
Attending: FAMILY MEDICINE
Payer: MEDICARE

## 2020-03-09 DIAGNOSIS — M54.2 NECK PAIN: Primary | ICD-10-CM

## 2020-03-09 PROCEDURE — 97140 MANUAL THERAPY 1/> REGIONS: CPT | Performed by: PHYSICAL THERAPIST

## 2020-03-09 PROCEDURE — 97112 NEUROMUSCULAR REEDUCATION: CPT | Performed by: PHYSICAL THERAPIST

## 2020-03-09 PROCEDURE — 97110 THERAPEUTIC EXERCISES: CPT | Performed by: PHYSICAL THERAPIST

## 2020-03-09 NOTE — PROGRESS NOTES
Daily Note     Today's date: 3/9/2020  Patient name: Shelly Jc  : 1955  MRN: [id number]  Referring provider: Indira Haley MD  Dx:   Encounter Diagnosis     ICD-10-CM    1  Neck pain M54 2                   Subjective: New [place] reports that she is gradually having improved       Objective: See treatment diary below      Assessment: Tolerated treatment well  Patient with good tolerance to TE and improving functional activity range       Plan: Continue per plan of care        Precautions: None      Manual  2/3 2/24 3/2 3/9         MFR to scalene/Scap AF Af AF AF                                                                 Exercise Diary              Supine cervical rotation 20 20           UT stretch 10 sec  X5   10 sec  X5           Scapular retraction 30 30           Seated scapular reach with retraction  3X10           Supine median nerve glie  20           Incline DNF with shoulder raise to 90  2X10           AAROM ER   10 sec  X10 10 sec  X10         pulleys   4 min  4         Supine AAROM flexion   20 20          Supine serratus punch    30                                                                                                                                               Modalities

## 2020-03-11 ENCOUNTER — APPOINTMENT (OUTPATIENT)
Dept: LAB | Facility: HOSPITAL | Age: 65
End: 2020-03-11
Attending: FAMILY MEDICINE
Payer: MEDICARE

## 2020-03-11 DIAGNOSIS — R53.83 FATIGUE, UNSPECIFIED TYPE: ICD-10-CM

## 2020-03-11 DIAGNOSIS — Z12.11 COLON CANCER SCREENING: ICD-10-CM

## 2020-03-11 DIAGNOSIS — E78.5 HYPERLIPIDEMIA, UNSPECIFIED HYPERLIPIDEMIA TYPE: ICD-10-CM

## 2020-03-11 DIAGNOSIS — Z11.4 SCREENING FOR HIV WITHOUT PRESENCE OF RISK FACTORS: ICD-10-CM

## 2020-03-11 LAB
ALBUMIN SERPL BCP-MCNC: 3.6 G/DL (ref 3.5–5)
ALP SERPL-CCNC: 71 U/L (ref 46–116)
ALT SERPL W P-5'-P-CCNC: 51 U/L (ref 12–78)
ANION GAP SERPL CALCULATED.3IONS-SCNC: 9 MMOL/L (ref 4–13)
AST SERPL W P-5'-P-CCNC: 20 U/L (ref 5–45)
BASOPHILS # BLD AUTO: 0.04 THOUSANDS/ΜL (ref 0–0.1)
BASOPHILS NFR BLD AUTO: 1 % (ref 0–1)
BILIRUB SERPL-MCNC: 0.33 MG/DL (ref 0.2–1)
BUN SERPL-MCNC: 19 MG/DL (ref 5–25)
CALCIUM SERPL-MCNC: 9.4 MG/DL (ref 8.3–10.1)
CHLORIDE SERPL-SCNC: 104 MMOL/L (ref 100–108)
CHOLEST SERPL-MCNC: 219 MG/DL (ref 50–200)
CO2 SERPL-SCNC: 28 MMOL/L (ref 21–32)
CREAT SERPL-MCNC: 0.78 MG/DL (ref 0.6–1.3)
EOSINOPHIL # BLD AUTO: 0.09 THOUSAND/ΜL (ref 0–0.61)
EOSINOPHIL NFR BLD AUTO: 2 % (ref 0–6)
ERYTHROCYTE [DISTWIDTH] IN BLOOD BY AUTOMATED COUNT: 13.2 % (ref 11.6–15.1)
GFR SERPL CREATININE-BSD FRML MDRD: 80 ML/MIN/1.73SQ M
GLUCOSE P FAST SERPL-MCNC: 101 MG/DL (ref 65–99)
HCT VFR BLD AUTO: 42.9 % (ref 34.8–46.1)
HDLC SERPL-MCNC: 54 MG/DL
HGB BLD-MCNC: 13.5 G/DL (ref 11.5–15.4)
IMM GRANULOCYTES # BLD AUTO: 0.01 THOUSAND/UL (ref 0–0.2)
IMM GRANULOCYTES NFR BLD AUTO: 0 % (ref 0–2)
LDLC SERPL CALC-MCNC: 136 MG/DL (ref 0–100)
LYMPHOCYTES # BLD AUTO: 1.34 THOUSANDS/ΜL (ref 0.6–4.47)
LYMPHOCYTES NFR BLD AUTO: 25 % (ref 14–44)
MCH RBC QN AUTO: 29.6 PG (ref 26.8–34.3)
MCHC RBC AUTO-ENTMCNC: 31.5 G/DL (ref 31.4–37.4)
MCV RBC AUTO: 94 FL (ref 82–98)
MONOCYTES # BLD AUTO: 0.36 THOUSAND/ΜL (ref 0.17–1.22)
MONOCYTES NFR BLD AUTO: 7 % (ref 4–12)
NEUTROPHILS # BLD AUTO: 3.56 THOUSANDS/ΜL (ref 1.85–7.62)
NEUTS SEG NFR BLD AUTO: 65 % (ref 43–75)
NONHDLC SERPL-MCNC: 165 MG/DL
NRBC BLD AUTO-RTO: 0 /100 WBCS
PLATELET # BLD AUTO: 248 THOUSANDS/UL (ref 149–390)
PMV BLD AUTO: 9.7 FL (ref 8.9–12.7)
POTASSIUM SERPL-SCNC: 3.9 MMOL/L (ref 3.5–5.3)
PROT SERPL-MCNC: 7.4 G/DL (ref 6.4–8.2)
RBC # BLD AUTO: 4.56 MILLION/UL (ref 3.81–5.12)
SODIUM SERPL-SCNC: 141 MMOL/L (ref 136–145)
TRIGL SERPL-MCNC: 145 MG/DL
TSH SERPL DL<=0.05 MIU/L-ACNC: 2.73 UIU/ML (ref 0.36–3.74)
WBC # BLD AUTO: 5.4 THOUSAND/UL (ref 4.31–10.16)

## 2020-03-11 PROCEDURE — 80061 LIPID PANEL: CPT

## 2020-03-11 PROCEDURE — 36415 COLL VENOUS BLD VENIPUNCTURE: CPT

## 2020-03-11 PROCEDURE — 87389 HIV-1 AG W/HIV-1&-2 AB AG IA: CPT

## 2020-03-11 PROCEDURE — 84443 ASSAY THYROID STIM HORMONE: CPT

## 2020-03-11 PROCEDURE — 80053 COMPREHEN METABOLIC PANEL: CPT

## 2020-03-11 PROCEDURE — 85025 COMPLETE CBC W/AUTO DIFF WBC: CPT

## 2020-03-12 LAB — HIV 1+2 AB+HIV1 P24 AG SERPL QL IA: NORMAL

## 2020-03-16 ENCOUNTER — APPOINTMENT (OUTPATIENT)
Dept: PHYSICAL THERAPY | Facility: MEDICAL CENTER | Age: 65
End: 2020-03-16
Attending: FAMILY MEDICINE
Payer: MEDICARE

## 2020-03-23 ENCOUNTER — APPOINTMENT (OUTPATIENT)
Dept: PHYSICAL THERAPY | Facility: MEDICAL CENTER | Age: 65
End: 2020-03-23
Attending: FAMILY MEDICINE
Payer: MEDICARE

## 2020-03-25 ENCOUNTER — TELEMEDICINE (OUTPATIENT)
Dept: PAIN MEDICINE | Facility: MEDICAL CENTER | Age: 65
End: 2020-03-25
Payer: MEDICARE

## 2020-03-25 DIAGNOSIS — M47.816 LUMBAR SPONDYLOSIS: Primary | ICD-10-CM

## 2020-03-25 DIAGNOSIS — G89.29 CHRONIC BILATERAL LOW BACK PAIN WITHOUT SCIATICA: ICD-10-CM

## 2020-03-25 DIAGNOSIS — M54.50 CHRONIC BILATERAL LOW BACK PAIN WITHOUT SCIATICA: ICD-10-CM

## 2020-03-25 PROCEDURE — G2012 BRIEF CHECK IN BY MD/QHP: HCPCS | Performed by: NURSE PRACTITIONER

## 2020-03-25 NOTE — PROGRESS NOTES
Virtual Regular Visit    Problem List Items Addressed This Visit        Musculoskeletal and Integument    Lumbar spondylosis - Primary       Other    Chronic bilateral low back pain without sciatica               Reason for visit is Procedure Follow up    Encounter provider JIMBO Goldberg    Provider located at [address] [place] [address]      Recent Visits  No visits were found meeting these conditions  Showing recent visits within past 7 days and meeting all other requirements     Future Appointments  No visits were found meeting these conditions  Showing future appointments within next 150 days and meeting all other requirements        After connecting through [employer], the patient was identified by name and date of birth  Dionicio Acevedo was informed that this is a telemedicine visit and that the visit is being conducted through telephone which may not be secure and therefore, might not be HIPAA-compliant  My office door was closed  No one else was in the room  She acknowledged consent and understanding of privacy and security of the video platform  The patient has agreed to participate and understands they can discontinue the visit at any time  Subjective  Dionicio Acevedo is a 76 y o  female with chronic bilateral low back pain related to lumbar spondylosis  She is status post bilateral L3-5 radiofrequency ablations  The left side was completed on February 12, 2020 in the right side was completed on February 26, 2020  Both procedures were performed by Dr Becky Rodriguez  The patient tells me that she is just starting to feel better now she tells me that following the procedure her right side actually felt worse  Today the patient rates her pain 3/10  She is using Tylenol as needed which does seem to help a little bit      Her pain is still most bothersome when she sits for a long period of time and she tells me this is actually a very long period of time as she sits find a computer for 6 hours         Past Medical History:   Diagnosis Date    Abdominal discomfort     Anemia     Arthritis     Bronchitis     Chronic pain disorder     Dyslipidemia     Last Assessed 2012    GERD (gastroesophageal reflux disease)     Headache, migraine     Hypotension     Low back pain     Low back pain     Rheumatoid arthritis ([address] )     Thyroid nodule 2019       Past Surgical History:   Procedure Laterality Date    CATARACT EXTRACTION       SECTION      ESOPHAGOGASTRODUODENOSCOPY N/A 2018    Procedure: ESOPHAGOGASTRODUODENOSCOPY (EGD) with push enteroscopy and bx;  Surgeon: Mervat Daly DO;  Location: [facility]; Service: Gastroenterology    EYE SURGERY      NERVE SURGERY Right     Hand    [place] EDG US EXAM SURGICAL ALTER STOM DUODENUM/JEJUNUM N/A 3/6/2019    Procedure: LINEAR ENDOSCOPIC U/S;  Surgeon: Valeriy Nova MD;  Location: [facility]; Service: Gastroenterology    UPPER GASTROINTESTINAL ENDOSCOPY         Current Outpatient Medications   Medication Sig Dispense Refill    acetaminophen (TYLENOL) 325 mg tablet Take 2 tablets (650 mg total) by mouth every 6 (six) hours as needed for mild pain or fever 30 tablet 0    Cholecalciferol (VITAMIN D3) 5000 units CAPS Take by mouth      cyclobenzaprine (FLEXERIL) 10 mg tablet Take 1 tablet (10 mg total) by mouth 3 (three) times a day 20 tablet 0    diclofenac sodium (VOLTAREN) 1 % Apply topically every 6 (six) hours      docusate sodium (COLACE) 100 mg capsule Take 1 capsule (100 mg total) by mouth daily 30 capsule 5    ibuprofen (MOTRIN) 800 mg tablet every 8 (eight) hours as needed       LORazepam (ATIVAN) 1 mg tablet Take 1 tablet 1 hour before procedure  May repeat x1 if needed   2 tablet 0    Magnesium 400 MG CAPS Take 400 mg by mouth daily      naproxen (NAPROSYN) 500 mg tablet Take one tab up to twice a day, no more than 3 times a week 20 tablet 3  omega-3-acid ethyl esters (LOVAZA) 1 g capsule Take 2 g by mouth daily       ondansetron (ZOFRAN) 4 mg tablet Take 1 tablet (4 mg total) by mouth every 6 (six) hours 12 tablet 0    pantoprazole (PROTONIX) 40 mg tablet TAKE ONE TABLET BY MOUTH ONCE DAILY 30 tablet 11    polyethylene glycol (MIRALAX) 17 g packet Take 17 g by mouth daily 30 each 5    pyridoxine (B-6) 500 MG tablet Take 400 mg by mouth daily      rizatriptan (MAXALT) 10 MG tablet Take 1 tablet (10 mg total) by mouth once as needed for migraine May repeat in 2 hours if needed  Max 2/24 hours, 9/month  9 tablet 6     No current facility-administered medications for this visit  Allergies   Allergen Reactions    Erythromycin Rash       Review of Systems    I did encourage the patient to use 1000 mg of extra-strength Tylenol up to 3 times daily as needed  She also has Voltaren gel at home I encouraged her to use this as needed up to 4 times a day on the affected area of her low back  She can also use heat or ice as needed  I did explain to the patient that it could take another week or 2 to notice further improvement, as she is just reaching the 4 week donal for the right side  Patient was agreeable verbalized understanding  Patient will follow up on an as-needed basis  I spent 8 minutes with the patient during this visit

## 2020-03-25 NOTE — PATIENT INSTRUCTIONS
did encourage the patient to use 1000 mg of extra-strength Tylenol up to 3 times daily as needed  She also has Voltaren gel at home I encouraged her to use this as needed up to 4 times a day on the affected area of her low back  She can also use heat or ice as needed  I did explain to the patient that it could take another week or 2 to notice further improvement, as she is just reaching the 4 week donal for the right side  Patient was agreeable verbalized understanding  Patient will follow up on an as-needed basis

## 2020-07-07 ENCOUNTER — OFFICE VISIT (OUTPATIENT)
Dept: NEUROLOGY | Facility: CLINIC | Age: 65
End: 2020-07-07
Payer: MEDICARE

## 2020-07-07 VITALS
TEMPERATURE: 97.6 F | HEIGHT: 61 IN | WEIGHT: 155 LBS | DIASTOLIC BLOOD PRESSURE: 72 MMHG | SYSTOLIC BLOOD PRESSURE: 122 MMHG | HEART RATE: 70 BPM | BODY MASS INDEX: 29.27 KG/M2

## 2020-07-07 DIAGNOSIS — G43.009 MIGRAINE WITHOUT AURA AND WITHOUT STATUS MIGRAINOSUS, NOT INTRACTABLE: Primary | ICD-10-CM

## 2020-07-07 DIAGNOSIS — E55.9 VITAMIN D DEFICIENCY: ICD-10-CM

## 2020-07-07 DIAGNOSIS — F41.9 ANXIETY AND DEPRESSION: ICD-10-CM

## 2020-07-07 DIAGNOSIS — F32.A ANXIETY AND DEPRESSION: ICD-10-CM

## 2020-07-07 PROCEDURE — 1036F TOBACCO NON-USER: CPT | Performed by: PSYCHIATRY & NEUROLOGY

## 2020-07-07 PROCEDURE — 99214 OFFICE O/P EST MOD 30 MIN: CPT | Performed by: PSYCHIATRY & NEUROLOGY

## 2020-07-07 PROCEDURE — 1160F RVW MEDS BY RX/DR IN RCRD: CPT | Performed by: PSYCHIATRY & NEUROLOGY

## 2020-07-07 PROCEDURE — 4040F PNEUMOC VAC/ADMIN/RCVD: CPT | Performed by: PSYCHIATRY & NEUROLOGY

## 2020-07-07 PROCEDURE — 3008F BODY MASS INDEX DOCD: CPT | Performed by: PSYCHIATRY & NEUROLOGY

## 2020-07-07 NOTE — PROGRESS NOTES
[address] [facility] Consult - Follow up   PATIENT:  Silvestre Amos  MRN:  [id number]  :  1955  DATE OF SERVICE:  2020  REFERRED BY: Jailene Alvarez MD  PMD: Chrissy Ryan MD    Assessment/Plan:     Benedicto knox very pleasant 65 y o  female Past medical history includes migraines, GERD, hyperlipidemia, depression, arthritis, low blood pressure, bilateral carpal tunnel, abdominal pain, anemia, glaucoma, cataracts referred here for evaluation of headache  She previously followed with a neurologist in [place] and is transitioning care here  My initial evaluation 2019  Follow-up 2019, 2019, 20, 2020     Migraine without aura and without status migrainosus, not intractable  Acephalgic migraine with aura   The patient reports headaches/migraines since her teenage years       She reports various locations of her migraines, but typically left-sided, quality varies   Typical associated migrainous features  - as of 2019: She reports migraines currently her about twice a month, but in January she had an increase in visual auras/possible retinal migraine that was new and concerning for her and occured daily for a week  - as of 2019:  Only 2 migraines in the past 3 months which is improvement, she has been keeping track of her visual symptoms and she was worried they have increased although it seems to me they are occurring less often - and there seems to be a component of anxiety triggering them in some cases  - as of 2019: No auras or migraines at least the past 2 months  Overall improved  Mood and anxiety improved with therapist  Taking mg, b2  Sleep not great due to frozen shoulder pain  Will consider melatonin   Also discussed considering venlafaxine low dose if needed for mood/headaches at next visit    - as of 20:  Headaches/migraines are less frequent, with weather change in Oct had 2 migraines, 3rd caught early with the rizatriptan  Aura only 2 times in the past 4-5 months  Still taking magnesium (although some nausea sometimes, other times forgets), B2   - As of 7/7/2020: she remains well controlled  Only one migraine in 6 months that resolved with rizatriptan  About 4 auras in 6 months  Sometimes forgets to take aspirin       Work up:  - CTA head and neck with without contrast 03/13/2019: I have personally reviewed imaging and radiology read   1   No acute findings   Unremarkable CT appearance of the brain  2   Unremarkable CT angiogram of the head and neck  - follows closely with Ophthalmology every 4 months due to other eye issues including history of glaucoma and cataracts  - will also consider MRI Brain, patient anxious about it at this time and will readdress next visit, otherwise denies symptoms of stroke, asked her to try and get old MRI Brain from 10-15 years ago, but she doubts she would be able to      Preventive:  - discussed headache hygiene and lifestyle factors that could improve her headaches including mental Health Care, sleep quality and quantity, drastically increasing hydration - as of 05/22/2019 she has not done much of these  - headache preventative supplements:  Continue magnesium 400 mg which she started around January 2019,  continue riboflavin  - patient not interested in prescription preventative at this time  - future options: Justin Ellsworth has been on nortriptyline 10 mg in the past which she reports decreased frequency and severity of her headaches, but then she decided she wanted to get off of it and stopped 6 months ago, has had an increase in symptoms since   Does not want to go back on it at this time, but will consider in the future  Likely would consider venlafaxine  vs cyproheptadine      Abortive:  - previously patient was taking pain medication daily, however now no more than twice a week   We discussed medication overuse/rebound headache and recommended no more than 3 days per week   Used naproxen, acetaminophen  - she takes rizatriptan 10 mg to 3 times a month for migraines, has been on this for years prescribed by previous neurologist, discussed proper use, possible side effects and risks  - has ondansetron 4 mg for nausea     Anxiety and Depression   Denies history of depression until about 9 months ago when her mother passed away   PHQ-9 depression screen score of 17 on 02/2019 suggestive of moderately severe depression   Patient denies suicidal ideation   Otherwise typical symptoms, fatigue, depressed mood, less motivation    -as of 9/24/2019 has established care with counselor and improved mood  - as of 1/6/20:  Seeing a therapist and it is helping a lot with multiple issues, mood much better  Not wearing sunglasses everywhere anymore  - as of 7/7/2020: doing much better, still following with counselor   -Linda Solorzano is not interested in medications at this time, but may consider venlafaxine if needed in the future        Vitamin D deficiency  -    03/08/2019 level was 44 2  - currently takes 5000 units daily  - PCP follow-up        Patient instructions         - continue baby aspirin every day 81 mg      Headache/migraine treatment:   Abortive medications (for immediate treatment of a headache): It is ok to take ibuprofen, acetaminophen or naproxen (Advil, Tylenol,  Aleve, Excedrin) if they help your headaches you should limit these to No more than 3 times a week to avoid medication overuse/rebound headaches       For nausea  Ondansetron 4 mg ok to take as needed for nausea      For your more moderate to severe migraines take this medication early:  Continue Maxalt (rizatriptan) 10mg tabs - take one at the onset of headache  May repeat one time after 1-2 hours if pain has not resolved     (Max 2 a day and 9 a month)         Over the counter preventive supplements for headaches/migraines   (to take every day to help prevent headaches - not to take at the time of headache):   - Mind ease can be found online and all of these   [x] Magnesium 500mg daily   [x] Riboflavin (Vitamin B2)  400mg daily   (FYI B2 may make your urine bright/neon yellow)     Prescription preventive medications for headaches/migraines   (to take every day to help prevent headaches - not to take at the time of headache):  [x] Could consider in the future if needed     Lifestyle Recommendations:  [x] SLEEP - Maintain a regular sleep schedule: Adults need at least 7-8 hours of uninterrupted a night  Maintain good sleep hygiene:  Going to bed and waking up at consistent times, avoiding excessive daytime naps, avoiding caffeinated beverages in the evening, avoid excessive stimulation in the evening and generally using bed primarily for sleeping   One hour before bedtime would recommend turning lights down lower, decreasing your activity (may read quietly, listen to music at a low volume)  When you get into bed, should eliminate all technology (no texting, emailing, playing with your phone, iPad or tablet in bed)  [x] HYDRATION - Maintain good hydration   Drink  2L of fluid a day (4 typical small water bottles)  [x] DIET - Maintain good nutrition  In particular don't skip meals and try and eat healthy balanced meals regularly  [x] TRIGGERS - Look for other triggers and avoid them: Limit caffeine to 1-2 cups a day or less  Avoid dietary triggers that you have noticed bring on your headaches (this could include aged cheese, peanuts, MSG, aspartame and nitrates)  [x] EXERCISE - physical exercise as we all know is good for you in many ways, and not only is good for your heart, but also is beneficial for your mental health, cognitive health and  chronic pain/headaches  I would encourage at the least 5 days of physical exercise weekly for at least 30 minutes       Education and Follow-up  [x] Please call with any questions or concerns  [x] Follow up 6 months, sooner as needed, or if not needed at 6 months, may push back to 12      CC:    We had the pleasure of evaluating Kaylan Young in neurological consultation today  Kaylan Young is a 65 y o    right handed female who presents today for evaluation of headaches       History of Present Illness:   Interval history as of 7/7/2020:    Headaches/migraines   - migraine - only 1 in 6 months, took rizatriptan and it helped  - Aura - 4 times in 6 months without headache afterwards related to stress, lasting about 20 minutes      - abortive: rizatriptan  - prevention: no longer taking magnesium, B2 - because she is on a diet with supplements     - has lost some weight on a diet and feeling much better as far as mood, movements, sleep   - talking to counselor every 3 weeks     Interval history as of 01/06/2020  She reports improvement, no concerns     Headaches/migraines are less frequent  - weather change in Oct had 2 migraines, 3rd caught early with the rizatriptan  - aura only 2 times in the past 4-5 months  - taking magnesium, B2     Sleep - still not great due to frozen shoulder pain (also may hurt all day long)   - following with orthopedics  - tried melatonin and did not help     Seeing a therapist and it is helping a lot with multiple issues, mood much better  Not wearing sunglasses everywhere anymore     Interval history as of 09/24/2019:  - 07/15/2019 patient called in requesting rizatriptan that was previously prescribed by her past neurologist and works well for her migraines  Ok since history no longer sounds like retinal migraine - both eyes   - following with orthopedics for frozen shoulder, doing therapies  - sometimes the therapies with PT was causing cervicogenic headaches   - had some floaters a few weeks ago and saw eye doctor who confirmed floaters and nothing to worry about      - worried about this time of year because may get sinus headaches that turn into migraines      No auras or migraines at least the past 2 months  Saw counselor and helping mood - her therapist suggested considering low dose mood medications  discussed with patient and may consider in the future   - wearing glasses less, realizing that she may have been hiding behind them      Going to start yoga class soon       - Supplements - taking magnesium and B2      Interval history as of 05/22/2019:  - CTA head and neck 03/13/2019 was unremarkable except for incidental thyroid nodule with follow-up ultrasound by primary care  - has followed up with Pain Medicine and weight management  - 5/8/19 - dilated eye exam was unremarkable except for dry eyes   - our  provider a list of therapists, mood improved since wheather improved     - Rizatriptan - continues to help  - Supplements - taking magnesium and B2   - Prescription medication - not interested although         Headaches started at what age? teen[age]  How often do the headaches occur?   - as of 3/2019: 2/month  - as of 5/22/19: 2 migraines in past 3 ,   - Acephalgic Migraine with aura more often than migraine - vs nonspecific visual symptoms due to other reason 4/14, 21, 23, 25, and 5/8, 21 - she reports these seem to be even triggered by stress/anxiety  - as of 09/24/2019:  No auras or migraines at least the past 2 months  - as of 1/6/20:  Headaches/migraines are less frequent, with weather change in Oct had 2 migraines, 3rd caught early with the rizatriptan  Aura only 2 times in the past 4-5 months  Still taking magnesium (although some nausea sometimes, other times forgets), B2  What time of the day do the headaches start? Can wake up with them or anytime during the day   How long do the headaches last? 3-4 hours because she takes medicine, up to all day   Are you ever headache free? Yes     Aura? with aura  Acephalgic Migraine with aura more often than migraine     Jan 2019:   For a week straight 1-2 times a day had increase in visual aura/retinal migraine  Zigzags, decreased peripheral vision, blurry without strong migraine, milder headache - for 2-3 hours   - seems to be in both eyes, not like a curtain coming down (both eyes - not retinal migraine)  - gradually faded out and this month she is fine, started mag a few weeks ago   - would take medicine and go away  *last saw an eye doctor 2 months ago, goes every 4 months for glaucoma, cataracts, dry eye       Prior to that once in a while that would happen      Describe your usual headache pain quality? Dull all day sometimes, sharp and pulsating, shooting   Where is your headache located?   When full blown more on left side, frontal to parietal and then to the back  Lately zig zag around head  Numbness change in sensation to bioccipital region   Sinus pressure     What is the intensity of pain? 4-5/10, up to higher   Associated symptoms:   [x] Nausea       [x] Vomiting        [] Diarrhea  [x] Insomnia    [] Stiff or sore neck   [x] Problems with concentration  [x] Photophobia     [x]Phonophobia      [] Osmophobia  [x] Blurred vision   [x] Prefer quiet, dark room  [] Light-headed or dizzy     [] Tinnitus   [] Hands or feet tingle or feel numb/paresthesias       [] Red ear      [] Ptosis      [] Facial droop  [] Lacrimation  [] Nasal congestion/rhinorrhea   [] Flushing of face  [] Change in pupil size      Things that make the headache worse? Bending down, any movement     Headache triggers:  Pressure when it rains/dr and cold  What time of the year do headaches occur more frequently?   More in Oct/Nov     Have you seen someone else for headaches or pain? Neurology   Have you had trigger point injection performed and how often? No  Have you had Botox injection performed and how often? No   Have you had epidural injections or transforaminal injections performed? Yes for lower back slipped disc   Have you used CBD or THC for your headaches and how often? No  Are you current pregnant or planning on getting pregnant? No  Have you ever had any Brain imaging? MRI Brain - maybe over 15-20 years ago      What medications do you take or have you taken for your headaches?    ABORTIVE:    2-3 times a week some sort of pain medication lately, previously was taking daily      Acetaminophen - a few days a week for back pain  Ibuprofen 800 mg - not as much now, takes naproxen instead  Rizatriptan 10 mg   Naproxen 500 mg - takes once a week, used to take every day      Diclofenac gel - for back   Lidocaine 5% gel - for back      Past for headache and back pain   Cyclobenzaprine 5 mg  - not taking (for back)  Tramadol 50 mg   - not taking (for back)  Meloxicam 15 mg - not taking   Ondansetron - does not take it      PREVENTIVE:   Magnesium 400 mg  Riboflavin 400 mg      Past:  Nortriptyline 10 mg - for 2 years, and did well as far as headaches not being as bad, then wanted to get off it, came off 6 months ago, increase in symptoms     Alternative therapies used in the past for headaches? no other headache interventions have been tried     LIFESTYLE  Sleep - averages 6-7 hours   - takes naps      Physical activity:  tries to get her to the gym twice a week   Water: a bottle a week   Mood:   - lately feels tired mentally tired  and depressed   - do not want to get up   - never has talked to a counselor - she used to do mentoring herself, never had depression in the past   - thinks going on for past 6 months, started after mom passed away   - PHQ-9 depression screen 02/27/2019:  Score of 17, denies suicidal ideation        The following portions of the patient's history were reviewed and updated as appropriate: allergies, current medications, past family history, past medical history, past social history, past surgical history and problem list         Pertinent family history:    Family history of headaches:  no known family members with significant headaches  Any family history of aneurysms - No     Pertinent social history:  Work:  Retired  Education: some college  Lives with   One trial     Illicit Drugs: denies  Alcohol/tobacco: Denies tobacco use, alcohol intake: social drinker         Past Medical History:     Past Medical History:   Diagnosis Date    Abdominal discomfort     Anemia     Arthritis     Bronchitis     Chronic pain disorder     Dyslipidemia     Last Assessed 07/09/2012    GERD (gastroesophageal reflux disease)     Headache, migraine     Hypotension     Low back pain     Low back pain     Rheumatoid arthritis ([address] )     Thyroid nodule 4/9/2019       Patient Active Problem List   Diagnosis    Migraine headache    Chronic bilateral low back pain without sciatica    Esophageal reflux    Arthritis    Duodenum disorder    Hyperlipidemia    Depression    Hyperglycemia    Abnormal MRI of abdomen    Vitamin D deficiency    Anxiety disorder    Retinal migraine    Thyroid nodule    Obesity, Class I, BMI 30-34 9    Impaired fasting glucose    Chronic left shoulder pain    Lumbar spondylosis    Adhesive capsulitis of left shoulder    Current moderate episode of major depressive disorder without prior episode (HCC)    Sleep disorder       Medications:      Current Outpatient Medications   Medication Sig Dispense Refill    acetaminophen (TYLENOL) 325 mg tablet Take 2 tablets (650 mg total) by mouth every 6 (six) hours as needed for mild pain or fever 30 tablet 0    Cholecalciferol (VITAMIN D3) 5000 units CAPS Take 5,000 Units by mouth daily       diclofenac sodium (VOLTAREN) 1 % Apply topically every 6 (six) hours      docusate sodium (COLACE) 100 mg capsule Take 1 capsule (100 mg total) by mouth daily 30 capsule 5    ibuprofen (MOTRIN) 800 mg tablet every 8 (eight) hours as needed       LORazepam (ATIVAN) 1 mg tablet Take 1 tablet 1 hour before procedure  May repeat x1 if needed   2 tablet 0    Magnesium 400 MG CAPS Take 400 mg by mouth daily      naproxen (NAPROSYN) 500 mg tablet Take one tab up to twice a day, no more than 3 times a week 20 tablet 3    omega-3-acid ethyl esters (LOVAZA) 1 g capsule Take 2 g by mouth daily       ondansetron (ZOFRAN) 4 mg tablet Take 1 tablet (4 mg total) by mouth every 6 (six) hours 12 tablet 0    pantoprazole (PROTONIX) 40 mg tablet TAKE ONE TABLET BY MOUTH ONCE DAILY 30 tablet 11    polyethylene glycol (MIRALAX) 17 g packet Take 17 g by mouth daily 30 each 5    pyridoxine (B-6) 500 MG tablet Take 400 mg by mouth daily      rizatriptan (MAXALT) 10 MG tablet Take 1 tablet (10 mg total) by mouth once as needed for migraine May repeat in 2 hours if needed  Max 2/24 hours, 9/month  9 tablet 6    cyclobenzaprine (FLEXERIL) 10 mg tablet Take 1 tablet (10 mg total) by mouth 3 (three) times a day (Patient not taking: Reported on 2020) 20 tablet 0     No current facility-administered medications for this visit  Allergies:       Allergies   Allergen Reactions    Erythromycin Rash       Family History:     Family History   Problem Relation Age of Onset    Hypertension Mother     Other Mother         Dyslipidemia    Diabetes Father         Mellitus    No Known Problems Brother     No Known Problems Brother     Cancer Neg Hx     Heart disease Neg Hx     Stroke Neg Hx        Social History:     Social History     Socioeconomic History    Marital status: /Civil Union     Spouse name: Not on file    Number of children: Not on file    Years of education: Not on file    Highest education level: Not on file   Occupational History    Occupation: retired   Social Needs    Financial resource strain: Not on file    Food insecurity:     Worry: Not on file     Inability: Not on file   [employer] needs:     Medical: Not on file     Non-medical: Not on file   Tobacco Use    Smoking status: Former Smoker     Last attempt to quit: 1985     Years since quittin 5    Smokeless tobacco: Never Used   Substance and Sexual Activity    Alcohol use: Yes     Comment: occasional  Maybe 5x per year    Drug use: No    Sexual activity: Yes     Partners: Male     Birth control/protection: Post-menopausal   Lifestyle    Physical activity:     Days per week: Not on file     Minutes per session: Not on file    Stress: Not on file   Relationships    Social connections:     Talks on phone: Not on file     Gets together: Not on file     Attends Religion service: Not on file     Active member of club or organization: Not on file     Attends meetings of clubs or organizations: Not on file     Relationship status: Not on file    Intimate partner violence:     Fear of current or ex partner: Not on file     Emotionally abused: Not on file     Physically abused: Not on file     Forced sexual activity: Not on file   Other Topics Concern    Not on file   Social History Narrative    Use Safety Equipment - [address]        Lives with spouse         Objective:       Physical Exam:                                                                 Vitals:            Constitutional:    /72 (BP Location: Left arm, Patient Position: Sitting, Cuff Size: Standard)   Pulse 70   Temp 97 6 °F (36 4 °C) (Tympanic)   Ht 5' 1" (1 549 m)   Wt 70 3 kg (155 lb)   BMI 29 29 kg/m²   BP Readings from Last 3 Encounters:   07/07/20 122/72   03/05/20 120/74   02/26/20 112/74     Pulse Readings from Last 3 Encounters:   07/07/20 70   03/05/20 74   02/26/20 80         Well developed, well nourished, well groomed  No dysmorphic features  HEENT:  Normocephalic atraumatic  See neuro exam   Chest:  Respirations regular and unlabored  Cardiovascular:  Regular rate, no observed significant swelling  Musculoskeletal:  Full range of motion  (see below under neurologic exam for evaluation of motor function and gait)   Skin:  warm and dry, not diaphoretic  No apparent birthmarks or stigmata of neurocutaneous disease     Psychiatric:  Normal behavior and appropriate affect        Neurological Examination:     Mental status/cognitive function:   Recent and remote memory intact  Attention span and concentration as well as fund of knowledge are appropriate for age  Normal language and spontaneous speech  Cranial Nerves:  III, IV, VI-Pupils were equal, round  Extraocular movements were full and conjugate   VII-facial expression symmetric  VIII-hearing grossly intact bilaterally   Motor Exam: symmetric bulk throughout  no atrophy, fasciculations or abnormal movements noted  Coordination:  no apparent dysmetria, ataxia or tremor noted  Gait: steady casual gait      Pertinent lab results:   03/11/2020 CMP unremarkable except for glucose 101, CBC unremarkable  TSH normal  HIV 2 7    4/09/2019:  A1c 5 7     03/08/2019:  BMP and CBC unremarkable  Vitamin-D 44 2  B12 2, 557  TSH 2 02     12/11/18: CMP unremarkable   11/29/18: CBC significant for hgb 10 8     Imaging:   CTA head and neck with without contrast 03/13/2019: I have personally reviewed imaging and radiology read   1   No acute findings   Unremarkable CT appearance of the brain  2   Unremarkable CT angiogram of the head and neck  Review of Systems:   ROS obtained by medical assistant Personally reviewed and updated if indicated  Review of Systems   Constitutional: Negative  Negative for appetite change and fever  HENT: Negative  Negative for hearing loss, tinnitus, trouble swallowing and voice change  Eyes: Negative  Negative for photophobia and pain  Respiratory: Negative  Negative for shortness of breath  Cardiovascular: Negative  Negative for palpitations  Gastrointestinal: Negative  Negative for nausea and vomiting  Endocrine: Negative  Negative for cold intolerance  Genitourinary: Negative  Negative for dysuria, frequency and urgency  Musculoskeletal: Negative  Negative for myalgias and neck pain  Skin: Negative  Negative for rash  Neurological: Negative   Negative for dizziness, tremors, seizures, syncope, facial asymmetry, speech difficulty, weakness, light-headedness, numbness and headaches  Hematological: Negative  Does not bruise/bleed easily  Psychiatric/Behavioral: Negative  Negative for confusion, hallucinations and sleep disturbance  I have spent 27 minutes with Patient  today in which greater than 50% of this time was spent in counseling/coordination of care regarding Prognosis, Risks and benefits of tx options, Intructions for management, Patient education, Importance of tx compliance, Risk factor reductions and Impressions        Author:  Gwen Owen MD 7/7/2020 10:33 AM

## 2020-07-07 NOTE — PATIENT INSTRUCTIONS
- continue baby aspirin every day 80 mg      Headache/migraine treatment:   Abortive medications (for immediate treatment of a headache): It is ok to take ibuprofen, acetaminophen or naproxen (Advil, Tylenol,  Aleve, Excedrin) if they help your headaches you should limit these to No more than 3 times a week to avoid medication overuse/rebound headaches       For nausea  Ondansetron 4 mg ok to take as needed for nausea      For your more moderate to severe migraines take this medication early:  Continue Maxalt (rizatriptan) 10mg tabs - take one at the onset of headache  May repeat one time after 1-2 hours if pain has not resolved  (Max 2 a day and 9 a month)         Over the counter preventive supplements for headaches/migraines   (to take every day to help prevent headaches - not to take at the time of headache):   - Mind ease can be found online and all of these   [x] Magnesium 500mg daily   [x] Riboflavin (Vitamin B2)  400mg daily   (FYI B2 may make your urine bright/neon yellow)     Prescription preventive medications for headaches/migraines   (to take every day to help prevent headaches - not to take at the time of headache):  [x] Could consider in the future if needed     Lifestyle Recommendations:  [x] SLEEP - Maintain a regular sleep schedule: Adults need at least 7-8 hours of uninterrupted a night  Maintain good sleep hygiene:  Going to bed and waking up at consistent times, avoiding excessive daytime naps, avoiding caffeinated beverages in the evening, avoid excessive stimulation in the evening and generally using bed primarily for sleeping   One hour before bedtime would recommend turning lights down lower, decreasing your activity (may read quietly, listen to music at a low volume)  When you get into bed, should eliminate all technology (no texting, emailing, playing with your phone, iPad or tablet in bed)    [x] HYDRATION - Maintain good hydration   Drink  2L of fluid a day (4 typical small water bottles)  [x] DIET - Maintain good nutrition  In particular don't skip meals and try and eat healthy balanced meals regularly  [x] TRIGGERS - Look for other triggers and avoid them: Limit caffeine to 1-2 cups a day or less  Avoid dietary triggers that you have noticed bring on your headaches (this could include aged cheese, peanuts, MSG, aspartame and nitrates)  [x] EXERCISE - physical exercise as we all know is good for you in many ways, and not only is good for your heart, but also is beneficial for your mental health, cognitive health and  chronic pain/headaches  I would encourage at the least 5 days of physical exercise weekly for at least 30 minutes       Education and Follow-up  [x] Please call with any questions or concerns     [x] Follow up 6 months, sooner as needed, or if not needed at 6 months, may push back to 12

## 2020-09-10 ENCOUNTER — OFFICE VISIT (OUTPATIENT)
Dept: FAMILY MEDICINE CLINIC | Facility: CLINIC | Age: 65
End: 2020-09-10
Payer: MEDICARE

## 2020-09-10 VITALS
SYSTOLIC BLOOD PRESSURE: 96 MMHG | WEIGHT: 146 LBS | TEMPERATURE: 97.8 F | BODY MASS INDEX: 27.56 KG/M2 | HEART RATE: 77 BPM | HEIGHT: 61 IN | DIASTOLIC BLOOD PRESSURE: 64 MMHG | OXYGEN SATURATION: 97 %

## 2020-09-10 DIAGNOSIS — R73.01 IMPAIRED FASTING GLUCOSE: ICD-10-CM

## 2020-09-10 DIAGNOSIS — K21.9 GASTROESOPHAGEAL REFLUX DISEASE WITHOUT ESOPHAGITIS: Primary | ICD-10-CM

## 2020-09-10 DIAGNOSIS — L30.9 DERMATITIS: ICD-10-CM

## 2020-09-10 DIAGNOSIS — Z12.39 BREAST CANCER SCREENING: ICD-10-CM

## 2020-09-10 DIAGNOSIS — R73.9 HYPERGLYCEMIA: ICD-10-CM

## 2020-09-10 DIAGNOSIS — Z12.31 ENCOUNTER FOR SCREENING MAMMOGRAM FOR BREAST CANCER: ICD-10-CM

## 2020-09-10 DIAGNOSIS — M75.02 ADHESIVE CAPSULITIS OF LEFT SHOULDER: ICD-10-CM

## 2020-09-10 LAB — SL AMB POCT HEMOGLOBIN AIC: 5.6 (ref ?–6.5)

## 2020-09-10 PROCEDURE — 99214 OFFICE O/P EST MOD 30 MIN: CPT | Performed by: FAMILY MEDICINE

## 2020-09-10 PROCEDURE — 83036 HEMOGLOBIN GLYCOSYLATED A1C: CPT | Performed by: FAMILY MEDICINE

## 2020-09-10 NOTE — ASSESSMENT & PLAN NOTE
She notes ongoing reflux symptoms in spite of 30 lb weight loss  She has been using pantoprazole daily  Recommended a trial of every other day

## 2020-09-10 NOTE — PROGRESS NOTES
Assessment/Plan:    Esophageal reflux    She notes ongoing reflux symptoms in spite of 30 lb weight loss  She has been using pantoprazole daily  Recommended a trial of every other day  Impaired fasting glucose  Normal Hgb A1c of 5 6    Migraine headache    Although she still gets occasional ocular migraines, she has not had had pain for quite some time  Adhesive capsulitis of left shoulder  She has marked improvement in left shoulder ROM       Diagnoses and all orders for this visit:    Gastroesophageal reflux disease without esophagitis    Breast cancer screening    Impaired fasting glucose    Adhesive capsulitis of left shoulder    Dermatitis  Comments:    Gave prescription for hydrocortisone 2 and 0 5% cream which she may use up to t i d   Orders:  -     hydrocortisone 2 5 % cream; Apply topically 3 (three) times a day    Encounter for screening mammogram for breast cancer  -     Mammo screening bilateral w 3d & cad; Future          Subjective:      Patient ID: Brett Beckett is a 76 y o  female  She is here with his wife for follow up  She feels well in general but she c/o itchy skin with little lesions  She also started a healthy diet program in March and has lost about 30 lb  She has noted decreased back pain and left frozen shoulder is improving  Thankfully the headaches have improved  She still gets occasional ocular migraines but has not needed the Maxalt for quite some time  The following portions of the patient's history were reviewed and updated as appropriate: allergies, current medications, past family history, past medical history, past social history, past surgical history and problem list     Review of Systems   Constitutional: Negative for activity change, chills, fatigue and fever  HENT: Negative for congestion, ear pain, sinus pressure and sore throat  Eyes: Negative for pain and visual disturbance     Respiratory: Negative for cough, chest tightness, shortness of breath and wheezing  Cardiovascular: Negative for chest pain, palpitations and leg swelling  Gastrointestinal: Negative for abdominal pain, blood in stool, constipation, diarrhea, nausea and vomiting  Endocrine: Negative for polydipsia and polyuria  Genitourinary: Negative for difficulty urinating, dysuria, frequency and urgency  Musculoskeletal: Positive for arthralgias  Negative for joint swelling and myalgias  Skin: Negative for rash  Neurological: Negative for dizziness, weakness, numbness and headaches  Hematological: Negative for adenopathy  Does not bruise/bleed easily  Psychiatric/Behavioral: Negative for dysphoric mood  The patient is not nervous/anxious  Objective:      BP 96/64 (BP Location: Left arm, Patient Position: Sitting, Cuff Size: Standard)   Pulse 77   Temp 97 8 °F (36 6 °C)   Ht 5' 1" (1 549 m)   Wt 66 2 kg (146 lb)   SpO2 97%   BMI 27 59 kg/m²          Physical Exam  Vitals signs and nursing note reviewed  Exam conducted with a chaperone present  Constitutional:       Appearance: Normal appearance  HENT:      Head: Normocephalic and atraumatic  Right Ear: Tympanic membrane normal       Left Ear: Tympanic membrane normal       Mouth/Throat:      Mouth: Mucous membranes are moist    Cardiovascular:      Rate and Rhythm: Normal rate and regular rhythm  Pulses: Normal pulses  Heart sounds: Normal heart sounds  Pulmonary:      Effort: Pulmonary effort is normal       Breath sounds: Normal breath sounds  Skin:     General: Skin is warm and dry  Comments: Mild erythema at the base of right thumb, but no rash  Small insect bite right upper arm  Neurological:      General: No focal deficit present  Mental Status: She is alert and oriented to person, place, and time     Psychiatric:         Mood and Affect: Mood normal          Behavior: Behavior normal

## 2020-10-15 ENCOUNTER — IMMUNIZATIONS (OUTPATIENT)
Dept: FAMILY MEDICINE CLINIC | Facility: CLINIC | Age: 65
End: 2020-10-15
Payer: MEDICARE

## 2020-10-15 VITALS — TEMPERATURE: 97.8 F

## 2020-10-15 DIAGNOSIS — Z23 NEED FOR INFLUENZA VACCINATION: Primary | ICD-10-CM

## 2020-10-15 PROCEDURE — 90662 IIV NO PRSV INCREASED AG IM: CPT

## 2020-10-15 PROCEDURE — G0008 ADMIN INFLUENZA VIRUS VAC: HCPCS

## 2020-11-04 ENCOUNTER — TELEMEDICINE (OUTPATIENT)
Dept: FAMILY MEDICINE CLINIC | Facility: CLINIC | Age: 65
End: 2020-11-04
Payer: MEDICARE

## 2020-11-04 VITALS — TEMPERATURE: 97.7 F

## 2020-11-04 DIAGNOSIS — J06.9 VIRAL UPPER RESPIRATORY TRACT INFECTION: Primary | ICD-10-CM

## 2020-11-04 DIAGNOSIS — J06.9 VIRAL UPPER RESPIRATORY TRACT INFECTION: ICD-10-CM

## 2020-11-04 PROCEDURE — 99213 OFFICE O/P EST LOW 20 MIN: CPT | Performed by: FAMILY MEDICINE

## 2020-11-04 PROCEDURE — U0003 INFECTIOUS AGENT DETECTION BY NUCLEIC ACID (DNA OR RNA); SEVERE ACUTE RESPIRATORY SYNDROME CORONAVIRUS 2 (SARS-COV-2) (CORONAVIRUS DISEASE [COVID-19]), AMPLIFIED PROBE TECHNIQUE, MAKING USE OF HIGH THROUGHPUT TECHNOLOGIES AS DESCRIBED BY CMS-2020-01-R: HCPCS | Performed by: FAMILY MEDICINE

## 2020-11-06 LAB — SARS-COV-2 RNA SPEC QL NAA+PROBE: NOT DETECTED

## 2020-12-15 ENCOUNTER — APPOINTMENT (EMERGENCY)
Dept: ULTRASOUND IMAGING | Facility: HOSPITAL | Age: 65
End: 2020-12-15
Payer: MEDICARE

## 2020-12-15 ENCOUNTER — ANESTHESIA EVENT (OUTPATIENT)
Dept: PERIOP | Facility: HOSPITAL | Age: 65
End: 2020-12-15
Payer: MEDICARE

## 2020-12-15 ENCOUNTER — APPOINTMENT (EMERGENCY)
Dept: CT IMAGING | Facility: HOSPITAL | Age: 65
End: 2020-12-15
Payer: MEDICARE

## 2020-12-15 ENCOUNTER — HOSPITAL ENCOUNTER (OUTPATIENT)
Facility: HOSPITAL | Age: 65
Setting detail: OBSERVATION
Discharge: HOME | End: 2020-12-15
Attending: EMERGENCY MEDICINE | Admitting: SURGERY
Payer: MEDICARE

## 2020-12-15 ENCOUNTER — ANESTHESIA (OUTPATIENT)
Dept: PERIOP | Facility: HOSPITAL | Age: 65
End: 2020-12-15
Payer: MEDICARE

## 2020-12-15 VITALS
HEART RATE: 80 BPM | BODY MASS INDEX: 26.66 KG/M2 | OXYGEN SATURATION: 96 % | WEIGHT: 141.09 LBS | SYSTOLIC BLOOD PRESSURE: 118 MMHG | TEMPERATURE: 97.3 F | DIASTOLIC BLOOD PRESSURE: 59 MMHG | RESPIRATION RATE: 16 BRPM

## 2020-12-15 VITALS — HEART RATE: 82 BPM

## 2020-12-15 DIAGNOSIS — R10.13 EPIGASTRIC PAIN: ICD-10-CM

## 2020-12-15 DIAGNOSIS — R11.2 NON-INTRACTABLE VOMITING WITH NAUSEA, UNSPECIFIED VOMITING TYPE: ICD-10-CM

## 2020-12-15 DIAGNOSIS — K81.0 ACUTE CHOLECYSTITIS: Primary | ICD-10-CM

## 2020-12-15 PROBLEM — K80.00 ACUTE CHOLECYSTITIS DUE TO BILIARY CALCULUS: Status: ACTIVE | Noted: 2020-12-15

## 2020-12-15 LAB
ALBUMIN SERPL BCP-MCNC: 3.6 G/DL (ref 3.5–5)
ALP SERPL-CCNC: 62 U/L (ref 46–116)
ALT SERPL W P-5'-P-CCNC: 38 U/L (ref 12–78)
AMORPH PHOS CRY URNS QL MICRO: ABNORMAL /HPF
ANION GAP SERPL CALCULATED.3IONS-SCNC: 6 MMOL/L (ref 4–13)
AST SERPL W P-5'-P-CCNC: 17 U/L (ref 5–45)
ATRIAL RATE: 64 BPM
BACTERIA UR QL AUTO: ABNORMAL /HPF
BASOPHILS # BLD AUTO: 0.02 THOUSANDS/ΜL (ref 0–0.1)
BASOPHILS NFR BLD AUTO: 0 % (ref 0–1)
BILIRUB SERPL-MCNC: 0.31 MG/DL (ref 0.2–1)
BILIRUB UR QL STRIP: NEGATIVE
BUN SERPL-MCNC: 22 MG/DL (ref 5–25)
CALCIUM SERPL-MCNC: 9.4 MG/DL (ref 8.3–10.1)
CHLORIDE SERPL-SCNC: 102 MMOL/L (ref 100–108)
CLARITY UR: CLEAR
CO2 SERPL-SCNC: 32 MMOL/L (ref 21–32)
COLOR UR: YELLOW
CREAT SERPL-MCNC: 0.8 MG/DL (ref 0.6–1.3)
EOSINOPHIL # BLD AUTO: 0 THOUSAND/ΜL (ref 0–0.61)
EOSINOPHIL NFR BLD AUTO: 0 % (ref 0–6)
ERYTHROCYTE [DISTWIDTH] IN BLOOD BY AUTOMATED COUNT: 12.4 % (ref 11.6–15.1)
GFR SERPL CREATININE-BSD FRML MDRD: 77 ML/MIN/1.73SQ M
GLUCOSE SERPL-MCNC: 132 MG/DL (ref 65–140)
GLUCOSE UR STRIP-MCNC: NEGATIVE MG/DL
HCT VFR BLD AUTO: 42.6 % (ref 34.8–46.1)
HGB BLD-MCNC: 13.7 G/DL (ref 11.5–15.4)
HGB UR QL STRIP.AUTO: ABNORMAL
IMM GRANULOCYTES # BLD AUTO: 0.03 THOUSAND/UL (ref 0–0.2)
IMM GRANULOCYTES NFR BLD AUTO: 0 % (ref 0–2)
KETONES UR STRIP-MCNC: ABNORMAL MG/DL
LEUKOCYTE ESTERASE UR QL STRIP: ABNORMAL
LIPASE SERPL-CCNC: 65 U/L (ref 73–393)
LYMPHOCYTES # BLD AUTO: 0.74 THOUSANDS/ΜL (ref 0.6–4.47)
LYMPHOCYTES NFR BLD AUTO: 7 % (ref 14–44)
MAGNESIUM SERPL-MCNC: 1.8 MG/DL (ref 1.6–2.6)
MCH RBC QN AUTO: 30.2 PG (ref 26.8–34.3)
MCHC RBC AUTO-ENTMCNC: 32.2 G/DL (ref 31.4–37.4)
MCV RBC AUTO: 94 FL (ref 82–98)
MONOCYTES # BLD AUTO: 0.23 THOUSAND/ΜL (ref 0.17–1.22)
MONOCYTES NFR BLD AUTO: 2 % (ref 4–12)
NEUTROPHILS # BLD AUTO: 9.35 THOUSANDS/ΜL (ref 1.85–7.62)
NEUTS SEG NFR BLD AUTO: 91 % (ref 43–75)
NITRITE UR QL STRIP: NEGATIVE
NON-SQ EPI CELLS URNS QL MICRO: ABNORMAL /HPF
NRBC BLD AUTO-RTO: 0 /100 WBCS
P AXIS: 82 DEGREES
PH UR STRIP.AUTO: 7.5 [PH] (ref 4.5–8)
PLATELET # BLD AUTO: 215 THOUSANDS/UL (ref 149–390)
PMV BLD AUTO: 10.3 FL (ref 8.9–12.7)
POTASSIUM SERPL-SCNC: 3.8 MMOL/L (ref 3.5–5.3)
PR INTERVAL: 148 MS
PROT SERPL-MCNC: 7.4 G/DL (ref 6.4–8.2)
PROT UR STRIP-MCNC: NEGATIVE MG/DL
QRS AXIS: -22 DEGREES
QRSD INTERVAL: 74 MS
QT INTERVAL: 422 MS
QTC INTERVAL: 435 MS
RBC # BLD AUTO: 4.54 MILLION/UL (ref 3.81–5.12)
RBC #/AREA URNS AUTO: ABNORMAL /HPF
SODIUM SERPL-SCNC: 140 MMOL/L (ref 136–145)
SP GR UR STRIP.AUTO: 1.02 (ref 1–1.03)
T WAVE AXIS: 42 DEGREES
UROBILINOGEN UR QL STRIP.AUTO: 0.2 E.U./DL
VENTRICULAR RATE: 64 BPM
WBC # BLD AUTO: 10.37 THOUSAND/UL (ref 4.31–10.16)
WBC #/AREA URNS AUTO: ABNORMAL /HPF

## 2020-12-15 PROCEDURE — 99285 EMERGENCY DEPT VISIT HI MDM: CPT

## 2020-12-15 PROCEDURE — 96361 HYDRATE IV INFUSION ADD-ON: CPT

## 2020-12-15 PROCEDURE — 36415 COLL VENOUS BLD VENIPUNCTURE: CPT | Performed by: NURSE PRACTITIONER

## 2020-12-15 PROCEDURE — 76705 ECHO EXAM OF ABDOMEN: CPT

## 2020-12-15 PROCEDURE — 96375 TX/PRO/DX INJ NEW DRUG ADDON: CPT

## 2020-12-15 PROCEDURE — 99024 POSTOP FOLLOW-UP VISIT: CPT | Performed by: SURGERY

## 2020-12-15 PROCEDURE — 88304 TISSUE EXAM BY PATHOLOGIST: CPT | Performed by: PATHOLOGY

## 2020-12-15 PROCEDURE — 93010 ELECTROCARDIOGRAM REPORT: CPT | Performed by: INTERNAL MEDICINE

## 2020-12-15 PROCEDURE — 47562 LAPAROSCOPIC CHOLECYSTECTOMY: CPT | Performed by: SURGERY

## 2020-12-15 PROCEDURE — 83735 ASSAY OF MAGNESIUM: CPT | Performed by: NURSE PRACTITIONER

## 2020-12-15 PROCEDURE — 80053 COMPREHEN METABOLIC PANEL: CPT | Performed by: NURSE PRACTITIONER

## 2020-12-15 PROCEDURE — G9197 ORDER FOR CEPH: HCPCS | Performed by: SURGERY

## 2020-12-15 PROCEDURE — 85025 COMPLETE CBC W/AUTO DIFF WBC: CPT | Performed by: NURSE PRACTITIONER

## 2020-12-15 PROCEDURE — 74177 CT ABD & PELVIS W/CONTRAST: CPT

## 2020-12-15 PROCEDURE — 83690 ASSAY OF LIPASE: CPT | Performed by: NURSE PRACTITIONER

## 2020-12-15 PROCEDURE — G1004 CDSM NDSC: HCPCS

## 2020-12-15 PROCEDURE — 81001 URINALYSIS AUTO W/SCOPE: CPT

## 2020-12-15 PROCEDURE — 99285 EMERGENCY DEPT VISIT HI MDM: CPT | Performed by: NURSE PRACTITIONER

## 2020-12-15 PROCEDURE — 96374 THER/PROPH/DIAG INJ IV PUSH: CPT

## 2020-12-15 PROCEDURE — 93005 ELECTROCARDIOGRAM TRACING: CPT

## 2020-12-15 RX ORDER — GLYCOPYRROLATE 0.2 MG/ML
INJECTION INTRAMUSCULAR; INTRAVENOUS AS NEEDED
Status: DISCONTINUED | OUTPATIENT
Start: 2020-12-15 | End: 2020-12-15

## 2020-12-15 RX ORDER — ONDANSETRON 2 MG/ML
4 INJECTION INTRAMUSCULAR; INTRAVENOUS ONCE
Status: COMPLETED | OUTPATIENT
Start: 2020-12-15 | End: 2020-12-15

## 2020-12-15 RX ORDER — MIDAZOLAM HYDROCHLORIDE 2 MG/2ML
INJECTION, SOLUTION INTRAMUSCULAR; INTRAVENOUS AS NEEDED
Status: DISCONTINUED | OUTPATIENT
Start: 2020-12-15 | End: 2020-12-15

## 2020-12-15 RX ORDER — ONDANSETRON 4 MG/1
4 TABLET, FILM COATED ORAL EVERY 8 HOURS PRN
Qty: 20 TABLET | Refills: 0 | Status: SHIPPED | OUTPATIENT
Start: 2020-12-15 | End: 2021-01-20 | Stop reason: SDUPTHER

## 2020-12-15 RX ORDER — FENTANYL CITRATE/PF 50 MCG/ML
50 SYRINGE (ML) INJECTION
Status: DISCONTINUED | OUTPATIENT
Start: 2020-12-15 | End: 2020-12-15 | Stop reason: HOSPADM

## 2020-12-15 RX ORDER — PROPOFOL 10 MG/ML
INJECTION, EMULSION INTRAVENOUS AS NEEDED
Status: DISCONTINUED | OUTPATIENT
Start: 2020-12-15 | End: 2020-12-15

## 2020-12-15 RX ORDER — OMEPRAZOLE 20 MG/1
20 CAPSULE, DELAYED RELEASE ORAL DAILY
Qty: 30 CAPSULE | Refills: 0 | Status: SHIPPED | OUTPATIENT
Start: 2020-12-15 | End: 2022-03-29 | Stop reason: ALTCHOICE

## 2020-12-15 RX ORDER — FENTANYL CITRATE 50 UG/ML
INJECTION, SOLUTION INTRAMUSCULAR; INTRAVENOUS AS NEEDED
Status: DISCONTINUED | OUTPATIENT
Start: 2020-12-15 | End: 2020-12-15

## 2020-12-15 RX ORDER — OXYCODONE HYDROCHLORIDE 5 MG/1
5 TABLET ORAL EVERY 4 HOURS PRN
Status: DISCONTINUED | OUTPATIENT
Start: 2020-12-15 | End: 2020-12-15 | Stop reason: HOSPADM

## 2020-12-15 RX ORDER — HYDROMORPHONE HCL/PF 1 MG/ML
0.5 SYRINGE (ML) INJECTION
Status: DISCONTINUED | OUTPATIENT
Start: 2020-12-15 | End: 2020-12-15 | Stop reason: HOSPADM

## 2020-12-15 RX ORDER — MAGNESIUM HYDROXIDE 1200 MG/15ML
LIQUID ORAL AS NEEDED
Status: DISCONTINUED | OUTPATIENT
Start: 2020-12-15 | End: 2020-12-15 | Stop reason: HOSPADM

## 2020-12-15 RX ORDER — NEOSTIGMINE METHYLSULFATE 1 MG/ML
INJECTION INTRAVENOUS AS NEEDED
Status: DISCONTINUED | OUTPATIENT
Start: 2020-12-15 | End: 2020-12-15

## 2020-12-15 RX ORDER — KETOROLAC TROMETHAMINE 30 MG/ML
INJECTION, SOLUTION INTRAMUSCULAR; INTRAVENOUS AS NEEDED
Status: DISCONTINUED | OUTPATIENT
Start: 2020-12-15 | End: 2020-12-15

## 2020-12-15 RX ORDER — ACETAMINOPHEN 325 MG/1
650 TABLET ORAL EVERY 6 HOURS PRN
Status: DISCONTINUED | OUTPATIENT
Start: 2020-12-15 | End: 2020-12-15 | Stop reason: HOSPADM

## 2020-12-15 RX ORDER — SODIUM CHLORIDE, SODIUM LACTATE, POTASSIUM CHLORIDE, CALCIUM CHLORIDE 600; 310; 30; 20 MG/100ML; MG/100ML; MG/100ML; MG/100ML
125 INJECTION, SOLUTION INTRAVENOUS CONTINUOUS
Status: DISCONTINUED | OUTPATIENT
Start: 2020-12-15 | End: 2020-12-15 | Stop reason: HOSPADM

## 2020-12-15 RX ORDER — ROCURONIUM BROMIDE 10 MG/ML
INJECTION, SOLUTION INTRAVENOUS AS NEEDED
Status: DISCONTINUED | OUTPATIENT
Start: 2020-12-15 | End: 2020-12-15

## 2020-12-15 RX ORDER — HYDROCODONE BITARTRATE AND ACETAMINOPHEN 5; 325 MG/1; MG/1
2 TABLET ORAL EVERY 6 HOURS PRN
Status: DISCONTINUED | OUTPATIENT
Start: 2020-12-15 | End: 2020-12-15 | Stop reason: HOSPADM

## 2020-12-15 RX ORDER — CEFAZOLIN SODIUM 1 G/50ML
1000 SOLUTION INTRAVENOUS EVERY 8 HOURS
Status: DISCONTINUED | OUTPATIENT
Start: 2020-12-15 | End: 2020-12-15 | Stop reason: HOSPADM

## 2020-12-15 RX ORDER — OXYCODONE HYDROCHLORIDE 10 MG/1
10 TABLET ORAL EVERY 4 HOURS PRN
Status: DISCONTINUED | OUTPATIENT
Start: 2020-12-15 | End: 2020-12-15 | Stop reason: HOSPADM

## 2020-12-15 RX ORDER — EPHEDRINE SULFATE 50 MG/ML
INJECTION INTRAVENOUS AS NEEDED
Status: DISCONTINUED | OUTPATIENT
Start: 2020-12-15 | End: 2020-12-15

## 2020-12-15 RX ORDER — HYDROCODONE BITARTRATE AND ACETAMINOPHEN 5; 325 MG/1; MG/1
1 TABLET ORAL EVERY 6 HOURS PRN
Qty: 20 TABLET | Refills: 0 | Status: SHIPPED | OUTPATIENT
Start: 2020-12-15 | End: 2020-12-25

## 2020-12-15 RX ORDER — BUPIVACAINE HYDROCHLORIDE 2.5 MG/ML
INJECTION, SOLUTION EPIDURAL; INFILTRATION; INTRACAUDAL AS NEEDED
Status: DISCONTINUED | OUTPATIENT
Start: 2020-12-15 | End: 2020-12-15 | Stop reason: HOSPADM

## 2020-12-15 RX ORDER — DEXAMETHASONE SODIUM PHOSPHATE 4 MG/ML
INJECTION, SOLUTION INTRA-ARTICULAR; INTRALESIONAL; INTRAMUSCULAR; INTRAVENOUS; SOFT TISSUE AS NEEDED
Status: DISCONTINUED | OUTPATIENT
Start: 2020-12-15 | End: 2020-12-15

## 2020-12-15 RX ORDER — ONDANSETRON 2 MG/ML
INJECTION INTRAMUSCULAR; INTRAVENOUS AS NEEDED
Status: DISCONTINUED | OUTPATIENT
Start: 2020-12-15 | End: 2020-12-15

## 2020-12-15 RX ORDER — LIDOCAINE HYDROCHLORIDE 20 MG/ML
INJECTION, SOLUTION EPIDURAL; INFILTRATION; INTRACAUDAL; PERINEURAL AS NEEDED
Status: DISCONTINUED | OUTPATIENT
Start: 2020-12-15 | End: 2020-12-15

## 2020-12-15 RX ORDER — ONDANSETRON 2 MG/ML
4 INJECTION INTRAMUSCULAR; INTRAVENOUS EVERY 6 HOURS PRN
Status: DISCONTINUED | OUTPATIENT
Start: 2020-12-15 | End: 2020-12-15 | Stop reason: HOSPADM

## 2020-12-15 RX ORDER — ONDANSETRON 2 MG/ML
4 INJECTION INTRAMUSCULAR; INTRAVENOUS ONCE AS NEEDED
Status: DISCONTINUED | OUTPATIENT
Start: 2020-12-15 | End: 2020-12-15 | Stop reason: HOSPADM

## 2020-12-15 RX ORDER — HYDROCODONE BITARTRATE AND ACETAMINOPHEN 5; 325 MG/1; MG/1
1 TABLET ORAL EVERY 4 HOURS PRN
Status: DISCONTINUED | OUTPATIENT
Start: 2020-12-15 | End: 2020-12-15 | Stop reason: HOSPADM

## 2020-12-15 RX ORDER — HEPARIN SODIUM 5000 [USP'U]/ML
5000 INJECTION, SOLUTION INTRAVENOUS; SUBCUTANEOUS EVERY 8 HOURS SCHEDULED
Status: DISCONTINUED | OUTPATIENT
Start: 2020-12-15 | End: 2020-12-15 | Stop reason: HOSPADM

## 2020-12-15 RX ADMIN — IOHEXOL 100 ML: 350 INJECTION, SOLUTION INTRAVENOUS at 06:40

## 2020-12-15 RX ADMIN — DEXAMETHASONE SODIUM PHOSPHATE 4 MG: 4 INJECTION INTRA-ARTICULAR; INTRALESIONAL; INTRAMUSCULAR; INTRAVENOUS; SOFT TISSUE at 16:18

## 2020-12-15 RX ADMIN — EPHEDRINE SULFATE 15 MG: 50 INJECTION, SOLUTION INTRAVENOUS at 16:22

## 2020-12-15 RX ADMIN — METRONIDAZOLE 500 MG: 500 INJECTION, SOLUTION INTRAVENOUS at 11:50

## 2020-12-15 RX ADMIN — ROCURONIUM BROMIDE 40 MG: 10 INJECTION, SOLUTION INTRAVENOUS at 16:18

## 2020-12-15 RX ADMIN — FENTANYL CITRATE 50 MCG: 50 INJECTION, SOLUTION INTRAMUSCULAR; INTRAVENOUS at 16:47

## 2020-12-15 RX ADMIN — EPHEDRINE SULFATE 10 MG: 50 INJECTION, SOLUTION INTRAVENOUS at 16:24

## 2020-12-15 RX ADMIN — ONDANSETRON 4 MG: 2 INJECTION INTRAMUSCULAR; INTRAVENOUS at 16:18

## 2020-12-15 RX ADMIN — PROPOFOL 160 MG: 10 INJECTION, EMULSION INTRAVENOUS at 16:18

## 2020-12-15 RX ADMIN — CEFAZOLIN SODIUM 1000 MG: 1 SOLUTION INTRAVENOUS at 16:05

## 2020-12-15 RX ADMIN — SODIUM CHLORIDE 1000 ML: 0.9 INJECTION, SOLUTION INTRAVENOUS at 05:19

## 2020-12-15 RX ADMIN — LIDOCAINE HYDROCHLORIDE 80 MG: 20 INJECTION, SOLUTION EPIDURAL; INFILTRATION; INTRACAUDAL; PERINEURAL at 16:18

## 2020-12-15 RX ADMIN — CEFAZOLIN SODIUM 1000 MG: 1 SOLUTION INTRAVENOUS at 10:59

## 2020-12-15 RX ADMIN — MIDAZOLAM 2 MG: 1 INJECTION INTRAMUSCULAR; INTRAVENOUS at 16:09

## 2020-12-15 RX ADMIN — GLYCOPYRROLATE 0.4 MG: 0.2 INJECTION, SOLUTION INTRAMUSCULAR; INTRAVENOUS at 17:33

## 2020-12-15 RX ADMIN — ACETAMINOPHEN 650 MG: 325 TABLET ORAL at 14:31

## 2020-12-15 RX ADMIN — FENTANYL CITRATE 50 MCG: 50 INJECTION, SOLUTION INTRAMUSCULAR; INTRAVENOUS at 17:14

## 2020-12-15 RX ADMIN — SODIUM CHLORIDE, SODIUM LACTATE, POTASSIUM CHLORIDE, AND CALCIUM CHLORIDE 125 ML/HR: .6; .31; .03; .02 INJECTION, SOLUTION INTRAVENOUS at 11:00

## 2020-12-15 RX ADMIN — PHENYLEPHRINE HYDROCHLORIDE 100 MCG: 10 INJECTION INTRAVENOUS at 16:22

## 2020-12-15 RX ADMIN — HEPARIN SODIUM 5000 UNITS: 5000 INJECTION INTRAVENOUS; SUBCUTANEOUS at 14:21

## 2020-12-15 RX ADMIN — FAMOTIDINE 20 MG: 10 INJECTION INTRAVENOUS at 05:24

## 2020-12-15 RX ADMIN — ONDANSETRON 4 MG: 2 INJECTION INTRAMUSCULAR; INTRAVENOUS at 05:21

## 2020-12-15 RX ADMIN — KETOROLAC TROMETHAMINE 30 MG: 30 INJECTION, SOLUTION INTRAMUSCULAR at 17:33

## 2020-12-15 RX ADMIN — GLYCOPYRROLATE 0.2 MG: 0.2 INJECTION, SOLUTION INTRAMUSCULAR; INTRAVENOUS at 16:25

## 2020-12-15 RX ADMIN — FENTANYL CITRATE 100 MCG: 50 INJECTION, SOLUTION INTRAMUSCULAR; INTRAVENOUS at 16:18

## 2020-12-15 RX ADMIN — NEOSTIGMINE METHYLSULFATE 3 MG: 1 INJECTION, SOLUTION INTRAVENOUS at 17:33

## 2020-12-15 RX ADMIN — HYDROCODONE BITARTRATE AND ACETAMINOPHEN 1 TABLET: 5; 325 TABLET ORAL at 19:35

## 2020-12-22 ENCOUNTER — TELEPHONE (OUTPATIENT)
Dept: SURGERY | Facility: CLINIC | Age: 65
End: 2020-12-22

## 2020-12-29 ENCOUNTER — OFFICE VISIT (OUTPATIENT)
Dept: SURGERY | Facility: CLINIC | Age: 65
End: 2020-12-29

## 2020-12-29 VITALS
DIASTOLIC BLOOD PRESSURE: 60 MMHG | HEART RATE: 72 BPM | HEIGHT: 61 IN | BODY MASS INDEX: 24.58 KG/M2 | SYSTOLIC BLOOD PRESSURE: 102 MMHG | RESPIRATION RATE: 16 BRPM | TEMPERATURE: 96.7 F | WEIGHT: 130.2 LBS

## 2020-12-29 DIAGNOSIS — Z90.49 STATUS POST LAPAROSCOPIC CHOLECYSTECTOMY: Primary | ICD-10-CM

## 2020-12-29 PROBLEM — K80.00 ACUTE CHOLECYSTITIS DUE TO BILIARY CALCULUS: Status: RESOLVED | Noted: 2020-12-15 | Resolved: 2020-12-29

## 2020-12-29 PROCEDURE — 99024 POSTOP FOLLOW-UP VISIT: CPT | Performed by: SURGERY

## 2021-01-06 ENCOUNTER — TELEPHONE (OUTPATIENT)
Dept: NEUROLOGY | Facility: CLINIC | Age: 66
End: 2021-01-06

## 2021-01-06 NOTE — TELEPHONE ENCOUNTER
Called pt to confirm upcoming apt in 2 weeks on 1/20/21 with Dr Cayetano KAUFMANM on patients cell to call office    if any new/worsening symptoms to report? Asked pt if they are unable to keep apt or interested in virtual apt to please call office, also advised of no show fee  Advised we will call closer to day of apt for COVID screening

## 2021-01-20 ENCOUNTER — OFFICE VISIT (OUTPATIENT)
Dept: NEUROLOGY | Facility: CLINIC | Age: 66
End: 2021-01-20
Payer: MEDICARE

## 2021-01-20 VITALS
HEIGHT: 61 IN | SYSTOLIC BLOOD PRESSURE: 106 MMHG | DIASTOLIC BLOOD PRESSURE: 57 MMHG | HEART RATE: 68 BPM | WEIGHT: 133.9 LBS | BODY MASS INDEX: 25.28 KG/M2

## 2021-01-20 DIAGNOSIS — G43.009 MIGRAINE WITHOUT AURA AND WITHOUT STATUS MIGRAINOSUS, NOT INTRACTABLE: Primary | ICD-10-CM

## 2021-01-20 DIAGNOSIS — G43.109 MIGRAINE WITH AURA AND WITHOUT STATUS MIGRAINOSUS, NOT INTRACTABLE: ICD-10-CM

## 2021-01-20 PROCEDURE — 99214 OFFICE O/P EST MOD 30 MIN: CPT | Performed by: PSYCHIATRY & NEUROLOGY

## 2021-01-20 RX ORDER — RIZATRIPTAN BENZOATE 10 MG/1
10 TABLET ORAL ONCE AS NEEDED
Qty: 9 TABLET | Refills: 6 | Status: SHIPPED | OUTPATIENT
Start: 2021-01-20 | End: 2022-01-26 | Stop reason: SDUPTHER

## 2021-01-20 NOTE — PATIENT INSTRUCTIONS
- continue baby aspirin every day 81 mg      Headache/migraine treatment:   Abortive medications (for immediate treatment of a headache): It is ok to take ibuprofen, acetaminophen or naproxen (Advil, Tylenol,  Aleve, Excedrin) if they help your headaches you should limit these to No more than 3 times a week to avoid medication overuse/rebound headaches       For nausea  Ondansetron 4 mg ok to take as needed for nausea      For your more moderate to severe migraines take this medication early:  Continue Maxalt (rizatriptan) 10mg tabs - take one at the onset of headache  May repeat one time after 1-2 hours if pain has not resolved  (Max 2 a day and 9 a month)         Over the counter preventive supplements for headaches/migraines   (to take every day to help prevent headaches - not to take at the time of headache):   - Mind ease can be found online and all of these   [x]?  Magnesium 500mg daily   [x]? Riboflavin (Vitamin B2)  400mg daily   (FYI B2 may make your urine bright/neon yellow)     Prescription preventive medications for headaches/migraines   (to take every day to help prevent headaches - not to take at the time of headache):  [x]? Could consider in the future if needed     Lifestyle Recommendations:  [x]?  SLEEP - Maintain a regular sleep schedule: Adults need at least 7-8 hours of uninterrupted a night  Maintain good sleep hygiene:  Going to bed and waking up at consistent times, avoiding excessive daytime naps, avoiding caffeinated beverages in the evening, avoid excessive stimulation in the evening and generally using bed primarily for sleeping   One hour before bedtime would recommend turning lights down lower, decreasing your activity (may read quietly, listen to music at a low volume)  When you get into bed, should eliminate all technology (no texting, emailing, playing with your phone, iPad or tablet in bed)    [x]? HYDRATION - Maintain good hydration   Drink  2L of fluid a day (4 typical small water bottles)  [x]?  DIET - Maintain good nutrition  In particular don't skip meals and try and eat healthy balanced meals regularly  [x]?  TRIGGERS - Look for other triggers and avoid them: Limit caffeine to 1-2 cups a day or less  Avoid dietary triggers that you have noticed bring on your headaches (this could include aged cheese, peanuts, MSG, aspartame and nitrates)  [x]?  EXERCISE - physical exercise as we all know is good for you in many ways, and not only is good for your heart, but also is beneficial for your mental health, cognitive health and  chronic pain/headaches  I would encourage at the least 5 days of physical exercise weekly for at least 30 minutes       Education and Follow-up  [x]? Please call with any questions or concerns     [x]? Follow up 6 months, sooner as needed

## 2021-01-20 NOTE — PROGRESS NOTES
Review of Systems    {[id number]}      Review of Systems   Constitutional: Negative  Negative for appetite change and fever  HENT: Negative  Negative for hearing loss, tinnitus, trouble swallowing and voice change  Eyes: Negative  Negative for photophobia and pain  Respiratory: Negative  Negative for shortness of breath  Cardiovascular: Negative  Negative for palpitations  Gastrointestinal: Negative  Negative for nausea and vomiting  Endocrine: Negative  Negative for cold intolerance  Genitourinary: Negative  Negative for dysuria, frequency and urgency  Musculoskeletal: Negative  Negative for myalgias and neck pain  Skin: Negative  Negative for rash  Neurological: Positive for headaches (1 in the past 6 monhts)  Negative for dizziness, tremors, seizures, syncope, facial asymmetry, speech difficulty, weakness, light-headedness and numbness  Hematological: Negative  Does not bruise/bleed easily  Psychiatric/Behavioral: Negative  Negative for confusion, hallucinations and sleep disturbance

## 2021-01-20 NOTE — PROGRESS NOTES
[facility] Consult - Follow up   PATIENT:  Melanie Jiménez  MRN:  [id number]  :  1955  DATE OF SERVICE:  2021  REFERRED BY: No ref  provider found  PMD: Dar Kumar MD    Assessment/Plan:     Noe knox very pleasant 65 y o  female Past medical history includes migraines, GERD, hyperlipidemia, depression, arthritis, low blood pressure, bilateral carpal tunnel, abdominal pain, anemia, glaucoma, cataracts referred here for evaluation of headache  She previously followed with a neurologist in [place] and is transitioning care here  My initial evaluation 2019  Follow-up 2019, 2019, 20, 2020, 2021     Migraine without aura and without status migrainosus, not intractable  Acephalgic migraine with aura   The patient reports headaches/migraines since her teenage years       She reports various locations of her migraines, but typically left-sided, quality varies   Typical associated migrainous features  - as of 2019: She reports migraines currently her about twice a month, but in January she had an increase in visual auras/possible retinal migraine that was new and concerning for her and occured daily for a week  - as of 2019:  Only 2 migraines in the past 3 months which is improvement, she has been keeping track of her visual symptoms and she was worried they have increased although it seems to me they are occurring less often - and there seems to be a component of anxiety triggering them in some cases  - as of 2019: No auras or migraines at least the past 2 months  Overall improved  Mood and anxiety improved with therapist  Taking mg, b2  Sleep not great due to frozen shoulder pain  Will consider melatonin   Also discussed considering venlafaxine low dose if needed for mood/headaches at next visit    - as of 20:  Headaches/migraines are less frequent, with weather change in Oct had 2 migraines, 3rd caught early with the rizatriptan  Aura only 2 times in the past 4-5 months  Still taking magnesium (although some nausea sometimes, other times forgets), B2   - As of 7/7/2020: she remains well controlled  Only one migraine in 6 months that resolved with rizatriptan  About 4 auras in 6 months  Sometimes forgets to take aspirin    - as of 1/20/2021: She remains well controlled  Only one migraine in 6 months that resolved with rizatriptan       Work up:  - CTA head and neck with without contrast 03/13/2019: I have personally reviewed imaging and radiology read   1   No acute findings   Unremarkable CT appearance of the brain  2   Unremarkable CT angiogram of the head and neck  - follows closely with Ophthalmology every 4 months due to other eye issues including history of glaucoma and cataracts  - have asked her to try and get old MRI Brain from 10-15 years ago, but she doubts she would be able to      Preventive:  - discussed headache hygiene and lifestyle factors that could improve her headaches including mental Health Care, sleep quality and quantity, drastically increasing hydration - as of 05/22/2019 she has not done much of these  - headache preventative supplements:  Continue magnesium 400 mg which she started around January 2019,  continue riboflavin   - patient not interested in prescription preventative at this time  - future options: Deloris Edward has been on nortriptyline 10 mg in the past which she reports decreased frequency and severity of her headaches, but then she decided she wanted to get off of it and stopped 6 months ago, has had an increase in symptoms since   Does not want to go back on it at this time, but will consider in the future  Likely would consider venlafaxine  vs cyproheptadine      Abortive:  - previously patient was taking pain medication daily, however now no more than twice a week   We discussed medication overuse/rebound headache and recommended no more than 3 days per week   Used naproxen, acetaminophen  - she takes rizatriptan 10 mg, has been on this for years prescribed by previous neurologist, discussed proper use, possible side effects and risks  - has ondansetron 4 mg for nausea             Patient instructions         - continue baby aspirin every day 81 mg      Headache/migraine treatment:   Abortive medications (for immediate treatment of a headache): It is ok to take ibuprofen, acetaminophen or naproxen (Advil, Tylenol,  Aleve, Excedrin) if they help your headaches you should limit these to No more than 3 times a week to avoid medication overuse/rebound headaches       For nausea  Ondansetron 4 mg ok to take as needed for nausea      For your more moderate to severe migraines take this medication early:  Continue Maxalt (rizatriptan) 10mg tabs - take one at the onset of headache  May repeat one time after 1-2 hours if pain has not resolved  (Max 2 a day and 9 a month)         Over the counter preventive supplements for headaches/migraines   (to take every day to help prevent headaches - not to take at the time of headache):   - Mind ease can be found online and all of these   [x]?  Magnesium 500mg daily   [x]? Riboflavin (Vitamin B2)  400mg daily   (FYI B2 may make your urine bright/neon yellow)     Prescription preventive medications for headaches/migraines   (to take every day to help prevent headaches - not to take at the time of headache):  [x]? Could consider in the future if needed     Lifestyle Recommendations:  [x]?  SLEEP - Maintain a regular sleep schedule: Adults need at least 7-8 hours of uninterrupted a night   Maintain good sleep hygiene:  Going to bed and waking up at consistent times, avoiding excessive daytime naps, avoiding caffeinated beverages in the evening, avoid excessive stimulation in the evening and generally using bed primarily for sleeping   One hour before bedtime would recommend turning lights down lower, decreasing your activity (may read quietly, listen to music at a low volume)  When you get into bed, should eliminate all technology (no texting, emailing, playing with your phone, iPad or tablet in bed)  [x]? HYDRATION - Maintain good hydration   Drink  2L of fluid a day (4 typical small water bottles)  [x]?  DIET - Maintain good nutrition  In particular don't skip meals and try and eat healthy balanced meals regularly  [x]?  TRIGGERS - Look for other triggers and avoid them: Limit caffeine to 1-2 cups a day or less  Avoid dietary triggers that you have noticed bring on your headaches (this could include aged cheese, peanuts, MSG, aspartame and nitrates)  [x]?  EXERCISE - physical exercise as we all know is good for you in many ways, and not only is good for your heart, but also is beneficial for your mental health, cognitive health and  chronic pain/headaches  I would encourage at the least 5 days of physical exercise weekly for at least 30 minutes       Education and Follow-up  [x]? Please call with any questions or concerns  [x]? Follow up 6 months, sooner as needed      CC: We had the pleasure of evaluating Kaylan Young in neurological consultation today  Kaylan Young is right handed female who presents today for evaluation of headaches       History of Present Illness:   Interval history as of 01/20/2021  She denies any new or concerning symptoms  Headaches and migraines -  only 1 migraine in 6 months that resolved with rizatriptan and naproxen, one aura    - abortive: rizatriptan  - prevention: restarted magnesium, and restarting B2     - has lost weight, no sugar  - mood up and down overall better, following with counselor, denies depression     Interval history as of 7/7/2020:    Headaches/migraines   - migraine - only 1 in 6 months, took rizatriptan and it helped  - Aura - 4 times in 6 months without headache afterwards related to stress, lasting about 20 minutes       - abortive: rizatriptan     - prevention: no longer taking magnesium, B2 - because she is on a diet with supplements      - has lost some weight on a diet and feeling much better as far as mood, movements, sleep   - talking to counselor every 3 weeks      Interval history as of 01/06/2020  She reports improvement, no concerns     Headaches/migraines are less frequent  - weather change in Oct had 2 migraines, 3rd caught early with the rizatriptan  - aura only 2 times in the past 4-5 months  - taking magnesium, B2     Sleep - still not great due to frozen shoulder pain (also may hurt all day long)   - following with orthopedics  - tried melatonin and did not help     Seeing a therapist and it is helping a lot with multiple issues, mood much better  Not wearing sunglasses everywhere anymore     Interval history as of 09/24/2019:  - 07/15/2019 patient called in requesting rizatriptan that was previously prescribed by her past neurologist and works well for her migraines  Ok since history no longer sounds like retinal migraine - both eyes   - following with orthopedics for frozen shoulder, doing therapies  - sometimes the therapies with PT was causing cervicogenic headaches   - had some floaters a few weeks ago and saw eye doctor who confirmed floaters and nothing to worry about      - worried about this time of year because may get sinus headaches that turn into migraines      No auras or migraines at least the past 2 months  Saw counselor and helping mood - her therapist suggested considering low dose mood medications   discussed with patient and may consider in the future   - wearing glasses less, realizing that she may have been hiding behind them      Going to start yoga class soon       - Supplements - taking magnesium and B2      Interval history as of 05/22/2019:  - CTA head and neck 03/13/2019 was unremarkable except for incidental thyroid nodule with follow-up ultrasound by primary care  - has followed up with Pain Medicine and weight management  - 5/8/19 - dilated eye exam was unremarkable except for dry eyes   - our  provider a list of therapists, mood improved since wheather improved     - Rizatriptan - continues to help  - Supplements - taking magnesium and B2   - Prescription medication - not interested although         Headaches started at what age? [age]  How often do the headaches occur?   - as of 3/2019: 2/month  - as of 5/22/19: 2 migraines in past 3 ,   - Acephalgic Migraine with aura more often than migraine - vs nonspecific visual symptoms due to other reason 4/14, 21, 23, 25, and 5/8, 21 - she reports these seem to be even triggered by stress/anxiety  - as of 09/24/2019:  No auras or migraines at least the past 2 months  - as of 1/6/20:  Headaches/migraines are less frequent, with weather change in Oct had 2 migraines, 3rd caught early with the rizatriptan  Aura only 2 times in the past 4-5 months  Still taking magnesium (although some nausea sometimes, other times forgets), B2  What time of the day do the headaches start? Can wake up with them or anytime during the day   How long do the headaches last? 3-4 hours because she takes medicine, up to all day   Are you ever headache free? Yes     Aura? with aura  Acephalgic Migraine with aura more often than migraine     Jan 2019:   For a week straight 1-2 times a day had increase in visual aura/retinal migraine  Zigzags, decreased peripheral vision, blurry without strong migraine, milder headache - for 2-3 hours   - seems to be in both eyes, not like a curtain coming down (both eyes - not retinal migraine)  - gradually faded out and this month she is fine, started mag a few weeks ago   - would take medicine and go away  *last saw an eye doctor 2 months ago, goes every 4 months for glaucoma, cataracts, dry eye       Prior to that once in a while that would happen      Describe your usual headache pain quality? Dull all day sometimes, sharp and pulsating, shooting   Where is your headache located?   When full blown more on left side, frontal to parietal and then to the back  Lately zig zag around head  Numbness change in sensation to bioccipital region   Sinus pressure     What is the intensity of pain? 4-5/10, up to higher   Associated symptoms:   [x]? Nausea       [x]?  Vomiting        []? Diarrhea  [x]? Insomnia    []? Stiff or sore neck   [x]?  Problems with concentration  [x]? Photophobia     [x]? Phonophobia      []? Osmophobia  [x]? Blurred vision   [x]?  Prefer quiet, dark room  []? Light-headed or dizzy     []? Tinnitus   []? Hands or feet tingle or feel numb/paresthesias       []? Red ear      []? Ptosis      []? Facial droop  []? Lacrimation  []? Nasal congestion/rhinorrhea   []? Flushing of face  []? Change in pupil size      Things that make the headache worse? Bending down, any movement     Headache triggers:  Pressure when it rains/dr and cold  What time of the year do headaches occur more frequently?   More in Oct/Nov     Have you seen someone else for headaches or pain? Neurology   Have you had trigger point injection performed and how often? No  Have you had Botox injection performed and how often? No   Have you had epidural injections or transforaminal injections performed? Yes for lower back slipped disc   Have you used CBD or THC for your headaches and how often? No  Are you current pregnant or planning on getting pregnant? No  Have you ever had any Brain imaging? MRI Brain - maybe over 15-20 years ago      What medications do you take or have you taken for your headaches?    ABORTIVE:    2-3 times a week some sort of pain medication lately, previously was taking daily      Acetaminophen - a few days a week for back pain  Ibuprofen 800 mg - not as much now, takes naproxen instead  Rizatriptan 10 mg   Naproxen 500 mg - takes once a week, used to take every day      Diclofenac gel - for back   Lidocaine 5% gel - for back      Past for headache and back pain   Cyclobenzaprine 5 mg  - not taking (for back)  Tramadol 50 mg   - not taking (for back)  Meloxicam 15 mg - not taking   Ondansetron - does not take it      PREVENTIVE:   Magnesium 400 mg  Riboflavin 400 mg      Past:  Nortriptyline 10 mg - for 2 years, and did well as far as headaches not being as bad, then wanted to get off it, came off 6 months ago, increase in symptoms     Alternative therapies used in the past for headaches? no other headache interventions have been tried     LIFESTYLE  Sleep - averages 6-7 hours   - takes naps      Physical activity:  tries to get her to the gym twice a week   Water: a bottle a week   Mood:   - lately feels tired mentally tired  and depressed   - do not want to get up   - never has talked to a counselor - she used to do mentoring herself, never had depression in the past   - thinks going on for past 6 months, started after mom passed away   - PHQ-9 depression screen 02/27/2019:  Score of 17, denies suicidal ideation        The following portions of the patient's history were reviewed and updated as appropriate: allergies, current medications, past family history, past medical history, past social history, past surgical history and problem list         Pertinent family history:    Family history of headaches:  no known family members with significant headaches  Any family history of aneurysms - No     Pertinent social history:  Work:  Retired  Education: some college  Lives with   One trial     Illicit Drugs: denies  Alcohol/tobacco: Denies tobacco use, alcohol intake: social drinker            Past Medical History:     Past Medical History:   Diagnosis Date    Abdominal discomfort     Acute cholecystitis due to biliary calculus 12/15/2020    Anemia     Arthritis     Bronchitis     Chronic pain disorder     Dyslipidemia     Last Assessed 07/09/2012    GERD (gastroesophageal reflux disease)     Headache, migraine     Hypotension     Low back pain     Low back pain     [facility] arthritis [facility] )     Thyroid nodule 4/9/2019       Patient Active Problem List   Diagnosis    Migraine headache    Chronic bilateral low back pain without sciatica    Esophageal reflux    Arthritis    Duodenum disorder    Hyperlipidemia    Depression    Abnormal MRI of abdomen    Vitamin D deficiency    Anxiety disorder    Retinal migraine    Thyroid nodule    Obesity, Class I, BMI 30-34 9    Impaired fasting glucose    Chronic left shoulder pain    Lumbar spondylosis    Adhesive capsulitis of left shoulder    Current moderate episode of major depressive disorder without prior episode ([facility] )    Sleep disorder    Status post laparoscopic cholecystectomy       Medications:      Current Outpatient Medications   Medication Sig Dispense Refill    acetaminophen (TYLENOL) 325 mg tablet Take 2 tablets (650 mg total) by mouth every 6 (six) hours as needed for mild pain or fever 30 tablet 0    Cholecalciferol (VITAMIN D3) 5000 units CAPS Take 5,000 Units by mouth daily       cyclobenzaprine (FLEXERIL) 10 mg tablet Take 1 tablet (10 mg total) by mouth 3 (three) times a day (Patient taking differently: Take 10 mg by mouth as needed ) 20 tablet 0    diclofenac sodium (VOLTAREN) 1 % Apply topically every 6 (six) hours      docusate sodium (COLACE) 100 mg capsule Take 1 capsule (100 mg total) by mouth daily 30 capsule 5    hydrocortisone 2 5 % cream Apply topically 3 (three) times a day 30 g 0    ibuprofen (MOTRIN) 800 mg tablet every 8 (eight) hours as needed       LORazepam (ATIVAN) 1 mg tablet Take 1 tablet 1 hour before procedure  May repeat x1 if needed   2 tablet 0    Magnesium 400 MG CAPS Take 400 mg by mouth daily      naproxen (NAPROSYN) 500 mg tablet Take one tab up to twice a day, no more than 3 times a week 20 tablet 3    omega-3-acid ethyl esters (LOVAZA) 1 g capsule Take 2 g by mouth daily       polyethylene glycol (MIRALAX) 17 g packet Take 17 g by mouth daily 30 each 5    rizatriptan (MAXALT) 10 MG tablet Take 1 tablet (10 mg total) by mouth once as needed for migraine May repeat in 2 hours if needed  Max 2/24 hours, 9/month  9 tablet 6    omeprazole (PriLOSEC) 20 mg delayed release capsule Take 1 capsule (20 mg total) by mouth daily (Patient not taking: Reported on 2020) 30 capsule 0    ondansetron (ZOFRAN) 4 mg tablet Take 1 tablet (4 mg total) by mouth every 6 (six) hours (Patient not taking: Reported on 2021) 12 tablet 0    pantoprazole (PROTONIX) 40 mg tablet TAKE ONE TABLET BY MOUTH ONCE DAILY (Patient not taking: Reported on 12/15/2020) 30 tablet 11     No current facility-administered medications for this visit  Allergies:       Allergies   Allergen Reactions    Erythromycin Rash       Family History:     Family History   Problem Relation Age of Onset    Hypertension Mother     Other Mother         Dyslipidemia    Diabetes Father         Mellitus    No Known Problems Brother     No Known Problems Brother     Cancer Neg Hx     Heart disease Neg Hx     Stroke Neg Hx        Social History:     Social History     Socioeconomic History    Marital status: /Civil Union     Spouse name: Not on file    Number of children: Not on file    Years of education: Not on file    Highest education level: Not on file   Occupational History    Occupation: retired   Social Needs    Financial resource strain: Not on file    Food insecurity     Worry: Not on file     Inability: Not on file   [place] Z[place]ovio needs     Medical: Not on file     Non-medical: Not on file   Tobacco Use    Smoking status: Former Smoker     Quit date: 1985     Years since quittin 0    Smokeless tobacco: Never Used   Substance and Sexual Activity    Alcohol use: Yes     Comment: occasional  Maybe 5x per year    Drug use: No    Sexual activity: Yes     Partners: Male     Birth control/protection: Post-menopausal   Lifestyle    Physical activity     Days per week: Not on file     Minutes per session: Not on file    Stress: Not on file   Relationships    Social connections     Talks on phone: Not on file     Gets together: Not on file     Attends Gnosticism service: Not on file     Active member of club or organization: Not on file     Attends meetings of clubs or organizations: Not on file     Relationship status: Not on file    Intimate partner violence     Fear of current or ex partner: Not on file     Emotionally abused: Not on file     Physically abused: Not on file     Forced sexual activity: Not on file   Other Topics Concern    Not on file   Social History Narrative    Use Safety Equipment - [address]        Lives with spouse         Objective:     Physical Exam:                                                                 Vitals:            Constitutional:    /57 (BP Location: Right arm, Patient Position: Sitting, Cuff Size: Standard)   Pulse 68   Ht 5' 1" (1 549 m)   Wt 60 7 kg (133 lb 14 4 oz)   BMI 25 30 kg/m²   BP Readings from Last 3 Encounters:   01/20/21 106/57   12/29/20 102/60   12/15/20 118/59     Pulse Readings from Last 3 Encounters:   01/20/21 68   12/29/20 72   12/15/20 80         Well developed, well nourished, No dysmorphic features  HEENT:  Normocephalic atraumatic  See neuro exam   Psychiatric:  Normal behavior and appropriate affect        Neurological Examination:     Mental status/cognitive function:   Recent and remote memory appear intact  Attention span and concentration as well as fund of knowledge appear appropriate for age  Normal language and spontaneous speech  Cranial Nerves:  III, IV, VI-Pupils were equal, round  Extraocular movements appear full and conjugate   VII-facial expression symmetric  Motor Exam: symmetric bulk throughout  no atrophy, fasciculations or abnormal movements noted     Coordination:  no apparent dysmetria, ataxia or tremor noted      Pertinent lab results:   03/11/2020 CMP unremarkable except for glucose 101, CBC unremarkable  TSH normal  HIV 2 7     4/09/2019:  A1c 5 7     03/08/2019:  BMP and CBC unremarkable  Vitamin-D 44 2  B12 2, 557  TSH 2 02     12/11/18: CMP unremarkable   11/29/18: CBC significant for hgb 10 8     Imaging:   CTA head and neck with without contrast 03/13/2019: I have personally reviewed imaging and radiology read   1   No acute findings   Unremarkable CT appearance of the brain  2   Unremarkable CT angiogram of the head and neck  Review of Systems:   ROS obtained by medical assistant Personally reviewed and updated if indicated  Review of Systems   Constitutional: Negative  Negative for appetite change and fever  HENT: Negative  Negative for hearing loss, tinnitus, trouble swallowing and voice change  Eyes: Negative  Negative for photophobia and pain  Respiratory: Negative  Negative for shortness of breath  Cardiovascular: Negative  Negative for palpitations  Gastrointestinal: Negative  Negative for nausea and vomiting  Endocrine: Negative  Negative for cold intolerance  Genitourinary: Negative  Negative for dysuria, frequency and urgency  Musculoskeletal: Negative  Negative for myalgias and neck pain  Skin: Negative  Negative for rash  Neurological: Positive for headaches (1 in the past 6 monhts)  Negative for dizziness, tremors, seizures, syncope, facial asymmetry, speech difficulty, weakness, light-headedness and numbness  Hematological: Negative  Does not bruise/bleed easily  Psychiatric/Behavioral: Negative  Negative for confusion, hallucinations and sleep disturbance  I have spent 20 minutes with Patient  today in which greater than 50% of this time was spent in counseling/coordination of care  I also spent 10 minutes non face to face for this patient the same day         Author:  Jayce Diaz MD 1/20/2021 9:55 AM

## 2021-01-23 ENCOUNTER — PATIENT MESSAGE (OUTPATIENT)
Dept: FAMILY MEDICINE CLINIC | Facility: CLINIC | Age: 66
End: 2021-01-23

## 2021-01-25 NOTE — TELEPHONE ENCOUNTER
From: Livia Hayward  To: Gerson Muir MD  Sent: 1/23/2021 11:20 AM EST  Subject: Non-Urgent Medical Question    I filled out the questionnaire for the Covid 19 vaccination, does your office notify, me when I am scheduled? It does not state if I will be notified  When I finished it just shows date preregistration date

## 2021-02-18 DIAGNOSIS — G43.109 MIGRAINE WITH AURA AND WITHOUT STATUS MIGRAINOSUS, NOT INTRACTABLE: ICD-10-CM

## 2021-02-18 RX ORDER — NAPROXEN 500 MG/1
TABLET ORAL
Qty: 30 TABLET | Refills: 6 | Status: SHIPPED | OUTPATIENT
Start: 2021-02-18 | End: 2021-10-07 | Stop reason: SDUPTHER

## 2021-03-10 DIAGNOSIS — Z23 ENCOUNTER FOR IMMUNIZATION: ICD-10-CM

## 2021-03-24 ENCOUNTER — OFFICE VISIT (OUTPATIENT)
Dept: FAMILY MEDICINE CLINIC | Facility: CLINIC | Age: 66
End: 2021-03-24
Payer: MEDICARE

## 2021-03-24 VITALS
WEIGHT: 136 LBS | HEART RATE: 68 BPM | SYSTOLIC BLOOD PRESSURE: 94 MMHG | DIASTOLIC BLOOD PRESSURE: 60 MMHG | HEIGHT: 61 IN | OXYGEN SATURATION: 98 % | RESPIRATION RATE: 18 BRPM | BODY MASS INDEX: 25.68 KG/M2

## 2021-03-24 DIAGNOSIS — Z00.00 MEDICARE ANNUAL WELLNESS VISIT, SUBSEQUENT: Primary | ICD-10-CM

## 2021-03-24 DIAGNOSIS — K21.9 GASTROESOPHAGEAL REFLUX DISEASE WITHOUT ESOPHAGITIS: ICD-10-CM

## 2021-03-24 DIAGNOSIS — F32.1 CURRENT MODERATE EPISODE OF MAJOR DEPRESSIVE DISORDER WITHOUT PRIOR EPISODE (HCC): ICD-10-CM

## 2021-03-24 DIAGNOSIS — R73.01 IMPAIRED FASTING GLUCOSE: ICD-10-CM

## 2021-03-24 DIAGNOSIS — F32.A DEPRESSION, UNSPECIFIED DEPRESSION TYPE: ICD-10-CM

## 2021-03-24 DIAGNOSIS — E78.5 HYPERLIPIDEMIA, UNSPECIFIED HYPERLIPIDEMIA TYPE: ICD-10-CM

## 2021-03-24 DIAGNOSIS — E66.3 OVERWEIGHT WITH BODY MASS INDEX (BMI) OF 25 TO 25.9 IN ADULT: ICD-10-CM

## 2021-03-24 DIAGNOSIS — G43.109 MIGRAINE WITH AURA AND WITHOUT STATUS MIGRAINOSUS, NOT INTRACTABLE: ICD-10-CM

## 2021-03-24 DIAGNOSIS — F41.9 ANXIETY DISORDER, UNSPECIFIED TYPE: ICD-10-CM

## 2021-03-24 PROBLEM — M75.02 ADHESIVE CAPSULITIS OF LEFT SHOULDER: Status: RESOLVED | Noted: 2019-12-13 | Resolved: 2021-03-24

## 2021-03-24 PROCEDURE — 1123F ACP DISCUSS/DSCN MKR DOCD: CPT | Performed by: FAMILY MEDICINE

## 2021-03-24 PROCEDURE — 99214 OFFICE O/P EST MOD 30 MIN: CPT | Performed by: FAMILY MEDICINE

## 2021-03-24 PROCEDURE — G0438 PPPS, INITIAL VISIT: HCPCS | Performed by: FAMILY MEDICINE

## 2021-03-24 NOTE — PROGRESS NOTES
Assessment/Plan:    Esophageal reflux  Her symptoms are managed with occasional pantoprazole  Migraine headache  She continues to get auras but she is able to prevent full migraine headaches with Maxalt  She will continue with neurology follow-up  Depression  She is doing well with counseling every 2 weeks    Anxiety disorder  She only takes rare lorazepam for flying or procedure       Diagnoses and all orders for this visit:    Medicare annual wellness visit, subsequent    Overweight with body mass index (BMI) of 25 to 25 9 in adult    Gastroesophageal reflux disease without esophagitis    Migraine with aura and without status migrainosus, not intractable    Depression, unspecified depression type    Anxiety disorder, unspecified type    Current moderate episode of major depressive disorder without prior episode ([facility] [address] )    Hyperlipidemia, unspecified hyperlipidemia type  -     Lipid panel; Future  -     Comprehensive metabolic panel; Future    Impaired fasting glucose  -     HEMOGLOBIN A1C W/ EAG ESTIMATION; Future          Subjective:      Patient ID: Deven Santillan is a 78 y o  female  She is here for annual Wellness visit and follow-up of chronic problems  She has been feeling well in general   She had cholecystectomy in December and she has recovered well  She still gets occasional migraine headaches, but she is usually able to take Maxalt as soon as she has an are a and avoid the full-blown headache  GERD is better  She manages with occasional omeprazole  Occasional LBP with left sciatic sx  Left shoulder is much better  Full ROM without pain    She received her 1st COVID vaccine and is scheduled for the 2nd vaccine early April  Will plan the Pneumovax at next visit        The following portions of the patient's history were reviewed and updated as appropriate: allergies, current medications, past family history, past medical history, past social history, past surgical history and problem list     Review of Systems   Constitutional: Negative for activity change, chills, fatigue and fever  HENT: Negative for congestion, ear pain, sinus pressure and sore throat  Eyes: Negative for pain and visual disturbance  Respiratory: Negative for cough, chest tightness, shortness of breath and wheezing  Cardiovascular: Negative for chest pain, palpitations and leg swelling  Gastrointestinal: Negative for abdominal pain, blood in stool, constipation, diarrhea, nausea and vomiting  Endocrine: Negative for polydipsia and polyuria  Genitourinary: Negative for difficulty urinating, dysuria, frequency and urgency  Musculoskeletal: Positive for back pain  Negative for arthralgias, joint swelling and myalgias  Skin: Negative for rash  Neurological: Positive for headaches  Negative for dizziness, weakness and numbness  Hematological: Negative for adenopathy  Does not bruise/bleed easily  Psychiatric/Behavioral: Negative for dysphoric mood  The patient is not nervous/anxious  Objective:      BP 94/60   Pulse 68   Resp 18   Ht 5' 1" (1 549 m)   Wt 61 7 kg (136 lb)   SpO2 98%   BMI 25 70 kg/m²          Physical Exam  Vitals signs and nursing note reviewed  Constitutional:       General: She is not in acute distress  Appearance: Normal appearance  She is well-developed  HENT:      Head: Normocephalic and atraumatic  Right Ear: External ear normal       Left Ear: External ear normal       Nose: Nose normal       Mouth/Throat:      Mouth: Mucous membranes are moist    Eyes:      General: No scleral icterus  Conjunctiva/sclera: Conjunctivae normal       Pupils: Pupils are equal, round, and reactive to light  Neck:      Musculoskeletal: Normal range of motion and neck supple  Thyroid: No thyromegaly  Vascular: No carotid bruit  Cardiovascular:      Rate and Rhythm: Normal rate and regular rhythm  Pulses: Normal pulses        Heart sounds: Normal heart sounds  No murmur  Pulmonary:      Effort: Pulmonary effort is normal       Breath sounds: Normal breath sounds  No wheezing or rales  Abdominal:      General: Bowel sounds are normal       Palpations: Abdomen is soft  There is no mass  Tenderness: There is no abdominal tenderness  Musculoskeletal:         General: No tenderness or deformity  Right lower leg: No edema  Left lower leg: No edema  Lymphadenopathy:      Cervical: No cervical adenopathy  Skin:     General: Skin is warm and dry  Findings: No erythema or rash  Neurological:      Mental Status: She is alert and oriented to person, place, and time  Cranial Nerves: No cranial nerve deficit  Deep Tendon Reflexes: Reflexes are normal and symmetric     Psychiatric:         Mood and Affect: Mood normal          Behavior: Behavior normal

## 2021-03-24 NOTE — PROGRESS NOTES
Assessment and Plan:     Problem List Items Addressed This Visit     None      Visit Diagnoses     Medicare annual wellness visit, subsequent    -  Primary        BMI Counseling: Body mass index is 25 7 kg/m²  The BMI is above normal  Nutrition recommendations include decreasing portion sizes and encouraging healthy choices of fruits and vegetables  Exercise recommendations include moderate physical activity 150 minutes/week  No pharmacotherapy was ordered  Preventive health issues were discussed with patient, and age appropriate screening tests were ordered as noted in patient's After Visit Summary  Personalized health advice and appropriate referrals for health education or preventive services given if needed, as noted in patient's After Visit Summary       History of Present Illness:     Patient presents for Medicare Annual Wellness visit    Patient Care Team:  Kassie Ross MD as PCP - General (Family Medicine)     Problem List:     Patient Active Problem List   Diagnosis    Migraine headache    Chronic bilateral low back pain without sciatica    Esophageal reflux    Arthritis    Duodenum disorder    Hyperlipidemia    Depression    Abnormal MRI of abdomen    Vitamin D deficiency    Anxiety disorder    Retinal migraine    Thyroid nodule    Obesity, Class I, BMI 30-34 9    Impaired fasting glucose    Chronic left shoulder pain    Lumbar spondylosis    Adhesive capsulitis of left shoulder    Current moderate episode of major depressive disorder without prior episode ([address] )    Sleep disorder    Status post laparoscopic cholecystectomy      Past Medical and Surgical History:     Past Medical History:   Diagnosis Date    Abdominal discomfort     Acute cholecystitis due to biliary calculus 12/15/2020    Anemia     Arthritis     Bronchitis     Chronic pain disorder     Dyslipidemia     Last Assessed 07/09/2012    GERD (gastroesophageal reflux disease)     Headache, migraine     Hypotension     Low back pain     Low back pain     Rheumatoid arthritis ([facility] [address] )     Thyroid nodule 2019     Past Surgical History:   Procedure Laterality Date    CATARACT EXTRACTION       SECTION      CHOLECYSTECTOMY LAPAROSCOPIC N/A 12/15/2020    Procedure: CHOLECYSTECTOMY LAPAROSCOPIC;  Surgeon: Alec Gatica MD;  Location: [facility];  Service: General    ESOPHAGOGASTRODUODENOSCOPY N/A 2018    Procedure: ESOPHAGOGASTRODUODENOSCOPY (EGD) with push enteroscopy and bx;  Surgeon: Bruna Shen DO;  Location: [facility]; Service: Gastroenterology    EYE SURGERY      NERVE SURGERY Right     Hand    MS EDG US EXAM SURGICAL ALTER STOM DUODENUM/JEJUNUM N/A 3/6/2019    Procedure: LINEAR ENDOSCOPIC U/S;  Surgeon: Basil Cosme MD;  Location:  GI LAB;   Service: Gastroenterology    UPPER GASTROINTESTINAL ENDOSCOPY        Family History:     Family History   Problem Relation Age of Onset    Hypertension Mother     Other Mother         Dyslipidemia    Diabetes Father         Mellitus    No Known Problems Brother     No Known Problems Brother     Cancer Neg Hx     Heart disease Neg Hx     Stroke Neg Hx       Social History:        Social History     Socioeconomic History    Marital status: /Civil Union     Spouse name: Not on file    Number of children: Not on file    Years of education: Not on file    Highest education level: Not on file   Occupational History    Occupation: retired   Social Needs    Financial resource strain: Not on file    Food insecurity     Worry: Not on file     Inability: Not on file   [employer] needs     Medical: Not on file     Non-medical: Not on file   Tobacco Use    Smoking status: Former Smoker     Quit date: 1985     Years since quittin 2    Smokeless tobacco: Never Used   Substance and Sexual Activity    Alcohol use: Yes     Comment: occasional  Maybe 5x per year    Drug use: No    Sexual activity: Yes     Partners: Male Birth control/protection: Post-menopausal   Lifestyle    Physical activity     Days per week: Not on file     Minutes per session: Not on file    Stress: Not on file   Relationships    Social connections     Talks on phone: Not on file     Gets together: Not on file     Attends [employer] service: Not on file     Active member of club or organization: Not on file     Attends meetings of clubs or organizations: Not on file     Relationship status: Not on file    Intimate partner violence     Fear of current or ex partner: Not on file     Emotionally abused: Not on file     Physically abused: Not on file     Forced sexual activity: Not on file   Other Topics Concern    Not on file   Social History Narrative    Use Safety Equipment - Seatbelts        Lives with spouse      Medications and Allergies:     Current Outpatient Medications   Medication Sig Dispense Refill    acetaminophen (TYLENOL) 325 mg tablet Take 2 tablets (650 mg total) by mouth every 6 (six) hours as needed for mild pain or fever 30 tablet 0    Cholecalciferol (VITAMIN D3) 5000 units CAPS Take 5,000 Units by mouth daily       cyclobenzaprine (FLEXERIL) 10 mg tablet Take 1 tablet (10 mg total) by mouth 3 (three) times a day (Patient taking differently: Take 10 mg by mouth as needed ) 20 tablet 0    diclofenac sodium (VOLTAREN) 1 % Apply topically every 6 (six) hours      docusate sodium (COLACE) 100 mg capsule Take 1 capsule (100 mg total) by mouth daily 30 capsule 5    hydrocortisone 2 5 % cream Apply topically 3 (three) times a day 30 g 0    ibuprofen (MOTRIN) 800 mg tablet every 8 (eight) hours as needed       LORazepam (ATIVAN) 1 mg tablet Take 1 tablet 1 hour before procedure  May repeat x1 if needed   2 tablet 0    Magnesium 400 MG CAPS Take 400 mg by mouth daily      naproxen (NAPROSYN) 500 mg tablet Take one tab up to twice a day, no more than 3 times a week 30 tablet 6    omega-3-acid ethyl esters (LOVAZA) 1 g capsule Take 2 g by mouth daily       omeprazole (PriLOSEC) 20 mg delayed release capsule Take 1 capsule (20 mg total) by mouth daily (Patient not taking: Reported on 12/29/2020) 30 capsule 0    ondansetron (ZOFRAN) 4 mg tablet Take 1 tablet (4 mg total) by mouth every 6 (six) hours (Patient not taking: Reported on 1/20/2021) 12 tablet 0    pantoprazole (PROTONIX) 40 mg tablet TAKE ONE TABLET BY MOUTH ONCE DAILY (Patient not taking: Reported on 12/15/2020) 30 tablet 11    polyethylene glycol (MIRALAX) 17 g packet Take 17 g by mouth daily 30 each 5    rizatriptan (MAXALT) 10 MG tablet Take 1 tablet (10 mg total) by mouth once as needed for migraine May repeat in 2 hours if needed  Max 2/24 hours, 9/month  9 tablet 6     No current facility-administered medications for this visit  Allergies   Allergen Reactions    Erythromycin Rash      Immunizations:     Immunization History   Administered Date(s) Administered    INFLUENZA 12/28/2015, 11/22/2016    Influenza, high dose seasonal 0 7 mL 11/11/2019, 10/15/2020    Influenza, recombinant, quadrivalent,injectable, preservative free 11/29/2018    Pneumococcal Conjugate 13-Valent 03/05/2020    SARS-CoV-2 / COVID-19 mRNA IM (Moderna) 03/08/2021    Tdap 12/28/2015    Zoster Vaccine Recombinant 08/15/2019, 11/01/2019      Health Maintenance:         Topic Date Due    MAMMOGRAM  Never done    Colorectal Cancer Screening  03/16/2023    Hepatitis C Screening  Completed         Topic Date Due    COVID-19 Vaccine (1) Never done    Pneumococcal Vaccine: 65+ Years (2 of 2 - PPSV23) 03/05/2021      Medicare Health Risk Assessment:     There were no vitals taken for this visit  Mallory Cox is here for her Subsequent Wellness visit  Health Risk Assessment:   Patient rates overall health as good  Patient feels that their physical health rating is same  Patient is satisfied with their life  Eyesight was rated as same  Hearing was rated as same   Patient feels that their emotional and mental health rating is slightly better  Patients states they are never, rarely angry  Patient states they are never, rarely unusually tired/fatigued  Pain experienced in the last 7 days has been some  Patient's pain rating has been 6/10  Patient states that she has experienced no weight loss or gain in last 6 months  Depression Screening:   PHQ-2 Score: 1  PHQ-9 Score: 5      Fall Risk Screening: In the past year, patient has experienced: no history of falling in past year      Urinary Incontinence Screening:   Patient has not leaked urine accidently in the last six months  Home Safety:  Patient does not have trouble with stairs inside or outside of their home  Patient has working smoke alarms and has working carbon monoxide detector  Home safety hazards include: none  Nutrition:   Current diet is Regular, Low Carb, No Added Salt and Limited junk food  Medications:   Patient is currently taking over-the-counter supplements  OTC medications include: see medication list  Patient is able to manage medications  Activities of Daily Living (ADLs)/Instrumental Activities of Daily Living (IADLs):   Walk and transfer into and out of bed and chair?: Yes  Dress and groom yourself?: Yes    Bathe or shower yourself?: Yes    Feed yourself?  Yes  Do your laundry/housekeeping?: Yes  Manage your money, pay your bills and track your expenses?: Yes  Make your own meals?: Yes    Do your own shopping?: Yes    Previous Hospitalizations:   Any hospitalizations or ED visits within the last 12 months?: Yes    How many hospitalizations have you had in the last year?: 1-2    Advance Care Planning:   Living will: No    Durable POA for healthcare: No    Advanced directive: No      PREVENTIVE SCREENINGS      Cardiovascular Screening:    General: Screening Not Indicated and History Lipid Disorder      Diabetes Screening:     General: Screening Current      Colorectal Cancer Screening:     General: Screening Current      Cervical Cancer Screening:    General: Screening Not Indicated      Lung Cancer Screening:     General: Screening Not Indicated      Hepatitis C Screening:    General: Screening Current    Screening, Brief Intervention, and Referral to Treatment (SBIRT)    Screening      Single Item Drug Screening:  How often have you used an illegal drug (including marijuana) or a prescription medication for non-medical reasons in the past year? never    Single Item Drug Screen Score: 0  Interpretation: Negative screen for possible drug use disorder      Yolis Heaton MD

## 2021-03-24 NOTE — ASSESSMENT & PLAN NOTE
She continues to get auras but she is able to prevent full migraine headaches with Maxalt  She will continue with neurology follow-up

## 2021-04-11 NOTE — ASSESSMENT & PLAN NOTE
[facility]  [facility]  [address] [address]  Dept: [phone]    ROBOTIC & MINIMALLY INVASIVE SURGERY  PROGRESS NOTE INITIAL EVALUATION     Patient: Abbi Reynolds        Service Date: 4/8/2021      HPI:     Chief Complaint   Patient presents with    Consultation     mesenteric duodenal compression        The patient is a pleasant 27y.o. year old female  With weight loss, who stands Height: 5' 2\" (157.5 cm) tall with a weight of Weight: 93 lb (42.2 kg) , resulting in a BMI of Body mass index is 17.01 kg/m². She reports she had a cholecystectomy September 2020. She states 2 months later she developed nausea and could not eat. She states meat and dairy worsen her nausea and vomiting. She complains of epigastric pain. She had an upper GI suggestive of SMA syndrome    The patient denies  a history of myocardial infarction, deep vein thrombosis, pulmonary embolism, renal failure, hepatic failure and stroke.       Medical History:  Past Medical History:   Diagnosis Date    Abdominal bloating     Anxiety     Bacterial vaginitis     Chest pain     Colicky right upper quadrant pain     Dysmenorrhea     GERD (gastroesophageal reflux disease)     H/O chlamydia infection     Nausea     Opiate addiction ([address]     Osteoarthritis     Pleurisy        Surgical History:  Past Surgical History:   Procedure Laterality Date    CHOLECYSTECTOMY      LAPAROSCOPY      TUBAL LIGATION         Family History:      Problem Relation Age of Onset    High Blood Pressure Mother     High Cholesterol Mother     Other Mother         RA    Coronary Art Dis Father     High Blood Pressure Father     High Cholesterol Father     Cancer Other        Social History:   Social History     Tobacco Use    Smoking status: Current Every Day Smoker    Smokeless tobacco: Never Used   Substance Use Topics    Alcohol use: Not on file    Drug use: Not on file She still gets migraine auras but not full blown headaches    She will cont neuro follow up Current Med List:  Current Outpatient Medications   Medication Sig Dispense Refill    busPIRone (BUSPAR) 10 MG tablet Take 10 mg by mouth 2 times daily      pantoprazole (PROTONIX) 40 MG tablet Take 40 mg by mouth 2 times daily      cyclobenzaprine (FLEXERIL) 10 MG tablet Take 10 mg by mouth 3 times daily as needed for Muscle spasms      ondansetron (ZOFRAN ODT) 4 MG disintegrating tablet Take 1 tablet by mouth every 8 hours as needed for Nausea 10 tablet 0    acetaminophen (TYLENOL) 650 MG suppository Place 650 mg rectally every 4 hours as needed for Fever      ketorolac (TORADOL) 10 MG tablet Take 1 tablet by mouth every 6 hours as needed for Pain 12 tablet 0    ibuprofen (ADVIL;MOTRIN) 800 MG tablet Take 800 mg by mouth every 8 hours as needed for Pain       No current facility-administered medications for this visit. Allergies   Allergen Reactions    Latex        SOCIAL:      This patient is alone for the evaluation today.       [x] HIV Risk Factors (i.e.) intravenous drug abuser; at risk sexual behavior; received blood products    [] TB Risk Factors (i.e.) Medically underserved, institutional care, foreign born, endemic area; exposure to active case    [] Hepatitis B&C Risk Factors (i.e.) Received blood transfusion prior to 1992; recreational drug use; high risk sexual behaviors; tattoos or body piercings; contact with blood or needle sticks in the workplace    Comprehension    Ability to grasp concepts and respond to questions:   [x] High   [] Medium   [] Low    Motivation    [x] Asks Questions; eager to learn   [] Needs education   [] Extreme anxiety    [] uncooperative   [] Denies need for education    English Speaking Ability    [x] Speaks English well   [x] Reads English well   [x] Understands spoken Justin Rosibel    [] Understands written English   [] No need for interpretive support      [] Might benefit from interpretive support   []  required for all services     REVIEW OF SYSTEMS: (Negative unless marked otherwise)       Do you or have you had any of the following? Cardiovascular YES NO Respiratory YES NO   High Blood Pressure   []   [] COPD   []   []   Heart Attack   []   [] TB/Positive skin Test   []   []   Congestive Heart Failure   []   [] Obstructive Sleep Apnea   []   []   Coronary Artery Disease   []   [] Asthma   []   [x]   Circulation Problems   []   []      Activity Intolerance   []   [] Gastrointestinal YES NO   Peripheral Vascular Disease   []   [] Gastric Problems   []   []        Colorectal problems   []   []   Hematological YES NO Ulcer disease   []   []   Bleeding Tendencies   []   [] Liver disease   []   []   Blood Transfusion last 30d   []   [] Gallstones   []   []   Anemia   []   [] Refulx or Heartburn   [x]   []   Blood Clots   []   []      High Cholesterol   []   [] Muscoloskeletal YES NO   High Triglycerides   []   [] Joint Limitations   []   []      Muscle Weakness   []   []   Eyes, Ears, Nose, Throat YES NO Multiple Sclerosis   []   []   Cataracts   []   [] Arthritis   [x]   []   Glasses   []   []      Blurred Vision   []   [] Cancer   []   []   Hearing Aids   []   [] Type:     Ringing in Ears   []   []      Difficulty Swallowing   []   [] Encodrine YES NO      Diabetes   []   []   Neurological YES NO Thyroid   []   []   Stroke   []   []      Seizure   []   [] Psychiatric Disorder YES NO   Dizziness/Blackouts/Fainting   [x]   [] Depression   [x]   []   Memory Impairement   []   [] Bipolar   []   []   Parkinson's   []   [] Anxiety disorder   [x]   []           Genitourinary/Gyn YES NO Skin Intact   [x]   []   Urinary Infection   []   [] Rash  x   Stones   []   []      Kidney Disease   []   []      Incontinent   []   []      Irregular menstrual cycles   []   []      Possibly Pregnant? []     []      Date of LMP:               PRESENT ILLNESS:     Weight Parameters  Weight 93 lb (42.2 kg)   Height 5' 2\" (1.575 m)   BMI Body mass index is 17.01 kg/m².    IBW     EBW Physician Review    [x] Past medical, family, & social history reviewed and discussed with patient. Review of surgery and post-surgical changes (by surgeon for surgical patients only)    [x] Lifelong diet expectations reviewed with patient    [x] Need for lifelong vitamin supplementation reviewed with patient    PHYSICAL EXAMINATION:      /72 (Site: Left Upper Arm, Position: Sitting, Cuff Size: Medium Adult)   Pulse 87   Ht 5' 2\" (1.575 m)   Wt 93 lb (42.2 kg)   LMP 03/16/2021 (Approximate)   BMI 17.01 kg/m²   Constitutional:  Vital signs are normal. The patient appears well-developed and well-nourished. HEENT:   Head: Normocephalic. Atraumatic  Eyes: pupils are equal and reactive. No scleral icterus is present. Neck: No mass and no thyromegaly present. Cardiovascular: Normal rate, regular rhythm, S1 normal and S2 normal.  Radial pulses present   Pulmonary/Chest: Effort normal and breath sounds normal. No retractions  Abdominal: Soft. Normal appearance. There is no organomegaly. No tenderness. There is no rigidity, no rebound, no guarding and no Jennings's sign. Musculoskeletal:        Right lower leg: Normal. No tenderness and no edema. Left lower leg: Normal. No tenderness and no edema. Neurological: The patient is alert and oriented. Moving all 4 extremities, sensation grossly intact bilateral  Skin: Skin is warm, dry and intact. Psychiatric: The patient has a normal mood and affect. Speech is normal and behavior is normal. Judgment and thought content normal. Cognition and memory are normal.       PLAN:       Diagnosis Orders   1.  Mesenteric duodenal compression syndrome (HCC)  CT ABDOMEN PELVIS W IV CONTRAST Additional Contrast? Oral    TSH    Comprehensive Metabolic Panel    Prealbumin            CT ordered to evaluate for SMA syndrome  Patient instructed to increase calories as tolerated  Will obtain records  If persistent weight loss may need to send to hospital, appears stable at this time  Check labs to evaluate source and extent of malnutrition  Follow up in 1 month        Electronically signed by Lj Hernandez DO on 4/11/2021 at 3:38 PM

## 2021-04-28 ENCOUNTER — APPOINTMENT (OUTPATIENT)
Dept: LAB | Facility: MEDICAL CENTER | Age: 66
End: 2021-04-28
Attending: FAMILY MEDICINE
Payer: MEDICARE

## 2021-04-28 DIAGNOSIS — R73.01 IMPAIRED FASTING GLUCOSE: ICD-10-CM

## 2021-04-28 DIAGNOSIS — E78.5 HYPERLIPIDEMIA, UNSPECIFIED HYPERLIPIDEMIA TYPE: ICD-10-CM

## 2021-04-28 LAB
ALBUMIN SERPL BCP-MCNC: 3.8 G/DL (ref 3.5–5)
ALP SERPL-CCNC: 56 U/L (ref 46–116)
ALT SERPL W P-5'-P-CCNC: 35 U/L (ref 12–78)
ANION GAP SERPL CALCULATED.3IONS-SCNC: 2 MMOL/L (ref 4–13)
AST SERPL W P-5'-P-CCNC: 18 U/L (ref 5–45)
BILIRUB SERPL-MCNC: 0.49 MG/DL (ref 0.2–1)
BUN SERPL-MCNC: 22 MG/DL (ref 5–25)
CALCIUM SERPL-MCNC: 10 MG/DL (ref 8.3–10.1)
CHLORIDE SERPL-SCNC: 109 MMOL/L (ref 100–108)
CHOLEST SERPL-MCNC: 250 MG/DL (ref 50–200)
CO2 SERPL-SCNC: 30 MMOL/L (ref 21–32)
CREAT SERPL-MCNC: 0.8 MG/DL (ref 0.6–1.3)
EST. AVERAGE GLUCOSE BLD GHB EST-MCNC: 105 MG/DL
GFR SERPL CREATININE-BSD FRML MDRD: 77 ML/MIN/1.73SQ M
GLUCOSE P FAST SERPL-MCNC: 93 MG/DL (ref 65–99)
HBA1C MFR BLD: 5.3 %
HDLC SERPL-MCNC: 64 MG/DL
LDLC SERPL CALC-MCNC: 163 MG/DL (ref 0–100)
NONHDLC SERPL-MCNC: 186 MG/DL
POTASSIUM SERPL-SCNC: 4.7 MMOL/L (ref 3.5–5.3)
PROT SERPL-MCNC: 7.3 G/DL (ref 6.4–8.2)
SODIUM SERPL-SCNC: 141 MMOL/L (ref 136–145)
TRIGL SERPL-MCNC: 116 MG/DL

## 2021-04-28 PROCEDURE — 80053 COMPREHEN METABOLIC PANEL: CPT

## 2021-04-28 PROCEDURE — 36415 COLL VENOUS BLD VENIPUNCTURE: CPT

## 2021-04-28 PROCEDURE — 83036 HEMOGLOBIN GLYCOSYLATED A1C: CPT

## 2021-04-28 PROCEDURE — 80061 LIPID PANEL: CPT

## 2021-05-03 ENCOUNTER — HOSPITAL ENCOUNTER (OUTPATIENT)
Dept: MAMMOGRAPHY | Facility: MEDICAL CENTER | Age: 66
Discharge: HOME | End: 2021-05-03
Attending: FAMILY MEDICINE
Payer: MEDICARE

## 2021-05-03 VITALS — WEIGHT: 136 LBS | BODY MASS INDEX: 25.68 KG/M2 | HEIGHT: 61 IN

## 2021-05-03 DIAGNOSIS — Z12.31 ENCOUNTER FOR SCREENING MAMMOGRAM FOR BREAST CANCER: ICD-10-CM

## 2021-05-03 PROCEDURE — 77063 BREAST TOMOSYNTHESIS BI: CPT

## 2021-05-03 PROCEDURE — 77067 SCR MAMMO BI INCL CAD: CPT

## 2021-05-13 ENCOUNTER — TRANSCRIBE ORDERS (OUTPATIENT)
Dept: MAMMOGRAPHY | Facility: CLINIC | Age: 66
End: 2021-05-13

## 2021-05-13 ENCOUNTER — HOSPITAL ENCOUNTER (OUTPATIENT)
Dept: ULTRASOUND IMAGING | Facility: CLINIC | Age: 66
Discharge: HOME | End: 2021-05-13
Payer: MEDICARE

## 2021-05-13 VITALS — HEIGHT: 61 IN | WEIGHT: 136 LBS | BODY MASS INDEX: 25.68 KG/M2

## 2021-05-13 DIAGNOSIS — R92.8 ABNORMAL MAMMOGRAM: ICD-10-CM

## 2021-05-13 DIAGNOSIS — R92.8 ABNORMAL MAMMOGRAM: Primary | ICD-10-CM

## 2021-05-13 PROCEDURE — 76642 ULTRASOUND BREAST LIMITED: CPT

## 2021-07-12 ENCOUNTER — TELEPHONE (OUTPATIENT)
Dept: NEUROLOGY | Facility: CLINIC | Age: 66
End: 2021-07-12

## 2021-07-12 NOTE — TELEPHONE ENCOUNTER
Called and left a voicemail for patient - Please call back to confirm upcoming appointment with Dr Emmanuel Maurer  Provided patient with apt date, time and location  Informed patient that check in is at least 15 minutes prior to apt time

## 2021-07-26 ENCOUNTER — OFFICE VISIT (OUTPATIENT)
Dept: NEUROLOGY | Facility: CLINIC | Age: 66
End: 2021-07-26
Payer: MEDICARE

## 2021-07-26 VITALS
HEIGHT: 61 IN | SYSTOLIC BLOOD PRESSURE: 106 MMHG | DIASTOLIC BLOOD PRESSURE: 54 MMHG | HEART RATE: 63 BPM | WEIGHT: 137 LBS | BODY MASS INDEX: 25.86 KG/M2

## 2021-07-26 DIAGNOSIS — G43.009 MIGRAINE WITHOUT AURA AND WITHOUT STATUS MIGRAINOSUS, NOT INTRACTABLE: ICD-10-CM

## 2021-07-26 DIAGNOSIS — H81.10 BPPV (BENIGN PAROXYSMAL POSITIONAL VERTIGO): ICD-10-CM

## 2021-07-26 DIAGNOSIS — G43.109 MIGRAINE AURA WITHOUT HEADACHE: Primary | ICD-10-CM

## 2021-07-26 PROCEDURE — 99214 OFFICE O/P EST MOD 30 MIN: CPT | Performed by: PSYCHIATRY & NEUROLOGY

## 2021-07-26 NOTE — PATIENT INSTRUCTIONS
If BPPV returns, we can refer you to PT     - continue baby aspirin every day 81 mg      Headache/migraine treatment:   Abortive medications (for immediate treatment of a headache): It is ok to take ibuprofen, acetaminophen or naproxen (Advil, Tylenol,  Aleve, Excedrin) if they help your headaches you should limit these to No more than 3 times a week to avoid medication overuse/rebound headaches       For nausea  Ondansetron 4 mg ok to take as needed for nausea      For your more moderate to severe migraines take this medication early:  Continue Maxalt (rizatriptan) 10mg tabs - take one at the onset of headache  May repeat one time after 1-2 hours if pain has not resolved  (Max 2 a day and 9 a month)         Over the counter preventive supplements for headaches/migraines   (to take every day to help prevent headaches - not to take at the time of headache):   - Mind ease can be found online and all of these   [x]?  Magnesium 400mg daily   [x]? Riboflavin (Vitamin B2)  400mg daily   (FYI B2 may make your urine bright/neon yellow)     Prescription preventive medications for headaches/migraines   (to take every day to help prevent headaches - not to take at the time of headache):  [x]? Could consider in the future if needed     Lifestyle Recommendations:  [x]?  SLEEP - Maintain a regular sleep schedule: Adults need at least 7-8 hours of uninterrupted a night  Maintain good sleep hygiene:  Going to bed and waking up at consistent times, avoiding excessive daytime naps, avoiding caffeinated beverages in the evening, avoid excessive stimulation in the evening and generally using bed primarily for sleeping   One hour before bedtime would recommend turning lights down lower, decreasing your activity (may read quietly, listen to music at a low volume)  When you get into bed, should eliminate all technology (no texting, emailing, playing with your phone, iPad or tablet in bed)  [x]? HYDRATION - Maintain good hydration   Drink  2L of fluid a day (4 typical small water bottles)  [x]?  DIET - Maintain good nutrition  In particular don't skip meals and try and eat healthy balanced meals regularly  [x]?  TRIGGERS - Look for other triggers and avoid them: Limit caffeine to 1-2 cups a day or less  Avoid dietary triggers that you have noticed bring on your headaches (this could include aged cheese, peanuts, MSG, aspartame and nitrates)  [x]?  EXERCISE - physical exercise as we all know is good for you in many ways, and not only is good for your heart, but also is beneficial for your mental health, cognitive health and  chronic pain/headaches  I would encourage at the least 5 days of physical exercise weekly for at least 30 minutes       Education and Follow-up  [x]? Please call with any questions or concerns     [x]? Follow up 6 months, sooner as needed

## 2021-07-26 NOTE — PROGRESS NOTES
[address] [facility] Consult - Follow up   PATIENT:  Robby Mckeon  MRN:  [id number]  :  1955  DATE OF SERVICE:  2021  REFERRED BY: No ref  provider found  PMD: Hussain Peterson MD    Assessment/Plan:     Jatinder Richardson a very pleasant 66 y o  female with a past medical history includes  Chronic back pain, chronic left shoulder pain, chronic pain disorder, migraines, GERD, hyperlipidemia, depression, arthritis, low blood pressure, bilateral carpal tunnel, abdominal pain,  Sleep disorder,  Vitamin-D deficiency, anemia, glaucoma, cataracts referred here for evaluation of headache  She previously followed with a neurologist in [place] and is transitioning care here  My initial evaluation 2019  Follow-up 2019, 2019, 20, 2020, 2021, 2021     Migraine without aura and without status migrainosus, not intractable  Acephalgic migraine with aura  BPPV - Benign paroxysmal positional vertigo   The patient reports headaches/migraines since her teenage years       She reports various locations of her migraines, but typically left-sided, quality varies   Typical associated migrainous features  - as of 2019: She reports migraines currently occur about twice a month, but in January she had an increase in visual auras occured daily for a week  - as of 2019:  Only 2 migraines in the past 3 months which is improvement, she has been keeping track of her visual symptoms and she was worried they have increased although it seems to me they are occurring less often - and there seems to be a component of anxiety triggering them in some cases  - as of 2019: No auras or migraines at least the past 2 months  Overall improved  Mood and anxiety improved with therapist  Taking mg, b2  Sleep not great due to frozen shoulder pain  Will consider melatonin   Also discussed considering venlafaxine low dose if needed for mood/headaches at next visit    - as of 1/6/20:  Headaches/migraines are less frequent, with weather change in Oct had 2 migraines, 3rd caught early with the rizatriptan  Aura only 2 times in the past 4-5 months  Still taking magnesium (although some nausea sometimes, other times forgets), B2   - As of 7/7/2020: she remains well controlled  Only one migraine in 6 months that resolved with rizatriptan  About 4 auras in 6 months  Sometimes forgets to take aspirin    - as of 1/20/2021: She remains well controlled  Only one migraine in 6 months that resolved with rizatriptan  - as of 7/26/2021: She reports she continues to do well  No significant migraines requiring rizatriptan  3 milder per month that improve with naproxen  3-4 bilateral visual auras without headache in 6 months, that do not last as long as before  Recent episode that sounds like BPPV with negative HINTs exam today, likely resolved  Work up:  - CTA head and neck with without contrast 03/13/2019: I have personally reviewed imaging and radiology read   1   No acute findings   Unremarkable CT appearance of the brain  2   Unremarkable CT angiogram of the head and neck    - follows closely with Ophthalmology every 4 months due to other eye issues including history of glaucoma and cataracts  - have asked her to try and get old MRI Brain from 10-15 years ago, but she doubts she would be able to      Preventive:  - discussed headache hygiene and lifestyle factors that could improve her headaches including mental Health Care, sleep quality and quantity, drastically increasing hydration - as of 05/22/2019 she has not done much of these  - headache preventative supplements:  magnesium 400 mg, riboflavin   - patient not interested in prescription preventative at this time  -  Past: Nortriptyline 10 mg, propranolol and verapamil contraindicated due to history of hypotension  - future options: venlafaxine, cyproheptadine,  Gabapentin, topiramate, CGRP     Abortive:  - previously patient was taking pain medication daily, however now no more than twice a week   We discussed medication overuse/rebound headache and recommended no more than 3 days per week  Used naproxen, acetaminophen  - she takes rizatriptan 10 mg, has been on this for years prescribed by previous neurologist, discussed proper use, possible side effects and risks  - has ondansetron 4 mg for nausea  Discussed proper use, possible side effects and risks  - future options: Alternative Triptan,  prochlorperazine, Toradol IM or p o , could consider trial for 5 days of Depakote or dexamethasone 4 prolonged migraine, ubrelvy, reyvow, nurtec              Patient instructions      If BPPV returns, we can refer you to PT     - continue baby aspirin every day 81 mg      Headache/migraine treatment:   Abortive medications (for immediate treatment of a headache): It is ok to take ibuprofen, acetaminophen or naproxen (Advil, Tylenol,  Aleve, Excedrin) if they help your headaches you should limit these to No more than 3 times a week to avoid medication overuse/rebound headaches       For nausea  Ondansetron 4 mg ok to take as needed for nausea      For your more moderate to severe migraines take this medication early:  Continue Maxalt (rizatriptan) 10mg tabs - take one at the onset of headache  May repeat one time after 1-2 hours if pain has not resolved  (Max 2 a day and 9 a month)         Over the counter preventive supplements for headaches/migraines   (to take every day to help prevent headaches - not to take at the time of headache):   - Mind ease can be found online and all of these   [x]?  Magnesium 400mg daily   [x]? Riboflavin (Vitamin B2)  400mg daily   (FYI B2 may make your urine bright/neon yellow)     Prescription preventive medications for headaches/migraines   (to take every day to help prevent headaches - not to take at the time of headache):  [x]? Could consider in the future if needed     Lifestyle Recommendations:  [x]?  SLEEP - Maintain a regular sleep schedule: Adults need at least 7-8 hours of uninterrupted a night  Maintain good sleep hygiene:  Going to bed and waking up at consistent times, avoiding excessive daytime naps, avoiding caffeinated beverages in the evening, avoid excessive stimulation in the evening and generally using bed primarily for sleeping   One hour before bedtime would recommend turning lights down lower, decreasing your activity (may read quietly, listen to music at a low volume)  When you get into bed, should eliminate all technology (no texting, emailing, playing with your phone, iPad or tablet in bed)  [x]? HYDRATION - Maintain good hydration   Drink  2L of fluid a day (4 typical small water bottles)  [x]?  DIET - Maintain good nutrition  In particular don't skip meals and try and eat healthy balanced meals regularly  [x]?  TRIGGERS - Look for other triggers and avoid them: Limit caffeine to 1-2 cups a day or less  Avoid dietary triggers that you have noticed bring on your headaches (this could include aged cheese, peanuts, MSG, aspartame and nitrates)  [x]?  EXERCISE - physical exercise as we all know is good for you in many ways, and not only is good for your heart, but also is beneficial for your mental health, cognitive health and  chronic pain/headaches  I would encourage at the least 5 days of physical exercise weekly for at least 30 minutes       Education and Follow-up  [x]? Please call with any questions or concerns  [x]? Follow up 6 months, sooner as needed        CC:    We had the pleasure of evaluating Kaylan Young in neurological consultation today  Kaylan Young is right handed female who presents today for evaluation of headaches       History of Present Illness:   Interval history as of 7/26/2021  - mood - anxiety, depression  - following with PCP and counselor   - doing meditation   - for months feels like itchy/creepy crawling or stinging or pricks on anywhere of entire body at night  - rec'd talk to PCP    - a week ago had vertigo that started in bed and after rolling had room spinning and took a meclizine and then this made her sleepy (has had BPPV once before so tried to do the maneuvers) still feeling it slightly if she turns     Headaches and migraines   -  3 headaches per month -   - Auras without headache - 3-4 times in 6 months (lights and zig zags in both eyes)     Preventative:   Magnesium, riboflavin - not taking lately, upsets stomach   - melatonin at night is helping with sleep     Abortive: naproxen for milder headaches - helps, rizatriptan - hasn't taken in 6 months (has to lay in bed afterwards)    Interval history as of 01/20/2021  She denies any new or concerning symptoms  Headaches and migraines -  only 1 migraine in 6 months that resolved with rizatriptan and naproxen, one aura    - abortive: rizatriptan  - prevention: restarted magnesium, and restarting B2     - has lost weight, no sugar  - mood up and down overall better, following with counselor, denies depression     Interval history as of 7/7/2020:    Headaches/migraines   - migraine - only 1 in 6 months, took rizatriptan and it helped  - Aura - 4 times in 6 months without headache afterwards related to stress, lasting about 20 minutes       - abortive: rizatriptan  - prevention: no longer taking magnesium, B2 - because she is on a diet with supplements      - has lost some weight on a diet and feeling much better as far as mood, movements, sleep   - talking to counselor every 3 weeks      Interval history as of 01/06/2020  She reports improvement, no concerns     Headaches/migraines are less frequent  - weather change in Oct had 2 migraines, 3rd caught early with the rizatriptan  - aura only 2 times in the past 4-5 months  - taking magnesium, B2     Sleep - still not great due to frozen shoulder pain (also may hurt all day long)   - following with orthopedics  - tried melatonin and did not help     Seeing a therapist and it is helping a lot with multiple issues, mood much better  Not wearing sunglasses everywhere anymore     Interval history as of 09/24/2019:  - 07/15/2019 patient called in requesting rizatriptan that was previously prescribed by her past neurologist and works well for her migraines  Ok since history no longer sounds like retinal migraine - both eyes   - following with orthopedics for frozen shoulder, doing therapies  - sometimes the therapies with PT was causing cervicogenic headaches   - had some floaters a few weeks ago and saw eye doctor who confirmed floaters and nothing to worry about      - worried about this time of year because may get sinus headaches that turn into migraines      No auras or migraines at least the past 2 months  Saw counselor and helping mood - her therapist suggested considering low dose mood medications   discussed with patient and may consider in the future   - wearing glasses less, realizing that she may have been hiding behind them      Going to start yoga class soon       - Supplements - taking magnesium and B2      Interval history as of 05/22/2019:  - CTA head and neck 03/13/2019 was unremarkable except for incidental thyroid nodule with follow-up ultrasound by primary care  - has followed up with Pain Medicine and weight management  - 5/8/19 - dilated eye exam was unremarkable except for dry eyes   - our  provider a list of therapists, mood improved since wheather improved     - Rizatriptan - continues to help  - Supplements - taking magnesium and B2   - Prescription medication - not interested although         Headaches started at what age? [age]  How often do the headaches occur?   - as of 3/2019: 2/month  - as of 5/22/19: 2 migraines in past 3 ,   - Acephalgic Migraine with aura more often than migraine - vs nonspecific visual symptoms due to other reason 4/14, 21, 23, 25, and 5/8, 21 - she reports these seem to be even triggered by stress/anxiety  - as of 09/24/2019:  No auras or migraines at least the past 2 months  - as of 1/6/20:  Headaches/migraines are less frequent, with weather change in Oct had 2 migraines, 3rd caught early with the rizatriptan  Aura only 2 times in the past 4-5 months  Still taking magnesium (although some nausea sometimes, other times forgets), B2  What time of the day do the headaches start? Can wake up with them or anytime during the day   How long do the headaches last? 3-4 hours because she takes medicine, up to all day   Are you ever headache free? Yes     Aura? with aura  Acephalgic Migraine with aura more often than migraine     Jan 2019: For a week straight 1-2 times a day had increase in visual aura/retinal migraine  Zigzags, decreased peripheral vision, blurry without strong migraine, milder headache - for 2-3 hours   - seems to be in both eyes, not like a curtain coming down (both eyes - not retinal migraine)  - gradually faded out and this month she is fine, started mag a few weeks ago   - would take medicine and go away  *last saw an eye doctor 2 months ago, goes every 4 months for glaucoma, cataracts, dry eye       Prior to that once in a while that would happen      Describe your usual headache pain quality? Dull all day sometimes, sharp and pulsating, shooting   Where is your headache located?   When full blown more on left side, frontal to parietal and then to the back  Lately zig zag around head  Numbness change in sensation to bioccipital region   Sinus pressure     What is the intensity of pain? 4-5/10, up to higher   Associated symptoms:   [x]? Nausea       [x]?  Vomiting        []? Diarrhea  [x]? Insomnia    []? Stiff or sore neck   [x]?  Problems with concentration  [x]? Photophobia     [x]? Phonophobia      []? Osmophobia  [x]? Blurred vision   [x]?  Prefer quiet, dark room  []? Light-headed or dizzy     []? Tinnitus   []? Hands or feet tingle or feel numb/paresthesias       []? Red ear      []? Ptosis      []? Facial droop  []? Lacrimation  []? Nasal congestion/rhinorrhea   []? Flushing of face  []? Change in pupil size      Things that make the headache worse? Bending down, any movement     Headache triggers:  Pressure when it rains/dr and cold  What time of the year do headaches occur more frequently?   More in Oct/Nov     Have you seen someone else for headaches or pain? Neurology   Have you had trigger point injection performed and how often? No  Have you had Botox injection performed and how often? No   Have you had epidural injections or transforaminal injections performed? Yes for lower back slipped disc   Have you used CBD or THC for your headaches and how often? No  Are you current pregnant or planning on getting pregnant? No  Have you ever had any Brain imaging? MRI Brain - maybe over 15-20 years ago      What medications do you take or have you taken for your headaches?    ABORTIVE:    2-3 times a week some sort of pain medication lately, previously was taking daily      Acetaminophen - a few days a week for back pain  Ibuprofen 800 mg - not as much now, takes naproxen instead  Rizatriptan 10 mg   Naproxen 500 mg - takes once a week, used to take every day      Diclofenac gel - for back   Lidocaine 5% gel - for back      Past for headache and back pain   Cyclobenzaprine 5 mg  - not taking (for back)  Tramadol 50 mg   - not taking (for back)  Meloxicam 15 mg - not taking   Ondansetron - does not take it      PREVENTIVE:   Magnesium 400 mg  Riboflavin 400 mg      Past:  Nortriptyline 10 mg - for 2 years, and did well as far as headaches not being as bad, then wanted to get off it, came off 6 months ago, increase in symptoms     Alternative therapies used in the past for headaches? no other headache interventions have been tried     LIFESTYLE  Sleep - averages 6-7 hours   - takes naps      Physical activity:  tries to get her to the gym twice a week   Water: a bottle a week   Mood:   - lately feels tired mentally tired  and depressed   - do not want to get up   - never has talked to a counselor - she used to do mentoring herself, never had depression in the past   - thinks going on for past 6 months, started after mom passed away   - PHQ-9 depression screen 02/27/2019:  Score of 17, denies suicidal ideation        The following portions of the patient's history were reviewed and updated as appropriate: allergies, current medications, past family history, past medical history, past social history, past surgical history and problem list         Pertinent family history:    Family history of headaches:  no known family members with significant headaches  Any family history of aneurysms - No     Pertinent social history:  Work:  Retired  Education: some college  Lives with   One trial     Illicit Drugs: denies  Alcohol/tobacco: Denies tobacco use, alcohol intake: social drinker         Past Medical History:     Past Medical History:   Diagnosis Date    Abdominal discomfort     Acute cholecystitis due to biliary calculus 12/15/2020    Anemia     Arthritis     Bronchitis     Chronic pain disorder     Dyslipidemia     Last Assessed 07/09/2012    GERD (gastroesophageal reflux disease)     Headache, migraine     Hypotension     Low back pain     Low back pain     Rheumatoid arthritis ([facility] )     Thyroid nodule 4/9/2019       Patient Active Problem List   Diagnosis    Migraine headache    Chronic bilateral low back pain without sciatica    Esophageal reflux    Arthritis    Duodenum disorder    Hyperlipidemia    Depression    Abnormal MRI of abdomen    Vitamin D deficiency    Anxiety disorder    Migraine aura without headache    Thyroid nodule    Obesity, Class I, BMI 30-34 9    Impaired fasting glucose    Chronic left shoulder pain    Lumbar spondylosis    Current moderate episode of major depressive disorder without prior episode ([address] )    Sleep disorder  Status post laparoscopic cholecystectomy       Medications:      Current Outpatient Medications   Medication Sig Dispense Refill    acetaminophen (TYLENOL) 325 mg tablet Take 2 tablets (650 mg total) by mouth every 6 (six) hours as needed for mild pain or fever 30 tablet 0    Cholecalciferol (VITAMIN D3) 5000 units CAPS Take 5,000 Units by mouth daily       cyclobenzaprine (FLEXERIL) 10 mg tablet Take 1 tablet (10 mg total) by mouth 3 (three) times a day (Patient taking differently: Take 10 mg by mouth as needed ) 20 tablet 0    diclofenac sodium (VOLTAREN) 1 % Apply topically every 6 (six) hours      docusate sodium (COLACE) 100 mg capsule Take 1 capsule (100 mg total) by mouth daily 30 capsule 5    hydrocortisone 2 5 % cream Apply topically 3 (three) times a day 30 g 0    ibuprofen (MOTRIN) 800 mg tablet every 8 (eight) hours as needed       LORazepam (ATIVAN) 1 mg tablet Take 1 tablet 1 hour before procedure  May repeat x1 if needed  2 tablet 0    naproxen (NAPROSYN) 500 mg tablet Take one tab up to twice a day, no more than 3 times a week 30 tablet 6    omega-3-acid ethyl esters (LOVAZA) 1 g capsule Take 2 g by mouth daily       ondansetron (ZOFRAN) 4 mg tablet Take 1 tablet (4 mg total) by mouth every 6 (six) hours 12 tablet 0    polyethylene glycol (MIRALAX) 17 g packet Take 17 g by mouth daily 30 each 5    rizatriptan (MAXALT) 10 MG tablet Take 1 tablet (10 mg total) by mouth once as needed for migraine May repeat in 2 hours if needed  Max 2/24 hours, 9/month   9 tablet 6    Magnesium 400 MG CAPS Take 400 mg by mouth daily (Patient not taking: Reported on 7/26/2021)      omeprazole (PriLOSEC) 20 mg delayed release capsule Take 1 capsule (20 mg total) by mouth daily (Patient not taking: Reported on 12/29/2020) 30 capsule 0    pantoprazole (PROTONIX) 40 mg tablet TAKE ONE TABLET BY MOUTH ONCE DAILY (Patient not taking: Reported on 12/15/2020) 30 tablet 11     No current facility-administered medications for this visit  Allergies: Allergies   Allergen Reactions    Erythromycin Rash       Family History:     Family History   Problem Relation Age of Onset    Hypertension Mother     Other Mother         Dyslipidemia    Diabetes Father         Mellitus    No Known Problems Brother     No Known Problems Brother     Cancer Neg Hx     Heart disease Neg Hx     Stroke Neg Hx        Social History:     Social History     Socioeconomic History    Marital status: /Civil Union     Spouse name: Not on file    Number of children: Not on file    Years of education: Not on file    Highest education level: Not on file   Occupational History    Occupation: retired   Tobacco Use    Smoking status: Former Smoker     Quit date: 1985     Years since quittin 5    Smokeless tobacco: Never Used   Vaping Use    Vaping Use: Never used   Substance and Sexual Activity    Alcohol use: Yes     Comment: occasional  Maybe 5x per year    Drug use: No    Sexual activity: Yes     Partners: Male     Birth control/protection: Post-menopausal   Other Topics Concern    Not on file   Social History Narrative    Use Safety Equipment - Seatbelts        Lives with spouse     Social Determinants of Health     Financial Resource Strain:     Difficulty of Paying Living Expenses:    Food Insecurity:     Worried About Running Out of Food in the Last Year:     [address] in the Last Year:    Transportation Needs:     Lack of Transportation (Medical):      Lack of Transportation (Non-Medical):    Physical Activity:     Days of Exercise per Week:     Minutes of Exercise per Session:    Stress:     Feeling of Stress :    Social Connections:     Frequency of Communication with Friends and Family:     Frequency of Social Gatherings with Friends and Family:     Attends [employer] Services:     Active Member of Clubs or Organizations:     Attends Club or Organization Meetings:     Marital Status: Intimate Partner Violence:     Fear of Current or Ex-Partner:     Emotionally Abused:     Physically Abused:     Sexually Abused:          Objective:       Physical Exam:                                                                 Vitals:            Constitutional:    /54 (BP Location: Left arm, Patient Position: Sitting, Cuff Size: Standard)   Pulse 63   Ht 5' 1" (1 549 m)   Wt 62 1 kg (137 lb)   BMI 25 89 kg/m²   BP Readings from Last 3 Encounters:   07/26/21 106/54   03/24/21 94/60   01/20/21 106/57     Pulse Readings from Last 3 Encounters:   07/26/21 63   03/24/21 68   01/20/21 68         Well developed, well nourished, well groomed  No dysmorphic features  HEENT:  Normocephalic atraumatic  See neuro exam   Chest:  Respirations appear regular and unlabored  Cardiovascular:  no observed significant swelling  Musculoskeletal:  (see below under neurologic exam for evaluation of motor function and gait)   Skin:  warm and dry, not diaphoretic  Psychiatric:  Normal behavior and appropriate affect        Neurological Examination:     Mental status/cognitive function:   Recent and remote memory intact  Attention span and concentration as well as fund of knowledge are appropriate for age  Normal language and spontaneous speech  No nystagmus  Head impulse normal  Barclay Hallpike negative    Cranial Nerves:  III, IV, VI-Pupils were equal, round  Extraocular movements were full and conjugate   VII-facial expression symmetric  VIII-hearing grossly intact bilaterally   Motor Exam: symmetric bulk throughout  no atrophy, fasciculations or abnormal movements noted     Coordination:  no apparent dysmetria, ataxia or tremor noted  Gait: steady casual gait     Pertinent lab results:   See EMR for recent labs    03/11/2020 CMP unremarkable except for glucose 101, CBC unremarkable  TSH normal  HIV 2 7     4/09/2019:  A1c 5 7     03/08/2019:  BMP and CBC unremarkable  Vitamin-D 44 2  B12 2, 557  TSH 2 02     12/11/18: CMP unremarkable   11/29/18: CBC significant for hgb 10 8     Imaging:   CTA head and neck with without contrast 03/13/2019: I have personally reviewed imaging and radiology read   1   No acute findings   Unremarkable CT appearance of the brain  2   Unremarkable CT angiogram of the head and neck  Review of Systems:   ROS obtained by medical assistant Personally reviewed and updated if indicated  Review of Systems   Constitutional: Negative  Negative for appetite change and fever  HENT: Negative  Negative for hearing loss, tinnitus, trouble swallowing and voice change  Eyes: Negative  Negative for photophobia and pain  Respiratory: Negative  Negative for shortness of breath  Cardiovascular: Negative  Negative for palpitations  Gastrointestinal: Negative  Negative for nausea and vomiting  Endocrine: Negative  Negative for cold intolerance  Genitourinary: Negative  Negative for dysuria, frequency and urgency  Musculoskeletal: Negative  Negative for myalgias and neck pain  Skin: Negative  Negative for rash  Neurological: Positive for headaches (3 per month)  Negative for dizziness, tremors, seizures, syncope, facial asymmetry, speech difficulty, weakness, light-headedness and numbness  Hematological: Negative  Does not bruise/bleed easily  Psychiatric/Behavioral: Negative  Negative for confusion, hallucinations and sleep disturbance  I have spent 24 minutes with Patient  today in which greater than 50% of this time was spent in counseling/coordination of care  I also spent 10 minutes non face to face for this patient the same day         Author:  Edmond Cormier MD 7/26/2021 10:36 AM

## 2021-08-06 ENCOUNTER — ANNUAL EXAM (OUTPATIENT)
Dept: OBGYN CLINIC | Facility: MEDICAL CENTER | Age: 66
End: 2021-08-06
Payer: MEDICARE

## 2021-08-06 VITALS
SYSTOLIC BLOOD PRESSURE: 110 MMHG | DIASTOLIC BLOOD PRESSURE: 68 MMHG | HEIGHT: 61 IN | BODY MASS INDEX: 25.98 KG/M2 | WEIGHT: 137.6 LBS

## 2021-08-06 DIAGNOSIS — Z13.820 ENCOUNTER FOR OSTEOPOROSIS SCREENING IN ASYMPTOMATIC POSTMENOPAUSAL PATIENT: ICD-10-CM

## 2021-08-06 DIAGNOSIS — Z01.419 ENCOUNTER FOR WELL WOMAN EXAM WITH ROUTINE GYNECOLOGICAL EXAM: Primary | ICD-10-CM

## 2021-08-06 DIAGNOSIS — N95.0 PMB (POSTMENOPAUSAL BLEEDING): ICD-10-CM

## 2021-08-06 DIAGNOSIS — Z78.0 ENCOUNTER FOR OSTEOPOROSIS SCREENING IN ASYMPTOMATIC POSTMENOPAUSAL PATIENT: ICD-10-CM

## 2021-08-06 PROCEDURE — G0145 SCR C/V CYTO,THINLAYER,RESCR: HCPCS | Performed by: OBSTETRICS & GYNECOLOGY

## 2021-08-06 PROCEDURE — G0101 CA SCREEN;PELVIC/BREAST EXAM: HCPCS | Performed by: OBSTETRICS & GYNECOLOGY

## 2021-08-06 NOTE — PROGRESS NOTES
ASSESSMENT & PLAN: Zina Muniz is a 78 y o   with normal gynecologic exam     1   Routine well woman exam done today  2  Pap and HPV:  The patient's last pap was  It was normal     Pap and cotesting was done today  Current ASCCP Guidelines reviewed  3   Mammogram ordered  4  Colonoscopy   5  DEXA ordered  6  The following were reviewed in today's visit: breast self exam and mammography screening ordered  7  PMB: check ultrasound    CC:  Annual Gynecologic Examination    HPI: Zina Muniz is a 78 y o  Mike Heys who presents for annual gynecologic examination  She has the following concerns:  Reports PMB and postcoital bleeding, reports using lubricants    Health Maintenance:    She wears her seatbelt routinely  She does perform regular monthly self breast exams  She feels safe at home  Last PAP:   Last mammogram:    Last colonoscopy:     Past Medical History:   Diagnosis Date    Abdominal discomfort     Acute cholecystitis due to biliary calculus 12/15/2020    Anemia     Arthritis     Bronchitis     Chronic pain disorder     Dyslipidemia     Last Assessed 2012    GERD (gastroesophageal reflux disease)     Headache, migraine     Hypotension     Low back pain     Low back pain     Rheumatoid arthritis ([facility] [address] )     Thyroid nodule 2019       Past Surgical History:   Procedure Laterality Date    CATARACT EXTRACTION       SECTION      CHOLECYSTECTOMY LAPAROSCOPIC N/A 12/15/2020    Procedure: CHOLECYSTECTOMY LAPAROSCOPIC;  Surgeon: Sea Pearce MD;  Location: [facility];  Service: General    ESOPHAGOGASTRODUODENOSCOPY N/A 2018    Procedure: ESOPHAGOGASTRODUODENOSCOPY (EGD) with push enteroscopy and bx;  Surgeon: Asif Cortes DO;  Location: [facility];   Service: Gastroenterology    EYE SURGERY      NERVE SURGERY Right     Hand    [place] EDG US EXAM SURGICAL ALTER STOM DUODENUM/JEJUNUM N/A 3/6/2019    Procedure: Aliza Rowell ENDOSCOPIC U/S;  Surgeon: Elder Mcardle, MD;  [facility]; Service: Gastroenterology    UPPER GASTROINTESTINAL ENDOSCOPY         Past OB/Gyn History:  OB History        2    Para   2    Term   1       1    AB        Living   1       SAB        TAB        Ectopic        Multiple        Live Births   1               History of abnormal pap smears: No        Family History   Problem Relation Age of Onset    Hypertension Mother     Other Mother         Dyslipidemia    Diabetes Father         Mellitus    No Known Problems Brother     No Known Problems Brother     Cancer Neg Hx     Heart disease Neg Hx     Stroke Neg Hx        Social History:  Social History     Socioeconomic History    Marital status: /Civil Union     Spouse name: Not on file    Number of children: Not on file    Years of education: Not on file    Highest education level: Not on file   Occupational History    Occupation: retired   Tobacco Use    Smoking status: Former Smoker     Quit date: 1985     Years since quittin 6    Smokeless tobacco: Never Used   Vaping Use    Vaping Use: Never used   Substance and Sexual Activity    Alcohol use: Yes     Comment: occasional  Maybe 5x per year    Drug use: No    Sexual activity: Yes     Partners: Male     Birth control/protection: Post-menopausal   Other Topics Concern    Not on file   Social History Narrative    Use Safety Equipment - Seatbelts        Lives with spouse     Social Determinants of Health     Financial Resource Strain:     Difficulty of Paying Living Expenses:    Food Insecurity:     Worried About Running Out of Food in the Last Year:     Ran Out of Food in the Last Year:    Transportation Needs:     Lack of Transportation (Medical):      Lack of Transportation (Non-Medical):    Physical Activity:     Days of Exercise per Week:     Minutes of Exercise per Session:    Stress:     Feeling of Stress :    Social Connections:     Frequency of Communication with Friends and Family:     Frequency of Social Gatherings with Friends and Family:     Attends Confucianist Services:     Active Member of Clubs or Organizations:     Attends Club or Organization Meetings:     Marital Status:    Intimate Partner Violence:     Fear of Current or Ex-Partner:     Emotionally Abused:     Physically Abused:     Sexually Abused: Allergies   Allergen Reactions    Erythromycin Rash         Current Outpatient Medications:     acetaminophen (TYLENOL) 325 mg tablet, Take 2 tablets (650 mg total) by mouth every 6 (six) hours as needed for mild pain or fever, Disp: 30 tablet, Rfl: 0    Cholecalciferol (VITAMIN D3) 5000 units CAPS, Take 5,000 Units by mouth daily , Disp: , Rfl:     cyclobenzaprine (FLEXERIL) 10 mg tablet, Take 1 tablet (10 mg total) by mouth 3 (three) times a day (Patient taking differently: Take 10 mg by mouth as needed ), Disp: 20 tablet, Rfl: 0    diclofenac sodium (VOLTAREN) 1 %, Apply topically every 6 (six) hours, Disp: , Rfl:     docusate sodium (COLACE) 100 mg capsule, Take 1 capsule (100 mg total) by mouth daily, Disp: 30 capsule, Rfl: 5    hydrocortisone 2 5 % cream, Apply topically 3 (three) times a day, Disp: 30 g, Rfl: 0    ibuprofen (MOTRIN) 800 mg tablet, every 8 (eight) hours as needed , Disp: , Rfl:     LORazepam (ATIVAN) 1 mg tablet, Take 1 tablet 1 hour before procedure  May repeat x1 if needed  , Disp: 2 tablet, Rfl: 0    Magnesium 400 MG CAPS, Take 400 mg by mouth daily , Disp: , Rfl:     naproxen (NAPROSYN) 500 mg tablet, Take one tab up to twice a day, no more than 3 times a week, Disp: 30 tablet, Rfl: 6    omega-3-acid ethyl esters (LOVAZA) 1 g capsule, Take 2 g by mouth daily , Disp: , Rfl:     omeprazole (PriLOSEC) 20 mg delayed release capsule, Take 1 capsule (20 mg total) by mouth daily, Disp: 30 capsule, Rfl: 0    ondansetron (ZOFRAN) 4 mg tablet, Take 1 tablet (4 mg total) by mouth every 6 (six) hours, Disp: 12 tablet, Rfl: 0    polyethylene glycol (MIRALAX) 17 g packet, Take 17 g by mouth daily, Disp: 30 each, Rfl: 5    pantoprazole (PROTONIX) 40 mg tablet, TAKE ONE TABLET BY MOUTH ONCE DAILY (Patient not taking: Reported on 12/15/2020), Disp: 30 tablet, Rfl: 11    rizatriptan (MAXALT) 10 MG tablet, Take 1 tablet (10 mg total) by mouth once as needed for migraine May repeat in 2 hours if needed  Max 2/24 hours, 9/month  (Patient not taking: Reported on 8/6/2021), Disp: 9 tablet, Rfl: 6    Review of Systems  Constitutional :no fever, feels well, no tiredness, no recent weight gain or loss  ENT: no ear ache, no loss of hearing, no nosebleeds or nasal discharge, no sore throat or hoarseness  Cardiovascular: no complaints of slow or fast heart beat, no chest pain, no palpitations, no leg claudication or lower extremity edema  Respiratory: no complaints of shortness of shortness of breath, no MINAYA  Breasts:no complaints of breast pain, breast lump, or nipple discharge  Gastrointestinal: no complaints of abdominal pain, constipation, nausea, vomiting, or diarrhea or bloody stools  Genitourinary : no complaints of dysuria, incontinence, pelvic pain, no dysmenorrhea, vaginal discharge or abnormal vaginal bleeding and as noted in HPI  Musculoskeletal: no complaints of arthralgia, no myalgia, no joint swelling or stiffness, no limb pain or swelling    Integumentary: no complaints of skin rash or lesion, itching or dry skin  Neurological: no complaints of headache, no confusion, no numbness or tingling, no dizziness or fainting    Objective      /68   Ht 5' 1" (1 549 m)   Wt 62 4 kg (137 lb 9 6 oz)   BMI 26 00 kg/m²   General:   appears stated age, cooperative, alert normal mood and affect   Neck: normal, supple,trachea midline, no masses   Heart: regular rate and rhythm, S1, S2 normal, no murmur, click, rub or gallop   Lungs: clear to auscultation bilaterally   Breasts: normal appearance, no masses or tenderness, Inspection negative, No nipple retraction or dimpling, No nipple discharge or bleeding, No axillary or supraclavicular adenopathy, Normal to palpation without dominant masses   Abdomen: soft, non-tender, without masses or organomegaly   Vulva: normal female genitalia, Bartholin's, Urethra, Sanctuary normal, no lesions, normal female hair distribution   Vagina: normal vagina, no discharge, exudate, lesion, or erythema   Urethra: normal   Cervix: Normal, no discharge  PAP done  Uterus: normal size, contour, position, consistency, mobility, non-tender   Adnexa: normal adnexa   Lymphatic palpation of lymph nodes in neck, axilla, groin and/or other locations: no lymphadenopathy or masses noted   Skin normal skin turgor and no rashes     Psychiatric orientation to person, place, and time: normal  mood and affect: normal

## 2021-08-11 ENCOUNTER — HOSPITAL ENCOUNTER (OUTPATIENT)
Dept: ULTRASOUND IMAGING | Facility: MEDICAL CENTER | Age: 66
Discharge: HOME | End: 2021-08-11
Payer: MEDICARE

## 2021-08-11 DIAGNOSIS — N95.0 PMB (POSTMENOPAUSAL BLEEDING): ICD-10-CM

## 2021-08-11 PROCEDURE — 76856 US EXAM PELVIC COMPLETE: CPT

## 2021-08-11 PROCEDURE — 76830 TRANSVAGINAL US NON-OB: CPT

## 2021-08-11 NOTE — LETTER
[address] [place] [address]  [address] [facility] [address]      August 20, 2021    MRN: [id number]     Phone: [phone]     Dear Ms Janel Hernandez recently had a(n) Ultrasound performed on 8/11/2021 at  [address] [place] [address] that was requested by Kenneth Schwartz MD  The study was reviewed by a radiologist, which is a physician who specializes in medical imaging  The radiologist issued a report describing his or her findings  In that report there was a finding that the radiologist felt warranted further discussion with your health care provider and that discussion would be beneficial to you  The results were sent to Kenneth Schwartz MD on 08/18/2021 12:06 PM  We recommend that you contact Kenneth Schwartz MD at [phone] or set up an appointment to discuss the results of the imaging test  If you have already heard from Kenneth Schwartz MD regarding the results of your study, you can disregard this letter  This letter is not meant to alarm you, but intended to encourage you to follow-up on your results with the provider that sent you for the imaging study  In addition, we have enclosed answers to frequently asked questions by other patients who have also received a letter to review results with their health care provider (see page two)  Thank you for choosing [address] [place] [address] for your medical imaging needs  FREQUENTLY ASKED QUESTIONS    1  Why am I receiving this letter? [facility] requires us to notify patients who have findings on imaging exams that may require more testing or follow-up with a health professional within the next 3 months  2  How serious is the finding on the imaging test?  This letter is sent to all patients who may need follow-up or more testing within the next 3 months  Receiving this letter does not necessarily mean you have a life-threatening imaging finding or disease  Recommendations in the radiologists imaging report are general in nature and it is up to your healthcare provider to say whether those recommendations make sense for your situation  You are strongly encouraged to talk to your health care provider about the results and ask whether additional steps need to be taken  3  Where can I get a copy of the final report for my recent radiology exam?  To get a full copy of the report you can access your records online at [email or web address] or please contact [address] [place] [address] Department at [phone] Monday through Friday between 8 am and 6 pm          4  What do I need to do now? Please contact your health care provider who requested the imaging study to discuss what further actions (if any) are needed  You may have already heard from (your ordering provider) in regard to this test in which case you can disregard this letter  NOTICE IN ACCORDANCE WITH THE [facility] PATIENT TEST RESULT INFORMATION ACT OF 2018    You are receiving this notice as a result of a determination by your diagnostic imaging service that further discussions of your test results are warranted and would be beneficial to you  The complete results of your test or tests have been or will be sent to the health care practitioner that ordered the test or tests  It is recommended that you contact your health care practitioner to discuss your results as soon as possible

## 2021-08-12 ENCOUNTER — OFFICE VISIT (OUTPATIENT)
Dept: FAMILY MEDICINE CLINIC | Facility: CLINIC | Age: 66
End: 2021-08-12
Payer: MEDICARE

## 2021-08-12 VITALS
SYSTOLIC BLOOD PRESSURE: 124 MMHG | DIASTOLIC BLOOD PRESSURE: 64 MMHG | WEIGHT: 136 LBS | BODY MASS INDEX: 25.68 KG/M2 | HEIGHT: 61 IN | OXYGEN SATURATION: 97 % | HEART RATE: 68 BPM

## 2021-08-12 DIAGNOSIS — R53.83 FATIGUE, UNSPECIFIED TYPE: ICD-10-CM

## 2021-08-12 DIAGNOSIS — G43.109 MIGRAINE AURA WITHOUT HEADACHE: ICD-10-CM

## 2021-08-12 DIAGNOSIS — E55.9 VITAMIN D DEFICIENCY: ICD-10-CM

## 2021-08-12 DIAGNOSIS — L29.9 PRURITUS: Primary | ICD-10-CM

## 2021-08-12 PROCEDURE — 99214 OFFICE O/P EST MOD 30 MIN: CPT | Performed by: FAMILY MEDICINE

## 2021-08-12 RX ORDER — HYDROXYZINE HYDROCHLORIDE 10 MG/1
10 TABLET, FILM COATED ORAL EVERY 6 HOURS PRN
Qty: 30 TABLET | Refills: 0 | Status: SHIPPED | OUTPATIENT
Start: 2021-08-12

## 2021-08-12 NOTE — PROGRESS NOTES
Assessment/Plan:    Pruritus    Will do workup for systemic causes  She had recent CMP and hemoglobin A1c which for completely normal   Will order CBC and TSH along with serum protein electrophoresis  Also gave prescription for hydroxyzine 10 mg  She may take up to 3 tablets at bedtime as needed for itching  Vitamin D deficiency    She tries to supplement but sometimes forgets  Will check vitamin-D level with her blood work  Migraine aura without headache    Thankfully her migraine headaches have been better  She will continue with Neurology follow-up and her therapist        Diagnoses and all orders for this visit:    Pruritus    Vitamin D deficiency    Migraine aura without headache          Subjective:      Patient ID: Llana Lefort is a 78 y o  female  She is here with complaint of generalized itching which has been going on for several months  She denies rash, but she will frequently wake up at night and feel like an area of her body is itching  Sometimes she gets irritated by clothing causing itching  Itching is always worse at night  She thinks she has some seasonal allergies worse in the angeli  She tends to get sinus congestion and headaches in the fall  She visited her right gynecologist recently and noted some vaginal bleeding after intercourse  She ordered pelvic ultrasound which was done earlier today  No urinary sx      She has history of migraine headaches with aura  She has been going to Neurology and also working with a counselor    Her neurologist felt that some of her auras could be related to anxiety    Once every 2 months or so she gets abd pain followed by loose stools      The following portions of the patient's history were reviewed and updated as appropriate: allergies, current medications, past family history, past medical history, past social history, past surgical history and problem list     Review of Systems   Constitutional: Negative for activity change, chills, fatigue and fever  HENT: Negative for congestion, ear pain, sinus pressure and sore throat  Eyes: Negative for pain and visual disturbance  Respiratory: Negative for cough, chest tightness, shortness of breath and wheezing  Cardiovascular: Negative for chest pain, palpitations and leg swelling  Gastrointestinal: Negative for abdominal pain, blood in stool, constipation, diarrhea, nausea and vomiting  Endocrine: Negative for polydipsia and polyuria  Genitourinary: Negative for difficulty urinating, dysuria, frequency and urgency  Musculoskeletal: Negative for arthralgias, joint swelling and myalgias  Skin: Negative for rash  pruritis   Allergic/Immunologic: Positive for environmental allergies  Neurological: Positive for headaches  Negative for dizziness, weakness and numbness  Hematological: Negative for adenopathy  Does not bruise/bleed easily  Psychiatric/Behavioral: Negative for dysphoric mood  The patient is not nervous/anxious  Objective:      /64 (BP Location: Left arm, Patient Position: Sitting, Cuff Size: Adult)   Pulse 68   Ht 5' 1" (1 549 m)   Wt 61 7 kg (136 lb)   SpO2 97% Comment: Nail polish  BMI 25 70 kg/m²          Physical Exam  Vitals and nursing note reviewed  Constitutional:       Appearance: Normal appearance  HENT:      Head: Normocephalic and atraumatic  Right Ear: Tympanic membrane, ear canal and external ear normal       Left Ear: Tympanic membrane, ear canal and external ear normal       Mouth/Throat:      Mouth: Mucous membranes are moist    Cardiovascular:      Rate and Rhythm: Normal rate and regular rhythm  Pulses: Normal pulses  Heart sounds: Normal heart sounds  Pulmonary:      Effort: Pulmonary effort is normal       Breath sounds: Normal breath sounds  Abdominal:      General: Bowel sounds are normal  There is no distension  Palpations: There is no mass  Tenderness:  There is no abdominal tenderness  Musculoskeletal:      Right lower leg: No edema  Left lower leg: No edema  Skin:     General: Skin is warm and dry  Findings: No rash  Neurological:      General: No focal deficit present  Mental Status: She is alert and oriented to person, place, and time     Psychiatric:         Mood and Affect: Mood normal          Behavior: Behavior normal

## 2021-08-12 NOTE — ASSESSMENT & PLAN NOTE
Thankfully her migraine headaches have been better    She will continue with Neurology follow-up and her therapist

## 2021-08-12 NOTE — ASSESSMENT & PLAN NOTE
Will do workup for systemic causes  She had recent CMP and hemoglobin A1c which for completely normal   Will order CBC and TSH along with serum protein electrophoresis  Also gave prescription for hydroxyzine 10 mg  She may take up to 3 tablets at bedtime as needed for itching

## 2021-08-13 LAB
LAB AP GYN PRIMARY INTERPRETATION: NORMAL
Lab: NORMAL

## 2021-08-18 NOTE — RESULT ENCOUNTER NOTE
Please notify pt of abnormal result:  thickened endometrial lining on pelvic ultrasound  Recommend endometrial biopsy

## 2021-08-23 ENCOUNTER — APPOINTMENT (OUTPATIENT)
Dept: LAB | Facility: HOSPITAL | Age: 66
End: 2021-08-23
Attending: FAMILY MEDICINE
Payer: MEDICARE

## 2021-08-23 DIAGNOSIS — E55.9 VITAMIN D DEFICIENCY: ICD-10-CM

## 2021-08-23 DIAGNOSIS — L29.9 PRURITUS: ICD-10-CM

## 2021-08-23 DIAGNOSIS — R53.83 FATIGUE, UNSPECIFIED TYPE: ICD-10-CM

## 2021-08-23 LAB
25(OH)D3 SERPL-MCNC: 43.6 NG/ML (ref 30–100)
ALBUMIN SERPL BCP-MCNC: 3.6 G/DL (ref 3.5–5)
ALP SERPL-CCNC: 52 U/L (ref 46–116)
ALT SERPL W P-5'-P-CCNC: 31 U/L (ref 12–78)
ANION GAP SERPL CALCULATED.3IONS-SCNC: 9 MMOL/L (ref 4–13)
AST SERPL W P-5'-P-CCNC: 12 U/L (ref 5–45)
BASOPHILS # BLD AUTO: 0.03 THOUSANDS/ΜL (ref 0–0.1)
BASOPHILS NFR BLD AUTO: 1 % (ref 0–1)
BILIRUB SERPL-MCNC: 0.34 MG/DL (ref 0.2–1)
BUN SERPL-MCNC: 24 MG/DL (ref 5–25)
CALCIUM SERPL-MCNC: 8.8 MG/DL (ref 8.3–10.1)
CHLORIDE SERPL-SCNC: 105 MMOL/L (ref 100–108)
CO2 SERPL-SCNC: 27 MMOL/L (ref 21–32)
CREAT SERPL-MCNC: 0.75 MG/DL (ref 0.6–1.3)
EOSINOPHIL # BLD AUTO: 0.08 THOUSAND/ΜL (ref 0–0.61)
EOSINOPHIL NFR BLD AUTO: 1 % (ref 0–6)
ERYTHROCYTE [DISTWIDTH] IN BLOOD BY AUTOMATED COUNT: 12.5 % (ref 11.6–15.1)
GFR SERPL CREATININE-BSD FRML MDRD: 83 ML/MIN/1.73SQ M
GLUCOSE P FAST SERPL-MCNC: 96 MG/DL (ref 65–99)
HCT VFR BLD AUTO: 40.4 % (ref 34.8–46.1)
HGB BLD-MCNC: 12.9 G/DL (ref 11.5–15.4)
IMM GRANULOCYTES # BLD AUTO: 0.02 THOUSAND/UL (ref 0–0.2)
IMM GRANULOCYTES NFR BLD AUTO: 0 % (ref 0–2)
LYMPHOCYTES # BLD AUTO: 1.37 THOUSANDS/ΜL (ref 0.6–4.47)
LYMPHOCYTES NFR BLD AUTO: 24 % (ref 14–44)
MCH RBC QN AUTO: 30.6 PG (ref 26.8–34.3)
MCHC RBC AUTO-ENTMCNC: 31.9 G/DL (ref 31.4–37.4)
MCV RBC AUTO: 96 FL (ref 82–98)
MONOCYTES # BLD AUTO: 0.37 THOUSAND/ΜL (ref 0.17–1.22)
MONOCYTES NFR BLD AUTO: 6 % (ref 4–12)
NEUTROPHILS # BLD AUTO: 3.9 THOUSANDS/ΜL (ref 1.85–7.62)
NEUTS SEG NFR BLD AUTO: 68 % (ref 43–75)
NRBC BLD AUTO-RTO: 0 /100 WBCS
PLATELET # BLD AUTO: 191 THOUSANDS/UL (ref 149–390)
PMV BLD AUTO: 10.2 FL (ref 8.9–12.7)
POTASSIUM SERPL-SCNC: 3.8 MMOL/L (ref 3.5–5.3)
PROT SERPL-MCNC: 6.6 G/DL (ref 6.4–8.2)
RBC # BLD AUTO: 4.21 MILLION/UL (ref 3.81–5.12)
SODIUM SERPL-SCNC: 141 MMOL/L (ref 136–145)
TSH SERPL DL<=0.05 MIU/L-ACNC: 2.61 UIU/ML (ref 0.36–3.74)
WBC # BLD AUTO: 5.77 THOUSAND/UL (ref 4.31–10.16)

## 2021-08-23 PROCEDURE — 84443 ASSAY THYROID STIM HORMONE: CPT

## 2021-08-23 PROCEDURE — 84165 PROTEIN E-PHORESIS SERUM: CPT

## 2021-08-23 PROCEDURE — 80053 COMPREHEN METABOLIC PANEL: CPT

## 2021-08-23 PROCEDURE — 84165 PROTEIN E-PHORESIS SERUM: CPT | Performed by: PATHOLOGY

## 2021-08-23 PROCEDURE — 36415 COLL VENOUS BLD VENIPUNCTURE: CPT

## 2021-08-23 PROCEDURE — 85025 COMPLETE CBC W/AUTO DIFF WBC: CPT

## 2021-08-23 PROCEDURE — 82306 VITAMIN D 25 HYDROXY: CPT

## 2021-08-24 LAB
ALBUMIN SERPL ELPH-MCNC: 4.07 G/DL (ref 3.5–5)
ALBUMIN SERPL ELPH-MCNC: 62.6 % (ref 52–65)
ALPHA1 GLOB SERPL ELPH-MCNC: 0.27 G/DL (ref 0.1–0.4)
ALPHA1 GLOB SERPL ELPH-MCNC: 4.2 % (ref 2.5–5)
ALPHA2 GLOB SERPL ELPH-MCNC: 0.73 G/DL (ref 0.4–1.2)
ALPHA2 GLOB SERPL ELPH-MCNC: 11.2 % (ref 7–13)
BETA GLOB ABNORMAL SERPL ELPH-MCNC: 0.35 G/DL (ref 0.4–0.8)
BETA1 GLOB SERPL ELPH-MCNC: 5.4 % (ref 5–13)
BETA2 GLOB SERPL ELPH-MCNC: 5 % (ref 2–8)
BETA2+GAMMA GLOB SERPL ELPH-MCNC: 0.33 G/DL (ref 0.2–0.5)
GAMMA GLOB ABNORMAL SERPL ELPH-MCNC: 0.75 G/DL (ref 0.5–1.6)
GAMMA GLOB SERPL ELPH-MCNC: 11.6 % (ref 12–22)
IGG/ALB SER: 1.67 {RATIO} (ref 1.1–1.8)
PROT PATTERN SERPL ELPH-IMP: ABNORMAL
PROT SERPL-MCNC: 6.5 G/DL (ref 6.4–8.2)

## 2021-08-26 ENCOUNTER — TELEPHONE (OUTPATIENT)
Dept: FAMILY MEDICINE CLINIC | Facility: CLINIC | Age: 66
End: 2021-08-26

## 2021-08-26 DIAGNOSIS — F41.9 ANXIETY DISORDER, UNSPECIFIED TYPE: Primary | ICD-10-CM

## 2021-08-26 RX ORDER — LORAZEPAM 1 MG/1
TABLET ORAL
Qty: 2 TABLET | Refills: 0 | Status: SHIPPED | OUTPATIENT
Start: 2021-08-26

## 2021-08-26 NOTE — TELEPHONE ENCOUNTER
Patient called in to the office stating she is getting a biopsy done with OB/GYN on 9/13/21 and that you usually prescribe a valium for her  She would like this to go to [facility]  I advised the patient you are out of the office the rest of this week and all of next week, and stated to her that once you returned you would respond and we would call her back  Thank you!

## 2021-09-29 ENCOUNTER — OFFICE VISIT (OUTPATIENT)
Dept: FAMILY MEDICINE CLINIC | Facility: CLINIC | Age: 66
End: 2021-09-29
Payer: MEDICARE

## 2021-09-29 VITALS — TEMPERATURE: 98.6 F

## 2021-09-29 DIAGNOSIS — Z23 NEED FOR INFLUENZA VACCINATION: Primary | ICD-10-CM

## 2021-09-29 PROCEDURE — G0008 ADMIN INFLUENZA VIRUS VAC: HCPCS

## 2021-09-29 PROCEDURE — 90662 IIV NO PRSV INCREASED AG IM: CPT

## 2021-10-07 ENCOUNTER — OFFICE VISIT (OUTPATIENT)
Dept: FAMILY MEDICINE CLINIC | Facility: CLINIC | Age: 66
End: 2021-10-07
Payer: MEDICARE

## 2021-10-07 VITALS
DIASTOLIC BLOOD PRESSURE: 66 MMHG | TEMPERATURE: 96.4 F | RESPIRATION RATE: 18 BRPM | WEIGHT: 141.6 LBS | HEIGHT: 61 IN | BODY MASS INDEX: 26.73 KG/M2 | SYSTOLIC BLOOD PRESSURE: 124 MMHG | HEART RATE: 73 BPM | OXYGEN SATURATION: 97 %

## 2021-10-07 DIAGNOSIS — Z23 NEED FOR PNEUMOCOCCAL VACCINATION: Primary | ICD-10-CM

## 2021-10-07 DIAGNOSIS — N95.0 POST-MENOPAUSAL BLEEDING: ICD-10-CM

## 2021-10-07 DIAGNOSIS — K21.9 GASTROESOPHAGEAL REFLUX DISEASE WITHOUT ESOPHAGITIS: ICD-10-CM

## 2021-10-07 DIAGNOSIS — L29.9 PRURITUS: ICD-10-CM

## 2021-10-07 DIAGNOSIS — G43.109 MIGRAINE WITH AURA AND WITHOUT STATUS MIGRAINOSUS, NOT INTRACTABLE: ICD-10-CM

## 2021-10-07 PROCEDURE — G0009 ADMIN PNEUMOCOCCAL VACCINE: HCPCS

## 2021-10-07 PROCEDURE — 99214 OFFICE O/P EST MOD 30 MIN: CPT | Performed by: FAMILY MEDICINE

## 2021-10-07 PROCEDURE — 90732 PPSV23 VACC 2 YRS+ SUBQ/IM: CPT

## 2021-10-07 RX ORDER — NAPROXEN 500 MG/1
TABLET ORAL
Qty: 30 TABLET | Refills: 6 | Status: SHIPPED | OUTPATIENT
Start: 2021-10-07

## 2021-11-16 ENCOUNTER — HOSPITAL ENCOUNTER (OUTPATIENT)
Dept: ULTRASOUND IMAGING | Facility: CLINIC | Age: 66
Discharge: HOME | End: 2021-11-16
Payer: MEDICARE

## 2021-11-16 ENCOUNTER — HOSPITAL ENCOUNTER (OUTPATIENT)
Dept: MAMMOGRAPHY | Facility: CLINIC | Age: 66
Discharge: HOME | End: 2021-11-16
Payer: MEDICARE

## 2021-11-16 VITALS — BODY MASS INDEX: 26.72 KG/M2 | WEIGHT: 141.54 LBS | HEIGHT: 61 IN

## 2021-11-16 DIAGNOSIS — N63.11 UNSPECIFIED LUMP IN THE RIGHT BREAST, UPPER OUTER QUADRANT: ICD-10-CM

## 2021-11-16 DIAGNOSIS — R92.8 ABNORMAL MAMMOGRAM: ICD-10-CM

## 2021-11-16 DIAGNOSIS — N63.10 BREAST MASS, RIGHT: Primary | ICD-10-CM

## 2021-11-16 PROCEDURE — 76642 ULTRASOUND BREAST LIMITED: CPT

## 2021-11-16 PROCEDURE — 77065 DX MAMMO INCL CAD UNI: CPT

## 2021-11-16 PROCEDURE — G0279 TOMOSYNTHESIS, MAMMO: HCPCS

## 2021-12-06 ENCOUNTER — TELEPHONE (OUTPATIENT)
Dept: FAMILY MEDICINE CLINIC | Facility: CLINIC | Age: 66
End: 2021-12-06

## 2021-12-06 DIAGNOSIS — Z20.822 CLOSE EXPOSURE TO COVID-19 VIRUS: Primary | ICD-10-CM

## 2021-12-06 PROCEDURE — 0241U HB NFCT DS VIR RESP RNA 4 TRGT: CPT | Performed by: FAMILY MEDICINE

## 2021-12-23 ENCOUNTER — TELEPHONE (OUTPATIENT)
Dept: FAMILY MEDICINE CLINIC | Facility: CLINIC | Age: 66
End: 2021-12-23

## 2021-12-23 DIAGNOSIS — Z11.9 ENCOUNTER FOR SCREENING FOR INFECTIOUS AND PARASITIC DISEASES, UNSPECIFIED: ICD-10-CM

## 2021-12-23 DIAGNOSIS — Z20.822 CLOSE EXPOSURE TO COVID-19 VIRUS: Primary | ICD-10-CM

## 2021-12-23 PROCEDURE — 87636 SARSCOV2 & INF A&B AMP PRB: CPT | Performed by: FAMILY MEDICINE

## 2022-01-19 ENCOUNTER — TELEPHONE (OUTPATIENT)
Dept: NEUROLOGY | Facility: CLINIC | Age: 67
End: 2022-01-19

## 2022-01-19 NOTE — TELEPHONE ENCOUNTER
Called and left a voicemail for patient - Please call back to confirm upcoming appointment with Dr Bobbi Mcneil  Provided patient with apt date, time and location  Informed patient that check in is at least 15 minutes prior to apt time  SUGGESTED PT CHANGE TO VIRTUAL

## 2022-01-26 ENCOUNTER — TELEMEDICINE (OUTPATIENT)
Dept: NEUROLOGY | Facility: CLINIC | Age: 67
End: 2022-01-26
Payer: MEDICARE

## 2022-01-26 VITALS — WEIGHT: 141 LBS | HEIGHT: 61 IN | BODY MASS INDEX: 26.62 KG/M2

## 2022-01-26 DIAGNOSIS — G43.109 MIGRAINE WITH AURA AND WITHOUT STATUS MIGRAINOSUS, NOT INTRACTABLE: Primary | ICD-10-CM

## 2022-01-26 PROCEDURE — 99213 OFFICE O/P EST LOW 20 MIN: CPT | Performed by: PSYCHIATRY & NEUROLOGY

## 2022-01-26 RX ORDER — LIFITEGRAST 50 MG/ML
1 SOLUTION/ DROPS OPHTHALMIC 2 TIMES DAILY
COMMUNITY
Start: 2021-12-30 | End: 2022-03-29 | Stop reason: ALTCHOICE

## 2022-01-26 RX ORDER — RIZATRIPTAN BENZOATE 10 MG/1
10 TABLET ORAL ONCE AS NEEDED
Qty: 9 TABLET | Refills: 12 | Status: SHIPPED | OUTPATIENT
Start: 2022-01-26

## 2022-01-26 NOTE — PROGRESS NOTES
Virtual Regular Visit    Verification of patient location:    Patient is located in the following state in which I hold an active license [place]      Assessment/Plan:    Problem List Items Addressed This Visit        Cardiovascular and Mediastinum    Migraine headache - Primary    Relevant Medications    rizatriptan (MAXALT) 10 MG tablet               Reason for visit is   Chief Complaint   Patient presents with    Virtual Regular Visit        Encounter provider Tung Campos MD    Provider located at [address] [place] [address]  [phone]      Recent Visits  Date Type Provider Dept   01/19/22 Telephone Clare Serrano, [address] [place] [address] recent visits within past 7 days and meeting all other requirements  Today's Visits  Date Type Provider Dept   01/26/22 Telemedicine Tung Campos MD [facility] today's visits and meeting all other requirements  Future Appointments  No visits were found meeting these conditions  Showing future appointments within next 150 days and meeting all other requirements       The patient was identified by name and date of birth  Pao Hand was informed that this is a telemedicine visit and that the visit is being conducted through  [address] and patient was informed this is a secure, HIPAA-complaint platform  She agrees to proceed     My office door was closed  No one else was in the room  She acknowledged consent and understanding of privacy and security of the video platform  The patient has agreed to participate and understands they can discontinue the visit at any time  Patient is aware this is a billable service       Subjective    Assessment/Plan:   Shena Dawson a very pleasant 66 y o  female with a past medical history includes  Chronic back pain, chronic left shoulder pain, chronic pain disorder, migraines, GERD, hyperlipidemia, depression, arthritis, low blood pressure, bilateral carpal tunnel, abdominal pain,  Sleep disorder,  Vitamin-D deficiency, anemia, glaucoma, cataracts referred here for evaluation of headache  My initial evaluation 02/27/2019     Migraine without aura and without status migrainosus, not intractable  Acephalgic migraine with aura  BPPV - Benign paroxysmal positional vertigo - resolved  The patient reports headaches/migraines since her teenage years       She reports various locations of her migraines, but typically left-sided, quality varies   Typical associated migrainous features  - as of 02/27/2019: She reports migraines currently occur about twice a month, but in January she had an increase in visual auras occured daily for a week  - as of 05/22/2019:  Only 2 migraines in the past 3 months which is improvement, she has been keeping track of her visual symptoms and she was worried they have increased although it seems to me they are occurring less often - and there seems to be a component of anxiety triggering them in some cases  - as of 09/24/2019: No auras or migraines at least the past 2 months  Overall improved  Mood and anxiety improved with therapist  Taking mg, b2  Sleep not great due to frozen shoulder pain  Will consider melatonin  Also discussed considering venlafaxine low dose if needed for mood/headaches at next visit    - as of 1/6/20:  Headaches/migraines are less frequent, with weather change in Oct had 2 migraines, 3rd caught early with the rizatriptan  Aura only 2 times in the past 4-5 months  Still taking magnesium (although some nausea sometimes, other times forgets), B2   - As of 7/7/2020: she remains well controlled  Only one migraine in 6 months that resolved with rizatriptan  About 4 auras in 6 months  Sometimes forgets to take aspirin    - as of 1/20/2021: She remains well controlled  Only one migraine in 6 months that resolved with rizatriptan     - as of 7/26/2021: She reports she continues to do well  No significant migraines requiring rizatriptan  3 milder per month that improve with naproxen  3-4 bilateral visual auras without headache in 6 months, that do not last as long as before  Recent episode that sounds like BPPV with negative HINTs exam today, likely resolved  - as of 1/26/2022: Doing better, about 2 mild headaches per month that resolve with naproxen, has not taken rizatriptan in over a year  Migraine visual aura maybe 0-2 a month that self resolves in minutes  Not on preventative  Work up:  - CTA head and neck with without contrast 03/13/2019: I have personally reviewed imaging and radiology read   No acute findings   Unremarkable CT appearance of the brain  Unremarkable CT angiogram of the head and neck  - follows closely with Ophthalmology every 4 months due to other eye issues including history of glaucoma and cataracts  - have asked her to try and get old MRI Brain from 10-15 years ago, but she doubts she would be able to      Preventive:  - discussed headache hygiene and lifestyle factors that could improve her headaches including mental Health Care, sleep quality and quantity, increasing hydration   - could resume headache preventative supplements:  magnesium 400 mg, riboflavin   - patient not interested in prescription preventative at this time  -  Past: Nortriptyline 10 mg, propranolol and verapamil contraindicated due to history of hypotension  - future options: venlafaxine, cyproheptadine,  Gabapentin, topiramate, CGRP     Abortive:  - previously patient was taking pain medication daily, however now no more than twice a week   We discussed medication overuse/rebound headache and recommended no more than 3 days per week  Used naproxen, acetaminophen  - she takes rizatriptan 10 mg, has been on this for years prescribed by previous neurologist, discussed proper use, possible side effects and risks  - has ondansetron 4 mg for nausea   Discussed proper use, possible side effects and risks  - future options: Alternative Triptan,  prochlorperazine, Toradol IM or p o , could consider trial for 5 days of Depakote or dexamethasone 4 prolonged migraine, ubrelvy, reyvow, nurtec       Patient instructions      If vertigo  returns, we can refer you to PT        Headache/migraine treatment:   Abortive medications (for immediate treatment of a headache): It is ok to take ibuprofen, acetaminophen or naproxen (Advil, Tylenol,  Aleve, Excedrin) if they help your headaches you should limit these to No more than 3 times a week to avoid medication overuse/rebound headaches       For nausea  Ondansetron 4 mg ok to take as needed for nausea      For your more moderate to severe migraines take this medication early:  Continue Maxalt (rizatriptan) 10mg tabs - take one at the onset of headache  May repeat one time after 1-2 hours if pain has not resolved  (Max 2 a day and 9 a month)         Over the counter preventive supplements for headaches/migraines   (to take every day to help prevent headaches - not to take at the time of headache):   - Mind ease can be found online and all of these   [x]?  Magnesium 400mg daily   [x]? Riboflavin (Vitamin B2)  400mg daily   (FYI B2 may make your urine bright/neon yellow)     Prescription preventive medications for headaches/migraines   (to take every day to help prevent headaches - not to take at the time of headache):  [x]? Could consider in the future if needed     Lifestyle Recommendations:  [x]?  SLEEP - Maintain a regular sleep schedule: Adults need at least 7-8 hours of uninterrupted a night   Maintain good sleep hygiene:  Going to bed and waking up at consistent times, avoiding excessive daytime naps, avoiding caffeinated beverages in the evening, avoid excessive stimulation in the evening and generally using bed primarily for sleeping   One hour before bedtime would recommend turning lights down lower, decreasing your activity (may read quietly, listen to music at a low volume)  When you get into bed, should eliminate all technology (no texting, emailing, playing with your phone, iPad or tablet in bed)  [x]? HYDRATION - Maintain good hydration   Drink  2L of fluid a day (4 typical small water bottles)  [x]?  DIET - Maintain good nutrition  In particular don't skip meals and try and eat healthy balanced meals regularly  [x]?  TRIGGERS - Look for other triggers and avoid them: Limit caffeine to 1-2 cups a day or less  Avoid dietary triggers that you have noticed bring on your headaches (this could include aged cheese, peanuts, MSG, aspartame and nitrates)  [x]?  EXERCISE - physical exercise as we all know is good for you in many ways, and not only is good for your heart, but also is beneficial for your mental health, cognitive health and  chronic pain/headaches  I would encourage at the least 5 days of physical exercise weekly for at least 30 minutes       Education and Follow-up  [x]? Please call with any questions or concerns  [x]? Follow up 1 year, sooner as needed        CC: We had the pleasure of evaluating Kaylan Young in neurological consultation today  Kaylan Young is right handed female who presents today for evaluation of headaches       History of Present Illness:   Interval history as of 1/26/2022  - denies any new or concerning neurologic symptoms since last visit   - saw ENT and given nasal spray   - saw eye doctor and given a med for dry eye   - drinking a bottle a day   - taking classes online, writing blog  - joined a group online, mood is much better     Headaches and migraines   - Doing better, about 2 mild headaches per month that resolve with naproxen, has not taken rizatriptan in over a year  - Acephalgic Migraine with aura more often than migraine - lasts a few mins up to 30 mins with mild pressure not severe, maybe 0-2 a month, previous providers started asa 81 mg for prevention       Preventative: magnesium occasionally as makes her stomach upset   Abortive: naproxen     Interval history as of 7/26/2021  - mood - anxiety, depression  - following with PCP and counselor   - doing meditation   - for months feels like itchy/creepy crawling or stinging or pricks on anywhere of entire body at night  - rec'd talk to PCP    - a week ago had vertigo that started in bed and after rolling had room spinning and took a meclizine and then this made her sleepy (has had BPPV once before so tried to do the maneuvers) still feeling it slightly if she turns     Headaches and migraines   -  3 headaches per month -   - Auras without headache - 3-4 times in 6 months (lights and zig zags in both eyes)     Preventative:   Magnesium, riboflavin - not taking lately, upsets stomach   - melatonin at night is helping with sleep     Abortive: naproxen for milder headaches - helps, rizatriptan - hasn't taken in 6 months (has to lay in bed afterwards)    Interval history as of 01/20/2021  She denies any new or concerning symptoms  Headaches and migraines -  only 1 migraine in 6 months that resolved with rizatriptan and naproxen, one aura    - abortive: rizatriptan  - prevention: restarted magnesium, and restarting B2     - has lost weight, no sugar  - mood up and down overall better, following with counselor, denies depression     Interval history as of 7/7/2020:    Headaches/migraines   - migraine - only 1 in 6 months, took rizatriptan and it helped  - Aura - 4 times in 6 months without headache afterwards related to stress, lasting about 20 minutes       - abortive: rizatriptan     - prevention: no longer taking magnesium, B2 - because she is on a diet with supplements      - has lost some weight on a diet and feeling much better as far as mood, movements, sleep   - talking to counselor every 3 weeks      Interval history as of 01/06/2020  She reports improvement, no concerns     Headaches/migraines are less frequent  - weather change in Oct had 2 migraines, 3rd caught early with the rizatriptan  - aura only 2 times in the past 4-5 months  - taking magnesium, B2     Sleep - still not great due to frozen shoulder pain (also may hurt all day long)   - following with orthopedics  - tried melatonin and did not help     Seeing a therapist and it is helping a lot with multiple issues, mood much better  Not wearing sunglasses everywhere anymore     Interval history as of 09/24/2019:  - 07/15/2019 patient called in requesting rizatriptan that was previously prescribed by her past neurologist and works well for her migraines  Ok since history no longer sounds like retinal migraine - both eyes   - following with orthopedics for frozen shoulder, doing therapies  - sometimes the therapies with PT was causing cervicogenic headaches   - had some floaters a few weeks ago and saw eye doctor who confirmed floaters and nothing to worry about      - worried about this time of year because may get sinus headaches that turn into migraines      No auras or migraines at least the past 2 months  Saw counselor and helping mood - her therapist suggested considering low dose mood medications   discussed with patient and may consider in the future   - wearing glasses less, realizing that she may have been hiding behind them      Going to start yoga class soon       - Supplements - taking magnesium and B2      Interval history as of 05/22/2019:  - CTA head and neck 03/13/2019 was unremarkable except for incidental thyroid nodule with follow-up ultrasound by primary care  - has followed up with Pain Medicine and weight management  - 5/8/19 - dilated eye exam was unremarkable except for dry eyes   - our  provider a list of therapists, mood improved since wheather improved     - Rizatriptan - continues to help  - Supplements - taking magnesium and B2   - Prescription medication - not interested although         Headaches started at what age? teen[age]  How often do the headaches occur?   - as of 3/2019: 2/month  - as of 5/22/19: 2 migraines in past 3 ,   - Acephalgic Migraine with aura more often than migraine - vs nonspecific visual symptoms due to other reason 4/14, 21, 23, 25, and 5/8, 21 - she reports these seem to be even triggered by stress/anxiety  - as of 09/24/2019:  No auras or migraines at least the past 2 months  - as of 1/6/20:  Headaches/migraines are less frequent, with weather change in Oct had 2 migraines, 3rd caught early with the rizatriptan  Aura only 2 times in the past 4-5 months  Still taking magnesium (although some nausea sometimes, other times forgets), B2  What time of the day do the headaches start? Can wake up with them or anytime during the day   How long do the headaches last? 3-4 hours because she takes medicine, up to all day   Are you ever headache free? Yes     Aura? with aura  Acephalgic Migraine with aura more often than migraine     Jan 2019: For a week straight 1-2 times a day had increase in visual aura/retinal migraine  Zigzags, decreased peripheral vision, blurry without strong migraine, milder headache - for 2-3 hours   - seems to be in both eyes, not like a curtain coming down (both eyes - not retinal migraine)  - gradually faded out and this month she is fine, started mag a few weeks ago   - would take medicine and go away  *last saw an eye doctor 2 months ago, goes every 4 months for glaucoma, cataracts, dry eye       Prior to that once in a while that would happen      Describe your usual headache pain quality? Dull all day sometimes, sharp and pulsating, shooting   Where is your headache located?   When full blown more on left side, frontal to parietal and then to the back  Lately zig zag around head  Numbness change in sensation to bioccipital region   Sinus pressure     What is the intensity of pain? 4-5/10, up to higher   Associated symptoms:   [x]? Nausea       [x]?  Vomiting        []? Diarrhea  [x]? Insomnia    []? Stiff or sore neck   [x]?  Problems with concentration  [x]? Photophobia     [x]? Phonophobia      []? Osmophobia  [x]? Blurred vision   [x]?  Prefer quiet, dark room  []? Light-headed or dizzy     []? Tinnitus   []? Hands or feet tingle or feel numb/paresthesias       []? Red ear      []? Ptosis      []? Facial droop  []? Lacrimation  []? Nasal congestion/rhinorrhea   []? Flushing of face  []? Change in pupil size      Things that make the headache worse? Bending down, any movement     Headache triggers:  Pressure when it rains/dr and cold  What time of the year do headaches occur more frequently?   More in Oct/Nov     Have you seen someone else for headaches or pain? Neurology   Have you had trigger point injection performed and how often? No  Have you had Botox injection performed and how often? No   Have you had epidural injections or transforaminal injections performed? Yes for lower back slipped disc   Have you used CBD or THC for your headaches and how often? No  Are you current pregnant or planning on getting pregnant? No  Have you ever had any Brain imaging? MRI Brain - maybe over 15-20 years ago      What medications do you take or have you taken for your headaches?    ABORTIVE:    2-3 times a week some sort of pain medication lately, previously was taking daily      Acetaminophen - a few days a week for back pain  Ibuprofen 800 mg - not as much now, takes naproxen instead  Rizatriptan 10 mg   Naproxen 500 mg - takes once a week, used to take every day      Diclofenac gel - for back   Lidocaine 5% gel - for back      Past for headache and back pain   Cyclobenzaprine 5 mg  - not taking (for back)  Tramadol 50 mg   - not taking (for back)  Meloxicam 15 mg - not taking   Ondansetron - does not take it      PREVENTIVE:   Magnesium 400 mg  Riboflavin 400 mg      Past:  Nortriptyline 10 mg - for 2 years, and did well as far as headaches not being as bad, then wanted to get off it, came off 6 months ago, increase in symptoms     Alternative therapies used in the past for headaches? no other headache interventions have been tried     LIFESTYLE  Sleep - averages 6-7 hours   - takes naps      Physical activity:  tries to get her to the gym twice a week   Water: a bottle a week   Mood:   - lately feels tired mentally tired  and depressed   - do not want to get up   - never has talked to a counselor - she used to do mentoring herself, never had depression in the past   - thinks going on for past 6 months, started after mom passed away   - PHQ-9 depression screen 2019:  Score of 17, denies suicidal ideation        The following portions of the patient's history were reviewed and updated as appropriate: allergies, current medications, past family history, past medical history, past social history, past surgical history and problem list         Pertinent family history:    Family history of headaches:  no known family members with significant headaches  Any family history of aneurysms - No     Pertinent social history:  Work:  Retired  Education: some college  Lives with   One trial     Illicit Drugs: denies  Alcohol/tobacco: Denies tobacco use, alcohol intake: social drinker       Past Medical History:   Diagnosis Date    Abdominal discomfort     Acute cholecystitis due to biliary calculus 12/15/2020    Anemia     Arthritis     Bronchitis     Chronic pain disorder     Dyslipidemia     Last Assessed 2012    GERD (gastroesophageal reflux disease)     Headache, migraine     Hypotension     Low back pain     Low back pain     Rheumatoid arthritis ([address] )     Thyroid nodule 2019       Past Surgical History:   Procedure Laterality Date    CATARACT EXTRACTION       SECTION      CHOLECYSTECTOMY LAPAROSCOPIC N/A 12/15/2020    Procedure: CHOLECYSTECTOMY LAPAROSCOPIC;  Surgeon: Mani Vazquez MD;  Location: [facility] OR;  Service: General    ESOPHAGOGASTRODUODENOSCOPY N/A 2018 Procedure: ESOPHAGOGASTRODUODENOSCOPY (EGD) with push enteroscopy and bx;  Surgeon: Simon Del Castillo DO;  Location: [facility]; Service: Gastroenterology    EYE SURGERY      NERVE SURGERY Right     Hand    SD EDG US EXAM SURGICAL ALTER STOM DUODENUM/JEJUNUM N/A 3/6/2019    Procedure: LINEAR ENDOSCOPIC U/S;  Surgeon: Russell Granados MD;  Location: [facility];   Service: Gastroenterology    SEPTOPLASTY  around age 21   [employer] UPPER GASTROINTESTINAL ENDOSCOPY         Current Outpatient Medications   Medication Sig Dispense Refill    acetaminophen (TYLENOL) 325 mg tablet Take 2 tablets (650 mg total) by mouth every 6 (six) hours as needed for mild pain or fever 30 tablet 0    Cholecalciferol (VITAMIN D3) 5000 units CAPS Take 5,000 Units by mouth daily       cyclobenzaprine (FLEXERIL) 10 mg tablet Take 1 tablet (10 mg total) by mouth 3 (three) times a day 20 tablet 0    diclofenac sodium (VOLTAREN) 1 % Apply topically every 6 (six) hours      docusate sodium (COLACE) 100 mg capsule Take 1 capsule (100 mg total) by mouth daily 30 capsule 5    hydrocortisone 2 5 % cream Apply topically 3 (three) times a day 30 g 0    hydrOXYzine HCL (ATARAX) 10 mg tablet Take 1 tablet (10 mg total) by mouth every 6 (six) hours as needed for itching 30 tablet 0    ibuprofen (MOTRIN) 800 mg tablet every 8 (eight) hours as needed       ipratropium (ATROVENT) 0 03 % nasal spray 2 sprays into each nostril 3 (three) times a day as needed for rhinitis 30 mL 3    Magnesium 400 MG CAPS Take 400 mg by mouth daily        naproxen (NAPROSYN) 500 mg tablet Take one tab up to twice a day, no more than 3 times a week 30 tablet 6    omega-3-acid ethyl esters (LOVAZA) 1 g capsule Take 2 g by mouth daily       ondansetron (ZOFRAN) 4 mg tablet Take 1 tablet (4 mg total) by mouth every 6 (six) hours 12 tablet 0    polyethylene glycol (MIRALAX) 17 g packet Take 17 g by mouth daily 30 each 5    rizatriptan (MAXALT) 10 MG tablet Take 1 tablet (10 mg total) by mouth once as needed for migraine May repeat in 2 hours if needed  Max 2/24 hours, 9/month  9 tablet 12    Xiidra 5 % op solution Administer 1 drop to both eyes 2 (two) times a day      LORazepam (ATIVAN) 1 mg tablet Take 1 tablet po 1 hour before procedure  May repeat dose x 1 if needed (Patient not taking: Reported on 1/26/2022 ) 2 tablet 0    omeprazole (PriLOSEC) 20 mg delayed release capsule Take 1 capsule (20 mg total) by mouth daily 30 capsule 0    pantoprazole (PROTONIX) 40 mg tablet TAKE ONE TABLET BY MOUTH ONCE DAILY 30 tablet 11     No current facility-administered medications for this visit  Allergies   Allergen Reactions    Erythromycin Rash         Objective:     Exam limited by Video  Physical Exam:                                                                 Vitals:            Constitutional:    Ht 5' 1" (1 549 m)   Wt 64 kg (141 lb)   BMI 26 64 kg/m²   BP Readings from Last 3 Encounters:   01/19/22 126/78   01/05/22 136/76   10/07/21 124/66     Pulse Readings from Last 3 Encounters:   01/19/22 68   01/05/22 68   10/07/21 73         Well developed, well nourished, No dysmorphic features  HEENT:  Normocephalic atraumatic  See neuro exam   Psychiatric:  Normal behavior and appropriate affect        Neurological Examination:     Mental status/cognitive function:   Recent and remote memory appear intact  Attention span and concentration as well as fund of knowledge appear appropriate for age  Normal language and spontaneous speech  Cranial Nerves:  VII-facial expression symmetric  Motor Exam: symmetric bulk throughout  no atrophy, fasciculations or abnormal movements noted     Coordination:  no apparent dysmetria, ataxia or tremor noted         Pertinent lab results:   See EMR for recent labs     03/11/2020 CMP unremarkable except for glucose 101, CBC unremarkable  TSH normal  HIV 2 7     4/09/2019:  A1c 5 7     03/08/2019:  BMP and CBC unremarkable  Vitamin-D 44 2  B12 2, 557  TSH 2 02     12/11/18: CMP unremarkable   11/29/18: CBC significant for hgb 10 8     Imaging:   CTA head and neck with without contrast 03/13/2019: I have personally reviewed imaging and radiology read   1   No acute findings   Unremarkable CT appearance of the brain  2   Unremarkable CT angiogram of the head and neck  Review of Systems:   ROS obtained by medical assistant Personally reviewed and updated if indicated  I recommended PCP follow up for non neurologic problems  Review of Systems   Constitutional: Negative  Negative for appetite change and fever  HENT: Negative  Negative for hearing loss, tinnitus, trouble swallowing and voice change  Eyes: Negative  Negative for photophobia and pain  Respiratory: Negative  Negative for shortness of breath  Cardiovascular: Negative  Negative for palpitations  Gastrointestinal: Negative  Negative for nausea and vomiting  Endocrine: Negative  Negative for cold intolerance  Genitourinary: Negative  Negative for dysuria, frequency and urgency  Musculoskeletal: Negative  Negative for myalgias and neck pain  Skin: Negative  Negative for rash  Neurological: Positive for headaches (2 per month)  Negative for dizziness, tremors, seizures, syncope, facial asymmetry, speech difficulty, weakness, light-headedness and numbness  Hematological: Negative  Does not bruise/bleed easily  Psychiatric/Behavioral: Negative  Negative for confusion, hallucinations and sleep disturbance  I have spent 16 minutes with Patient today in which greater than 50% of this time was spent in counseling/coordination of care  I also spent 5 minutes non face to face for this patient the same day  VIRTUAL VISIT DISCLAIMER      Darline Vital verbally agrees to participate in [employer]   Pt is aware that [employer] could be limited without vital signs or the ability to perform a full hands-on physical Zunilda Milton understands she or the provider may request at any time to terminate the video visit and request the patient to seek care or treatment in person

## 2022-01-26 NOTE — PROGRESS NOTES
Review of Systems   Constitutional: Negative  Negative for appetite change and fever  HENT: Negative  Negative for hearing loss, tinnitus, trouble swallowing and voice change  Eyes: Negative  Negative for photophobia and pain  Respiratory: Negative  Negative for shortness of breath  Cardiovascular: Negative  Negative for palpitations  Gastrointestinal: Negative  Negative for nausea and vomiting  Endocrine: Negative  Negative for cold intolerance  Genitourinary: Negative  Negative for dysuria, frequency and urgency  Musculoskeletal: Negative  Negative for myalgias and neck pain  Skin: Negative  Negative for rash  Neurological: Positive for headaches (2 per month)  Negative for dizziness, tremors, seizures, syncope, facial asymmetry, speech difficulty, weakness, light-headedness and numbness  Hematological: Negative  Does not bruise/bleed easily  Psychiatric/Behavioral: Negative  Negative for confusion, hallucinations and sleep disturbance

## 2022-01-26 NOTE — PATIENT INSTRUCTIONS
If vertigo  returns, we can refer you to PT     - continue baby aspirin every day 81 mg      Headache/migraine treatment:   Abortive medications (for immediate treatment of a headache): It is ok to take ibuprofen, acetaminophen or naproxen (Advil, Tylenol,  Aleve, Excedrin) if they help your headaches you should limit these to No more than 3 times a week to avoid medication overuse/rebound headaches       For nausea  Ondansetron 4 mg ok to take as needed for nausea      For your more moderate to severe migraines take this medication early:  Continue Maxalt (rizatriptan) 10mg tabs - take one at the onset of headache  May repeat one time after 1-2 hours if pain has not resolved  (Max 2 a day and 9 a month)         Over the counter preventive supplements for headaches/migraines   (to take every day to help prevent headaches - not to take at the time of headache):   - Mind ease can be found online and all of these   [x]?  Magnesium 400mg daily   [x]? Riboflavin (Vitamin B2)  400mg daily   (FYI B2 may make your urine bright/neon yellow)     Prescription preventive medications for headaches/migraines   (to take every day to help prevent headaches - not to take at the time of headache):  [x]? Could consider in the future if needed     Lifestyle Recommendations:  [x]?  SLEEP - Maintain a regular sleep schedule: Adults need at least 7-8 hours of uninterrupted a night  Maintain good sleep hygiene:  Going to bed and waking up at consistent times, avoiding excessive daytime naps, avoiding caffeinated beverages in the evening, avoid excessive stimulation in the evening and generally using bed primarily for sleeping   One hour before bedtime would recommend turning lights down lower, decreasing your activity (may read quietly, listen to music at a low volume)  When you get into bed, should eliminate all technology (no texting, emailing, playing with your phone, iPad or tablet in bed)  [x]? HYDRATION - Maintain good hydration   Drink  2L of fluid a day (4 typical small water bottles)  [x]?  DIET - Maintain good nutrition  In particular don't skip meals and try and eat healthy balanced meals regularly  [x]?  TRIGGERS - Look for other triggers and avoid them: Limit caffeine to 1-2 cups a day or less  Avoid dietary triggers that you have noticed bring on your headaches (this could include aged cheese, peanuts, MSG, aspartame and nitrates)  [x]?  EXERCISE - physical exercise as we all know is good for you in many ways, and not only is good for your heart, but also is beneficial for your mental health, cognitive health and  chronic pain/headaches  I would encourage at the least 5 days of physical exercise weekly for at least 30 minutes       Education and Follow-up  [x]? Please call with any questions or concerns     [x]? Follow up 1 year, sooner as needed

## 2022-03-29 ENCOUNTER — OFFICE VISIT (OUTPATIENT)
Dept: FAMILY MEDICINE CLINIC | Facility: CLINIC | Age: 67
End: 2022-03-29
Payer: MEDICARE

## 2022-03-29 VITALS
TEMPERATURE: 95.6 F | WEIGHT: 143.3 LBS | BODY MASS INDEX: 27.06 KG/M2 | HEART RATE: 66 BPM | DIASTOLIC BLOOD PRESSURE: 66 MMHG | HEIGHT: 61 IN | OXYGEN SATURATION: 97 % | SYSTOLIC BLOOD PRESSURE: 116 MMHG

## 2022-03-29 DIAGNOSIS — Z78.0 POST-MENOPAUSAL: ICD-10-CM

## 2022-03-29 DIAGNOSIS — M65.312 TRIGGER FINGER OF LEFT THUMB: ICD-10-CM

## 2022-03-29 DIAGNOSIS — G43.109 MIGRAINE AURA WITHOUT HEADACHE: ICD-10-CM

## 2022-03-29 DIAGNOSIS — F32.1 CURRENT MODERATE EPISODE OF MAJOR DEPRESSIVE DISORDER WITHOUT PRIOR EPISODE (HCC): ICD-10-CM

## 2022-03-29 DIAGNOSIS — Z00.00 MEDICARE ANNUAL WELLNESS VISIT, SUBSEQUENT: Primary | ICD-10-CM

## 2022-03-29 DIAGNOSIS — L29.9 PRURITUS: ICD-10-CM

## 2022-03-29 DIAGNOSIS — G43.109 MIGRAINE WITH AURA AND WITHOUT STATUS MIGRAINOSUS, NOT INTRACTABLE: ICD-10-CM

## 2022-03-29 DIAGNOSIS — G47.9 SLEEP DISORDER: ICD-10-CM

## 2022-03-29 PROBLEM — E66.9 OBESITY, CLASS I, BMI 30-34.9: Status: RESOLVED | Noted: 2019-04-29 | Resolved: 2022-03-29

## 2022-03-29 PROBLEM — E66.811 OBESITY, CLASS I, BMI 30-34.9: Status: RESOLVED | Noted: 2019-04-29 | Resolved: 2022-03-29

## 2022-03-29 PROCEDURE — 1123F ACP DISCUSS/DSCN MKR DOCD: CPT | Performed by: FAMILY MEDICINE

## 2022-03-29 PROCEDURE — G0439 PPPS, SUBSEQ VISIT: HCPCS | Performed by: FAMILY MEDICINE

## 2022-03-29 PROCEDURE — 99214 OFFICE O/P EST MOD 30 MIN: CPT | Performed by: FAMILY MEDICINE

## 2022-03-29 NOTE — PROGRESS NOTES
Assessment and Plan:     Problem List Items Addressed This Visit     None      Visit Diagnoses     Post-menopausal    -  Primary        BMI Counseling: Body mass index is 27 08 kg/m²  The BMI is above normal  Nutrition recommendations include encouraging healthy choices of fruits and vegetables  Exercise recommendations include moderate physical activity 150 minutes/week  No pharmacotherapy was ordered  Rationale for BMI follow-up plan is due to patient being overweight or obese  Preventive health issues were discussed with patient, and age appropriate screening tests were ordered as noted in patient's After Visit Summary  Personalized health advice and appropriate referrals for health education or preventive services given if needed, as noted in patient's After Visit Summary       History of Present Illness:     Patient presents for Medicare Annual Wellness visit    Patient Care Team:  Sonja Mcafrland MD as PCP - General (Family Medicine)     Problem List:     Patient Active Problem List   Diagnosis    Migraine headache    Chronic bilateral low back pain without sciatica    Esophageal reflux    Arthritis    Duodenum disorder    Hyperlipidemia    Depression    Abnormal MRI of abdomen    Vitamin D deficiency    Anxiety disorder    Migraine aura without headache    Thyroid nodule    Obesity, Class I, BMI 30-34 9    Impaired fasting glucose    Chronic left shoulder pain    Lumbar spondylosis    Current moderate episode of major depressive disorder without prior episode ([facility] [address] [facility]    Sleep disorder    Status post laparoscopic cholecystectomy    Pruritus    Post-menopausal bleeding      Past Medical and Surgical History:     Past Medical History:   Diagnosis Date    Abdominal discomfort     Acute cholecystitis due to biliary calculus 12/15/2020    Anemia     Arthritis     Bronchitis     Chronic pain disorder     Dyslipidemia     Last Assessed 07/09/2012    GERD (gastroesophageal reflux disease)     Headache, migraine     Hypotension     Low back pain     Low back pain     Rheumatoid arthritis ([facility] [address]     Thyroid nodule 2019     Past Surgical History:   Procedure Laterality Date    CATARACT EXTRACTION       SECTION      CHOLECYSTECTOMY LAPAROSCOPIC N/A 12/15/2020    Procedure: CHOLECYSTECTOMY LAPAROSCOPIC;  Surgeon: Karlie Garrido MD;  Location: [facility];  Service: General    ESOPHAGOGASTRODUODENOSCOPY N/A 2018    Procedure: ESOPHAGOGASTRODUODENOSCOPY (EGD) with push enteroscopy and bx;  Surgeon: Blayne Regan DO;  Location: [facility]; Service: Gastroenterology    EYE SURGERY      NERVE SURGERY Right     Hand    WI EDG US EXAM SURGICAL ALTER STOM DUODENUM/JEJUNUM N/A 3/6/2019    Procedure: LINEAR ENDOSCOPIC U/S;  Surgeon: Yina Parks MD;  Location:  GI LAB;   Service: Gastroenterology    SEPTOPLASTY  around age 20   Exie Gurdon UPPER GASTROINTESTINAL ENDOSCOPY        Family History:     Family History   Problem Relation Age of Onset   Exie Gurdon Hypertension Mother     Other Mother         Dyslipidemia    Diabetes Father         Mellitus    No Known Problems Brother     No Known Problems Brother     Cancer Neg Hx     Heart disease Neg Hx     Stroke Neg Hx       Social History:     Social History     Socioeconomic History    Marital status: /Civil Union     Spouse name: Not on file    Number of children: Not on file    Years of education: Not on file    Highest education level: Not on file   Occupational History    Occupation: retired   Tobacco Use    Smoking status: Former Smoker     Quit date: 1985     Years since quittin 2    Smokeless tobacco: Never Used   Vaping Use    Vaping Use: Never used   Substance and Sexual Activity    Alcohol use: Yes     Comment: occasional  Maybe 5x per year    Drug use: No    Sexual activity: Yes     Partners: Male     Birth control/protection: Post-menopausal   Other Topics Concern    Not on file   Social History Narrative    Use Safety Equipment - Seatbelts        Lives with spouse        Consumes 3 cups of coffee per day     Social Determinants of Health     Financial Resource Strain: Not on file   Food Insecurity: Not on file   Transportation Needs: Not on file   Physical Activity: Not on file   Stress: Not on file   Social Connections: Not on file   Intimate Partner Violence: Not on file   Housing Stability: Not on file      Medications and Allergies:     Current Outpatient Medications   Medication Sig Dispense Refill    acetaminophen (TYLENOL) 325 mg tablet Take 2 tablets (650 mg total) by mouth every 6 (six) hours as needed for mild pain or fever 30 tablet 0    Cholecalciferol (VITAMIN D3) 5000 units CAPS Take 5,000 Units by mouth daily       cyclobenzaprine (FLEXERIL) 10 mg tablet Take 1 tablet (10 mg total) by mouth 3 (three) times a day 20 tablet 0    diclofenac sodium (VOLTAREN) 1 % Apply topically every 6 (six) hours      docusate sodium (COLACE) 100 mg capsule Take 1 capsule (100 mg total) by mouth daily 30 capsule 5    hydrocortisone 2 5 % cream Apply topically 3 (three) times a day 30 g 0    hydrOXYzine HCL (ATARAX) 10 mg tablet Take 1 tablet (10 mg total) by mouth every 6 (six) hours as needed for itching 30 tablet 0    ibuprofen (MOTRIN) 800 mg tablet every 8 (eight) hours as needed       ipratropium (ATROVENT) 0 03 % nasal spray 2 sprays into each nostril 3 (three) times a day as needed for rhinitis 30 mL 3    LORazepam (ATIVAN) 1 mg tablet Take 1 tablet po 1 hour before procedure    May repeat dose x 1 if needed (Patient not taking: Reported on 1/26/2022 ) 2 tablet 0    Magnesium 400 MG CAPS Take 400 mg by mouth daily        naproxen (NAPROSYN) 500 mg tablet Take one tab up to twice a day, no more than 3 times a week 30 tablet 6    olopatadine HCl (PATADAY) 0 2 % opth drops Administer 1 drop to both eyes daily 2 5 mL 11    omega-3-acid ethyl esters (LOVAZA) 1 g capsule Take 2 g by mouth daily  omeprazole (PriLOSEC) 20 mg delayed release capsule Take 1 capsule (20 mg total) by mouth daily 30 capsule 0    ondansetron (ZOFRAN) 4 mg tablet Take 1 tablet (4 mg total) by mouth every 6 (six) hours 12 tablet 0    pantoprazole (PROTONIX) 40 mg tablet TAKE ONE TABLET BY MOUTH ONCE DAILY 30 tablet 11    polyethylene glycol (MIRALAX) 17 g packet Take 17 g by mouth daily 30 each 5    rizatriptan (MAXALT) 10 MG tablet Take 1 tablet (10 mg total) by mouth once as needed for migraine May repeat in 2 hours if needed  Max 2/24 hours, 9/month  9 tablet 12    Xiidra 5 % op solution Administer 1 drop to both eyes 2 (two) times a day       No current facility-administered medications for this visit  Allergies   Allergen Reactions    Erythromycin Rash      Immunizations:     Immunization History   Administered Date(s) Administered    COVID-19 MODERNA VACC 0 5 ML IM 03/08/2021, 04/05/2021, 12/10/2021    INFLUENZA 12/28/2015, 11/22/2016    Influenza, high dose seasonal 0 7 mL 11/11/2019, 10/15/2020, 09/29/2021    Influenza, recombinant, quadrivalent,injectable, preservative free 11/29/2018    Pneumococcal Conjugate 13-Valent 03/05/2020    Pneumococcal Polysaccharide PPV23 10/07/2021    Tdap 12/28/2015    Zoster Vaccine Recombinant 08/15/2019, 11/01/2019      Health Maintenance:         Topic Date Due    Breast Cancer Screening: Mammogram  11/16/2022    Colorectal Cancer Screening  03/16/2023    Hepatitis C Screening  Completed     There are no preventive care reminders to display for this patient  Medicare Health Risk Assessment:     There were no vitals taken for this visit  Jeanie Reyes is here for her Subsequent Wellness visit  Health Risk Assessment:   Patient rates overall health as good  Patient feels that their physical health rating is same  Patient is satisfied with their life  Eyesight was rated as same  Hearing was rated as same   Patient feels that their emotional and mental health rating is slightly better  Patients states they are never, rarely angry  Patient states they are never, rarely unusually tired/fatigued  Pain experienced in the last 7 days has been none  Patient states that she has experienced no weight loss or gain in last 6 months  Fall Risk Screening: In the past year, patient has experienced: no history of falling in past year      Urinary Incontinence Screening:   Patient has not leaked urine accidently in the last six months  Home Safety:  Patient does not have trouble with stairs inside or outside of their home  Patient has working smoke alarms and has working carbon monoxide detector  Home safety hazards include: none  Nutrition:   Current diet is Regular, Low Carb, No Added Salt and Limited junk food  Medications:   Patient is currently taking over-the-counter supplements  OTC medications include: see medication list  Patient is able to manage medications  Activities of Daily Living (ADLs)/Instrumental Activities of Daily Living (IADLs):   Walk and transfer into and out of bed and chair?: Yes  Dress and groom yourself?: Yes    Bathe or shower yourself?: Yes    Feed yourself? Yes  Do your laundry/housekeeping?: Yes  Manage your money, pay your bills and track your expenses?: Yes  Make your own meals?: Yes    Do your own shopping?: Yes    Previous Hospitalizations:   Any hospitalizations or ED visits within the last 12 months?: No    How many hospitalizations have you had in the last year?: 1-2    Advance Care Planning:   Living will: No    Durable POA for healthcare:  Yes    Advanced directive: No    Advanced directive counseling given: Yes    Five wishes given: Yes      PREVENTIVE SCREENINGS      Cardiovascular Screening:    General: Screening Not Indicated and History Lipid Disorder      Diabetes Screening:     General: Screening Current      Colorectal Cancer Screening:     General: Screening Current      Breast Cancer Screening:     General: Screening Current      Cervical Cancer Screening:    General: Screening Not Indicated      Lung Cancer Screening:     General: Screening Not Indicated      Hepatitis C Screening:    General: Screening Current    Screening, Brief Intervention, and Referral to Treatment (SBIRT)    Screening  Typical number of drinks in a day: 0  Typical number of drinks in a week: 0  Interpretation: Low risk drinking behavior      AUDIT-C Screenin) How often did you have a drink containing alcohol in the past year? never  2) How many drinks did you have on a typical day when you were drinking in the past year? 0  3) How often did you have 6 or more drinks on one occasion in the past year? never    AUDIT-C Score: 0  Interpretation: Score 0-2 (female): Negative screen for alcohol misuse    Single Item Drug Screening:  How often have you used an illegal drug (including marijuana) or a prescription medication for non-medical reasons in the past year? never    Single Item Drug Screen Score: 0  Interpretation: Negative screen for possible drug use disorder      Smitha Alfred MD

## 2022-03-29 NOTE — ASSESSMENT & PLAN NOTE
Thankfully her symptoms have been well controlled without medication  She will continue counseling every 2 weeks

## 2022-03-29 NOTE — ASSESSMENT & PLAN NOTE
She has difficulty falling asleep but she has noted some improvement with melatonin  I recommended use of melatonin each night

## 2022-03-29 NOTE — ASSESSMENT & PLAN NOTE
She is stable currently  She gets headache relief with naproxen  She will continue yearly follow-up with Neurology

## 2022-03-29 NOTE — PROGRESS NOTES
Depression Screening Follow-up Plan: Patient's depression screening was positive with a PHQ-2 score of   Their PHQ-9 score was   Clinically patient does not have depression  No treatment is required  Assessment/Plan:    Migraine aura without headache  She is stable currently  She gets headache relief with naproxen  She will continue yearly follow-up with Neurology  Migraine headache  She is stable currently  She gets headache relief with naproxen  She will continue yearly follow-up with Neurology  Sleep disorder    She has difficulty falling asleep but she has noted some improvement with melatonin  I recommended use of melatonin each night  Pruritus  Sx have improved and she no longer needs hydroxyzine    Current moderate episode of major depressive disorder without prior episode ([facility] [address] )    Thankfully her symptoms have been well controlled without medication  She will continue counseling every 2 weeks  Trigger finger of left thumb    I have given her referral to hand surgeon  Diagnoses and all orders for this visit:    Medicare annual wellness visit, subsequent    Post-menopausal  -     DXA bone density spine hip and pelvis; Future    Migraine aura without headache    Migraine with aura and without status migrainosus, not intractable    Sleep disorder    Pruritus    Current moderate episode of major depressive disorder without prior episode (HCC)    Trigger finger of left thumb          Subjective:      Patient ID: Bharath Olivas is a 75 y o  female  She is here with her  for annual wellness visit and follow-up of chronic problems  In general she feels OK but she always feels like she has a cold  She had  ENT evaluation in January because of ongoing nasal congestion, rhinorrhea and postnasal drainage  She was given Atrovent nose spray which seems to be helping a little bit although it seems to cause more postnasal drainage and feeling of a lump in the throat    She also was having burning eyes and her eye doctor gave her 2 new eyedrops including steroid  She has not gotten a chance to try this yet  He     She also noticed swelling on the right side of her neck which seems to be decreasing in size  Migraines have been under control recently  She was having some troubles with ocular migraines but this has calmed  She usually gets relief with naproxen  The following portions of the patient's history were reviewed and updated as appropriate: allergies, current medications, past family history, past medical history, past social history, past surgical history and problem list     Review of Systems   Constitutional: Negative for activity change, chills, fatigue and fever  HENT: Positive for congestion  Negative for ear pain, sinus pressure and sore throat  Eyes: Positive for pain  Negative for visual disturbance  Respiratory: Negative for cough, chest tightness, shortness of breath and wheezing  Cardiovascular: Negative for chest pain, palpitations and leg swelling  Gastrointestinal: Negative for abdominal pain, blood in stool, constipation, diarrhea, nausea and vomiting  Endocrine: Negative for polydipsia and polyuria  Genitourinary: Negative for difficulty urinating, dysuria, frequency and urgency  Musculoskeletal: Negative for arthralgias, joint swelling and myalgias  Skin: Negative for rash  Neurological: Positive for headaches  Negative for dizziness, weakness and numbness  Hematological: Negative for adenopathy  Does not bruise/bleed easily  Psychiatric/Behavioral: Positive for sleep disturbance  Negative for dysphoric mood  The patient is not nervous/anxious            Objective:      /66 (BP Location: Right arm, Patient Position: Sitting, Cuff Size: Standard)   Pulse 66   Temp (!) 95 6 °F (35 3 °C) (Tympanic)   Ht 5' 1" (1 549 m)   Wt 65 kg (143 lb 4 8 oz)   SpO2 97%   BMI 27 08 kg/m²          Physical Exam  Vitals and nursing note reviewed  Constitutional:       General: She is not in acute distress  Appearance: Normal appearance  She is well-developed  HENT:      Head: Normocephalic and atraumatic  Right Ear: Tympanic membrane, ear canal and external ear normal       Left Ear: Tympanic membrane, ear canal and external ear normal       Nose: Nose normal       Mouth/Throat:      Mouth: Mucous membranes are moist    Eyes:      General: No scleral icterus  Conjunctiva/sclera: Conjunctivae normal       Pupils: Pupils are equal, round, and reactive to light  Neck:      Thyroid: No thyromegaly  Cardiovascular:      Rate and Rhythm: Normal rate and regular rhythm  Pulses: Normal pulses  Heart sounds: Normal heart sounds  No murmur heard  Pulmonary:      Effort: Pulmonary effort is normal       Breath sounds: Normal breath sounds  No wheezing or rales  Abdominal:      General: Bowel sounds are normal       Palpations: Abdomen is soft  There is no mass  Tenderness: There is no abdominal tenderness  Musculoskeletal:         General: No tenderness or deformity  Normal range of motion  Cervical back: Normal range of motion and neck supple  Right lower leg: No edema  Left lower leg: No edema  Comments: Left thumb with triggering  Lymphadenopathy:      Cervical: No cervical adenopathy  Skin:     General: Skin is warm and dry  Findings: No erythema or rash  Neurological:      General: No focal deficit present  Mental Status: She is alert and oriented to person, place, and time  Cranial Nerves: No cranial nerve deficit  Deep Tendon Reflexes: Reflexes are normal and symmetric     Psychiatric:         Mood and Affect: Mood normal          Behavior: Behavior normal

## 2022-04-06 DIAGNOSIS — L98.9 SKIN LESION: Primary | ICD-10-CM

## 2022-05-24 ENCOUNTER — CONSULT (OUTPATIENT)
Dept: DERMATOLOGY | Facility: CLINIC | Age: 67
End: 2022-05-24
Payer: MEDICARE

## 2022-05-24 VITALS — TEMPERATURE: 98.2 F | HEIGHT: 61 IN | WEIGHT: 145 LBS | BODY MASS INDEX: 27.38 KG/M2

## 2022-05-24 DIAGNOSIS — L57.0 ACTINIC KERATOSES: ICD-10-CM

## 2022-05-24 DIAGNOSIS — Z12.83 SCREENING FOR MALIGNANT NEOPLASM OF SKIN: Primary | ICD-10-CM

## 2022-05-24 PROCEDURE — 99203 OFFICE O/P NEW LOW 30 MIN: CPT | Performed by: DERMATOLOGY

## 2022-05-24 PROCEDURE — 17000 DESTRUCT PREMALG LESION: CPT | Performed by: DERMATOLOGY

## 2022-05-24 NOTE — PROGRESS NOTES
[facility] Note     Patient Name: Farooq Lopez  Encounter Date: 5/24/22     Have you been cared for by a [facility] in the last 3 years and, if so, which one? No    · Have you traveled outside of the [facility] in the past 3 months? No     May we call your Preferred Phone number to discuss your specific medical information? Yes     May we leave a detailed message that includes your specific medical information? Yes      Today's Chief Concerns:   Concern #1:  Skin exam      Past Medical History:  Have you personally ever had or currently have any of the following? · Skin cancer (such as Melanoma, Basal Cell Carcinoma, Squamous Cell Carcinoma? (If Yes, please provide more detail)- No  · Eczema: No  · Psoriasis: No  · HIV/AIDS: No  · Hepatitis B or C: No  · Tuberculosis: No  · Systemic Immunosuppression such as Diabetes, Biologic or Immunotherapy, Chemotherapy, Organ Transplantation, Bone Marrow Transplantation (If YES, please provide more detail): No  · Radiation Treatment (If YES, please provide more detail): No  · Any other major medical conditions/concerns? (If Yes, which types)- YES, migraines and hyperlipidemia    Social History:    What is/was your primary occupation? retired    What are your hobbies/past-times? Family history:    Have any of your "first degree relatives" (parent, brother, sister, or child) had any of the following       · Skin cancer such as Melanoma or Merkel Cell Carcinoma or Pancreatic Cancer? No  · Eczema, Asthma, Hay Fever or Seasonal Allergies: No  · Psoriasis or Psoriatic Arthritis: No  · Do any other medical conditions seem to run in your family? If Yes, what condition and which relatives?   No    Current Medications  Current Outpatient Medications:     acetaminophen (TYLENOL) 325 mg tablet, Take 2 tablets (650 mg total) by mouth every 6 (six) hours as needed for mild pain or fever, Disp: 30 tablet, Rfl: 0    Cholecalciferol (VITAMIN D3) 5000 units CAPS, Take 5,000 Units by mouth daily , Disp: , Rfl:     cyclobenzaprine (FLEXERIL) 10 mg tablet, Take 1 tablet (10 mg total) by mouth 3 (three) times a day, Disp: 20 tablet, Rfl: 0    diclofenac sodium (VOLTAREN) 1 %, Apply topically every 6 (six) hours, Disp: , Rfl:     docusate sodium (COLACE) 100 mg capsule, Take 1 capsule (100 mg total) by mouth daily, Disp: 30 capsule, Rfl: 5    hydrocortisone 2 5 % cream, Apply topically 3 (three) times a day, Disp: 30 g, Rfl: 0    hydrOXYzine HCL (ATARAX) 10 mg tablet, Take 1 tablet (10 mg total) by mouth every 6 (six) hours as needed for itching, Disp: 30 tablet, Rfl: 0    ibuprofen (MOTRIN) 800 mg tablet, every 8 (eight) hours as needed , Disp: , Rfl:     ipratropium (ATROVENT) 0 03 % nasal spray, 2 sprays into each nostril 3 (three) times a day as needed for rhinitis, Disp: 30 mL, Rfl: 3    LORazepam (ATIVAN) 1 mg tablet, Take 1 tablet po 1 hour before procedure  May repeat dose x 1 if needed, Disp: 2 tablet, Rfl: 0    naproxen (NAPROSYN) 500 mg tablet, Take one tab up to twice a day, no more than 3 times a week, Disp: 30 tablet, Rfl: 6    olopatadine HCl (PATADAY) 0 2 % opth drops, Administer 1 drop to both eyes daily, Disp: 2 5 mL, Rfl: 11    omega-3-acid ethyl esters (LOVAZA) 1 g capsule, Take 2 g by mouth daily , Disp: , Rfl:     ondansetron (ZOFRAN) 4 mg tablet, Take 1 tablet (4 mg total) by mouth every 6 (six) hours, Disp: 12 tablet, Rfl: 0    polyethylene glycol (MIRALAX) 17 g packet, Take 17 g by mouth daily, Disp: 30 each, Rfl: 5    rizatriptan (MAXALT) 10 MG tablet, Take 1 tablet (10 mg total) by mouth once as needed for migraine May repeat in 2 hours if needed  Max 2/24 hours, 9/month , Disp: 9 tablet, Rfl: 12      Review of Systems/System Alerts:  Have you recently had or currently have any of the following? If YES, what are you doing for the problem?     · Fever, chills or unintended weight loss: No  · Sudden loss or change in your vision: No  · Nausea, vomiting or blood in your stool: No  · Painful or swollen joints: No  · Wheezing or cough: No  · Changing mole or non-healing wound: No  · Nosebleeds: No  · Excessive sweating: No  · Easy or prolonged bleeding? No  · Over the last 2 weeks, how often have you been bothered by the following problems? · Taking little interest or pleasure in doing things: 1 - Not at All  · Feeling down, depressed, or hopeless: 1 - Not at All  · Rapid heartbeat with epinephrine:  No  · Any known allergies? YES, see below  Allergies   Allergen Reactions    Erythromycin Rash   ·       PHYSICAL EXAM:       Was a chaperone (Derm Clinical Assistant) present throughout the entire Physical Exam? Yes     Did the Dermatology Team specifically  the patient on the importance of a Full Skin Exam to be sure that nothing is missed clinically? Yes}  o Did the patient request or accept a Full Skin Exam?  Yes  o Did the patient specifically refuse to have the areas "under-the-bra" examined by the Dermatologist? No  o Did the patient specifically refuse to have the areas "under-the-underwear" examined by the Dermatologist? No      CONSTITUTIONAL   There were no vitals filed for this visit        PSYCH: Normal mood and affect  EYES: Normal conjunctiva  ENT: Normal lips and oral mucosa  CARDIOVASCULAR: No edema  RESPIRATORY: Normal respirations  HEME/LYMPH/IMMUNO:  No regional lymphadenopathy except as noted below in ASSESSMENT AND PLAN BY DIAGNOSIS    SKIN:  FULL ORGAN SYSTEM EXAM  Hair, Scalp, Ears, Face Normal except as noted below in Assessment   Neck, Cervical Chain Nodes Normal except as noted below in Assessment   Right Arm/Hand/Fingers Normal except as noted below in Assessment   Left Arm/Hand/Fingers Normal except as noted below in Assessment   Chest/Axillae Viewed areas Normal except as noted below in Assessment   Abdomen, Umbilicus Normal except as noted below in Assessment Back/Spine Normal except as noted below in Assessment   Groin/Genitalia/Buttocks Normal except as noted below in Assessment   Right Leg, Foot, Toes Normal except as noted below in Assessment   Left Leg, Foot, Toes Normal except as noted below in Assessment      SCREENING SKIN EXAM    Physical Exam:   Anatomic Location Affected: Integument    Morphological Description:  No atypical skin lesions in examined areas   Pertinent Positives:   Pertinent Negatives:    Clinically distinguishing a healthy mole from melanoma may be difficult  The "ABCDE's" of moles have been suggested as a means of helping to alert a person to a suspicious mole and the possible increased risk of melanoma  Asymmetry: Healthy moles tend to be symmetric, while melanomas are often asymmetric  Asymmetry means if you draw a line through the mole, the two halves do not match in color, size, shape, or surface texture Any mole that starts to demonstrate "asymmetry" should be examined promptly by a board certified dermatologist      Border: Healthy moles tend to have discrete, even borders  The border of a melanoma often blends into the normal skin and does not sharply delineate the mole from normal skin  Any mole that starts to demonstrate "uneven borders" should be examined promptly     Color: Healthy moles tend to be one color throughout  Melanomas tend to be made up of different colors ranging from dark black, blue, white, or red  Any mole that demonstrates a color change should be examined promptly    Diameter: Healthy moles tend to be smaller than 0 6 cm in size; an exception are "congenital nevi" that can be larger  Melanomas tend to grow and can often be greater than 0 6 cm (1/4 of an inch, or the size of a pencil eraser)  This is only a guideline, and many normal moles may be larger than 0 6 cm without being unhealthy    Any mole that starts to change in size (small to bigger or bigger to smaller) should be examined promptly    Evolving: Healthy moles tend to "stay the same "  Melanomas may often show signs of change or evolution such as a change in size, shape, color, or elevation  Any mole that starts to itch, bleed, crust, burn, hurt, or ulcerate or demonstrate a change or evolution should be examined promptly by a board certified dermatologist       What are atypical moles or dysplastic nevi? Dysplastic moles are moles that have some of the ABCDE  changes listed above but  are not cancerous  Sometimes a biopsy and microscopic examination are needed to determine the difference  They may indicate an increased risk of melanoma in that person, especially if there is a family history of melanoma  What is a Melanoma? The main concern when looking at a new or changing mole it to evaluate whether it may be a melanoma  The appearance of a "new mole" remains one of the most reliable methods for identifying a malignant melanoma  A melanoma is a type of skin cancer that can be deadly if it spreads throughout the body  The prognosis of a Melanoma depends on how deep it has penetrated in the skin  If caught early, they generally will not have had time to grow into the deeper layers of the skin and they cure rate is then very high  Once the melanoma grows deeper into the skin, the cure rate drops dramatically  Therefore, early detection and removal of a malignant melanoma results in a much better chance of complete cure  LENTIGO    Physical Exam:   Anatomic Location Affected:  Morphological Description:   No such lesions of note today   Pertinent Positives:   Pertinent Negatives:   Reassured benign   Recommend sunscreen SPF 50 or higher daily     What is a lentigo? A lentigo is a pigmented flat or slightly raised lesion with a clearly defined edge  Unlike an ephelis (freckle), it does not fade in the winter months  There are several kinds of lentigo    The name lentigo originally referred to its appearance resembling a small lentil  The plural of lentigo is lentigines, although lentigos is also in common use  Who gets lentigines? Lentigines can affect males and females of all ages and races  Solar lentigines are especially prevalent in fair skinned adults  Lentigines associated with syndromes are present at birth or arise during childhood  What causes lentigines? Common forms of lentigo are due to exposure to ultraviolet radiation:   Sun damage including sunburn    Indoor tanning    Phototherapy, especially photochemotherapy (PUVA)    Ionizing radiation, eg radiation therapy, can also cause lentigines  Several familial syndromes associated with widespread lentigines originate from mutations in Terry-MAP kinase, mTOR signaling and PTEN pathways  What are the clinical features of lentigines? Lentigines have been classified into several different types depending on what they look like, where they appear on the body, causative factors, and whether they are associated to other diseases or conditions  Lentigines may be solitary or more often, multiple  Most lentigines are smaller than 5 mm in diameter      Lentigo simplex   A precursor to junctional naevus    Arises during childhood and early adult life    Found on trunk and limbs    Small brown round or oval macule or thin plaque    Jagged or smooth edge    May have a dry surface    May disappear in time  Solar lentigo   A precursor to seborrhoeic keratosis    Found on chronically sun exposed sites such as hands, face, lower legs    May also follow sunburn to shoulders    Yellow, light or dark brown regular or irregular macule or thin plaque    May have a dry surface    Often has moth-eaten outline    Can slowly enlarge to several centimeters in diameter    May disappear, often through the process known as lichenoid keratosis    When atypical in appearance, may be difficult to distinguish from melanoma in situ  Ink spot lentigo   Also known as reticulated lentigo    Few in number compared to solar lentigines    Follows sunburn in very fair skinned individuals    Dark brown to black irregular ink spot-like macule  PUVA lentigo   Similar to ink spot lentigo but follows photochemotherapy (PUVA)    Location anywhere exposed to PUVA  Tanning bed lentigo   Similar to ink spot lentigo but follows indoor tanning    Location anywhere exposed to tanning bed  Radiation lentigo   Occurs in site of irradiation (accidental or therapeutic)    Associated with late-stage radiation dermatitis: epidermal atrophy, subcutaneous fibrosis, keratosis, telangiectasias  Melanotic macule   Mucosal surfaces or adjacent glabrous skin eg lip, vulva, penis, anus    Light to dark brown    Also called mucosal melanosis  Generalised lentigines   Found on any exposed or covered site from early childhood    Small macules may merge to form larger patches    Not associated with a syndrome    Also called lentigines profusa, multiple lentigines  Agminated lentigines   Naevoid eruption of lentigos confined to a single segmental area    Sharp demarcation in midline    May have associated neurological and developmental abnormalities  Patterned lentigines   Inherited tendency to lentigines on face, lips, buttocks, palms, soles    Recognised mainly in people of  ethnicity  Centrofacial neurodysraphic lentiginosis  Associated with mental retardation  Lentiginosis syndromes   Syndromes include LEOPARD/Keasbey, Peutz-Jeghers, Laugier-Hunziker, Moynahan, Xeroderma pigmentosum, myxoma syndromes (PANIAGUA, NAME, Bauer), Ruvalcaba-Myhre-Us, Bannayan-Zonnana syndrome, Cowden disease (multiple hamartoma syndrome )    Inheritance is autosomal dominant; sporadic cases common    Widespread lentigines present at birth or arise in early childhood    Associated with neural, endocrine, and mesenchymal tumors    How is the diagnosis made?   Lentigines are usually diagnosed clinically by their typical appearance  Concern regarding possibility of melanoma may lead to:   Dermatoscopy    Diagnostic excision biopsy    Histopathology of a lentigo shows:   Thickened epidermis    An increased number of melanocytes along the basal layer of epidermis    Unlike junctional melanocytic naevus, there are no nests of melanocytes    Increased melanin pigment within the keratinocytes    Additional features depending on type of lentigo    In contrast, an ephelis (freckle) shows sun-induced increased melanin within the keratinocytes, without an increase in number of cells  What is the treatment for lentigines? Most lentigines are left alone  Attempts to lighten them may not be successful  The following approaches are used:   SPF 50+ broad-spectrum sunscreen    Hydroquinone bleaching cream    Alpha hydroxy acids    Vitamin C    Retinoids    Azelaic acid    Chemical peels  Individual lesions can be permanently removed using:   Cryotherapy    Intense pulsed light    Pigment lasers    How can lentigines be prevented? Lentigines associated with exposure ultraviolet radiation can be prevented by very careful sun protection  Clothing is more successful at preventing new lentigines than are sunscreens  What is the outlook for lentigines? Lentigines usually persist  They may increase in number with age and sun exposure  Some in sun-protected sites may fade and disappear      ACTINIC KERATOSIS    Physical Exam:   Anatomic Location Affected:  Right cheek   Morphological Description:  Pink scaly macule    Additional History of Present Condition:      Assessment and Plan:  Based on a thorough discussion of this condition and the management approach to it (including a comprehensive discussion of the known risks, side effects and potential benefits of treatment), the patient (family) agrees to implement the following specific plan:     Treated with cryotherapy   Consent obtained    Actinic keratoses are very common on sites repeatedly exposed to the sun, especially the backs of the hands and the face, most often affecting the ears, nose, cheeks, upper lip, vermilion of the lower lip, temples, forehead and balding scalp  In severely chronically sun-damaged individuals, they may also be found on the upper trunk, upper and lower limbs, and dorsum of feet  We discussed the theoretical premalignant (pre-cancerous) nature and etiology of these growths  We discussed the prevailing notion that actinic keratoses are a reflection of abnormal skin cell development due to DNA damage by short wavelength UVB  They are more likely to appear if the immune function is poor, due to aging, recent sun exposure, predisposing disease or certain drugs  We discussed that the main concern is that actinic keratoses may predispose to squamous cell carcinoma  It is rare for a solitary actinic keratosis to evolve to squamous cell carcinoma (SCC), but the risk of SCC occurring at some stage in a patient with more than 10 actinic keratoses is thought to be about 10 to 15%  A tender, thickened, ulcerated or enlarging actinic keratosis is suspicious of SCC  Actinic keratoses may be prevented by strict sun protection  If already present, keratoses may improve with a very high sun protection factor (50+) broad-spectrum sunscreen applied at least daily to affected areas, year-round  We recommend that UPF-rated clothing and hats and sunglasses be worn whenever possible and that a sunscreen-moisturizer combination product such as Neutrogena Daily Defense be applied at least three times a day      We performed a thorough discussion of treatment options and specific risk/benefits/alternatives including but not limited to medical field treatment with medications such as the following:     Topical field area medications such as 5-fluorouracil or Aldara (specifically, the trouble with long-term compliance, blistering and local skin reaction versus the convenience of at-home therapy and that field therapy gets what is not yet seen)   Cryotherapy (specifically, local pain, scarring, dyspigmentation, blistering, possible superinfection, and treats only what we see versus directed treatment today)   Photodynamic therapy (specifically, local pain, scarring, dyspigmentation, blistering, possible superinfection, need to schedule for a later date, and time spent in the office versus field therapy that gets what is not yet seen)  PROCEDURE:  DESTRUCTION OF PRE-MALIGNANT LESIONS  After a thorough discussion of treatment options and risk/benefits/alternatives (including but not limited to local pain, scarring, dyspigmentation, blistering, and possible superinfection), verbal and written consent were obtained and the aforementioned lesions were treated on with cryotherapy using liquid nitrogen x 1 cycle for 5-10 seconds   TOTAL NUMBER of 1 pre-malignant lesions were treated today on the ANATOMIC LOCATION: right cheek  The patient tolerated the procedure well, and after-care instructions were provided      Scribe Attestation    I,:  Lakshmi Valencia MA am acting as a scribe while in the presence of the attending physician :       I,:  Scarlet Wooten MD personally performed the services described in this documentation    as scribed in my presence :       Montrell Scott, DO

## 2022-05-24 NOTE — PATIENT INSTRUCTIONS
MELANOCYTIC NEVI ("Moles")      Assessment and Plan:  Based on a thorough discussion of this condition and the management approach to it (including a comprehensive discussion of the known risks, side effects and potential benefits of treatment), the patient (family) agrees to implement the following specific plan:    Reassured benign     Melanocytic Nevi  Melanocytic nevi ("moles") are caused by collections of the color producing skin cells, or melanocytes, in 1 area in the skin  They can range in color from pink to dark brown and be either raised or flat  Some moles are present at birth (I e , "congenital nevi"), while others come up later in life (i e , "acquired nevi")  Radha Crazier exposure also stimulates the body to make more moles, ie the more sun you get the more moles you'll grow  Clinically distinguishing a healthy mole from melanoma may be difficult  The "ABCDE's" of moles have been suggested as a means of helping to alert a person to a suspicious mole and the possible increased risk of melanoma  Asymmetry: Healthy moles tend to be symmetric, while melanomas are often asymmetric  Asymmetry means if you draw a line through the mole, the two halves do not match in color, size, shape, or surface texture Any mole that starts to demonstrate "asymmetry" should be examined promptly by a board certified dermatologist      Border: Healthy moles tend to have discrete, even borders  The border of a melanoma often blends into the normal skin and does not sharply delineate the mole from normal skin  Any mole that starts to demonstrate "uneven borders" should be examined promptly     Color: Healthy moles tend to be one color throughout  Melanomas tend to be made up of different colors ranging from dark black, blue, white, or red    Any mole that demonstrates a color change should be examined promptly    Diameter: Healthy moles tend to be smaller than 0 6 cm in size; an exception are "congenital nevi" that can be larger  Melanomas tend to grow and can often be greater than 0 6 cm (1/4 of an inch, or the size of a pencil eraser)  This is only a guideline, and many normal moles may be larger than 0 6 cm without being unhealthy  Any mole that starts to change in size (small to bigger or bigger to smaller) should be examined promptly    Evolving: Healthy moles tend to "stay the same "  Melanomas may often show signs of change or evolution such as a change in size, shape, color, or elevation  Any mole that starts to itch, bleed, crust, burn, hurt, or ulcerate or demonstrate a change or evolution should be examined promptly by a board certified dermatologist       What are atypical moles or dysplastic nevi? Dysplastic moles are moles that have some of the ABCDE  changes listed above but  are not cancerous  Sometimes a biopsy and microscopic examination are needed to determine the difference  They may indicate an increased risk of melanoma in that person, especially if there is a family history of melanoma  What is a Melanoma? The main concern when looking at a new or changing mole it to evaluate whether it may be a melanoma  The appearance of a "new mole" remains one of the most reliable methods for identifying a malignant melanoma  A melanoma is a type of skin cancer that can be deadly if it spreads throughout the body  The prognosis of a Melanoma depends on how deep it has penetrated in the skin  If caught early, they generally will not have had time to grow into the deeper layers of the skin and they cure rate is then very high  Once the melanoma grows deeper into the skin, the cure rate drops dramatically  Therefore, early detection and removal of a malignant melanoma results in a much better chance of complete cure       LENTIGO      Assessment and Plan:  Based on a thorough discussion of this condition and the management approach to it (including a comprehensive discussion of the known risks, side effects and potential benefits of treatment), the patient (family) agrees to implement the following specific plan:    Reassured benign  Recommend sunscreen SPF 48 or higher daily     What is a lentigo? A lentigo is a pigmented flat or slightly raised lesion with a clearly defined edge  Unlike an ephelis (freckle), it does not fade in the winter months  There are several kinds of lentigo  The name lentigo originally referred to its appearance resembling a small lentil  The plural of lentigo is lentigines, although lentigos is also in common use  Who gets lentigines? Lentigines can affect males and females of all ages and races  Solar lentigines are especially prevalent in fair skinned adults  Lentigines associated with syndromes are present at birth or arise during childhood  What causes lentigines? Common forms of lentigo are due to exposure to ultraviolet radiation:  Sun damage including sunburn   Indoor tanning   Phototherapy, especially photochemotherapy (PUVA)    Ionizing radiation, eg radiation therapy, can also cause lentigines  Several familial syndromes associated with widespread lentigines originate from mutations in Terry-MAP kinase, mTOR signaling and PTEN pathways  What are the clinical features of lentigines? Lentigines have been classified into several different types depending on what they look like, where they appear on the body, causative factors, and whether they are associated to other diseases or conditions  Lentigines may be solitary or more often, multiple  Most lentigines are smaller than 5 mm in diameter      Lentigo simplex  A precursor to junctional naevus   Arises during childhood and early adult life   Found on trunk and limbs   Small brown round or oval macule or thin plaque   Jagged or smooth edge   May have a dry surface   May disappear in time  Solar lentigo  A precursor to seborrhoeic keratosis   Found on chronically sun exposed sites such as hands, face, lower legs   May also follow sunburn to shoulders   Yellow, light or dark brown regular or irregular macule or thin plaque   May have a dry surface   Often has moth-eaten outline   Can slowly enlarge to several centimeters in diameter   May disappear, often through the process known as lichenoid keratosis   When atypical in appearance, may be difficult to distinguish from melanoma in situ  Ink spot lentigo  Also known as reticulated lentigo   Few in number compared to solar lentigines   Follows sunburn in very fair skinned individuals   Dark brown to black irregular ink spot-like macule  PUVA lentigo  Similar to ink spot lentigo but follows photochemotherapy (PUVA)   Location anywhere exposed to PUVA  Tanning bed lentigo  Similar to ink spot lentigo but follows indoor tanning   Location anywhere exposed to tanning bed  Radiation lentigo  Occurs in site of irradiation (accidental or therapeutic)   Associated with late-stage radiation dermatitis: epidermal atrophy, subcutaneous fibrosis, keratosis, telangiectasias  Melanotic macule  Mucosal surfaces or adjacent glabrous skin eg lip, vulva, penis, anus   Light to dark brown   Also called mucosal melanosis  Generalised lentigines  Found on any exposed or covered site from early childhood   Small macules may merge to form larger patches   Not associated with a syndrome   Also called lentigines profusa, multiple lentigines  Agminated lentigines  Naevoid eruption of lentigos confined to a single segmental area   Sharp demarcation in midline   May have associated neurological and developmental abnormalities  Patterned lentigines  Inherited tendency to lentigines on face, lips, buttocks, palms, soles   Recognised mainly in people of  ethnicity  Centrofacial neurodysraphic lentiginosis  Associated with mental retardation  Lentiginosis syndromes  Syndromes include LEOPARD/Sumter, Peutz-Jeghers, Laugier-Hunziker, Moynahan, Xeroderma pigmentosum, myxoma syndromes (LAMB, NAME, Bauer), Ruvalcaba-Myhre-Us, Bannayan-Zonnana syndrome, Cowden disease (multiple hamartoma syndrome )   Inheritance is autosomal dominant; sporadic cases common   Widespread lentigines present at birth or arise in early childhood   Associated with neural, endocrine, and mesenchymal tumors    How is the diagnosis made? Lentigines are usually diagnosed clinically by their typical appearance  Concern regarding possibility of melanoma may lead to:  Dermatoscopy   Diagnostic excision biopsy    Histopathology of a lentigo shows: Thickened epidermis   An increased number of melanocytes along the basal layer of epidermis   Unlike junctional melanocytic naevus, there are no nests of melanocytes   Increased melanin pigment within the keratinocytes   Additional features depending on type of lentigo    In contrast, an ephelis (freckle) shows sun-induced increased melanin within the keratinocytes, without an increase in number of cells  What is the treatment for lentigines? Most lentigines are left alone  Attempts to lighten them may not be successful  The following approaches are used:  SPF 50+ broad-spectrum sunscreen   Hydroquinone bleaching cream   Alpha hydroxy acids   Vitamin C   Retinoids   Azelaic acid   Chemical peels  Individual lesions can be permanently removed using:  Cryotherapy   Intense pulsed light   Pigment lasers    How can lentigines be prevented? Lentigines associated with exposure ultraviolet radiation can be prevented by very careful sun protection  Clothing is more successful at preventing new lentigines than are sunscreens  What is the outlook for lentigines? Lentigines usually persist  They may increase in number with age and sun exposure  Some in sun-protected sites may fade and disappear      ACTINIC KERATOSIS      Assessment and Plan:  Based on a thorough discussion of this condition and the management approach to it (including a comprehensive discussion of the known risks, side effects and potential benefits of treatment), the patient (family) agrees to implement the following specific plan:    Treated with cryotherapy  Consent obtained    Actinic keratoses are very common on sites repeatedly exposed to the sun, especially the backs of the hands and the face, most often affecting the ears, nose, cheeks, upper lip, vermilion of the lower lip, temples, forehead and balding scalp  In severely chronically sun-damaged individuals, they may also be found on the upper trunk, upper and lower limbs, and dorsum of feet  We discussed the theoretical premalignant (pre-cancerous) nature and etiology of these growths  We discussed the prevailing notion that actinic keratoses are a reflection of abnormal skin cell development due to DNA damage by short wavelength UVB  They are more likely to appear if the immune function is poor, due to aging, recent sun exposure, predisposing disease or certain drugs  We discussed that the main concern is that actinic keratoses may predispose to squamous cell carcinoma  It is rare for a solitary actinic keratosis to evolve to squamous cell carcinoma (SCC), but the risk of SCC occurring at some stage in a patient with more than 10 actinic keratoses is thought to be about 10 to 15%  A tender, thickened, ulcerated or enlarging actinic keratosis is suspicious of SCC  Actinic keratoses may be prevented by strict sun protection  If already present, keratoses may improve with a very high sun protection factor (50+) broad-spectrum sunscreen applied at least daily to affected areas, year-round  We recommend that UPF-rated clothing and hats and sunglasses be worn whenever possible and that a sunscreen-moisturizer combination product such as Neutrogena Daily Defense be applied at least three times a day      We performed a thorough discussion of treatment options and specific risk/benefits/alternatives including but not limited to medical field treatment with medications such as the following:    Topical field area medications such as 5-fluorouracil or Aldara (specifically, the trouble with long-term compliance, blistering and local skin reaction versus the convenience of at-home therapy and that field therapy gets what is not yet seen)  Cryotherapy (specifically, local pain, scarring, dyspigmentation, blistering, possible superinfection, and treats only what we see versus directed treatment today)  Photodynamic therapy (specifically, local pain, scarring, dyspigmentation, blistering, possible superinfection, need to schedule for a later date, and time spent in the office versus field therapy that gets what is not yet seen)  PROCEDURE:  DESTRUCTION OF PRE-MALIGNANT LESIONS  After a thorough discussion of treatment options and risk/benefits/alternatives (including but not limited to local pain, scarring, dyspigmentation, blistering, and possible superinfection), verbal and written consent were obtained and the aforementioned lesions were treated on with cryotherapy using liquid nitrogen x 1 cycle for 5-10 seconds  The patient tolerated the procedure well, and after-care instructions were provided

## 2022-05-31 ENCOUNTER — HOSPITAL ENCOUNTER (OUTPATIENT)
Dept: ULTRASOUND IMAGING | Facility: CLINIC | Age: 67
Discharge: HOME | End: 2022-05-31
Payer: MEDICARE

## 2022-05-31 ENCOUNTER — HOSPITAL ENCOUNTER (OUTPATIENT)
Dept: MAMMOGRAPHY | Facility: CLINIC | Age: 67
Discharge: HOME | End: 2022-05-31
Payer: MEDICARE

## 2022-05-31 VITALS — HEIGHT: 61 IN | BODY MASS INDEX: 27.38 KG/M2 | WEIGHT: 145 LBS

## 2022-05-31 DIAGNOSIS — R92.8 ABNORMAL MAMMOGRAM: ICD-10-CM

## 2022-05-31 PROCEDURE — 76642 ULTRASOUND BREAST LIMITED: CPT

## 2022-05-31 PROCEDURE — 77066 DX MAMMO INCL CAD BI: CPT

## 2022-05-31 PROCEDURE — G0279 TOMOSYNTHESIS, MAMMO: HCPCS

## 2022-06-01 DIAGNOSIS — R92.8 ABNORMAL MAMMOGRAM OF RIGHT BREAST: Primary | ICD-10-CM

## 2022-06-01 NOTE — RESULT ENCOUNTER NOTE
Please call pt with results of diagnostic mammogram -   Recommend repeating in 6 mos - for ongoing surveillance of probably benign mass in right breast

## 2022-09-22 ENCOUNTER — OFFICE VISIT (OUTPATIENT)
Dept: FAMILY MEDICINE CLINIC | Facility: CLINIC | Age: 67
End: 2022-09-22
Payer: MEDICARE

## 2022-09-22 VITALS
HEIGHT: 61 IN | DIASTOLIC BLOOD PRESSURE: 72 MMHG | OXYGEN SATURATION: 97 % | HEART RATE: 65 BPM | SYSTOLIC BLOOD PRESSURE: 116 MMHG | WEIGHT: 144.8 LBS | TEMPERATURE: 97.8 F | BODY MASS INDEX: 27.34 KG/M2

## 2022-09-22 DIAGNOSIS — R19.4 BOWEL HABIT CHANGES: ICD-10-CM

## 2022-09-22 DIAGNOSIS — M54.50 CHRONIC BILATERAL LOW BACK PAIN WITHOUT SCIATICA: ICD-10-CM

## 2022-09-22 DIAGNOSIS — R92.8 ABNORMAL MAMMOGRAM: ICD-10-CM

## 2022-09-22 DIAGNOSIS — Z23 NEED FOR INFLUENZA VACCINATION: Primary | ICD-10-CM

## 2022-09-22 DIAGNOSIS — G89.29 CHRONIC BILATERAL LOW BACK PAIN WITHOUT SCIATICA: ICD-10-CM

## 2022-09-22 DIAGNOSIS — M47.816 LUMBAR SPONDYLOSIS: ICD-10-CM

## 2022-09-22 DIAGNOSIS — F32.1 CURRENT MODERATE EPISODE OF MAJOR DEPRESSIVE DISORDER WITHOUT PRIOR EPISODE (HCC): ICD-10-CM

## 2022-09-22 DIAGNOSIS — G43.109 MIGRAINE AURA WITHOUT HEADACHE: ICD-10-CM

## 2022-09-22 DIAGNOSIS — E78.5 HYPERLIPIDEMIA, UNSPECIFIED HYPERLIPIDEMIA TYPE: ICD-10-CM

## 2022-09-22 DIAGNOSIS — R73.01 IMPAIRED FASTING GLUCOSE: ICD-10-CM

## 2022-09-22 DIAGNOSIS — E78.2 MIXED HYPERLIPIDEMIA: ICD-10-CM

## 2022-09-22 PROCEDURE — 99214 OFFICE O/P EST MOD 30 MIN: CPT | Performed by: FAMILY MEDICINE

## 2022-09-22 PROCEDURE — 90662 IIV NO PRSV INCREASED AG IM: CPT

## 2022-09-22 PROCEDURE — G0008 ADMIN INFLUENZA VIRUS VAC: HCPCS

## 2022-09-22 RX ORDER — DICLOFENAC SODIUM 300 MG/G
CREAM TOPICAL
COMMUNITY

## 2022-10-06 NOTE — PROGRESS NOTES
FAMILY PRACTICE OFFICE VISIT    NAME: Brett Beckett    AGE: 75 y o  SEX: female  : 1955   MRN: [id number]    DATE: 2022  TIME: 8:38 AM    Assessment and Plan      1  Need for influenza vaccination  Given today  2  Impaired fasting glucose  Fasting labs ordered  3  Migraine aura without headache  Seeing neuro  4  Lumbar spondylosis  Using topical voltaren gel    5  Hyperlipidemia, unspecified hyperlipidemia type  Fasting labs  6  Chronic bilateral low back pain without sciatica      7  Current moderate episode of major depressive disorder without prior episode ([facility] [address] )  Stable  Seeing therapist       8  Abnormal mammogram      9  Bowel habit changes  Refer for full colonoscopy        There are no Patient Instructions on file for this visit  pti s on a couple of meds that can adversely interact with each other -but is taking each of them very infrequently    Patient Instructions   Fasting labs  Refer to GI for scope  Trial of nasal spray  Return in 6 mos          Chief Complaint     Chief Complaint   Patient presents with    Follow-up       History of Present Illness   Brett Beckett is a 75y o -year-old female who presents today for routine f/u visit  Former pt of dr crane      Review of Systems   Review of Systems   Constitutional:        Will be starting swimming next month  And aqua aerobics  Also walks   HENT: Positive for hearing loss and postnasal drip  Negative for tinnitus  Slight hearing loss  Increased nasal congestion for the past week  Pt not sure if allergies  Respiratory: Positive for cough  Negative for shortness of breath and wheezing  Cough from post-nasal drip  Nonsmoker     Cardiovascular: Negative for chest pain, palpitations and leg swelling  Gastrointestinal: Negative for abdominal pain          Denies GERD  Pt noticing a change in bowels over the past year  cologuard 2020  Sometimes associated with some sweating and nausea Outbound Call made to: Nenita Goins     Procedure: colonoscopy     Date, Location, and Surgeon of Procedure Cancelled:   [facility]/20[facility] - [facility]       Reason for call (please be detailed, any staff messages or encounters to note?):     Deepak not credentialed at [facility] yet.         Rescheduled:     No - lvmtocb on both #'s. -- AWAITING PT CALL BACK TO [facility].     PT NEEDS:     **COLONOSCOPY WITH ANY PROVIDER  **(CS) OKAY     [place]         Genitourinary: Negative for difficulty urinating and dysuria  Musculoskeletal: Positive for back pain  Using voltaren gel for back pain  H/o CHIARA with pain management - but stopped going - as injections not helpful    Also having pain in right 1st mcp joint  H/o injection - with ortho  And will be gettign xray    Infrequently using flexeril if back in spasm     Skin:        Has rx for atarax to use prn severe pruritis  Not taking often     Allergic/Immunologic: Positive for environmental allergies  Neurological:        Migraines  - with auras  Seem to occur more in the fall  May even take the maxalt with aura only and no headache  Sees neurology   Psychiatric/Behavioral: Positive for sleep disturbance  Negative for self-injury and suicidal ideas  Takes ativan prn severe anxiety only - situational - ie: mri or plane  Seeing a counselor for aura type migraines  Wakes frequently during the night    Pt was taking melatonin       Active Problem List     Patient Active Problem List   Diagnosis    Migraine headache    Chronic bilateral low back pain without sciatica    Esophageal reflux    Arthritis    Duodenum disorder    Hyperlipidemia    Depression    Abnormal MRI of abdomen    Vitamin D deficiency    Anxiety disorder    Migraine aura without headache    Thyroid nodule    Impaired fasting glucose    Chronic left shoulder pain    Lumbar spondylosis    Current moderate episode of major depressive disorder without prior episode ([facility] [address] )    Sleep disorder    Status post laparoscopic cholecystectomy    Pruritus    Post-menopausal bleeding    Trigger finger of left thumb         Past Medical History:  Past Medical History:   Diagnosis Date    Abdominal discomfort     Acute cholecystitis due to biliary calculus 12/15/2020    Anemia     Arthritis     Bronchitis     Chronic pain disorder     Dyslipidemia     Last Assessed 07/09/2012    GERD (gastroesophageal reflux disease)     Headache, migraine     Hypotension     Low back pain     Low back pain     Rheumatoid arthritis ([facility] [address] )     Thyroid nodule 2019       Past Surgical History:  Past Surgical History:   Procedure Laterality Date    CATARACT EXTRACTION       SECTION      CHOLECYSTECTOMY LAPAROSCOPIC N/A 12/15/2020    Procedure: CHOLECYSTECTOMY LAPAROSCOPIC;  Surgeon: Rommel Fritz MD;  [facility];  Service: General    ESOPHAGOGASTRODUODENOSCOPY N/A 2018    Procedure: ESOPHAGOGASTRODUODENOSCOPY (EGD) with push enteroscopy and bx;  Surgeon: Yvonne Colby DO;  [facility]: [facility]; Service: Gastroenterology    EYE SURGERY      NERVE SURGERY Right     Hand    UT EDG US EXAM SURGICAL ALTER STOM DUODENUM/JEJUNUM N/A 3/6/2019    Procedure: LINEAR ENDOSCOPIC U/S;  Surgeon: Blank Gibson MD;  Location:  GI LAB;   Service: Gastroenterology    SEPTOPLASTY  around age 20   Jullie Liming UPPER GASTROINTESTINAL ENDOSCOPY         Family History:  Family History   Problem Relation Age of Onset   Jullie Liming Hypertension Mother     Other Mother         Dyslipidemia    Diabetes Father         Mellitus    No Known Problems Brother     No Known Problems Brother     Cancer Neg Hx     Heart disease Neg Hx     Stroke Neg Hx        Social History:  Social History     Socioeconomic History    Marital status: /Civil Union     Spouse name: Not on file    Number of children: Not on file    Years of education: Not on file    Highest education level: Not on file   Occupational History    Occupation: retired   Tobacco Use    Smoking status: Former Smoker     Quit date: 1985     Years since quittin 7    Smokeless tobacco: Never Used   Vaping Use    Vaping Use: Never used   Substance and Sexual Activity    Alcohol use: Yes     Comment: occasional  Maybe 5x per year    Drug use: No    Sexual activity: Yes     Partners: Male     Birth control/protection: Post-menopausal   Other Topics Concern    Not on file   Social History Narrative    Use Safety Equipment - Seatbelts        Lives with spouse        Consumes 3 cups of coffee per day     Social Determinants of Health     Financial Resource Strain: Not on file   Food Insecurity: Not on file   Transportation Needs: Not on file   Physical Activity: Not on file   Stress: Not on file   Social Connections: Not on file   Intimate Partner Violence: Not on file   Housing Stability: Not on file       Objective     Vitals:    09/22/22 0800   BP: 116/72   Pulse: 65   Temp: 97 8 °F (36 6 °C)   SpO2: 97%     Wt Readings from Last 3 Encounters:   09/22/22 65 7 kg (144 lb 12 8 oz)   05/31/22 65 8 kg (145 lb)   05/24/22 65 8 kg (145 lb)       Physical Exam  Vitals and nursing note reviewed  Constitutional:       General: She is not in acute distress  Appearance: Normal appearance  She is not ill-appearing or toxic-appearing  HENT:      Right Ear: Tympanic membrane normal  There is no impacted cerumen  Left Ear: Tympanic membrane normal  There is no impacted cerumen  Nose: Nose normal  No congestion  Mouth/Throat:      Mouth: Mucous membranes are moist       Pharynx: No oropharyngeal exudate or posterior oropharyngeal erythema  Eyes:      General: No scleral icterus  Extraocular Movements: Extraocular movements intact  Pupils: Pupils are equal, round, and reactive to light  Neck:      Vascular: No carotid bruit  Comments: No thyromegaly  Cardiovascular:      Rate and Rhythm: Normal rate and regular rhythm  Pulses: Normal pulses  Heart sounds: Normal heart sounds  No murmur heard  Pulmonary:      Effort: Pulmonary effort is normal  No respiratory distress  Breath sounds: Normal breath sounds  No wheezing, rhonchi or rales  Abdominal:      General: There is no distension  Palpations: Abdomen is soft  There is no mass  Tenderness: There is no abdominal tenderness  There is no guarding or rebound     Musculoskeletal:      Cervical back: Neck supple  Right lower leg: No edema  Left lower leg: No edema  Lymphadenopathy:      Cervical: No cervical adenopathy  Skin:     General: Skin is warm  Coloration: Skin is not jaundiced  Neurological:      General: No focal deficit present  Mental Status: She is alert and oriented to person, place, and time  Psychiatric:         Mood and Affect: Mood normal          Behavior: Behavior normal          Thought Content: Thought content normal          Judgment: Judgment normal          Pertinent Laboratory/Diagnostic Studies:  Lab Results   Component Value Date    BUN 24 08/23/2021    CREATININE 0 75 08/23/2021    CALCIUM 8 8 08/23/2021    K 3 8 08/23/2021    CO2 27 08/23/2021     08/23/2021     Lab Results   Component Value Date    ALT 31 08/23/2021    AST 12 08/23/2021    ALKPHOS 52 08/23/2021       Lab Results   Component Value Date    WBC 5 77 08/23/2021    HGB 12 9 08/23/2021    HCT 40 4 08/23/2021    MCV 96 08/23/2021     08/23/2021       No results found for: TSH    No results found for: CHOL  Lab Results   Component Value Date    TRIG 116 04/28/2021     Lab Results   Component Value Date    HDL 64 04/28/2021     Lab Results   Component Value Date    LDLCALC 163 (H) 04/28/2021     Lab Results   Component Value Date    HGBA1C 5 3 04/28/2021       Results for orders placed or performed in visit on 12/23/21   COVID/FLU- Collected at [facility] or [facility]    Specimen: Nose; Nares   Result Value Ref Range    SARS-CoV-2 Negative Negative    INFLUENZA A PCR Negative Negative    INFLUENZA B PCR Negative Negative       No orders of the defined types were placed in this encounter        ALLERGIES:  Allergies   Allergen Reactions    Erythromycin Rash       Current Medications     Current Outpatient Medications   Medication Sig Dispense Refill    acetaminophen (TYLENOL) 325 mg tablet Take 2 tablets (650 mg total) by mouth every 6 (six) hours as needed for mild pain or fever 30 tablet 0    Cholecalciferol (VITAMIN D3) 5000 units CAPS Take 5,000 Units by mouth daily       cyclobenzaprine (FLEXERIL) 10 mg tablet Take 1 tablet (10 mg total) by mouth 3 (three) times a day 20 tablet 0    diclofenac sodium (VOLTAREN) 1 % Apply topically every 6 (six) hours      docusate sodium (COLACE) 100 mg capsule Take 1 capsule (100 mg total) by mouth daily 30 capsule 5    hydrocortisone 2 5 % cream Apply topically 3 (three) times a day 30 g 0    hydrOXYzine HCL (ATARAX) 10 mg tablet Take 1 tablet (10 mg total) by mouth every 6 (six) hours as needed for itching 30 tablet 0    ibuprofen (MOTRIN) 800 mg tablet every 8 (eight) hours as needed       ipratropium (ATROVENT) 0 03 % nasal spray 2 sprays into each nostril 3 (three) times a day as needed for rhinitis 30 mL 3    LORazepam (ATIVAN) 1 mg tablet Take 1 tablet po 1 hour before procedure  May repeat dose x 1 if needed 2 tablet 0    naproxen (NAPROSYN) 500 mg tablet Take one tab up to twice a day, no more than 3 times a week 30 tablet 6    olopatadine HCl (PATADAY) 0 2 % opth drops Administer 1 drop to both eyes daily 2 5 mL 11    omega-3-acid ethyl esters (LOVAZA) 1 g capsule Take 2 g by mouth daily       ondansetron (ZOFRAN) 4 mg tablet Take 1 tablet (4 mg total) by mouth every 6 (six) hours 12 tablet 0    polyethylene glycol (MIRALAX) 17 g packet Take 17 g by mouth daily 30 each 5    rizatriptan (MAXALT) 10 MG tablet Take 1 tablet (10 mg total) by mouth once as needed for migraine May repeat in 2 hours if needed  Max 2/24 hours, 9/month  9 tablet 12    Diclofenac Sodium 3 % CREA compounded medication   Lidocaine 5% and Diclofenac 3%   Apply 1-3 grams to the affected area 2-4 times per day as needed  No current facility-administered medications for this visit           Health Maintenance     Health Maintenance   Topic Date Due    Osteoporosis Screening  Never done    PT PLAN OF CARE  03/01/2020    COVID-19 Vaccine (4 - Booster for Moderna series) 04/10/2022    Influenza Vaccine (1) 09/01/2022    Colorectal Cancer Screening  03/16/2023    Fall Risk  03/29/2023    Urinary Incontinence Screening  03/29/2023    Medicare Annual Wellness Visit (AWV)  03/29/2023    BMI: Followup Plan  03/29/2023    BMI: Adult  05/31/2023    Breast Cancer Screening: Mammogram  05/31/2023    Depression Remission PHQ  09/22/2023    Hepatitis C Screening  Completed    Pneumococcal Vaccine: 65+ Years  Completed    HIB Vaccine  Aged Out    Hepatitis B Vaccine  Aged Out    IPV Vaccine  Aged Out    Hepatitis A Vaccine  Aged Out    Meningococcal ACWY Vaccine  Aged Out    HPV Vaccine  Aged Out     Immunization History   Administered Date(s) Administered    COVID-19 MODERNA VACC 0 5 ML IM 03/08/2021, 04/05/2021, 12/10/2021    INFLUENZA 12/28/2015, 11/22/2016    Influenza, high dose seasonal 0 7 mL 11/11/2019, 10/15/2020, 09/29/2021    Influenza, recombinant, quadrivalent,injectable, preservative free 11/29/2018    Pneumococcal Conjugate 13-Valent 03/05/2020    Pneumococcal Polysaccharide PPV23 10/07/2021    Tdap 12/28/2015    Zoster Vaccine Recombinant 08/15/2019, 11/01/2019          Jhon Andrews DO

## 2022-11-17 ENCOUNTER — CONSULT (OUTPATIENT)
Dept: GASTROENTEROLOGY | Facility: MEDICAL CENTER | Age: 67
End: 2022-11-17

## 2022-11-17 VITALS
DIASTOLIC BLOOD PRESSURE: 77 MMHG | TEMPERATURE: 97.1 F | SYSTOLIC BLOOD PRESSURE: 120 MMHG | WEIGHT: 143.8 LBS | BODY MASS INDEX: 27.17 KG/M2 | HEART RATE: 73 BPM

## 2022-11-17 DIAGNOSIS — R19.4 BOWEL HABIT CHANGES: ICD-10-CM

## 2022-11-17 DIAGNOSIS — K21.9 GASTROESOPHAGEAL REFLUX DISEASE, UNSPECIFIED WHETHER ESOPHAGITIS PRESENT: Primary | ICD-10-CM

## 2022-11-17 RX ORDER — CHOLESTYRAMINE 4 G/9G
1 POWDER, FOR SUSPENSION ORAL
Qty: 90 PACKET | Refills: 1 | Status: SHIPPED | OUTPATIENT
Start: 2022-11-17

## 2022-11-17 NOTE — PROGRESS NOTES
[facility] - Outpatient Consultation  Daniel Resendez 75 y o  female MRN: [id number]  Encounter: [id number]          ASSESSMENT AND PLAN:      1  Bowel habit changes  Patient's symptoms more consistent with bile salt diarrhea  Patient was counseled on correct use of cholestyramine  labs to rule out celiac disease and hyperthyroidism  Trial of cholestyramine  Follow-up in 3 months  - cholestyramine (QUESTRAN) 4 g packet; Take 1 packet (4 g total) by mouth 3 (three) times a day with meals  Dispense: 90 packet; Refill: 1    2  Gastroesophageal reflux disease, unspecified whether esophagitis present  Her GERD symptoms has resolved  She is off PPI     ______________________________________________________________________    HPI:    65-year-old female referred for evaluation of change of bowel habits  Patient reported she has intermittent loose bowel movement after she has cholecystectomy 2 years ago  She reported she has regular formed bowel movement on a daily basis but intermittently she will have loose stool with vasovagal symptoms (sweating and tenesmus)  when she is having the bowel movement  She denies blood in the stool  Denies abdominal pain  Patient takes naproxen maybe once a month for migraine  She was seen more than 3 years ago  Her last colonoscopy in 2015 and was negative finding  She is not due for colon cancer screening until 2025  Her Cologuard test in 2020 was also negative  Her most recent labs showed normal hemoglobin  She reported her acid reflux has resolved  REVIEW OF SYSTEMS:    CONSTITUTIONAL: Denies any fever, chills, rigors, and weight loss  HEENT: No earache or tinnitus  Denies hearing loss or visual disturbances  CARDIOVASCULAR: No chest pain or palpitations  RESPIRATORY: Denies any cough, hemoptysis, shortness of breath or dyspnea on exertion  GASTROINTESTINAL: As noted in the History of Present Illness     GENITOURINARY: No problems with urination  Denies any hematuria or dysuria  NEUROLOGIC: No dizziness or vertigo, denies headaches  MUSCULOSKELETAL: Denies any muscle or joint pain  SKIN: Denies skin rashes or itching  ENDOCRINE: Denies excessive thirst  Denies intolerance to heat or cold  PSYCHOSOCIAL: Denies depression or anxiety  Denies any recent memory loss  Historical Information   Past Medical History:   Diagnosis Date   • Abdominal discomfort    • Acute cholecystitis due to biliary calculus 12/15/2020   • Anemia    • Arthritis    • Bronchitis    • Chronic pain disorder    • Dyslipidemia     Last Assessed 2012   • GERD (gastroesophageal reflux disease)    • Headache, migraine    • Hypotension    • Low back pain    • Low back pain    • Rheumatoid arthritis ([facility] [address] )    • Thyroid nodule 2019     Past Surgical History:   Procedure Laterality Date   • CATARACT EXTRACTION     •  SECTION     • CHOLECYSTECTOMY LAPAROSCOPIC N/A 12/15/2020    Procedure: CHOLECYSTECTOMY LAPAROSCOPIC;  Surgeon: Tono Liang MD;  [facility];  Service: General   • ESOPHAGOGASTRODUODENOSCOPY N/A 2018    Procedure: ESOPHAGOGASTRODUODENOSCOPY (EGD) with push enteroscopy and bx;  Surgeon: Rafael Roberts DO;  [facility]: [facility]; Service: Gastroenterology   • EYE SURGERY     • NERVE SURGERY Right     Hand   • IN EDG US EXAM SURGICAL ALTER STOM DUODENUM/JEJUNUM N/A 3/6/2019    Procedure: LINEAR ENDOSCOPIC U/S;  Surgeon: Jackie Mantilla MD;  Location:  GI LAB;   Service: Gastroenterology   • SEPTOPLASTY  around age 20   • UPPER GASTROINTESTINAL ENDOSCOPY       Social History   Social History     Substance and Sexual Activity   Alcohol Use Yes    Comment: occasional  Maybe 5x per year     Social History     Substance and Sexual Activity   Drug Use No     Social History     Tobacco Use   Smoking Status Former   • Types: Cigarettes   • Quit date: 1985   • Years since quittin 9   Smokeless Tobacco Never     Family History Problem Relation Age of Onset   • Hypertension Mother    • Other Mother         Dyslipidemia   • Diabetes Father         Mellitus   • No Known Problems Brother    • No Known Problems Brother    • Cancer Neg Hx    • Heart disease Neg Hx    • Stroke Neg Hx        Meds/Allergies       Current Outpatient Medications:   •  acetaminophen (TYLENOL) 325 mg tablet  •  Cholecalciferol (VITAMIN D3) 5000 units CAPS  •  cholestyramine (QUESTRAN) 4 g packet  •  cyclobenzaprine (FLEXERIL) 10 mg tablet  •  diclofenac sodium (VOLTAREN) 1 %  •  Diclofenac Sodium 3 % CREA  •  docusate sodium (COLACE) 100 mg capsule  •  hydrocortisone 2 5 % cream  •  hydrOXYzine HCL (ATARAX) 10 mg tablet  •  ibuprofen (MOTRIN) 800 mg tablet  •  ipratropium (ATROVENT) 0 03 % nasal spray  •  LORazepam (ATIVAN) 1 mg tablet  •  naproxen (NAPROSYN) 500 mg tablet  •  olopatadine HCl (PATADAY) 0 2 % opth drops  •  omega-3-acid ethyl esters (LOVAZA) 1 g capsule  •  ondansetron (ZOFRAN) 4 mg tablet  •  polyethylene glycol (MIRALAX) 17 g packet  •  rizatriptan (MAXALT) 10 MG tablet    Allergies   Allergen Reactions   • Erythromycin Rash           Objective     Blood pressure 120/77, pulse 73, temperature (!) 97 1 °F (36 2 °C), temperature source Tympanic, weight 65 2 kg (143 lb 12 8 oz)  Body mass index is 27 17 kg/m²  PHYSICAL EXAM:      General Appearance:   Alert, cooperative, no distress   HEENT:   Normocephalic, atraumatic, anicteric      Neck:  Supple, symmetrical, trachea midline   Lungs:   Clear to auscultation bilaterally; no rales, rhonchi or wheezing; respirations unlabored    Heart[de-identified]   Regular rate and rhythm; no murmur, rub, or gallop     Abdomen:   Soft, non-tender, non-distended; normal bowel sounds; no masses, no organomegaly    Genitalia:   Deferred    Rectal:   Deferred    Extremities:  No cyanosis, clubbing or edema    Pulses:  2+ and symmetric    Skin:  No jaundice, rashes, or lesions    Lymph nodes:  No palpable cervical lymphadenopathy        Lab Results:   No visits with results within 1 Day(s) from this visit  Latest known visit with results is:   Orders Only on 12/23/2021   Component Date Value   • SARS-CoV-2 12/23/2021 Negative    • INFLUENZA A PCR 12/23/2021 Negative    • INFLUENZA B PCR 12/23/2021 Negative          Radiology Results:   No results found    Answers for HPI/ROS submitted by the patient on 11/11/2022  Chronicity: recurrent  Onset: more than 1 month ago  Onset quality: gradual  Frequency: every several days  Episode duration: 1 Hours  Progression since onset: rapidly worsening  Pain quality: cramping  Radiates to: does not radiate  constipation: Yes  diarrhea: Yes  nausea: Yes  Relieved by: bowel movements  Diagnostic workup: GI consult, lower endoscopy, surgery

## 2022-11-22 ENCOUNTER — LAB (OUTPATIENT)
Dept: LAB | Facility: MEDICAL CENTER | Age: 67
End: 2022-11-22

## 2022-11-22 DIAGNOSIS — E78.2 MIXED HYPERLIPIDEMIA: ICD-10-CM

## 2022-11-22 DIAGNOSIS — R19.4 BOWEL HABIT CHANGES: ICD-10-CM

## 2022-11-22 LAB
ALBUMIN SERPL BCP-MCNC: 3.6 G/DL (ref 3.5–5)
ALP SERPL-CCNC: 62 U/L (ref 46–116)
ALT SERPL W P-5'-P-CCNC: 25 U/L (ref 12–78)
ANION GAP SERPL CALCULATED.3IONS-SCNC: 4 MMOL/L (ref 4–13)
AST SERPL W P-5'-P-CCNC: 19 U/L (ref 5–45)
BASOPHILS # BLD AUTO: 0.03 THOUSANDS/ÂΜL (ref 0–0.1)
BASOPHILS NFR BLD AUTO: 1 % (ref 0–1)
BILIRUB DIRECT SERPL-MCNC: 0.08 MG/DL (ref 0–0.2)
BILIRUB SERPL-MCNC: 0.44 MG/DL (ref 0.2–1)
BUN SERPL-MCNC: 14 MG/DL (ref 5–25)
CALCIUM SERPL-MCNC: 9.6 MG/DL (ref 8.3–10.1)
CHLORIDE SERPL-SCNC: 106 MMOL/L (ref 96–108)
CHOLEST SERPL-MCNC: 201 MG/DL
CO2 SERPL-SCNC: 27 MMOL/L (ref 21–32)
CREAT SERPL-MCNC: 0.73 MG/DL (ref 0.6–1.3)
EOSINOPHIL # BLD AUTO: 0.06 THOUSAND/ÂΜL (ref 0–0.61)
EOSINOPHIL NFR BLD AUTO: 1 % (ref 0–6)
ERYTHROCYTE [DISTWIDTH] IN BLOOD BY AUTOMATED COUNT: 12.7 % (ref 11.6–15.1)
GFR SERPL CREATININE-BSD FRML MDRD: 84 ML/MIN/1.73SQ M
GLUCOSE P FAST SERPL-MCNC: 90 MG/DL (ref 65–99)
HCT VFR BLD AUTO: 42 % (ref 34.8–46.1)
HDLC SERPL-MCNC: 62 MG/DL
HGB BLD-MCNC: 13.2 G/DL (ref 11.5–15.4)
IMM GRANULOCYTES # BLD AUTO: 0.01 THOUSAND/UL (ref 0–0.2)
IMM GRANULOCYTES NFR BLD AUTO: 0 % (ref 0–2)
LDLC SERPL CALC-MCNC: 121 MG/DL (ref 0–100)
LYMPHOCYTES # BLD AUTO: 1.4 THOUSANDS/ÂΜL (ref 0.6–4.47)
LYMPHOCYTES NFR BLD AUTO: 24 % (ref 14–44)
MCH RBC QN AUTO: 30.3 PG (ref 26.8–34.3)
MCHC RBC AUTO-ENTMCNC: 31.4 G/DL (ref 31.4–37.4)
MCV RBC AUTO: 97 FL (ref 82–98)
MONOCYTES # BLD AUTO: 0.35 THOUSAND/ÂΜL (ref 0.17–1.22)
MONOCYTES NFR BLD AUTO: 6 % (ref 4–12)
NEUTROPHILS # BLD AUTO: 4 THOUSANDS/ÂΜL (ref 1.85–7.62)
NEUTS SEG NFR BLD AUTO: 68 % (ref 43–75)
NONHDLC SERPL-MCNC: 139 MG/DL
NRBC BLD AUTO-RTO: 0 /100 WBCS
PLATELET # BLD AUTO: 245 THOUSANDS/UL (ref 149–390)
PMV BLD AUTO: 10 FL (ref 8.9–12.7)
POTASSIUM SERPL-SCNC: 4.1 MMOL/L (ref 3.5–5.3)
PROT SERPL-MCNC: 7 G/DL (ref 6.4–8.4)
RBC # BLD AUTO: 4.35 MILLION/UL (ref 3.81–5.12)
SODIUM SERPL-SCNC: 137 MMOL/L (ref 135–147)
TRIGL SERPL-MCNC: 90 MG/DL
TSH SERPL DL<=0.05 MIU/L-ACNC: 1.19 UIU/ML (ref 0.45–4.5)
WBC # BLD AUTO: 5.85 THOUSAND/UL (ref 4.31–10.16)

## 2022-11-23 LAB
ENDOMYSIUM IGA SER QL: NEGATIVE
GLIADIN PEPTIDE IGA SER-ACNC: 8 UNITS (ref 0–19)
GLIADIN PEPTIDE IGG SER-ACNC: 3 UNITS (ref 0–19)
IGA SERPL-MCNC: 207 MG/DL (ref 87–352)
TTG IGA SER-ACNC: <2 U/ML (ref 0–3)
TTG IGG SER-ACNC: <2 U/ML (ref 0–5)

## 2022-12-02 ENCOUNTER — HOSPITAL ENCOUNTER (OUTPATIENT)
Dept: MAMMOGRAPHY | Facility: CLINIC | Age: 67
Discharge: HOME | End: 2022-12-02

## 2022-12-02 VITALS — WEIGHT: 149 LBS | BODY MASS INDEX: 28.13 KG/M2 | HEIGHT: 61 IN

## 2022-12-02 DIAGNOSIS — R92.8 ABNORMAL MAMMOGRAM: ICD-10-CM

## 2022-12-04 NOTE — RESULT ENCOUNTER NOTE
Chase sierra  Your followup diagnostic right mammogram shows a stable finding  Recommendation is to followup with diagnostic mammogram of both breasts in 6 mos  It appears that you have already scheduled  Have a nice holiday!   Dr Corey Nunn

## 2022-12-14 ENCOUNTER — TELEPHONE (OUTPATIENT)
Dept: NEUROLOGY | Facility: CLINIC | Age: 67
End: 2022-12-14

## 2022-12-14 NOTE — TELEPHONE ENCOUNTER
Called and spoke to patient to confirm their upcoming appointment with Dr Lisa Mclain  Informed patient about arriving in the [place] location 15 minutes prior to their appointment to get checked in and going over chart

## 2022-12-20 ENCOUNTER — TELEMEDICINE (OUTPATIENT)
Dept: NEUROLOGY | Facility: CLINIC | Age: 67
End: 2022-12-20

## 2022-12-20 DIAGNOSIS — G43.109 MIGRAINE WITH AURA AND WITHOUT STATUS MIGRAINOSUS, NOT INTRACTABLE: ICD-10-CM

## 2022-12-20 DIAGNOSIS — G43.009 MIGRAINE WITHOUT AURA AND WITHOUT STATUS MIGRAINOSUS, NOT INTRACTABLE: Primary | ICD-10-CM

## 2022-12-20 RX ORDER — MULTIVIT-MIN/IRON/FOLIC ACID/K 18-600-40
CAPSULE ORAL
COMMUNITY

## 2022-12-20 RX ORDER — RIZATRIPTAN BENZOATE 10 MG/1
10 TABLET ORAL ONCE AS NEEDED
Qty: 9 TABLET | Refills: 12 | Status: SHIPPED | OUTPATIENT
Start: 2022-12-20

## 2022-12-20 NOTE — PATIENT INSTRUCTIONS
If vertigo  returns, we can refer you to PT or you can call in and see if I happen to have an opening that day  Headache/migraine treatment:   Abortive medications (for immediate treatment of a headache): It is ok to take ibuprofen, acetaminophen or naproxen (Advil, Tylenol,  Aleve, Excedrin) if they help your headaches you should limit these to No more than 3 times a week to avoid medication overuse/rebound headaches  For nausea  Ondansetron 4 mg ok to take as needed for nausea      For your more moderate to severe migraines take this medication early:  Continue Maxalt (rizatriptan) 10mg tabs - take one at the onset of headache  May repeat one time after 1-2 hours if pain has not resolved  (Max 2 a day and 9 a month)         Prescription preventive medications for headaches/migraines   (to take every day to help prevent headaches - not to take at the time of headache):  [x] Could consider in the future if needed     Lifestyle Recommendations:  [x] SLEEP - Maintain a regular sleep schedule: Adults need at least 7-8 hours of uninterrupted a night  Maintain good sleep hygiene:  Going to bed and waking up at consistent times, avoiding excessive daytime naps, avoiding caffeinated beverages in the evening, avoid excessive stimulation in the evening and generally using bed primarily for sleeping  One hour before bedtime would recommend turning lights down lower, decreasing your activity (may read quietly, listen to music at a low volume)  When you get into bed, should eliminate all technology (no texting, emailing, playing with your phone, iPad or tablet in bed)  [x] HYDRATION - Maintain good hydration  Drink  2L of fluid a day (4 typical small water bottles)  [x] DIET - Maintain good nutrition  In particular don't skip meals and try and eat healthy balanced meals regularly  [x] TRIGGERS - Look for other triggers and avoid them: Limit caffeine to 1-2 cups a day or less   Avoid dietary triggers that you have noticed bring on your headaches (this could include aged cheese, peanuts, MSG, aspartame and nitrates)  [x] EXERCISE - physical exercise as we all know is good for you in many ways, and not only is good for your heart, but also is beneficial for your mental health, cognitive health and  chronic pain/headaches  I would encourage at the least 5 days of physical exercise weekly for at least 30 minutes  Education and Follow-up  [x] Please call with any questions or concerns     [x] Follow up 1 year, sooner as needed

## 2022-12-20 NOTE — PROGRESS NOTES
Virtual Regular Visit    Verification of patient location:    Patient is located in the following state in which I hold an active license [place]      Assessment/Plan:    Problem List Items Addressed This Visit    None  Visit Diagnoses     Migraine without aura and without status migrainosus, not intractable    -  Primary    Relevant Medications    rizatriptan (MAXALT) 10 mg tablet    Migraine with aura and without status migrainosus, not intractable        Relevant Medications    rizatriptan (MAXALT) 10 mg tablet               Reason for visit is   Chief Complaint   Patient presents with   • Virtual Regular Visit        Encounter provider Mau Joiner MD    Provider located at [address] [place]  [phone]      Recent Visits  Date Type Provider Dept   12/14/22 Telephone [address] recent visits within past 7 days and meeting all other requirements  Today's Visits  Date Type Provider Dept   12/20/22 Telemedicine Mau Joiner MD [facility] [address] [facility] today's visits and meeting all other requirements  Future Appointments  No visits were found meeting these conditions  Showing future appointments within next 150 days and meeting all other requirements       The patient was identified by name and date of birth  Leopold Gage was informed that this is a telemedicine visit and that the visit is being conducted through the [employer]  She agrees to proceed     My office door was closed  No one else was in the room  She acknowledged consent and understanding of privacy and security of the video platform  The patient has agreed to participate and understands they can discontinue the visit at any time  Patient is aware this is a billable service       Subjective    Assessment/Plan:   Stephane Patino a very pleasant 67 y o  female with a past medical history includes  Chronic back pain, chronic left shoulder pain, chronic pain disorder, migraines, GERD, hyperlipidemia, depression, arthritis, low blood pressure, bilateral carpal tunnel, abdominal pain,  Sleep disorder,  Vitamin-D deficiency, anemia, glaucoma, cataracts referred here for evaluation of headache  My initial evaluation 02/27/2019     Migraine without aura and without status migrainosus, not intractable  Acephalgic migraine with aura  BPPV - Benign paroxysmal positional vertigo - resolved  The patient reports headaches/migraines since her teenage years       She reports various locations of her migraines, but typically left-sided, quality varies   Typical associated migrainous features  - as of 02/27/2019: She reports migraines currently occur about twice a month, but in January she had an increase in visual auras occured daily for a week  - as of 05/22/2019:  Only 2 migraines in the past 3 months which is improvement, she has been keeping track of her visual symptoms and she was worried they have increased although it seems to me they are occurring less often - and there seems to be a component of anxiety triggering them in some cases  - as of 09/24/2019: No auras or migraines at least the past 2 months  Overall improved  Mood and anxiety improved with therapist  Taking mg, b2  Sleep not great due to frozen shoulder pain  Will consider melatonin  Also discussed considering venlafaxine low dose if needed for mood/headaches at next visit    - as of 1/6/20:  Headaches/migraines are less frequent, with weather change in Oct had 2 migraines, 3rd caught early with the rizatriptan  Aura only 2 times in the past 4-5 months  Still taking magnesium (although some nausea sometimes, other times forgets), B2   - As of 7/7/2020: she remains well controlled  Only one migraine in 6 months that resolved with rizatriptan  About 4 auras in 6 months   Sometimes forgets to take aspirin    - as of 1/20/2021: She remains well controlled  Only one migraine in 6 months that resolved with rizatriptan  - as of 7/26/2021: She reports she continues to do well  No significant migraines requiring rizatriptan  3 milder per month that improve with naproxen  3-4 bilateral visual auras without headache in 6 months, that do not last as long as before  Recent episode that sounds like BPPV with negative HINTs exam today, likely resolved  - as of 1/26/2022: Doing better, about 2 mild headaches per month that resolve with naproxen, has not taken rizatriptan in over a year  Migraine visual aura maybe 0-2 a month that self resolves in minutes  Not on preventative  - as of 12/20/2022: She reports about 3 migraine aura without headache a month that respond to 5 mg rizatriptan  About 1 milder headache a week that improves with naproxen  Not interested in prescription preventative or resuming supplements and overall comfortable with current number of migraines  Work up:  - CTA head and neck with without contrast 03/13/2019: I have personally reviewed imaging and radiology read   No acute findings   Unremarkable CT appearance of the brain  Unremarkable CT angiogram of the head and neck    - follows closely with Ophthalmology every 4 months due to other eye issues including history of glaucoma and cataracts  - have asked her to try and get old MRI Brain from 10-15 years ago, but she doubts she would be able to      Preventive:  - discussed headache hygiene and lifestyle factors that could improve her headaches including mental Health Care, sleep quality and quantity, increasing hydration   - could resume headache preventative supplements if needed:  magnesium 400 mg, riboflavin   - patient not interested in prescription preventative at this time  -  Past: Nortriptyline 10 mg, propranolol and verapamil contraindicated due to history of hypotension  - future options: venlafaxine, cyproheptadine,  Gabapentin, topiramate, CGRP     Abortive:  - previously patient was taking pain medication daily, however now no more than twice a week   We discussed medication overuse/rebound headache and recommended no more than 3 days per week  - through other providers: naproxen, acetaminophen, ativan prn anxiety, hyroxyzine, cyclobenzaprine, ondansetron  - she takes rizatriptan 10 mg, has been on this for years prescribed by previous neurologist, discussed proper use, possible side effects and risks  - future options: Alternative Triptan,  prochlorperazine, Toradol IM or p o , could consider trial for 5 days of Depakote or dexamethasone 4 prolonged migraine, ubrelvy, reyvow, nurtec       Patient instructions      If vertigo  returns, we can refer you to PT or you can call in and see if I happen to have an opening that day       Headache/migraine treatment:   Abortive medications (for immediate treatment of a headache): It is ok to take ibuprofen, acetaminophen or naproxen (Advil, Tylenol,  Aleve, Excedrin) if they help your headaches you should limit these to No more than 3 times a week to avoid medication overuse/rebound headaches       For nausea  Ondansetron 4 mg ok to take as needed for nausea      For your more moderate to severe migraines take this medication early:  Continue Maxalt (rizatriptan) 10mg tabs - take one at the onset of headache  May repeat one time after 1-2 hours if pain has not resolved  (Max 2 a day and 9 a month)         Prescription preventive medications for headaches/migraines   (to take every day to help prevent headaches - not to take at the time of headache):  [x]? Could consider in the future if needed     Lifestyle Recommendations:  [x]?  SLEEP - Maintain a regular sleep schedule: Adults need at least 7-8 hours of uninterrupted a night   Maintain good sleep hygiene:  Going to bed and waking up at consistent times, avoiding excessive daytime naps, avoiding caffeinated beverages in the evening, avoid excessive stimulation in the evening and generally using bed primarily for sleeping   One hour before bedtime would recommend turning lights down lower, decreasing your activity (may read quietly, listen to music at a low volume)  When you get into bed, should eliminate all technology (no texting, emailing, playing with your phone, iPad or tablet in bed)  [x]? HYDRATION - Maintain good hydration   Drink  2L of fluid a day (4 typical small water bottles)  [x]?  DIET - Maintain good nutrition  In particular don't skip meals and try and eat healthy balanced meals regularly  [x]?  TRIGGERS - Look for other triggers and avoid them: Limit caffeine to 1-2 cups a day or less  Avoid dietary triggers that you have noticed bring on your headaches (this could include aged cheese, peanuts, MSG, aspartame and nitrates)  [x]?  EXERCISE - physical exercise as we all know is good for you in many ways, and not only is good for your heart, but also is beneficial for your mental health, cognitive health and  chronic pain/headaches  I would encourage at the least 5 days of physical exercise weekly for at least 30 minutes       Education and Follow-up  [x]? Please call with any questions or concerns  [x]? Follow up 1 year, sooner as needed         CC:    We had the pleasure of evaluating Kaylan Young in neurological consultation today  Kaylan Young is right handed female who presents today for evaluation of headaches       History of Present Illness:   Interval history as of 12/20/2022  - denies any new or concerning neurologic symptoms since last visit  - a little anxious this morning and we did a deep breathing technique this am  - vertigo maybe 1-2 days that self resolved maybe 2 months ago    Headaches and migraines   She reports about 3 migraine aura without headache a month that respond to 5 mg rizatriptan faster and can do things   About 1 milder headache a week that improves with naproxen    Preventative: none for migraine Abortive: naproxen  rizatriptan works well - typically takes 5 mg since 10 mg sometimes caused SE    Interval history as of 1/26/2022  - denies any new or concerning neurologic symptoms since last visit   - saw ENT and given nasal spray   - saw eye doctor and given a med for dry eye   - drinking a bottle of water a day   - taking classes online, writing blog  - joined a group online, mood is much better     Headaches and migraines   - Doing better, about 2 mild headaches per month that resolve with naproxen, has not taken rizatriptan in over a year  - Acephalgic Migraine with aura more often than migraine - lasts a few mins up to 30 mins with mild pressure not severe, maybe 0-2 a month, previous providers started asa 81 mg for prevention  Preventative: magnesium occasionally as makes her stomach upset   Abortive: naproxen     Interval history as of 7/26/2021  - mood - anxiety, depression  - following with PCP and counselor   - doing meditation   - for months feels like itchy/creepy crawling or stinging or pricks on anywhere of entire body at night  - rec'd talk to PCP    - a week ago had vertigo that started in bed and after rolling had room spinning and took a meclizine and then this made her sleepy (has had BPPV once before so tried to do the maneuvers) still feeling it slightly if she turns     Headaches and migraines   -  3 headaches per month -   - Auras without headache - 3-4 times in 6 months (lights and zig zags in both eyes)     Preventative:   Magnesium, riboflavin - not taking lately, upsets stomach   - melatonin at night is helping with sleep     Abortive: naproxen for milder headaches - helps, rizatriptan - hasn't taken in 6 months (has to lay in bed afterwards)    Interval history as of 01/20/2021  She denies any new or concerning symptoms  Headaches and migraines -  only 1 migraine in 6 months that resolved with rizatriptan and naproxen, one aura    - abortive: rizatriptan     - prevention: restarted magnesium, and restarting B2     - has lost weight, no sugar  - mood up and down overall better, following with counselor, denies depression     Interval history as of 7/7/2020:    Headaches/migraines   - migraine - only 1 in 6 months, took rizatriptan and it helped  - Aura - 4 times in 6 months without headache afterwards related to stress, lasting about 20 minutes       - abortive: rizatriptan  - prevention: no longer taking magnesium, B2 - because she is on a diet with supplements      - has lost some weight on a diet and feeling much better as far as mood, movements, sleep   - talking to counselor every 3 weeks      Interval history as of 01/06/2020  She reports improvement, no concerns     Headaches/migraines are less frequent  - weather change in Oct had 2 migraines, 3rd caught early with the rizatriptan  - aura only 2 times in the past 4-5 months  - taking magnesium, B2     Sleep - still not great due to frozen shoulder pain (also may hurt all day long)   - following with orthopedics  - tried melatonin and did not help     Seeing a therapist and it is helping a lot with multiple issues, mood much better  Not wearing sunglasses everywhere anymore     Interval history as of 09/24/2019:  - 07/15/2019 patient called in requesting rizatriptan that was previously prescribed by her past neurologist and works well for her migraines  Ok since history no longer sounds like retinal migraine - both eyes   - following with orthopedics for frozen shoulder, doing therapies  - sometimes the therapies with PT was causing cervicogenic headaches   - had some floaters a few weeks ago and saw eye doctor who confirmed floaters and nothing to worry about      - worried about this time of year because may get sinus headaches that turn into migraines      No auras or migraines at least the past 2 months  Saw counselor and helping mood - her therapist suggested considering low dose mood medications   discussed with patient and may consider in the future   - wearing glasses less, realizing that she may have been hiding behind them      Going to start yoga class soon       - Supplements - taking magnesium and B2      Interval history as of 05/22/2019:  - CTA head and neck 03/13/2019 was unremarkable except for incidental thyroid nodule with follow-up ultrasound by primary care  - has followed up with Pain Medicine and weight management  - 5/8/19 - dilated eye exam was unremarkable except for dry eyes   - our  provider a list of therapists, mood improved since wheather improved     - Rizatriptan - continues to help  - Supplements - taking magnesium and B2   - Prescription medication - not interested although         Headaches started at what age? teen[age]  How often do the headaches occur?   - as of 3/2019: 2/month  - as of 5/22/19: 2 migraines in past 3 ,   - Acephalgic Migraine with aura more often than migraine - vs nonspecific visual symptoms due to other reason 4/14, 21, 23, 25, and 5/8, 21 - she reports these seem to be even triggered by stress/anxiety  - as of 09/24/2019:  No auras or migraines at least the past 2 months  - as of 1/6/20:  Headaches/migraines are less frequent, with weather change in Oct had 2 migraines, 3rd caught early with the rizatriptan  Aura only 2 times in the past 4-5 months  Still taking magnesium (although some nausea sometimes, other times forgets), B2  What time of the day do the headaches start? Can wake up with them or anytime during the day   How long do the headaches last? 3-4 hours because she takes medicine, up to all day   Are you ever headache free? Yes     Aura? with aura  Acephalgic Migraine with aura more often than migraine     Jan 2019:   For a week straight 1-2 times a day had increase in visual aura/retinal migraine  Zigzags, decreased peripheral vision, blurry without strong migraine, milder headache - for 2-3 hours   - seems to be in both eyes, not like a curtain coming down (both eyes - not retinal migraine)  - gradually faded out and this month she is fine, started mag a few weeks ago   - would take medicine and go away  *last saw an eye doctor 2 months ago, goes every 4 months for glaucoma, cataracts, dry eye       Prior to that once in a while that would happen      Describe your usual headache pain quality? Dull all day sometimes, sharp and pulsating, shooting   Where is your headache located?   When full blown more on left side, frontal to parietal and then to the back  Lately zig zag around head  Numbness change in sensation to bioccipital region   Sinus pressure     What is the intensity of pain? 4-5/10, up to higher   Associated symptoms:   [x]? Nausea       [x]?  Vomiting        []? Diarrhea  [x]? Insomnia    []? Stiff or sore neck   [x]?  Problems with concentration  [x]? Photophobia     [x]? Phonophobia      []? Osmophobia  [x]? Blurred vision   [x]?  Prefer quiet, dark room  []? Light-headed or dizzy     []? Tinnitus   []? Hands or feet tingle or feel numb/paresthesias       []? Red ear      []? Ptosis      []? Facial droop  []? Lacrimation  []? Nasal congestion/rhinorrhea   []? Flushing of face  []? Change in pupil size      Things that make the headache worse? Bending down, any movement     Headache triggers:  Pressure when it rains/dr and cold  What time of the year do headaches occur more frequently?   More in Oct/Nov     Have you seen someone else for headaches or pain? Neurology   Have you had trigger point injection performed and how often? No  Have you had Botox injection performed and how often? No   Have you had epidural injections or transforaminal injections performed? Yes for lower back slipped disc   Have you used CBD or THC for your headaches and how often? No  Are you current pregnant or planning on getting pregnant? No  Have you ever had any Brain imaging? MRI Brain - maybe over 15-20 years ago      What medications do you take or have you taken for your headaches?    ABORTIVE:    2-3 times a week some sort of pain medication lately, previously was taking daily      Acetaminophen - a few days a week for back pain  Ibuprofen 800 mg - not as much now, takes naproxen instead  Rizatriptan 10 mg   Naproxen 500 mg - takes once a week, used to take every day      Diclofenac gel - for back   Lidocaine 5% gel - for back      Past for headache and back pain   Cyclobenzaprine 5 mg  - not taking (for back)  Tramadol 50 mg   - not taking (for back)  Meloxicam 15 mg - not taking   Ondansetron - does not take it      PREVENTIVE:   Magnesium 400 mg  Riboflavin 400 mg      Past:  Nortriptyline 10 mg - for 2 years, and did well as far as headaches not being as bad, then wanted to get off it, came off 6 months ago, increase in symptoms     Alternative therapies used in the past for headaches? no other headache interventions have been tried     LIFESTYLE  Sleep - averages 6-7 hours   - takes naps      Physical activity:  tries to get her to the gym twice a week   Water: a bottle a week   Mood:   - lately feels tired mentally tired  and depressed   - do not want to get up   - never has talked to a counselor - she used to do mentoring herself, never had depression in the past   - thinks going on for past 6 months, started after mom passed away   - PHQ-9 depression screen 02/27/2019:  Score of 17, denies suicidal ideation        The following portions of the patient's history were reviewed and updated as appropriate: allergies, current medications, past family history, past medical history, past social history, past surgical history and problem list         Pertinent family history:    Family history of headaches:  no known family members with significant headaches  Any family history of aneurysms - No     Pertinent social history:  Work:  Retired  Education: some college  Lives with   One trial     Illicit Drugs: denies  Alcohol/tobacco: Denies tobacco use, alcohol intake: social drinker       Past Medical History:   Diagnosis Date   • Abdominal discomfort    • Acute cholecystitis due to biliary calculus 12/15/2020   • Anemia    • Arthritis    • Bronchitis    • Chronic pain disorder    • Dyslipidemia     Last Assessed 2012   • GERD (gastroesophageal reflux disease)    • Headache, migraine    • Hypotension    • Low back pain    • Low back pain    • Rheumatoid arthritis ([address] )    • Thyroid nodule 2019       Past Surgical History:   Procedure Laterality Date   • CATARACT EXTRACTION     •  SECTION     • CHOLECYSTECTOMY LAPAROSCOPIC N/A 12/15/2020    Procedure: CHOLECYSTECTOMY LAPAROSCOPIC;  Surgeon: Joella Favre, MD;  Location: [facility];  Service: General   • ESOPHAGOGASTRODUODENOSCOPY N/A 2018    Procedure: ESOPHAGOGASTRODUODENOSCOPY (EGD) with push enteroscopy and bx;  Surgeon: Mago Sheppard DO;  Location: [facility]; Service: Gastroenterology   • EYE SURGERY     • NERVE SURGERY Right     Hand   • RI EDG US EXAM SURGICAL ALTER STOM DUODENUM/JEJUNUM N/A 3/6/2019    Procedure: LINEAR ENDOSCOPIC U/S;  Surgeon: Cisco Turcios MD;  Location:  GI LAB;   Service: Gastroenterology   • SEPTOPLASTY  around age 20   • UPPER GASTROINTESTINAL ENDOSCOPY         Current Outpatient Medications   Medication Sig Dispense Refill   • acetaminophen (TYLENOL) 325 mg tablet Take 2 tablets (650 mg total) by mouth every 6 (six) hours as needed for mild pain or fever 30 tablet 0   • ASHWAGANDHA PO ashwagandha root extract     • Cholecalciferol (Vitamin D) 50 MCG (2000 UT) CAPS Vitamin D     • Cholecalciferol (VITAMIN D3) 5000 units CAPS Take 5,000 Units by mouth daily      • cholestyramine (QUESTRAN) 4 g packet Take 1 packet (4 g total) by mouth 3 (three) times a day with meals 90 packet 1   • cyclobenzaprine (FLEXERIL) 10 mg tablet Take 1 tablet (10 mg total) by mouth 3 (three) times a day 20 tablet 0   • Diclofenac Sodium 3 % CREA compounded medication   Lidocaine 5% and Diclofenac 3%   Apply 1-3 grams to the affected area 2-4 times per day as needed  • docusate sodium (COLACE) 100 mg capsule Take 1 capsule (100 mg total) by mouth daily 30 capsule 5   • hydrocortisone 2 5 % cream Apply topically 3 (three) times a day 30 g 0   • hydrOXYzine HCL (ATARAX) 10 mg tablet Take 1 tablet (10 mg total) by mouth every 6 (six) hours as needed for itching 30 tablet 0   • ibuprofen (MOTRIN) 800 mg tablet every 8 (eight) hours as needed      • ipratropium (ATROVENT) 0 03 % nasal spray 2 sprays into each nostril 3 (three) times a day as needed for rhinitis 30 mL 3   • LORazepam (ATIVAN) 1 mg tablet Take 1 tablet po 1 hour before procedure  May repeat dose x 1 if needed 2 tablet 0   • naproxen (NAPROSYN) 500 mg tablet Take one tab up to twice a day, no more than 3 times a week 30 tablet 6   • olopatadine HCl (PATADAY) 0 2 % opth drops Administer 1 drop to both eyes daily 2 5 mL 11   • Omega-3 Fatty Acids (OMEGA-3 CF PO) Omega 3     • omega-3-acid ethyl esters (LOVAZA) 1 g capsule Take 2 g by mouth daily      • ondansetron (ZOFRAN) 4 mg tablet Take 1 tablet (4 mg total) by mouth every 6 (six) hours 12 tablet 0   • polyethylene glycol (MIRALAX) 17 g packet Take 17 g by mouth daily 30 each 5   • rizatriptan (MAXALT) 10 mg tablet Take 1 tablet (10 mg total) by mouth once as needed for migraine May repeat in 2 hours if needed  Max 2/24 hours, 9/month  9 tablet 12   • diclofenac sodium (VOLTAREN) 1 % Apply topically every 6 (six) hours       No current facility-administered medications for this visit  Allergies   Allergen Reactions   • Erythromycin Rash         Objective:     Exam limited by Video  Physical Exam:                                                                 Vitals:            Constitutional:    There were no vitals taken for this visit    BP Readings from Last 3 Encounters:   11/17/22 120/77   09/22/22 116/72   03/29/22 116/66     Pulse Readings from Last 3 Encounters: 11/17/22 73   09/22/22 65   03/29/22 66         Well developed, well nourished, No dysmorphic features  HEENT:  Normocephalic atraumatic  See neuro exam   Psychiatric:  Normal behavior and appropriate affect        Neurological Examination:     Mental status/cognitive function:   Recent and remote memory appear intact  Attention span and concentration as well as fund of knowledge appear appropriate for age  Normal language and spontaneous speech  Cranial Nerves:  VII-facial expression symmetric  Motor Exam: symmetric bulk throughout  no atrophy, fasciculations or abnormal movements noted  Coordination:  no apparent dysmetria, ataxia or tremor noted     Pertinent lab results:   See EMR for recent labs     03/11/2020 CMP unremarkable except for glucose 101, CBC unremarkable  TSH normal  HIV 2 7     4/09/2019:  A1c 5 7     03/08/2019:  BMP and CBC unremarkable  Vitamin-D 44 2  B12 2, 557  TSH 2 02     12/11/18: CMP unremarkable   11/29/18: CBC significant for hgb 10 8     Imaging:   CTA head and neck with without contrast 03/13/2019: I have personally reviewed imaging and radiology read   1   No acute findings   Unremarkable CT appearance of the brain  2   Unremarkable CT angiogram of the head and neck  Review of Systems:   ROS obtained by medical assistant Personally reviewed and updated if indicated  I recommended PCP follow up for non neurologic problems  Review of Systems   Constitutional: Negative for appetite change and fever  HENT: Negative for hearing loss, tinnitus, trouble swallowing and voice change  Eyes: Negative  Negative for photophobia and pain  Respiratory: Negative  Negative for shortness of breath  Cardiovascular: Negative  Negative for palpitations  Gastrointestinal: Negative  Negative for nausea and vomiting  Endocrine: Negative  Negative for cold intolerance  Genitourinary: Negative  Negative for dysuria, frequency and urgency  Musculoskeletal: Negative  Negative for myalgias and neck pain  Skin: Negative  Negative for rash  Neurological: Negative  Negative for dizziness, tremors, seizures, syncope, facial asymmetry, speech difficulty, weakness, light-headedness, numbness and headaches  Hematological: Negative  Does not bruise/bleed easily  Psychiatric/Behavioral: Negative  Negative for confusion, hallucinations and sleep disturbance  I have spent 14 minutes with Patient today in which greater than 50% of this time was spent in counseling/coordination of care  I also spent 18 minutes non face to face for this patient the same day

## 2022-12-20 NOTE — PROGRESS NOTES
Review of Systems   Constitutional: Negative for appetite change and fever  HENT: Negative for hearing loss, tinnitus, trouble swallowing and voice change  Eyes: Negative  Negative for photophobia and pain  Respiratory: Negative  Negative for shortness of breath  Cardiovascular: Negative  Negative for palpitations  Gastrointestinal: Negative  Negative for nausea and vomiting  Endocrine: Negative  Negative for cold intolerance  Genitourinary: Negative  Negative for dysuria, frequency and urgency  Musculoskeletal: Negative  Negative for myalgias and neck pain  Skin: Negative  Negative for rash  Neurological: Negative  Negative for dizziness, tremors, seizures, syncope, facial asymmetry, speech difficulty, weakness, light-headedness, numbness and headaches  Hematological: Negative  Does not bruise/bleed easily  Psychiatric/Behavioral: Negative  Negative for confusion, hallucinations and sleep disturbance

## 2023-02-20 ENCOUNTER — OFFICE VISIT (OUTPATIENT)
Dept: GASTROENTEROLOGY | Facility: MEDICAL CENTER | Age: 68
End: 2023-02-20

## 2023-02-20 VITALS
TEMPERATURE: 98 F | BODY MASS INDEX: 27.75 KG/M2 | WEIGHT: 147 LBS | HEART RATE: 71 BPM | DIASTOLIC BLOOD PRESSURE: 80 MMHG | HEIGHT: 61 IN | SYSTOLIC BLOOD PRESSURE: 112 MMHG

## 2023-02-20 DIAGNOSIS — R19.5 LOOSE STOOLS: Primary | ICD-10-CM

## 2023-02-20 NOTE — PROGRESS NOTES
Assessment/Plan:     Diagnoses and all orders for this visit:    Loose stools  Patient has intermittent episodes of loose stools, approximately every 2 weeks  During these times she takes a half a packet of cholestyramine which bowels her bowels to return to normal   Would recommend continuing with this regimen as it is working well for her  We will see her back in 3 months or sooner if necessary  Subjective:      Patient ID: Meena Ayoub is a 75 y o  female  HPI     This is a follow-up for change in bowel habits  She states she was having intermittent loose stools after cholecystectomy  At her last visit it was recommended that she try Questran  She states she has an episode approximately once every 2 weeks and during these times she will take a half a packet of Questran as a full packet was making her nauseated  This typically resolves the loose stools and her bowels returned to normal   Her last colonoscopy was in 2015 and was negative, not due for repeat screening until 2025  Cologuard test in 2020 was also negative      Patient Active Problem List   Diagnosis   • Chronic bilateral low back pain without sciatica   • Esophageal reflux   • Arthritis   • Duodenum disorder   • Hyperlipidemia   • Depression   • Abnormal MRI of abdomen   • Vitamin D deficiency   • Anxiety disorder   • Migraine aura without headache   • Thyroid nodule   • Impaired fasting glucose   • Chronic left shoulder pain   • Lumbar spondylosis   • Current moderate episode of major depressive disorder without prior episode ([facility] [address] )   • Sleep disorder   • Status post laparoscopic cholecystectomy   • Pruritus   • Post-menopausal bleeding   • Trigger finger of left thumb   • Abnormal mammogram   • Loose stools     Allergies   Allergen Reactions   • Erythromycin Rash     Current Outpatient Medications on File Prior to Visit   Medication Sig   • acetaminophen (TYLENOL) 325 mg tablet Take 2 tablets (650 mg total) by mouth every 6 (six) hours as needed for mild pain or fever   • ASHWAGANDHA PO jerrya root extract   • Cholecalciferol (Vitamin D) 50 MCG (2000 UT) CAPS Vitamin D   • Cholecalciferol (VITAMIN D3) 5000 units CAPS Take 5,000 Units by mouth daily    • cholestyramine (QUESTRAN) 4 g packet Take 1 packet (4 g total) by mouth 3 (three) times a day with meals   • cyclobenzaprine (FLEXERIL) 10 mg tablet Take 1 tablet (10 mg total) by mouth 3 (three) times a day   • diclofenac sodium (VOLTAREN) 1 % Apply topically every 6 (six) hours   • Diclofenac Sodium 3 % CREA compounded medication   Lidocaine 5% and Diclofenac 3%   Apply 1-3 grams to the affected area 2-4 times per day as needed  • docusate sodium (COLACE) 100 mg capsule Take 1 capsule (100 mg total) by mouth daily   • hydrocortisone 2 5 % cream Apply topically 3 (three) times a day   • hydrOXYzine HCL (ATARAX) 10 mg tablet Take 1 tablet (10 mg total) by mouth every 6 (six) hours as needed for itching   • ibuprofen (MOTRIN) 800 mg tablet every 8 (eight) hours as needed    • ipratropium (ATROVENT) 0 03 % nasal spray 2 sprays into each nostril 3 (three) times a day as needed for rhinitis   • LORazepam (ATIVAN) 1 mg tablet Take 1 tablet po 1 hour before procedure  May repeat dose x 1 if needed   • naproxen (NAPROSYN) 500 mg tablet Take one tab up to twice a day, no more than 3 times a week   • olopatadine HCl (PATADAY) 0 2 % opth drops Administer 1 drop to both eyes daily   • Omega-3 Fatty Acids (OMEGA-3 CF PO) Omega 3   • omega-3-acid ethyl esters (LOVAZA) 1 g capsule Take 2 g by mouth daily    • ondansetron (ZOFRAN) 4 mg tablet Take 1 tablet (4 mg total) by mouth every 6 (six) hours   • polyethylene glycol (MIRALAX) 17 g packet Take 17 g by mouth daily   • rizatriptan (MAXALT) 10 mg tablet Take 1 tablet (10 mg total) by mouth once as needed for migraine May repeat in 2 hours if needed  Max 2/24 hours, 9/month       No current facility-administered medications on file prior to visit       Family History   Problem Relation Age of Onset   • Hypertension Mother    • Other Mother         Dyslipidemia   • Diabetes Father         Mellitus   • Stomach cancer Father    • No Known Problems Brother    • No Known Problems Brother    • Cancer Neg Hx    • Heart disease Neg Hx    • Stroke Neg Hx      Past Medical History:   Diagnosis Date   • Abdominal discomfort    • Acute cholecystitis due to biliary calculus 12/15/2020   • Anemia    • Arthritis    • Bronchitis    • Chronic pain disorder    • Dyslipidemia     Last Assessed 2012   • GERD (gastroesophageal reflux disease)    • Headache, migraine    • Hypotension    • Low back pain    • Low back pain    • [facility]    • Thyroid nodule 2019     Social History     Socioeconomic History   • Marital status: /Civil Union     Spouse name: None   • Number of children: None   • Years of education: None   • Highest education level: None   Occupational History   • Occupation: retired   Tobacco Use   • Smoking status: Former     Types: Cigarettes     Quit date: 1985     Years since quittin 1   • Smokeless tobacco: Never   Vaping Use   • Vaping Use: Never used   Substance and Sexual Activity   • Alcohol use: Yes     Comment: occasional  Maybe 5x per year   • Drug use: No   • Sexual activity: Yes     Partners: Male     Birth control/protection: Post-menopausal   Other Topics Concern   • None   Social History Narrative    Use Safety Equipment - Seatbelts        Lives with spouse        Consumes 3 cups of coffee per day     Social Determinants of Health     Financial Resource Strain: Not on file   Food Insecurity: Not on file   Transportation Needs: Not on file   Physical Activity: Not on file   Stress: Not on file   Social Connections: Not on file   Intimate Partner Violence: Not on file   Housing Stability: Not on file     Past Surgical History:   Procedure Laterality Date   • CATARACT EXTRACTION     •  SECTION     • CHOLECYSTECTOMY LAPAROSCOPIC N/A 12/15/2020    Procedure: CHOLECYSTECTOMY LAPAROSCOPIC;  Surgeon: Jessica Parra MD;  Location: [facility];  Service: General   • ESOPHAGOGASTRODUODENOSCOPY N/A 11/28/2018    Procedure: ESOPHAGOGASTRODUODENOSCOPY (EGD) with push enteroscopy and bx;  Surgeon: Sanjay Augustin DO;  Location: [facility]; Service: Gastroenterology   • EYE SURGERY     • NERVE SURGERY Right     Hand   • OK EDG US EXAM SURGICAL ALTER STOM DUODENUM/JEJUNUM N/A 3/6/2019    Procedure: LINEAR ENDOSCOPIC U/S;  Surgeon: Cisco Ross MD;  Location:  GI LAB; Service: Gastroenterology   • SEPTOPLASTY  around age 20   • UPPER GASTROINTESTINAL ENDOSCOPY           Review of Systems   Gastrointestinal: Positive for abdominal pain  All other systems reviewed and are negative  Objective:      /80   Pulse 71   Temp 98 °F (36 7 °C) (Tympanic)   Ht 5' 1" (1 549 m)   Wt 66 7 kg (147 lb)   BMI 27 78 kg/m²          Physical Exam  Constitutional:       Appearance: Normal appearance  She is well-developed  HENT:      Head: Normocephalic and atraumatic  Eyes:      Conjunctiva/sclera: Conjunctivae normal    Cardiovascular:      Rate and Rhythm: Normal rate and regular rhythm  Pulmonary:      Effort: Pulmonary effort is normal       Breath sounds: Normal breath sounds  Abdominal:      General: Bowel sounds are normal  There is no distension  Palpations: Abdomen is soft  Tenderness: There is no abdominal tenderness  Musculoskeletal:         General: Normal range of motion  Cervical back: Normal range of motion  Skin:     General: Skin is warm and dry  Neurological:      Mental Status: She is alert and oriented to person, place, and time     Psychiatric:         Mood and Affect: Mood normal          Behavior: Behavior normal

## 2023-03-23 ENCOUNTER — OFFICE VISIT (OUTPATIENT)
Dept: FAMILY MEDICINE CLINIC | Facility: CLINIC | Age: 68
End: 2023-03-23

## 2023-03-23 VITALS
TEMPERATURE: 97 F | SYSTOLIC BLOOD PRESSURE: 110 MMHG | OXYGEN SATURATION: 97 % | BODY MASS INDEX: 28.57 KG/M2 | HEART RATE: 80 BPM | WEIGHT: 151.2 LBS | DIASTOLIC BLOOD PRESSURE: 80 MMHG

## 2023-03-23 DIAGNOSIS — K21.9 GASTROESOPHAGEAL REFLUX DISEASE WITHOUT ESOPHAGITIS: Primary | ICD-10-CM

## 2023-03-23 DIAGNOSIS — R73.01 IMPAIRED FASTING GLUCOSE: ICD-10-CM

## 2023-03-23 DIAGNOSIS — R19.5 LOOSE STOOLS: ICD-10-CM

## 2023-03-23 DIAGNOSIS — M79.602 PAIN IN BOTH UPPER EXTREMITIES: ICD-10-CM

## 2023-03-23 DIAGNOSIS — E78.2 MIXED HYPERLIPIDEMIA: ICD-10-CM

## 2023-03-23 DIAGNOSIS — M54.50 CHRONIC BILATERAL LOW BACK PAIN WITHOUT SCIATICA: ICD-10-CM

## 2023-03-23 DIAGNOSIS — E04.1 THYROID NODULE: ICD-10-CM

## 2023-03-23 DIAGNOSIS — M79.601 PAIN IN BOTH UPPER EXTREMITIES: ICD-10-CM

## 2023-03-23 DIAGNOSIS — E55.9 VITAMIN D DEFICIENCY: ICD-10-CM

## 2023-03-23 DIAGNOSIS — F32.1 CURRENT MODERATE EPISODE OF MAJOR DEPRESSIVE DISORDER WITHOUT PRIOR EPISODE (HCC): ICD-10-CM

## 2023-03-23 DIAGNOSIS — G43.109 MIGRAINE AURA WITHOUT HEADACHE: ICD-10-CM

## 2023-03-23 DIAGNOSIS — M47.816 LUMBAR SPONDYLOSIS: ICD-10-CM

## 2023-03-23 DIAGNOSIS — F41.9 ANXIETY DISORDER, UNSPECIFIED TYPE: ICD-10-CM

## 2023-03-23 DIAGNOSIS — F32.A DEPRESSION, UNSPECIFIED DEPRESSION TYPE: ICD-10-CM

## 2023-03-23 DIAGNOSIS — R29.898 UPPER EXTREMITY WEAKNESS: ICD-10-CM

## 2023-03-23 DIAGNOSIS — G89.29 CHRONIC BILATERAL LOW BACK PAIN WITHOUT SCIATICA: ICD-10-CM

## 2023-03-23 NOTE — PROGRESS NOTES
FAMILY PRACTICE OFFICE VISIT    NAME: Daniel Resendez    AGE: 70 y o  SEX: female  : 1955   MRN: [id number]    DATE: 3/23/2023  TIME: 10:28 AM    Assessment and Plan   1  Gastroesophageal reflux disease without esophagitis  Stable  Off PPI      2  Thyroid nodule  Last US 2019:  IMPRESSION:     Multi nodular thyroid gland as described above  Currently no nodule meets current ACR criteria requiring biopsy or follow-up ultrasounds           3  Impaired fasting glucose  Normal on 2022 testing      4  Migraine aura without headache  Currently has migraine      5  Lumbar spondylosis  Uses voltaren gel prn      6  Chronic bilateral low back pain without sciatica      7  Mixed hyperlipidemia  Good control      8  Depression, unspecified depression type  Stable  9  Anxiety disorder, unspecified type  Getting counseling  No rx meds  Other than ativan for situational anxiety  Taking ashwaganda  And melatonin     10  Vitamin D deficiency  Taking vit D      11  Loose stools  Pt taking questran prn  Labs done 2022: And celiac panel (-) and TSH normal    Cbc normal  Lipids normal  cmp normal    12   Upper ext pain and weakness  Send for emg/ncs  And pending results - may need ortho  Pt has not responded to bracing      Was seen by ent for hearing loss  Return in 6 mos for PE    If tinnitus worsening - to consider audiologist  Limit caffeine  None of pt's meds seem to be causing symptoms  Had hearing test - mild high frequency sensorineural hearing loss, bilaterally  Chief Complaint     Chief Complaint   Patient presents with   • Follow-up     6m, concerns hands bilateral  from forearm to hands gets pain and has issues opening jars, picking up cups, pain level 10/9 sometimes shooting pain or just there  When holding pressure points on her arm it feels better          History of Present Illness   Daniel Resendez is a 70y o -year-old female who presents today for routine visit  Currently has a migraine - and wearing sunglasses  Took naproxen this am  Did not take maxalt or zofran or this am    Was seen by GI for loose stools  Pt was advised to take questran - using a little of packet at time as full packet caused nausea  Review of Systems   Review of Systems   Constitutional: Negative for unexpected weight change  HENT: Positive for tinnitus  Negative for hearing loss  Respiratory: Negative for cough, shortness of breath and wheezing  Cardiovascular: Negative for chest pain and palpitations  Gastrointestinal: Positive for diarrhea  Negative for abdominal pain, blood in stool, constipation, nausea and vomiting  Musculoskeletal: Negative for neck pain  Gets pain into palm of both hands and into forearms - she feels that she has to apply pressure into palms of hands to relieve the pain  Comes every few weeks and then lasts for a week and then goes away again  Limits her ability to vacuum and do things  Neurological: Positive for headaches  Psychiatric/Behavioral: Positive for sleep disturbance  Negative for self-injury and suicidal ideas  Is in counseling   Has atarax on hand but has not used - did not help in past     Takes ativan only prn situational anxiety - for MRI or flying           Active Problem List     Patient Active Problem List   Diagnosis   • Chronic bilateral low back pain without sciatica   • Esophageal reflux   • Arthritis   • Duodenum disorder   • Hyperlipidemia   • Depression   • Abnormal MRI of abdomen   • Vitamin D deficiency   • Anxiety disorder   • Migraine aura without headache   • Thyroid nodule   • Impaired fasting glucose   • Chronic left shoulder pain   • Lumbar spondylosis   • Current moderate episode of major depressive disorder without prior episode ([facility] [address] )   • Sleep disorder   • Status post laparoscopic cholecystectomy   • Pruritus   • Post-menopausal bleeding   • Trigger finger of left thumb   • Abnormal mammogram   • Loose stools Past Medical History:  Past Medical History:   Diagnosis Date   • Abdominal discomfort    • Acute cholecystitis due to biliary calculus 12/15/2020   • Anemia    • Arthritis    • Bronchitis    • Chronic pain disorder    • Dyslipidemia     Last Assessed 2012   • GERD (gastroesophageal reflux disease)    • Headache, migraine    • Hypotension    • Low back pain    • Low back pain    • Rheumatoid arthritis ([facility] [address] )    • Thyroid nodule 2019       Past Surgical History:  Past Surgical History:   Procedure Laterality Date   • CATARACT EXTRACTION     •  SECTION     • CHOLECYSTECTOMY LAPAROSCOPIC N/A 12/15/2020    Procedure: CHOLECYSTECTOMY LAPAROSCOPIC;  Surgeon: See Stuart MD;  Location: [facility];  Service: General   • ESOPHAGOGASTRODUODENOSCOPY N/A 2018    Procedure: ESOPHAGOGASTRODUODENOSCOPY (EGD) with push enteroscopy and bx;  Surgeon: Richmond Johnston DO;  Location: [facility]; Service: Gastroenterology   • EYE SURGERY     • NERVE SURGERY Right     Hand   • NC EDG US EXAM SURGICAL ALTER STOM DUODENUM/JEJUNUM N/A 3/6/2019    Procedure: LINEAR ENDOSCOPIC U/S;  Surgeon: Yolis Hernandes MD;  Location:  GI LAB;   Service: Gastroenterology   • SEPTOPLASTY  around age 20   • UPPER GASTROINTESTINAL ENDOSCOPY         Family History:  Family History   Problem Relation Age of Onset   • Hypertension Mother    • Other Mother         Dyslipidemia   • Diabetes Father         Mellitus   • Stomach cancer Father    • No Known Problems Brother    • No Known Problems Brother    • Cancer Neg Hx    • Heart disease Neg Hx    • Stroke Neg Hx        Social History:  Social History     Socioeconomic History   • Marital status: /Civil Union     Spouse name: Not on file   • Number of children: Not on file   • Years of education: Not on file   • Highest education level: Not on file   Occupational History   • Occupation: retired   Tobacco Use   • Smoking status: Former     Types: Cigarettes     Quit date: 1985     Years since quittin 2   • Smokeless tobacco: Never   Vaping Use   • Vaping Use: Never used   Substance and Sexual Activity   • Alcohol use: Yes     Comment: occasional  Maybe 5x per year   • Drug use: No   • Sexual activity: Yes     Partners: Male     Birth control/protection: Post-menopausal   Other Topics Concern   • Not on file   Social History Narrative    Use Safety Equipment - Seatbelts        Lives with spouse        Consumes 3 cups of coffee per day     Social Determinants of Health     Financial Resource Strain: Not on file   Food Insecurity: Not on file   Transportation Needs: Not on file   Physical Activity: Not on file   Stress: Not on file   Social Connections: Not on file   Intimate Partner Violence: Not on file   Housing Stability: Not on file       Objective     Vitals:    23 1015   BP: 110/80   Pulse: 80   Temp: (!) 97 °F (36 1 °C)   SpO2: 97%     Wt Readings from Last 3 Encounters:   23 68 6 kg (151 lb 3 2 oz)   23 66 7 kg (147 lb)   23 67 1 kg (148 lb)       Physical Exam  Vitals and nursing note reviewed  Constitutional:       General: She is not in acute distress  Appearance: Normal appearance  She is not ill-appearing or toxic-appearing  HENT:      Right Ear: Tympanic membrane normal       Left Ear: Tympanic membrane normal       Mouth/Throat:      Mouth: Mucous membranes are moist       Comments: No parotid gland enlargement/tenderness  Neck:      Vascular: No carotid bruit  Cardiovascular:      Rate and Rhythm: Normal rate and regular rhythm  Pulses: Normal pulses  Heart sounds: Normal heart sounds  No murmur heard  Pulmonary:      Effort: Pulmonary effort is normal  No respiratory distress  Breath sounds: Normal breath sounds  No wheezing, rhonchi or rales  Musculoskeletal:      Cervical back: Neck supple  Right lower leg: No edema  Left lower leg: No edema     Lymphadenopathy:      Cervical: No cervical adenopathy  Skin:     General: Skin is warm  Coloration: Skin is not jaundiced  Neurological:      General: No focal deficit present  Mental Status: She is alert and oriented to person, place, and time  Psychiatric:         Mood and Affect: Mood normal          Behavior: Behavior normal          Thought Content:  Thought content normal          Judgment: Judgment normal          Pertinent Laboratory/Diagnostic Studies:  Lab Results   Component Value Date    BUN 14 11/22/2022    CREATININE 0 73 11/22/2022    CALCIUM 9 6 11/22/2022    K 4 1 11/22/2022    CO2 27 11/22/2022     11/22/2022     Lab Results   Component Value Date    ALT 25 11/22/2022    AST 19 11/22/2022    ALKPHOS 62 11/22/2022       Lab Results   Component Value Date    WBC 5 85 11/22/2022    HGB 13 2 11/22/2022    HCT 42 0 11/22/2022    MCV 97 11/22/2022     11/22/2022       No results found for: TSH    No results found for: CHOL  Lab Results   Component Value Date    TRIG 90 11/22/2022     Lab Results   Component Value Date    HDL 62 11/22/2022     Lab Results   Component Value Date    LDLCALC 121 (H) 11/22/2022     Lab Results   Component Value Date    HGBA1C 5 3 04/28/2021       Results for orders placed or performed in visit on 11/22/22   Comprehensive metabolic panel   Result Value Ref Range    Sodium 137 135 - 147 mmol/L    Potassium 4 1 3 5 - 5 3 mmol/L    Chloride 106 96 - 108 mmol/L    CO2 27 21 - 32 mmol/L    ANION GAP 4 4 - 13 mmol/L    BUN 14 5 - 25 mg/dL    Creatinine 0 73 0 60 - 1 30 mg/dL    Glucose, Fasting 90 65 - 99 mg/dL    Calcium 9 6 8 3 - 10 1 mg/dL    AST 19 5 - 45 U/L    ALT 25 12 - 78 U/L    Alkaline Phosphatase 62 46 - 116 U/L    Total Protein 7 0 6 4 - 8 4 g/dL    Albumin 3 6 3 5 - 5 0 g/dL    Total Bilirubin 0 44 0 20 - 1 00 mg/dL    eGFR 84 ml/min/1 73sq m   Lipid panel   Result Value Ref Range    Cholesterol 201 (H) See Comment mg/dL    Triglycerides 90 See Comment mg/dL    HDL, Direct 62 >=50 mg/dL    LDL Calculated 121 (H) 0 - 100 mg/dL    Non-HDL-Chol (CHOL-HDL) 139 mg/dl   TSH, 3rd generation   Result Value Ref Range    TSH 3RD GENERATON 1 190 0 450 - 4 500 uIU/mL   CBC and differential   Result Value Ref Range    WBC 5 85 4 31 - 10 16 Thousand/uL    RBC 4 35 3 81 - 5 12 Million/uL    Hemoglobin 13 2 11 5 - 15 4 g/dL    Hematocrit 42 0 34 8 - 46 1 %    MCV 97 82 - 98 fL    MCH 30 3 26 8 - 34 3 pg    MCHC 31 4 31 4 - 37 4 g/dL    RDW 12 7 11 6 - 15 1 %    MPV 10 0 8 9 - 12 7 fL    Platelets 593 035 - 682 Thousands/uL    nRBC 0 /100 WBCs    Neutrophils Relative 68 43 - 75 %    Immat GRANS % 0 0 - 2 %    Lymphocytes Relative 24 14 - 44 %    Monocytes Relative 6 4 - 12 %    Eosinophils Relative 1 0 - 6 %    Basophils Relative 1 0 - 1 %    Neutrophils Absolute 4 00 1 85 - 7 62 Thousands/µL    Immature Grans Absolute 0 01 0 00 - 0 20 Thousand/uL    Lymphocytes Absolute 1 40 0 60 - 4 47 Thousands/µL    Monocytes Absolute 0 35 0 17 - 1 22 Thousand/µL    Eosinophils Absolute 0 06 0 00 - 0 61 Thousand/µL    Basophils Absolute 0 03 0 00 - 0 10 Thousands/µL   Celiac Disease Antibody Profile   Result Value Ref Range    IgA 207 87 - 352 mg/dL    Gliadin IgA 8 0 - 19 units    Gliadin IgG 3 0 - 19 units    Tissue Transglut Ab IGG <2 0 - 5 U/mL    TISSUE TRANSGLUTAMINASE IGA <2 0 - 3 U/mL    Endomysial IgA Negative Negative   Bilirubin, direct   Result Value Ref Range    Bilirubin, Direct 0 08 0 00 - 0 20 mg/dL       No orders of the defined types were placed in this encounter        ALLERGIES:  Allergies   Allergen Reactions   • Erythromycin Rash       Current Medications     Current Outpatient Medications   Medication Sig Dispense Refill   • acetaminophen (TYLENOL) 325 mg tablet Take 2 tablets (650 mg total) by mouth every 6 (six) hours as needed for mild pain or fever 30 tablet 0   • ASHWAGANDHA PO ashwagandha root extract     • Cholecalciferol (Vitamin D) 50 MCG (2000 UT) CAPS Vitamin D     • Cholecalciferol (VITAMIN D3) 5000 units CAPS Take 5,000 Units by mouth daily      • cholestyramine (QUESTRAN) 4 g packet Take 1 packet (4 g total) by mouth 3 (three) times a day with meals 90 packet 1   • cyclobenzaprine (FLEXERIL) 10 mg tablet Take 1 tablet (10 mg total) by mouth 3 (three) times a day 20 tablet 0   • diclofenac sodium (VOLTAREN) 1 % Apply topically every 6 (six) hours     • Diclofenac Sodium 3 % CREA compounded medication   Lidocaine 5% and Diclofenac 3%   Apply 1-3 grams to the affected area 2-4 times per day as needed  • docusate sodium (COLACE) 100 mg capsule Take 1 capsule (100 mg total) by mouth daily 30 capsule 5   • hydrocortisone 2 5 % cream Apply topically 3 (three) times a day 30 g 0   • hydrOXYzine HCL (ATARAX) 10 mg tablet Take 1 tablet (10 mg total) by mouth every 6 (six) hours as needed for itching 30 tablet 0   • ibuprofen (MOTRIN) 800 mg tablet every 8 (eight) hours as needed      • ipratropium (ATROVENT) 0 03 % nasal spray 2 sprays into each nostril 3 (three) times a day as needed for rhinitis 30 mL 3   • LORazepam (ATIVAN) 1 mg tablet Take 1 tablet po 1 hour before procedure  May repeat dose x 1 if needed 2 tablet 0   • naproxen (NAPROSYN) 500 mg tablet Take one tab up to twice a day, no more than 3 times a week 30 tablet 6   • olopatadine HCl (PATADAY) 0 2 % opth drops Administer 1 drop to both eyes daily 2 5 mL 11   • Omega-3 Fatty Acids (OMEGA-3 CF PO) Omega 3     • omega-3-acid ethyl esters (LOVAZA) 1 g capsule Take 2 g by mouth daily      • ondansetron (ZOFRAN) 4 mg tablet Take 1 tablet (4 mg total) by mouth every 6 (six) hours 12 tablet 0   • polyethylene glycol (MIRALAX) 17 g packet Take 17 g by mouth daily 30 each 5   • rizatriptan (MAXALT) 10 mg tablet Take 1 tablet (10 mg total) by mouth once as needed for migraine May repeat in 2 hours if needed  Max 2/24 hours, 9/month  9 tablet 12     No current facility-administered medications for this visit           [facility] Health Maintenance   Topic Date Due   • Osteoporosis Screening  Never done   • PT PLAN OF CARE  03/01/2020   • COVID-19 Vaccine (4 - Booster for Moderna series) 02/04/2022   • Depression Remission PHQ  03/22/2023   • Colorectal Cancer Screening  03/16/2023   • Fall Risk  03/29/2023   • Medicare Annual Wellness Visit (AWV)  03/29/2023   • BMI: Followup Plan  03/29/2023   • Breast Cancer Screening: Mammogram  05/31/2023   • Urinary Incontinence Screening  03/29/2023   • BMI: Adult  02/20/2024   • Hepatitis C Screening  Completed   • Pneumococcal Vaccine: 65+ Years  Completed   • Influenza Vaccine  Completed   • HIB Vaccine  Aged Out   • IPV Vaccine  Aged Out   • Hepatitis A Vaccine  Aged Out   • Meningococcal ACWY Vaccine  Aged Out   • HPV Vaccine  Aged Out     Immunization History   Administered Date(s) Administered   • COVID-19 MODERNA VACC 0 5 ML IM 03/08/2021, 04/05/2021, 12/10/2021   • INFLUENZA 12/28/2015, 11/22/2016, 09/22/2022   • Influenza, high dose seasonal 0 7 mL 11/11/2019, 10/15/2020, 09/29/2021, 09/22/2022   • Influenza, recombinant, quadrivalent,injectable, preservative free 11/29/2018   • Pneumococcal Conjugate 13-Valent 03/05/2020   • Pneumococcal Polysaccharide PPV23 10/07/2021   • Tdap 12/28/2015   • Zoster Vaccine Recombinant 08/15/2019, 11/01/2019     BMI Counseling: Body mass index is 28 57 kg/m²  The BMI is above normal  Nutrition recommendations include decreasing portion sizes, encouraging healthy choices of fruits and vegetables, decreasing fast food intake, consuming healthier snacks, limiting drinks that contain sugar, moderation in carbohydrate intake, increasing intake of lean protein, reducing intake of saturated and trans fat and reducing intake of cholesterol  Exercise recommendations include exercising 3-5 times per week  No pharmacotherapy was ordered  Rationale for BMI follow-up plan is due to patient being overweight or obese   Lives with   Ramila Harrison [place]

## 2023-05-22 ENCOUNTER — TELEPHONE (OUTPATIENT)
Dept: GASTROENTEROLOGY | Facility: MEDICAL CENTER | Age: 68
End: 2023-05-22

## 2023-05-22 NOTE — TELEPHONE ENCOUNTER
LVM stating 05/24 appt is canceled due to provider being out  Placed on list to call back when another day opens up for her or if patient would like to to see a PA in another location she can do that as well

## 2023-06-02 ENCOUNTER — HOSPITAL ENCOUNTER (OUTPATIENT)
Dept: MAMMOGRAPHY | Facility: CLINIC | Age: 68
Discharge: HOME | End: 2023-06-02

## 2023-06-02 VITALS — HEIGHT: 61 IN | WEIGHT: 151 LBS | BODY MASS INDEX: 28.51 KG/M2

## 2023-06-02 DIAGNOSIS — R92.8 ABNORMAL FINDINGS ON DIAGNOSTIC IMAGING OF BREAST: ICD-10-CM

## 2023-06-05 NOTE — RESULT ENCOUNTER NOTE
Good evening   Your mammogram shows 2 areas in the right breast which were present prior and unchanged  Recommendation is to return to routine screening mammography  Have a great night! Dr Rowan Ada

## 2023-07-06 ENCOUNTER — OFFICE VISIT (OUTPATIENT)
Dept: GASTROENTEROLOGY | Facility: MEDICAL CENTER | Age: 68
End: 2023-07-06
Payer: MEDICARE

## 2023-07-06 VITALS
WEIGHT: 151 LBS | DIASTOLIC BLOOD PRESSURE: 74 MMHG | HEART RATE: 67 BPM | SYSTOLIC BLOOD PRESSURE: 110 MMHG | BODY MASS INDEX: 28.53 KG/M2 | TEMPERATURE: 97.7 F

## 2023-07-06 DIAGNOSIS — K52.9 CHRONIC DIARRHEA: Primary | ICD-10-CM

## 2023-07-06 PROCEDURE — 99213 OFFICE O/P EST LOW 20 MIN: CPT | Performed by: NURSE PRACTITIONER

## 2023-07-06 NOTE — PROGRESS NOTES
[facility] - Outpatient Follow-up Note  Cherie Dickens 70 y.o. female MRN: [id number]  Encounter: [id number]          ASSESSMENT AND PLAN:      1. Chronic diarrhea    She was last seen in GI office 2/20/2023. At that visit she was recommended to take a half a packet of cholestyramine for her diarrhea. Cholestyramine has worked in the past for her but a full packet makes her nauseated. Her last colonoscopy was 2015 and it was negative. Her last Cologuard test was 2020 which was also negative. She is using cholestyramine half a packet 1 packet as needed for looser stools. She occasionally alternates to harder stools or no BM. Requires MiraLAX at that time to have a BM. Denies any melena or hematochezia. Denies any abdominal pain. No weight loss. .  She has never tried fiber. We discussed in detail about fiber daily and lowering dose of her cholestyramine so she does not get constipated. She agreed to these recommendations and will message the office in a few weeks with an update.    -Benefiber daily  -Cholestyramine half a packet as needed        ______________________________________________________________________    SUBJECTIVE: 29-year-old female with chronic intermittent loose stools postcholecystectomy. She was last seen in GI office 2/20/2023. At that visit she was recommended to take a half a packet of cholestyramine for her diarrhea. Cholestyramine has worked in the past for her but a full packet makes her nauseated. Her last colonoscopy was 2015 and it was negative. Her last Cologuard test was 2020 which was also negative. She is using cholestyramine half a packet 1 packet as needed for looser stools. She occasionally alternates to harder stools or no BM. Requires MiraLAX at that time to have a BM. Denies any melena or hematochezia. Denies any abdominal pain.   No weight loss    Prior EGD/colonoscopy  Colonoscopy 2015-normal per patient    REVIEW OF SYSTEMS IS OTHERWISE NEGATIVE. 10 point review of systems was negative other than HPI      Historical Information   Past Medical History:   Diagnosis Date   • Abdominal discomfort    • Acute cholecystitis due to biliary calculus 12/15/2020   • Anemia    • Arthritis    • Bronchitis    • Chronic pain disorder    • Dyslipidemia     Last Assessed 2012   • GERD (gastroesophageal reflux disease)    • Headache, migraine    • Hypotension    • Low back pain    • Low back pain    • Rheumatoid arthritis ([address])    • Thyroid nodule 2019     Past Surgical History:   Procedure Laterality Date   • CATARACT EXTRACTION     •  SECTION     • CHOLECYSTECTOMY      When in ER at [facility]   • CHOLECYSTECTOMY LAPAROSCOPIC N/A 12/15/2020    Procedure: CHOLECYSTECTOMY LAPAROSCOPIC;  Surgeon: Yamilet Campbell MD;  [facility];  Service: General   • ESOPHAGOGASTRODUODENOSCOPY N/A 2018    Procedure: ESOPHAGOGASTRODUODENOSCOPY (EGD) with push enteroscopy and bx;  Surgeon: Fam Avery DO;  Location: [facility]; Service: Gastroenterology   • EYE SURGERY     • NERVE SURGERY Right     Hand   • [place] EDG US EXAM SURGICAL ALTER STOM DUODENUM/JEJUNUM N/A 2019    Procedure: LINEAR ENDOSCOPIC U/S;  Surgeon: Andry Ramon MD;  [facility]: [facility];   Service: Gastroenterology   • SEPTOPLASTY  around age 20   • UPPER GASTROINTESTINAL ENDOSCOPY       Social History   Social History     Substance and Sexual Activity   Alcohol Use Yes    Comment: occasionally     Social History     Substance and Sexual Activity   Drug Use No     Social History     Tobacco Use   Smoking Status Former   • Types: Cigarettes   • Quit date: 1985   • Years since quittin.5   Smokeless Tobacco Never     Family History   Problem Relation Age of Onset   • Hypertension Mother    • Other Mother         Dyslipidemia   • Hearing loss Mother         8 years old   • Diabetes Father         Mellitus   • Stomach cancer Father    • Arthritis Father • No Known Problems Maternal Grandmother    • No Known Problems Maternal Grandfather    • No Known Problems Paternal Grandmother    • No Known Problems Paternal Grandfather    • No Known Problems Brother    • No Known Problems Brother    • No Known Problems Maternal Aunt    • No Known Problems Maternal Aunt    • No Known Problems Maternal Aunt    • No Known Problems Maternal Aunt    • No Known Problems Maternal Aunt    • No Known Problems Paternal Aunt    • No Known Problems Paternal Aunt    • No Known Problems Paternal Aunt    • No Known Problems Paternal Aunt    • No Known Problems Paternal Aunt    • Cancer Neg Hx    • Heart disease Neg Hx    • Stroke Neg Hx    • Breast cancer Neg Hx    • Colon cancer Neg Hx    • Endometrial cancer Neg Hx        Meds/Allergies       Current Outpatient Medications:   •  acetaminophen (TYLENOL) 325 mg tablet  •  ASHWAGANDHA PO  •  Cholecalciferol (VITAMIN D3) 5000 units CAPS  •  cholestyramine (QUESTRAN) 4 g packet  •  cyclobenzaprine (FLEXERIL) 10 mg tablet  •  diclofenac sodium (VOLTAREN) 1 %  •  Diclofenac Sodium 3 % CREA  •  hydrOXYzine HCL (ATARAX) 10 mg tablet  •  ipratropium (ATROVENT) 0.03 % nasal spray  •  LORazepam (ATIVAN) 1 mg tablet  •  naproxen (NAPROSYN) 500 mg tablet  •  olopatadine HCl (PATADAY) 0.2 % opth drops  •  Omega-3 Fatty Acids (OMEGA-3 CF PO)  •  polyethylene glycol (MIRALAX) 17 g packet  •  rizatriptan (MAXALT) 10 mg tablet  •  Cholecalciferol (Vitamin D) 50 MCG (2000 UT) CAPS  •  docusate sodium (COLACE) 100 mg capsule  •  hydrocortisone 2.5 % cream  •  ibuprofen (MOTRIN) 800 mg tablet  •  omega-3-acid ethyl esters (LOVAZA) 1 g capsule  •  ondansetron (ZOFRAN) 4 mg tablet    Allergies   Allergen Reactions   • Erythromycin Rash           Objective     Blood pressure 110/74, pulse 67, temperature 97.7 °F (36.5 °C), temperature source Tympanic, weight 68.5 kg (151 lb). Body mass index is 28.53 kg/m².       PHYSICAL EXAM:      General Appearance:   Alert, cooperative, no distress   HEENT:   Normocephalic, atraumatic, anicteric. Neck:  Supple, symmetrical, trachea midline   Lungs:   Clear to auscultation bilaterally; no rales, rhonchi or wheezing; respirations unlabored    Heart[de-identified]   Regular rate and rhythm; no murmur, rub, or gallop. Abdomen:   Soft, non-tender, non-distended; normal bowel sounds; no masses, no organomegaly    Genitalia:   Deferred    Rectal:   Deferred    Extremities:  No cyanosis, clubbing or edema    Pulses:  2+ and symmetric    Skin:  No jaundice, rashes, or lesions    Lymph nodes:  No palpable cervical lymphadenopathy        Lab Results:   No visits with results within 1 Day(s) from this visit.    Latest known visit with results is:   Lab on 11/22/2022   Component Date Value   • Sodium 11/22/2022 137    • Potassium 11/22/2022 4.1    • Chloride 11/22/2022 106    • CO2 11/22/2022 27    • ANION GAP 11/22/2022 4    • BUN 11/22/2022 14    • Creatinine 11/22/2022 0.73    • Glucose, Fasting 11/22/2022 90    • Calcium 11/22/2022 9.6    • AST 11/22/2022 19    • ALT 11/22/2022 25    • Alkaline Phosphatase 11/22/2022 62    • Total Protein 11/22/2022 7.0    • Albumin 11/22/2022 3.6    • Total Bilirubin 11/22/2022 0.44    • eGFR 11/22/2022 84    • Cholesterol 11/22/2022 201 (H)    • Triglycerides 11/22/2022 90    • HDL, Direct 11/22/2022 62    • LDL Calculated 11/22/2022 121 (H)    • Non-HDL-Chol (CHOL-HDL) 11/22/2022 139    • TSH 3RD GENERATON 11/22/2022 1.190    • WBC 11/22/2022 5.85    • RBC 11/22/2022 4.35    • Hemoglobin 11/22/2022 13.2    • Hematocrit 11/22/2022 42.0    • MCV 11/22/2022 97    • MCH 11/22/2022 30.3    • MCHC 11/22/2022 31.4    • RDW 11/22/2022 12.7    • MPV 11/22/2022 10.0    • Platelets 46/18/8231 245    • nRBC 11/22/2022 0    • Neutrophils Relative 11/22/2022 68    • Immat GRANS % 11/22/2022 0    • Lymphocytes Relative 11/22/2022 24    • Monocytes Relative 11/22/2022 6    • Eosinophils Relative 11/22/2022 1    • Basophils Relative 11/22/2022 1    • Neutrophils Absolute 11/22/2022 4.00    • Immature Grans Absolute 11/22/2022 0.01    • Lymphocytes Absolute 11/22/2022 1.40    • Monocytes Absolute 11/22/2022 0.35    • Eosinophils Absolute 11/22/2022 0.06    • Basophils Absolute 11/22/2022 0.03    • IgA 11/22/2022 207    • Gliadin IgA 11/22/2022 8    • Gliadin IgG 11/22/2022 3    • Tissue Transglut Ab IGG 11/22/2022 <2    • TISSUE TRANSGLUTAMINASE * 11/22/2022 <2    • Endomysial IgA 11/22/2022 Negative    • Bilirubin, Direct 11/22/2022 0.08          Radiology Results:   No results found.

## 2023-08-08 ENCOUNTER — TELEPHONE (OUTPATIENT)
Dept: FAMILY MEDICINE CLINIC | Facility: CLINIC | Age: 68
End: 2023-08-08

## 2023-08-17 ENCOUNTER — OFFICE VISIT (OUTPATIENT)
Dept: FAMILY MEDICINE CLINIC | Facility: CLINIC | Age: 68
End: 2023-08-17
Payer: MEDICARE

## 2023-08-17 VITALS
WEIGHT: 151 LBS | BODY MASS INDEX: 28.53 KG/M2 | SYSTOLIC BLOOD PRESSURE: 124 MMHG | HEART RATE: 73 BPM | DIASTOLIC BLOOD PRESSURE: 70 MMHG | OXYGEN SATURATION: 96 %

## 2023-08-17 DIAGNOSIS — Z91.038 ALLERGIC REACTION TO INSECT BITE: Primary | ICD-10-CM

## 2023-08-17 PROBLEM — F32.1 CURRENT MODERATE EPISODE OF MAJOR DEPRESSIVE DISORDER WITHOUT PRIOR EPISODE (HCC): Status: RESOLVED | Noted: 2020-03-05 | Resolved: 2023-08-17

## 2023-08-17 PROCEDURE — 99213 OFFICE O/P EST LOW 20 MIN: CPT | Performed by: FAMILY MEDICINE

## 2023-08-17 RX ORDER — TRIAMCINOLONE ACETONIDE 1 MG/G
CREAM TOPICAL 2 TIMES DAILY
Qty: 30 G | Refills: 1 | Status: SHIPPED | OUTPATIENT
Start: 2023-08-17

## 2023-08-17 NOTE — PROGRESS NOTES
Assessment/Plan:       Problem List Items Addressed This Visit    None  Visit Diagnoses     Allergic reaction to insect bite    -  Primary    Relevant Medications    triamcinolone (KENALOG) 0.1 % cream          1. Allergic reaction to insect bite    Likely allergic reaction to insect bite advised to use triamcinolone hydroxyzine or Benadryl for itching follow-up if worsening  - triamcinolone (KENALOG) 0.1 % cream; Apply topically 2 (two) times a day  Dispense: 30 g; Refill: 1    Subjective:      Patient ID: Garrick Feliz is a 70 y.o. female. HPI    34-year-old female presenting today in for 3 days after changes on forearms as well as rash. Was outside in a cabin in [place] outdoors developed itchy rash    The following portions of the patient's history were reviewed and updated as appropriate: allergies, current medications, past family history, past medical history, past social history, past surgical history and problem list.      Current Outpatient Medications:   •  acetaminophen (TYLENOL) 325 mg tablet, Take 2 tablets (650 mg total) by mouth every 6 (six) hours as needed for mild pain or fever, Disp: 30 tablet, Rfl: 0  •  ASHWAGANDHA PO, ashwaannadha root extract, Disp: , Rfl:   •  Cholecalciferol (VITAMIN D3) 5000 units CAPS, Take 5,000 Units by mouth daily , Disp: , Rfl:   •  cholestyramine (QUESTRAN) 4 g packet, Take 1 packet (4 g total) by mouth 3 (three) times a day with meals, Disp: 90 packet, Rfl: 1  •  cyclobenzaprine (FLEXERIL) 10 mg tablet, Take 1 tablet (10 mg total) by mouth 3 (three) times a day, Disp: 20 tablet, Rfl: 0  •  diclofenac sodium (VOLTAREN) 1 %, Apply topically every 6 (six) hours, Disp: , Rfl:   •  Diclofenac Sodium 3 % CREA, compounded medication  Lidocaine 5% and Diclofenac 3%  Apply 1-3 grams to the affected area 2-4 times per day as needed. , Disp: , Rfl:   •  hydrocortisone 2.5 % cream, Apply topically 3 (three) times a day, Disp: 30 g, Rfl: 0  •  ipratropium (ATROVENT) 0.03 % nasal spray, INSTILL TWO SPRAYS IN EACH NOSTRIL THREE TIMES A DAY AS NEEDED for rhinitis, Disp: 30 mL, Rfl: 11  •  LORazepam (ATIVAN) 1 mg tablet, Take 1 tablet po 1 hour before procedure. May repeat dose x 1 if needed, Disp: 2 tablet, Rfl: 0  •  naproxen (NAPROSYN) 500 mg tablet, Take one tab up to twice a day, no more than 3 times a week, Disp: 30 tablet, Rfl: 6  •  olopatadine HCl (PATADAY) 0.2 % opth drops, Administer 1 drop to both eyes daily, Disp: 2.5 mL, Rfl: 11  •  Omega-3 Fatty Acids (OMEGA-3 CF PO), Omega 3, Disp: , Rfl:   •  polyethylene glycol (MIRALAX) 17 g packet, Take 17 g by mouth daily, Disp: 30 each, Rfl: 5  •  rizatriptan (MAXALT) 10 mg tablet, Take 1 tablet (10 mg total) by mouth once as needed for migraine May repeat in 2 hours if needed. Max 2/24 hours, 9/month., Disp: 9 tablet, Rfl: 12  •  triamcinolone (KENALOG) 0.1 % cream, Apply topically 2 (two) times a day, Disp: 30 g, Rfl: 1  •  Cholecalciferol (Vitamin D) 50 MCG (2000 UT) CAPS, Vitamin D (Patient not taking: Reported on 7/6/2023), Disp: , Rfl:   •  docusate sodium (COLACE) 100 mg capsule, Take 1 capsule (100 mg total) by mouth daily (Patient not taking: Reported on 7/6/2023), Disp: 30 capsule, Rfl: 5  •  hydrOXYzine HCL (ATARAX) 10 mg tablet, Take 1 tablet (10 mg total) by mouth every 6 (six) hours as needed for itching (Patient not taking: Reported on 8/17/2023), Disp: 30 tablet, Rfl: 0  •  ibuprofen (MOTRIN) 800 mg tablet, every 8 (eight) hours as needed  (Patient not taking: Reported on 7/6/2023), Disp: , Rfl:   •  omega-3-acid ethyl esters (LOVAZA) 1 g capsule, Take 2 g by mouth daily  (Patient not taking: Reported on 7/6/2023), Disp: , Rfl:   •  ondansetron (ZOFRAN) 4 mg tablet, Take 1 tablet (4 mg total) by mouth every 6 (six) hours (Patient not taking: Reported on 8/17/2023), Disp: 12 tablet, Rfl: 0     Review of Systems   Constitutional: Negative for activity change and appetite change.    Respiratory: Negative for chest tightness. Cardiovascular: Negative for chest pain and palpitations. Gastrointestinal: Negative for abdominal distention and abdominal pain. Musculoskeletal: Negative for arthralgias and back pain. Objective:      /70 (BP Location: Left arm, Patient Position: Sitting, Cuff Size: Standard)   Pulse 73   Wt 68.5 kg (151 lb)   SpO2 96%   BMI 28.53 kg/m²          Physical Exam  Constitutional:       Appearance: Normal appearance. Cardiovascular:      Rate and Rhythm: Normal rate and regular rhythm. Pulmonary:      Effort: Pulmonary effort is normal.   Skin:     General: Skin is warm. Capillary Refill: Capillary refill takes less than 2 seconds. Comments: Papular rash bilateral arms   Neurological:      General: No focal deficit present. Mental Status: She is alert and oriented to person, place, and time.            Carli Hankins MD

## 2023-09-12 ENCOUNTER — ANNUAL EXAM (OUTPATIENT)
Dept: OBGYN CLINIC | Facility: MEDICAL CENTER | Age: 68
End: 2023-09-12
Payer: MEDICARE

## 2023-09-12 VITALS
WEIGHT: 151 LBS | HEIGHT: 61 IN | DIASTOLIC BLOOD PRESSURE: 70 MMHG | SYSTOLIC BLOOD PRESSURE: 116 MMHG | BODY MASS INDEX: 28.51 KG/M2

## 2023-09-12 DIAGNOSIS — Z12.4 SCREENING FOR CERVICAL CANCER: ICD-10-CM

## 2023-09-12 DIAGNOSIS — Z01.419 ENCOUNTER FOR WELL WOMAN EXAM WITH ROUTINE GYNECOLOGICAL EXAM: Primary | ICD-10-CM

## 2023-09-12 PROCEDURE — G0124 SCREEN C/V THIN LAYER BY MD: HCPCS | Performed by: PATHOLOGY

## 2023-09-12 PROCEDURE — G0476 HPV COMBO ASSAY CA SCREEN: HCPCS | Performed by: OBSTETRICS & GYNECOLOGY

## 2023-09-12 PROCEDURE — G0101 CA SCREEN;PELVIC/BREAST EXAM: HCPCS | Performed by: OBSTETRICS & GYNECOLOGY

## 2023-09-12 PROCEDURE — G0145 SCR C/V CYTO,THINLAYER,RESCR: HCPCS | Performed by: PATHOLOGY

## 2023-09-12 NOTE — PROGRESS NOTES
ASSESSMENT & PLAN: Sandy Hayes is a 70 y.o.  with normal gynecologic exam.    1.  Routine well woman exam done today  2. Pap and HPV:  The patient's last pap was  It was normal.  Pap and cotesting was done today. Current ASCCP Guidelines reviewed. 3.  Mammogram ordered  4. Colonoscopy   5. DEXA ordered  6. The following were reviewed in today's visit: breast self exam and mammography screening ordered. CC:  Annual Gynecologic Examination    HPI: Sandy Hayes is a 70 y.o. Jaimie Kari who presents for annual gynecologic examination. She has the following concerns:    She reports  no new changes to her health. She reports no breast concerns. Her last mammogram was . Her last colonoscopy was . She is postmenopausal and reports  no postmenopausal bleeding. She has no vaginal discharge, vulvar or vaginal lesions, pelvic pain. Health Maintenance:    She wears her seatbelt routinely. She does perform regular monthly self breast exams. She feels safe at home.      Last PAP:   Last mammogram:    Last colonoscopy:     Past Medical History:   Diagnosis Date   • Abdominal discomfort    • Acute cholecystitis due to biliary calculus 12/15/2020   • Anemia    • Arthritis    • Bronchitis    • Chronic pain disorder    • Current moderate episode of major depressive disorder without prior episode ([address]) 3/5/2020   • Dyslipidemia     Last Assessed 2012   • GERD (gastroesophageal reflux disease)    • Headache, migraine    • Hypotension    • Low back pain    • Low back pain    • Rheumatoid arthritis ([address])    • Thyroid nodule 2019       Past Surgical History:   Procedure Laterality Date   • CATARACT EXTRACTION     •  SECTION     • CHOLECYSTECTOMY      When in ER at [facility]   • CHOLECYSTECTOMY LAPAROSCOPIC N/A 12/15/2020    Procedure: CHOLECYSTECTOMY LAPAROSCOPIC;  Surgeon: Raya Dhaliwal MD;  [facility];  Service: General   • ESOPHAGOGASTRODUODENOSCOPY N/A 2018    Procedure: ESOPHAGOGASTRODUODENOSCOPY (EGD) with push enteroscopy and bx;  Surgeon: Radha Rubi DO;  [facility]; Service: Gastroenterology   • EYE SURGERY     • NERVE SURGERY Right     Hand   • [place] EDG US EXAM SURGICAL ALTER STOM DUODENUM/JEJUNUM N/A 2019    Procedure: LINEAR ENDOSCOPIC U/S;  Surgeon: Cruz Arana MD;  [facility];   Service: Gastroenterology   • SEPTOPLASTY  around age 20   • UPPER GASTROINTESTINAL ENDOSCOPY         Past OB/Gyn History:  OB History        1    Para   1    Term   0       1    AB        Living   1       SAB        IAB        Ectopic        Multiple        Live Births   1               History of abnormal pap smears: No .      Family History   Problem Relation Age of Onset   • Hypertension Mother    • Other Mother         Dyslipidemia   • Hearing loss Mother         8 years old   • Diabetes Father         Mellitus   • Stomach cancer Father    • Arthritis Father    • No Known Problems Maternal Grandmother    • No Known Problems Maternal Grandfather    • No Known Problems Paternal Grandmother    • No Known Problems Paternal Grandfather    • No Known Problems Brother    • No Known Problems Brother    • No Known Problems Maternal Aunt    • No Known Problems Maternal Aunt    • No Known Problems Maternal Aunt    • No Known Problems Maternal Aunt    • No Known Problems Maternal Aunt    • No Known Problems Paternal Aunt    • No Known Problems Paternal Aunt    • No Known Problems Paternal Aunt    • No Known Problems Paternal Aunt    • No Known Problems Paternal Aunt    • Cancer Neg Hx    • Heart disease Neg Hx    • Stroke Neg Hx    • Breast cancer Neg Hx    • Colon cancer Neg Hx    • Endometrial cancer Neg Hx        Social History:  Social History     Socioeconomic History   • Marital status: /Civil Union     Spouse name: Not on file   • Number of children: Not on file   • Years of education: Not on file   • Highest education level: Not on file   Occupational History   • Occupation: retired   Tobacco Use   • Smoking status: Former     Types: Cigarettes     Quit date: 1985     Years since quittin.7   • Smokeless tobacco: Never   Vaping Use   • Vaping Use: Never used   Substance and Sexual Activity   • Alcohol use: Yes     Comment: occasionally   • Drug use: No   • Sexual activity: Yes     Partners: Male     Birth control/protection: Post-menopausal   Other Topics Concern   • Not on file   Social History Narrative    Use Safety Equipment - Seatbelts        Lives with spouse        Consumes 3 cups of coffee per day     Social Determinants of Health     Financial Resource Strain: Not on file   Food Insecurity: Not on file   Transportation Needs: Not on file   Physical Activity: Not on file   Stress: Not on file   Social Connections: Not on file   Intimate Partner Violence: Not on file   Housing Stability: Not on file         Allergies   Allergen Reactions   • Erythromycin Rash         Current Outpatient Medications:   •  acetaminophen (TYLENOL) 325 mg tablet, Take 2 tablets (650 mg total) by mouth every 6 (six) hours as needed for mild pain or fever, Disp: 30 tablet, Rfl: 0  •  ASHWAGANDHA PO, jerrya root extract, Disp: , Rfl:   •  Cholecalciferol (VITAMIN D3) 5000 units CAPS, Take 5,000 Units by mouth daily , Disp: , Rfl:   •  cholestyramine (QUESTRAN) 4 g packet, Take 1 packet (4 g total) by mouth 3 (three) times a day with meals, Disp: 90 packet, Rfl: 1  •  cyclobenzaprine (FLEXERIL) 10 mg tablet, Take 1 tablet (10 mg total) by mouth 3 (three) times a day, Disp: 20 tablet, Rfl: 0  •  diclofenac sodium (VOLTAREN) 1 %, Apply topically every 6 (six) hours, Disp: , Rfl:   •  Diclofenac Sodium 3 % CREA, compounded medication  Lidocaine 5% and Diclofenac 3%  Apply 1-3 grams to the affected area 2-4 times per day as needed. , Disp: , Rfl:   •  hydrocortisone 2.5 % cream, Apply topically 3 (three) times a day, Disp: 30 g, Rfl: 0  •  ipratropium (ATROVENT) 0.03 % nasal spray, INSTILL TWO SPRAYS IN EACH NOSTRIL THREE TIMES A DAY AS NEEDED for rhinitis, Disp: 30 mL, Rfl: 11  •  LORazepam (ATIVAN) 1 mg tablet, Take 1 tablet po 1 hour before procedure. May repeat dose x 1 if needed, Disp: 2 tablet, Rfl: 0  •  naproxen (NAPROSYN) 500 mg tablet, Take one tab up to twice a day, no more than 3 times a week, Disp: 30 tablet, Rfl: 6  •  olopatadine HCl (PATADAY) 0.2 % opth drops, Administer 1 drop to both eyes daily, Disp: 2.5 mL, Rfl: 11  •  Omega-3 Fatty Acids (OMEGA-3 CF PO), Omega 3, Disp: , Rfl:   •  polyethylene glycol (MIRALAX) 17 g packet, Take 17 g by mouth daily, Disp: 30 each, Rfl: 5  •  rizatriptan (MAXALT) 10 mg tablet, Take 1 tablet (10 mg total) by mouth once as needed for migraine May repeat in 2 hours if needed.  Max 2/24 hours, 9/month., Disp: 9 tablet, Rfl: 12  •  triamcinolone (KENALOG) 0.1 % cream, Apply topically 2 (two) times a day, Disp: 30 g, Rfl: 1  •  Cholecalciferol (Vitamin D) 50 MCG (2000 UT) CAPS, Vitamin D (Patient not taking: Reported on 7/6/2023), Disp: , Rfl:   •  docusate sodium (COLACE) 100 mg capsule, Take 1 capsule (100 mg total) by mouth daily (Patient not taking: Reported on 7/6/2023), Disp: 30 capsule, Rfl: 5  •  hydrOXYzine HCL (ATARAX) 10 mg tablet, Take 1 tablet (10 mg total) by mouth every 6 (six) hours as needed for itching (Patient not taking: Reported on 8/17/2023), Disp: 30 tablet, Rfl: 0  •  ibuprofen (MOTRIN) 800 mg tablet, every 8 (eight) hours as needed  (Patient not taking: Reported on 7/6/2023), Disp: , Rfl:   •  omega-3-acid ethyl esters (LOVAZA) 1 g capsule, Take 2 g by mouth daily  (Patient not taking: Reported on 7/6/2023), Disp: , Rfl:   •  ondansetron (ZOFRAN) 4 mg tablet, Take 1 tablet (4 mg total) by mouth every 6 (six) hours (Patient not taking: Reported on 8/17/2023), Disp: 12 tablet, Rfl: 0    Review of Systems  Constitutional :no fever, feels well, no tiredness, no recent weight gain or loss  ENT: no ear ache, no loss of hearing, no nosebleeds or nasal discharge, no sore throat or hoarseness. Cardiovascular: no complaints of slow or fast heart beat, no chest pain, no palpitations, no leg claudication or lower extremity edema. Respiratory: no complaints of shortness of shortness of breath, no MINAYA  Breasts:no complaints of breast pain, breast lump, or nipple discharge  Gastrointestinal: no complaints of abdominal pain, constipation, nausea, vomiting, or diarrhea or bloody stools  Genitourinary : no complaints of dysuria, incontinence, pelvic pain, no dysmenorrhea, vaginal discharge or abnormal vaginal bleeding and as noted in HPI. Musculoskeletal: no complaints of arthralgia, no myalgia, no joint swelling or stiffness, no limb pain or swelling. Integumentary: no complaints of skin rash or lesion, itching or dry skin  Neurological: no complaints of headache, no confusion, no numbness or tingling, no dizziness or fainting    Objective      /70   Ht 5' 1" (1.549 m)   Wt 68.5 kg (151 lb)   BMI 28.53 kg/m²   General:   appears stated age, cooperative, alert normal mood and affect   Neck: normal, supple,trachea midline, no masses   Heart: regular rate and rhythm, S1, S2 normal, no murmur, click, rub or gallop   Lungs: clear to auscultation bilaterally   Breasts: normal appearance, no masses or tenderness, Inspection negative, No nipple retraction or dimpling, No nipple discharge or bleeding, No axillary or supraclavicular adenopathy, Normal to palpation without dominant masses   Abdomen: soft, non-tender, without masses or organomegaly   Vulva: normal female genitalia, Bartholin's, Urethra, [place] normal, no lesions, normal female hair distribution   Vagina: normal vagina, no discharge, exudate, lesion, or erythema   Urethra: normal   Cervix: Normal, no discharge. PAP done.    Uterus: normal size, contour, position, consistency, mobility, non-tender   Adnexa: normal adnexa   Lymphatic palpation of lymph nodes in neck, axilla, groin and/or other locations: no lymphadenopathy or masses noted   Skin normal skin turgor and no rashes.    Psychiatric orientation to person, place, and time: normal. mood and affect: normal

## 2023-09-13 LAB
HPV HR 12 DNA CVX QL NAA+PROBE: NEGATIVE
HPV16 DNA CVX QL NAA+PROBE: POSITIVE
HPV18 DNA CVX QL NAA+PROBE: NEGATIVE

## 2023-09-14 ENCOUNTER — OFFICE VISIT (OUTPATIENT)
Dept: FAMILY MEDICINE CLINIC | Facility: CLINIC | Age: 68
End: 2023-09-14
Payer: MEDICARE

## 2023-09-14 VITALS
SYSTOLIC BLOOD PRESSURE: 112 MMHG | BODY MASS INDEX: 28.43 KG/M2 | TEMPERATURE: 97.3 F | HEIGHT: 61 IN | RESPIRATION RATE: 18 BRPM | WEIGHT: 150.6 LBS | OXYGEN SATURATION: 99 % | DIASTOLIC BLOOD PRESSURE: 78 MMHG | HEART RATE: 64 BPM

## 2023-09-14 DIAGNOSIS — F33.9 DEPRESSION, RECURRENT (HCC): ICD-10-CM

## 2023-09-14 DIAGNOSIS — L24.9 IRRITANT CONTACT DERMATITIS, UNSPECIFIED TRIGGER: Primary | ICD-10-CM

## 2023-09-14 PROCEDURE — 99214 OFFICE O/P EST MOD 30 MIN: CPT | Performed by: FAMILY MEDICINE

## 2023-09-14 RX ORDER — METHYLPREDNISOLONE 4 MG/1
TABLET ORAL
Qty: 21 EACH | Refills: 0 | Status: SHIPPED | OUTPATIENT
Start: 2023-09-14

## 2023-09-14 NOTE — PATIENT INSTRUCTIONS
Take zyrtec 10 mg at bedtime (over the counter) - will be make you drowsy  Stop the benadryl oral and cream     Use gentle soap (ie: DOVE) and avoid hot showers or overshowering. Prednisone - take with food  Avoid taking motrin like products while on prednisone  Take early in the day as it may keep you awake and make you more hyper and hungry    Ok to use the triamcinolone cream - 2x/day for another week  Avoid face/genitals.

## 2023-09-14 NOTE — PROGRESS NOTES
FAMILY PRACTICE OFFICE VISIT    NAME: Merary White    AGE: 70 y.o. SEX: female  : 1955   MRN: [id number]    DATE: 2023  TIME: 9:21 AM    Assessment and Plan     1. Irritant contact dermatitis, unspecified trigger  Suspect possible 'id' type reaction  Or inflammatory response to initial insult (possible bug or plant)  No urticaria on exam today    Pt was using the steroid cream more than 2x/day  explained that this can cause rebound dermatitis  Avoid overuse    Avoid scratching to prevent infection  Put gloves or socks on hands at night to avoid scratching and opening skin      Patient Instructions   Take zyrtec 10 mg at bedtime (over the counter) - will be make you drowsy  Stop the benadryl oral and cream     Use gentle soap (ie: DOVE) and avoid hot showers or overshowering. Prednisone - take with food  Avoid taking motrin like products while on prednisone  Take early in the day as it may keep you awake and make you more hyper and hungry    Ok to use the triamcinolone cream - 2x/day for another week  Avoid face/genitals. - methylPREDNISolone 4 MG tablet therapy pack; Use as directed on package; take with food; avoid taking with nsaids or asa  Dispense: 21 each; Refill: 0        Chief Complaint     Chief Complaint   Patient presents with   • Follow-up     Rash on hands and arms that itch like crazy the rash starts to raise off and on       History of Present Illness   Merary White is a 70y.o.-year-old female who presents today for ongoing rash that flares at times. Had been using triamcinolone cream more than bid since visit in 2023 but stopped using this cream    Pt brought along photos of initial rash - on face   Looked like hives  Then went to hands and would come and go - very itchy. Appears blistery initially  nighttime itching is severe  Taking benadryl. Involves fingers  Spares genitals. Not on toes.   No new areas developing - rash just keeps cropping up in the same areas        Review of Systems   Review of Systems   Constitutional: Negative for fever. Skin: Positive for rash. Active Problem List     Patient Active Problem List   Diagnosis   • Chronic bilateral low back pain without sciatica   • Esophageal reflux   • Arthritis   • Duodenum disorder   • Hyperlipidemia   • Depression   • Abnormal MRI of abdomen   • Vitamin D deficiency   • Anxiety disorder   • Migraine aura without headache   • Thyroid nodule   • Impaired fasting glucose   • Chronic left shoulder pain   • Lumbar spondylosis   • Sleep disorder   • Status post laparoscopic cholecystectomy   • Pruritus   • Post-menopausal bleeding   • Trigger finger of left thumb   • Abnormal mammogram   • Loose stools         Past Medical History:  Past Medical History:   Diagnosis Date   • Abdominal discomfort    • Acute cholecystitis due to biliary calculus 12/15/2020   • Anemia    • Arthritis    • Bronchitis    • Chronic pain disorder    • Current moderate episode of major depressive disorder without prior episode ([address]) 3/5/2020   • Dyslipidemia     Last Assessed 2012   • GERD (gastroesophageal reflux disease)    • Headache, migraine    • Hypotension    • Low back pain    • Low back pain    • Rheumatoid arthritis ([address])    • Thyroid nodule 2019       Past Surgical History:  Past Surgical History:   Procedure Laterality Date   • CATARACT EXTRACTION     •  SECTION     • CHOLECYSTECTOMY      When in ER at [facility]   • CHOLECYSTECTOMY LAPAROSCOPIC N/A 12/15/2020    Procedure: CHOLECYSTECTOMY LAPAROSCOPIC;  Surgeon: Janay Lezama MD;  Location: [facility];  Service: General   • ESOPHAGOGASTRODUODENOSCOPY N/A 2018    Procedure: ESOPHAGOGASTRODUODENOSCOPY (EGD) with push enteroscopy and bx;  Surgeon: Billye Gosselin, DO;  Location: [facility];   Service: Gastroenterology   • EYE SURGERY     • NERVE SURGERY Right     Hand   • MI EDG US EXAM SURGICAL ALTER STOM DUODENUM/JEJUNUM N/A 2019 Procedure: LINEAR ENDOSCOPIC U/S;  Surgeon: Radha Templeton MD;  Location: [facility];   Service: Gastroenterology   • SEPTOPLASTY  around age 20   • UPPER GASTROINTESTINAL ENDOSCOPY         Family History:  Family History   Problem Relation Age of Onset   • Hypertension Mother    • Other Mother         Dyslipidemia   • Hearing loss Mother         8 years old   • Diabetes Father         Mellitus   • Stomach cancer Father    • Arthritis Father    • No Known Problems Maternal Grandmother    • No Known Problems Maternal Grandfather    • No Known Problems Paternal Grandmother    • No Known Problems Paternal Grandfather    • No Known Problems Brother    • No Known Problems Brother    • No Known Problems Maternal Aunt    • No Known Problems Maternal Aunt    • No Known Problems Maternal Aunt    • No Known Problems Maternal Aunt    • No Known Problems Maternal Aunt    • No Known Problems Paternal Aunt    • No Known Problems Paternal Aunt    • No Known Problems Paternal Aunt    • No Known Problems Paternal Aunt    • No Known Problems Paternal Aunt    • Cancer Neg Hx    • Heart disease Neg Hx    • Stroke Neg Hx    • Breast cancer Neg Hx    • Colon cancer Neg Hx    • Endometrial cancer Neg Hx        Social History:  Social History     Socioeconomic History   • Marital status: /Civil Union     Spouse name: Not on file   • Number of children: Not on file   • Years of education: Not on file   • Highest education level: Not on file   Occupational History   • Occupation: retired   Tobacco Use   • Smoking status: Former     Types: Cigarettes     Quit date: 1985     Years since quittin.7   • Smokeless tobacco: Never   Vaping Use   • Vaping Use: Never used   Substance and Sexual Activity   • Alcohol use: Yes     Comment: occasionally   • Drug use: No   • Sexual activity: Yes     Partners: Male     Birth control/protection: Post-menopausal   Other Topics Concern   • Not on file   Social History Narrative    Use Safety Equipment - Seatbelts        Lives with spouse        Consumes 3 cups of coffee per day     Social Determinants of Health     Financial Resource Strain: Not on file   Food Insecurity: Not on file   Transportation Needs: Not on file   Physical Activity: Not on file   Stress: Not on file   Social Connections: Not on file   Intimate Partner Violence: Not on file   Housing Stability: Not on file       Objective     Vitals:    09/14/23 0917   BP: 112/78   Pulse: 64   Resp: 18   Temp: (!) 97.3 °F (36.3 °C)   SpO2: 99%     Wt Readings from Last 3 Encounters:   09/14/23 68.3 kg (150 lb 9.6 oz)   09/12/23 68.5 kg (151 lb)   08/17/23 68.5 kg (151 lb)       Physical Exam  Vitals and nursing note reviewed. Constitutional:       General: She is not in acute distress. Appearance: Normal appearance. She is not ill-appearing or toxic-appearing. Cardiovascular:      Rate and Rhythm: Normal rate and regular rhythm. Pulses: Normal pulses. Heart sounds: Normal heart sounds. No murmur heard. Pulmonary:      Effort: Pulmonary effort is normal. No respiratory distress. Breath sounds: Normal breath sounds. No wheezing, rhonchi or rales. Skin:     Findings: Rash present. Comments: Small pinpiont erythematous lesions on lateral side of both wrists and on fingers  Some appear to have opened  Nothing vesicular  No streaking  No signs of secondary infection. Spares webs   Neurological:      General: No focal deficit present. Mental Status: She is alert and oriented to person, place, and time. Psychiatric:         Mood and Affect: Mood normal.         Behavior: Behavior normal.         Thought Content:  Thought content normal.         Judgment: Judgment normal.         Pertinent Laboratory/Diagnostic Studies:  Lab Results   Component Value Date    BUN 14 11/22/2022    CREATININE 0.73 11/22/2022    CALCIUM 9.6 11/22/2022    K 4.1 11/22/2022    CO2 27 11/22/2022     11/22/2022     Lab Results   Component Value Date    ALT 25 11/22/2022    AST 19 11/22/2022    ALKPHOS 62 11/22/2022       Lab Results   Component Value Date    WBC 5.85 11/22/2022    HGB 13.2 11/22/2022    HCT 42.0 11/22/2022    MCV 97 11/22/2022     11/22/2022       No results found for: "TSH"    No results found for: "CHOL"  Lab Results   Component Value Date    TRIG 90 11/22/2022     Lab Results   Component Value Date    HDL 62 11/22/2022     Lab Results   Component Value Date    LDLCALC 121 (H) 11/22/2022     Lab Results   Component Value Date    HGBA1C 5.3 04/28/2021       Results for orders placed or performed in visit on 09/12/23   HPV High Risk    Specimen: Thin-Prep Vial   Result Value Ref Range    HPV Other HR Negative Negative    HPV16 Positive (A) Negative    HPV18 Negative Negative       No orders of the defined types were placed in this encounter. ALLERGIES:  Allergies   Allergen Reactions   • Erythromycin Rash       Current Medications     Current Outpatient Medications   Medication Sig Dispense Refill   • ASHWAGANDHA PO jerrya root extract     • Cholecalciferol (VITAMIN D3) 5000 units CAPS Take 5,000 Units by mouth daily      • cholestyramine (QUESTRAN) 4 g packet Take 1 packet (4 g total) by mouth 3 (three) times a day with meals 90 packet 1   • cyclobenzaprine (FLEXERIL) 10 mg tablet Take 1 tablet (10 mg total) by mouth 3 (three) times a day 20 tablet 0   • diclofenac sodium (VOLTAREN) 1 % Apply topically every 6 (six) hours     • Diclofenac Sodium 3 % CREA compounded medication   Lidocaine 5% and Diclofenac 3%   Apply 1-3 grams to the affected area 2-4 times per day as needed. • hydrocortisone 2.5 % cream Apply topically 3 (three) times a day 30 g 0   • ipratropium (ATROVENT) 0.03 % nasal spray INSTILL TWO SPRAYS IN EACH NOSTRIL THREE TIMES A DAY AS NEEDED for rhinitis 30 mL 11   • LORazepam (ATIVAN) 1 mg tablet Take 1 tablet po 1 hour before procedure.   May repeat dose x 1 if needed 2 tablet 0   • naproxen (NAPROSYN) 500 mg tablet Take one tab up to twice a day, no more than 3 times a week 30 tablet 6   • olopatadine HCl (PATADAY) 0.2 % opth drops Administer 1 drop to both eyes daily 2.5 mL 11   • Omega-3 Fatty Acids (OMEGA-3 CF PO) Omega 3     • polyethylene glycol (MIRALAX) 17 g packet Take 17 g by mouth daily 30 each 5   • rizatriptan (MAXALT) 10 mg tablet Take 1 tablet (10 mg total) by mouth once as needed for migraine May repeat in 2 hours if needed. Max 2/24 hours, 9/month. 9 tablet 12   • triamcinolone (KENALOG) 0.1 % cream Apply topically 2 (two) times a day 30 g 1   • acetaminophen (TYLENOL) 325 mg tablet Take 2 tablets (650 mg total) by mouth every 6 (six) hours as needed for mild pain or fever 30 tablet 0   • Cholecalciferol (Vitamin D) 50 MCG (2000 UT) CAPS Vitamin D (Patient not taking: Reported on 7/6/2023)     • docusate sodium (COLACE) 100 mg capsule Take 1 capsule (100 mg total) by mouth daily (Patient not taking: Reported on 7/6/2023) 30 capsule 5   • hydrOXYzine HCL (ATARAX) 10 mg tablet Take 1 tablet (10 mg total) by mouth every 6 (six) hours as needed for itching (Patient not taking: Reported on 8/17/2023) 30 tablet 0   • ibuprofen (MOTRIN) 800 mg tablet every 8 (eight) hours as needed  (Patient not taking: Reported on 7/6/2023)     • omega-3-acid ethyl esters (LOVAZA) 1 g capsule Take 2 g by mouth daily  (Patient not taking: Reported on 7/6/2023)     • ondansetron (ZOFRAN) 4 mg tablet Take 1 tablet (4 mg total) by mouth every 6 (six) hours (Patient not taking: Reported on 8/17/2023) 12 tablet 0     No current facility-administered medications for this visit.          Health Maintenance     Health Maintenance   Topic Date Due   • Osteoporosis Screening  Never done   • PT PLAN OF CARE  03/01/2020   • COVID-19 Vaccine (4 - Moderna series) 02/04/2022   • Colorectal Cancer Screening  03/16/2023   • Fall Risk  03/29/2023   • Urinary Incontinence Screening  03/29/2023   • Medicare Annual Wellness Visit (AWV)  03/29/2023   • Influenza Vaccine (1) 09/01/2023   • BMI: Followup Plan  03/23/2024   • Breast Cancer Screening: Mammogram  06/02/2024   • BMI: Adult  09/12/2024   • Hepatitis C Screening  Completed   • Pneumococcal Vaccine: 65+ Years  Completed   • HIB Vaccine  Aged Out   • IPV Vaccine  Aged Out   • Hepatitis A Vaccine  Aged Out   • Meningococcal ACWY Vaccine  Aged Out   • HPV Vaccine  Aged Out     Immunization History   Administered Date(s) Administered   • COVID-19 MODERNA VACC 0.5 ML IM 03/08/2021, 04/05/2021, 12/10/2021   • INFLUENZA 12/28/2015, 11/22/2016, 09/22/2022   • Influenza, high dose seasonal 0.7 mL 11/11/2019, 10/15/2020, 09/29/2021, 09/22/2022   • Influenza, recombinant, quadrivalent,injectable, preservative free 11/29/2018   • Pneumococcal Conjugate 13-Valent 03/05/2020   • Pneumococcal Polysaccharide PPV23 10/07/2021   • Tdap 12/28/2015   • Zoster Vaccine Recombinant 08/15/2019, 11/01/2019          Shahana Moody DO

## 2023-09-19 LAB
LAB AP GYN PRIMARY INTERPRETATION: ABNORMAL
Lab: ABNORMAL
PATH INTERP SPEC-IMP: ABNORMAL

## 2023-09-26 ENCOUNTER — OFFICE VISIT (OUTPATIENT)
Dept: FAMILY MEDICINE CLINIC | Facility: CLINIC | Age: 68
End: 2023-09-26
Payer: MEDICARE

## 2023-09-26 VITALS
DIASTOLIC BLOOD PRESSURE: 64 MMHG | RESPIRATION RATE: 18 BRPM | WEIGHT: 151.8 LBS | SYSTOLIC BLOOD PRESSURE: 110 MMHG | BODY MASS INDEX: 28.66 KG/M2 | OXYGEN SATURATION: 100 % | HEIGHT: 61 IN | TEMPERATURE: 97 F | HEART RATE: 78 BPM

## 2023-09-26 DIAGNOSIS — K21.9 GASTROESOPHAGEAL REFLUX DISEASE WITHOUT ESOPHAGITIS: ICD-10-CM

## 2023-09-26 DIAGNOSIS — E78.2 MIXED HYPERLIPIDEMIA: ICD-10-CM

## 2023-09-26 DIAGNOSIS — Z12.2 ENCOUNTER FOR SCREENING FOR LUNG CANCER: ICD-10-CM

## 2023-09-26 DIAGNOSIS — F41.9 ANXIETY DISORDER, UNSPECIFIED TYPE: ICD-10-CM

## 2023-09-26 DIAGNOSIS — Z12.12 ENCOUNTER FOR COLORECTAL CANCER SCREENING: ICD-10-CM

## 2023-09-26 DIAGNOSIS — F32.A DEPRESSION, UNSPECIFIED DEPRESSION TYPE: ICD-10-CM

## 2023-09-26 DIAGNOSIS — Z78.0 POST-MENOPAUSAL: ICD-10-CM

## 2023-09-26 DIAGNOSIS — G43.109 MIGRAINE AURA WITHOUT HEADACHE: ICD-10-CM

## 2023-09-26 DIAGNOSIS — E04.1 THYROID NODULE: ICD-10-CM

## 2023-09-26 DIAGNOSIS — R87.619 ABNORMAL CERVICAL PAPANICOLAOU SMEAR, UNSPECIFIED ABNORMAL PAP FINDING: ICD-10-CM

## 2023-09-26 DIAGNOSIS — F17.211 NICOTINE DEPENDENCE, CIGARETTES, IN REMISSION: ICD-10-CM

## 2023-09-26 DIAGNOSIS — R73.01 IMPAIRED FASTING GLUCOSE: ICD-10-CM

## 2023-09-26 DIAGNOSIS — Z23 NEED FOR INFLUENZA VACCINATION: Primary | ICD-10-CM

## 2023-09-26 DIAGNOSIS — Z12.11 ENCOUNTER FOR COLORECTAL CANCER SCREENING: ICD-10-CM

## 2023-09-26 DIAGNOSIS — Z00.00 MEDICARE ANNUAL WELLNESS VISIT, SUBSEQUENT: ICD-10-CM

## 2023-09-26 PROCEDURE — G0439 PPPS, SUBSEQ VISIT: HCPCS | Performed by: FAMILY MEDICINE

## 2023-09-26 PROCEDURE — 90662 IIV NO PRSV INCREASED AG IM: CPT

## 2023-09-26 PROCEDURE — G0008 ADMIN INFLUENZA VIRUS VAC: HCPCS

## 2023-09-26 PROCEDURE — 99214 OFFICE O/P EST MOD 30 MIN: CPT | Performed by: FAMILY MEDICINE

## 2023-09-26 NOTE — PROGRESS NOTES
Assessment and Plan:     1. Medicare annual wellness   Anticipatory guidance and preventative medicine discussed  Flu shot today  Pt did see audiologist since last visit  And had some decreased hearing left ear  Not candidate for hearing aids. 1. Post-menopausal  H/o taking fosamax  dexa ordered  Taking calcium and vit D        2. Gastroesophageal reflux disease without esophagitis  Stable. 3. Thyroid nodule  H/o workup in past not requiring additional imaging. 4. Impaired fasting glucose  Repeat labs in the fall. 5. Mixed hyperlipidemia  Labs  Patient to call for results if he/she does not hear from us      6. Depression, unspecified depression type  Stable. 7. Anxiety disorder, unspecified type      8. Abnormal cervical Papanicolaou smear, unspecified abnormal pap finding  followup with gyn    Patient instructions:\  Cologuard  Dexa scan - please schedule  CT scan chest - please schedule to screen for lung cancer  Flu shot today  Followup with gyn      Problem List Items Addressed This Visit    None  Visit Diagnoses     Post-menopausal    -  Primary           Preventive health issues were discussed with patient, and age appropriate screening tests were ordered as noted in patient's After Visit Summary. Personalized health advice and appropriate referrals for health education or preventive services given if needed, as noted in patient's After Visit Summary. History of Present Illness:     Patient presents for a Medicare Wellness Visit  Since last visit - pt was seen by gyn and needs to return for possible bx (cervical) due to HPV 16 (+)    HPI   Patient Care Team:  Shima Amado DO as PCP - General (Family Medicine)     Review of Systems:     Review of Systems   Constitutional: Negative for unexpected weight change. Respiratory: Negative for cough, shortness of breath and wheezing. Cardiovascular: Negative for chest pain and palpitations.    Gastrointestinal: Negative for abdominal pain. Occasional bouts of loose stools. Cheese seems to be a trigger     Neurological:        Had an aura last week with some tightness around head  Took a maxalt last week   Took naproxen yesterday (not taking regularly) - and prevented a full blown headache. Psychiatric/Behavioral:        Ativan for situational anxiety - flying in plane or MRI.         Problem List:     Patient Active Problem List   Diagnosis   • Chronic bilateral low back pain without sciatica   • Esophageal reflux   • Arthritis   • Duodenum disorder   • Hyperlipidemia   • Depression   • Abnormal MRI of abdomen   • Vitamin D deficiency   • Anxiety disorder   • Migraine aura without headache   • Thyroid nodule   • Impaired fasting glucose   • Chronic left shoulder pain   • Lumbar spondylosis   • Sleep disorder   • Status post laparoscopic cholecystectomy   • Pruritus   • Post-menopausal bleeding   • Trigger finger of left thumb   • Abnormal mammogram   • Loose stools      Past Medical and Surgical History:     Past Medical History:   Diagnosis Date   • Abdominal discomfort    • Acute cholecystitis due to biliary calculus 12/15/2020   • Anemia    • Arthritis    • Bronchitis    • Chronic pain disorder    • Current moderate episode of major depressive disorder without prior episode ([address]) 3/5/2020   • Dyslipidemia     Last Assessed 2012   • GERD (gastroesophageal reflux disease)    • Headache, migraine    • Hypotension    • Low back pain    • Low back pain    • Rheumatoid arthritis ([address])    • Thyroid nodule 2019     Past Surgical History:   Procedure Laterality Date   • CATARACT EXTRACTION     •  SECTION     • CHOLECYSTECTOMY      When in ER at [facility]   • CHOLECYSTECTOMY LAPAROSCOPIC N/A 12/15/2020    Procedure: CHOLECYSTECTOMY LAPAROSCOPIC;  Surgeon: Erum Owen MD;  Location: [facility];  Service: General   • ESOPHAGOGASTRODUODENOSCOPY N/A 2018    Procedure: ESOPHAGOGASTRODUODENOSCOPY (EGD) with push enteroscopy and bx;  Surgeon: John Stoll DO;  Location: [facility]; Service: Gastroenterology   • EYE SURGERY     • NERVE SURGERY Right     Hand   • [place] EDG US EXAM SURGICAL ALTER STOM DUODENUM/JEJUNUM N/A 2019    Procedure: LINEAR ENDOSCOPIC U/S;  Surgeon: Wilder Ceballos MD;  Location:  GI LAB;   Service: Gastroenterology   • SEPTOPLASTY  around age 20   • UPPER GASTROINTESTINAL ENDOSCOPY        Family History:     Family History   Problem Relation Age of Onset   • Hypertension Mother    • Other Mother         Dyslipidemia   • Hearing loss Mother         8 years old   • Diabetes Father         Mellitus   • Stomach cancer Father    • Arthritis Father    • No Known Problems Maternal Grandmother    • No Known Problems Maternal Grandfather    • No Known Problems Paternal Grandmother    • No Known Problems Paternal Grandfather    • No Known Problems Brother    • No Known Problems Brother    • No Known Problems Maternal Aunt    • No Known Problems Maternal Aunt    • No Known Problems Maternal Aunt    • No Known Problems Maternal Aunt    • No Known Problems Maternal Aunt    • No Known Problems Paternal Aunt    • No Known Problems Paternal Aunt    • No Known Problems Paternal Aunt    • No Known Problems Paternal Aunt    • No Known Problems Paternal Aunt    • Cancer Neg Hx    • Heart disease Neg Hx    • Stroke Neg Hx    • Breast cancer Neg Hx    • Colon cancer Neg Hx    • Endometrial cancer Neg Hx       Social History:     Social History     Socioeconomic History   • Marital status: /Civil Union     Spouse name: Not on file   • Number of children: Not on file   • Years of education: Not on file   • Highest education level: Not on file   Occupational History   • Occupation: retired   Tobacco Use   • Smoking status: Former     Types: Cigarettes     Quit date: 1985     Years since quittin.7   • Smokeless tobacco: Never   Vaping Use   • Vaping Use: Never used   Substance and Sexual Activity • Alcohol use: Yes     Comment: occasionally   • Drug use: No   • Sexual activity: Yes     Partners: Male     Birth control/protection: Post-menopausal   Other Topics Concern   • Not on file   Social History Narrative    Use Safety Equipment - Seatbelts        Lives with spouse        Consumes 3 cups of coffee per day     Social Determinants of Health     Financial Resource Strain: Medium Risk (9/24/2023)    Overall Financial Resource Strain (CARDIA)    • Difficulty of Paying Living Expenses: Somewhat hard   Food Insecurity: Not on file   Transportation Needs: No Transportation Needs (9/24/2023)    PRAPARE - Transportation    • Lack of Transportation (Medical): No    • Lack of Transportation (Non-Medical): No   Physical Activity: Not on file   Stress: Not on file   Social Connections: Not on file   Intimate Partner Violence: Not on file   Housing Stability: Not on file      Medications and Allergies:     Current Outpatient Medications   Medication Sig Dispense Refill   • acetaminophen (TYLENOL) 325 mg tablet Take 2 tablets (650 mg total) by mouth every 6 (six) hours as needed for mild pain or fever 30 tablet 0   • ASHWAGANDHA PO ashwagandha root extract     • Cholecalciferol (Vitamin D) 50 MCG (2000 UT) CAPS Vitamin D (Patient not taking: Reported on 7/6/2023)     • Cholecalciferol (VITAMIN D3) 5000 units CAPS Take 5,000 Units by mouth daily      • cholestyramine (QUESTRAN) 4 g packet Take 1 packet (4 g total) by mouth 3 (three) times a day with meals 90 packet 1   • cyclobenzaprine (FLEXERIL) 10 mg tablet Take 1 tablet (10 mg total) by mouth 3 (three) times a day 20 tablet 0   • diclofenac sodium (VOLTAREN) 1 % Apply topically every 6 (six) hours     • Diclofenac Sodium 3 % CREA compounded medication   Lidocaine 5% and Diclofenac 3%   Apply 1-3 grams to the affected area 2-4 times per day as needed.      • docusate sodium (COLACE) 100 mg capsule Take 1 capsule (100 mg total) by mouth daily (Patient not taking: Reported on 7/6/2023) 30 capsule 5   • hydrocortisone 2.5 % cream Apply topically 3 (three) times a day 30 g 0   • hydrOXYzine HCL (ATARAX) 10 mg tablet Take 1 tablet (10 mg total) by mouth every 6 (six) hours as needed for itching (Patient not taking: Reported on 8/17/2023) 30 tablet 0   • ibuprofen (MOTRIN) 800 mg tablet every 8 (eight) hours as needed  (Patient not taking: Reported on 7/6/2023)     • ipratropium (ATROVENT) 0.03 % nasal spray INSTILL TWO SPRAYS IN EACH NOSTRIL THREE TIMES A DAY AS NEEDED for rhinitis 30 mL 11   • LORazepam (ATIVAN) 1 mg tablet Take 1 tablet po 1 hour before procedure. May repeat dose x 1 if needed 2 tablet 0   • methylPREDNISolone 4 MG tablet therapy pack Use as directed on package; take with food; avoid taking with nsaids or asa 21 each 0   • naproxen (NAPROSYN) 500 mg tablet Take one tab up to twice a day, no more than 3 times a week 30 tablet 6   • olopatadine HCl (PATADAY) 0.2 % opth drops Administer 1 drop to both eyes daily 2.5 mL 11   • Omega-3 Fatty Acids (OMEGA-3 CF PO) Omega 3     • omega-3-acid ethyl esters (LOVAZA) 1 g capsule Take 2 g by mouth daily  (Patient not taking: Reported on 7/6/2023)     • ondansetron (ZOFRAN) 4 mg tablet Take 1 tablet (4 mg total) by mouth every 6 (six) hours (Patient not taking: Reported on 8/17/2023) 12 tablet 0   • polyethylene glycol (MIRALAX) 17 g packet Take 17 g by mouth daily 30 each 5   • rizatriptan (MAXALT) 10 mg tablet Take 1 tablet (10 mg total) by mouth once as needed for migraine May repeat in 2 hours if needed. Max 2/24 hours, 9/month. 9 tablet 12   • triamcinolone (KENALOG) 0.1 % cream Apply topically 2 (two) times a day 30 g 1     No current facility-administered medications for this visit.      Allergies   Allergen Reactions   • Erythromycin Rash      Immunizations:     Immunization History   Administered Date(s) Administered   • COVID-19 MODERNA VACC 0.5 ML IM 03/08/2021, 04/05/2021, 12/10/2021   • INFLUENZA 12/28/2015, 11/22/2016, 09/22/2022   • Influenza, high dose seasonal 0.7 mL 11/11/2019, 10/15/2020, 09/29/2021, 09/22/2022   • Influenza, recombinant, quadrivalent,injectable, preservative free 11/29/2018   • Pneumococcal Conjugate 13-Valent 03/05/2020   • Pneumococcal Polysaccharide PPV23 10/07/2021   • Tdap 12/28/2015   • Zoster Vaccine Recombinant 08/15/2019, 11/01/2019      Health Maintenance:         Topic Date Due   • Colorectal Cancer Screening  03/16/2023   • Breast Cancer Screening: Mammogram  06/02/2024   • Hepatitis C Screening  Completed         Topic Date Due   • COVID-19 Vaccine (4 - Radha Basque series) 02/04/2022   • Influenza Vaccine (1) 09/01/2023      Medicare Screening Tests and Risk Assessments:     Patrica Armstrong is here for her Subsequent Wellness visit. Health Risk Assessment:   Patient rates overall health as good. Patient feels that their physical health rating is same. Patient is satisfied with their life. Eyesight was rated as same. Hearing was rated as same. Patient feels that their emotional and mental health rating is same. Patients states they are never, rarely angry. Patient states they are sometimes unusually tired/fatigued. Pain experienced in the last 7 days has been some. Patient's pain rating has been 4/10. Patient states that she has experienced no weight loss or gain in last 6 months. Fall Risk Screening: In the past year, patient has experienced: no history of falling in past year      Urinary Incontinence Screening:   Patient has not leaked urine accidently in the last six months. Home Safety:  Patient does not have trouble with stairs inside or outside of their home. Patient has working smoke alarms and has working carbon monoxide detector. Home safety hazards include: none. Nutrition:   Current diet is Low Carb. Medications:   Patient is currently taking over-the-counter supplements. OTC medications include: D3/Fishoil/vitamins. Patient is able to manage medications. Activities of Daily Living (ADLs)/Instrumental Activities of Daily Living (IADLs):   Walk and transfer into and out of bed and chair?: Yes  Dress and groom yourself?: Yes    Bathe or shower yourself?: Yes    Feed yourself? Yes  Do your laundry/housekeeping?: No  Manage your money, pay your bills and track your expenses?: Yes  Make your own meals?: Yes    Do your own shopping?: Yes    ADL comments: I do household chores increments. Vacuum one room every other day due to back disc problem and bending a lot    Previous Hospitalizations:   Any hospitalizations or ED visits within the last 12 months?: No      Advance Care Planning:   Living will: No    Durable POA for healthcare: No    Advanced directive: No      PREVENTIVE SCREENINGS      Cardiovascular Screening:    General: Screening Not Indicated and History Lipid Disorder      Diabetes Screening:     General: Screening Current      Breast Cancer Screening:     General: Screening Current      Cervical Cancer Screening:    General: Screening Not Indicated      Lung Cancer Screening:     General: Screening Not Indicated      Hepatitis C Screening:    General: Screening Current    Screening, Brief Intervention, and Referral to Treatment (SBIRT)    Screening  Typical number of drinks in a day: 0  Typical number of drinks in a week: 0  Interpretation: Low risk drinking behavior.     AUDIT-C Screenin) How often did you have a drink containing alcohol in the past year? never  2) How many drinks did you have on a typical day when you were drinking in the past year? 0  3) How often did you have 6 or more drinks on one occasion in the past year? never    AUDIT-C Score: 0  Interpretation: Score 0-2 (female): Negative screen for alcohol misuse    Single Item Drug Screening:  How often have you used an illegal drug (including marijuana) or a prescription medication for non-medical reasons in the past year? never    Single Item Drug Screen Score: 0  Interpretation: Negative screen for possible drug use disorder    No results found. Physical Exam:     There were no vitals taken for this visit. Physical Exam  Vitals and nursing note reviewed. Constitutional:       General: She is not in acute distress. Appearance: Normal appearance. She is not ill-appearing or toxic-appearing. HENT:      Right Ear: Tympanic membrane normal.      Left Ear: Tympanic membrane normal.      Nose: Nose normal. No congestion. Mouth/Throat:      Mouth: Mucous membranes are moist.      Pharynx: No oropharyngeal exudate or posterior oropharyngeal erythema. Comments: No abnormality noted in oropharynx  Thinking that pt may have had tonsillar stones. Eyes:      General: No scleral icterus. Pupils: Pupils are equal, round, and reactive to light. Neck:      Vascular: No carotid bruit. Comments: No thyromegaly    Cardiovascular:      Rate and Rhythm: Normal rate and regular rhythm. Pulses: Normal pulses. Heart sounds: Normal heart sounds. No murmur heard. Pulmonary:      Effort: Pulmonary effort is normal. No respiratory distress. Breath sounds: Normal breath sounds. No wheezing, rhonchi or rales. Abdominal:      General: There is no distension. Palpations: Abdomen is soft. There is no mass. Tenderness: There is no abdominal tenderness. There is no guarding or rebound. Musculoskeletal:      Cervical back: Neck supple. No rigidity or tenderness. Right lower leg: No edema. Left lower leg: No edema. Lymphadenopathy:      Cervical: No cervical adenopathy. Skin:     General: Skin is warm. Coloration: Skin is not jaundiced. Neurological:      General: No focal deficit present. Mental Status: She is alert and oriented to person, place, and time. Psychiatric:         Mood and Affect: Mood normal.         Behavior: Behavior normal.         Thought Content:  Thought content normal.         Judgment: Judgment normal. Shima Ingris, DO

## 2023-09-26 NOTE — PATIENT INSTRUCTIONS
Cologuard  Dexa scan - please schedule  CT scan chest - please schedule to screen for lung cancer  Flu shot today  Followup with gyn      Medicare Preventive Visit Patient Instructions  Thank you for completing your Welcome to Medicare Visit or Medicare Annual Wellness Visit today. Your next wellness visit will be due in one year (9/26/2024). The screening/preventive services that you may require over the next 5-10 years are detailed below. Some tests may not apply to you based off risk factors and/or age. Screening tests ordered at today's visit but not completed yet may show as past due. Also, please note that scanned in results may not display below. Preventive Screenings:  Service Recommendations Previous Testing/Comments   Colorectal Cancer Screening  * Colonoscopy    * Fecal Occult Blood Test (FOBT)/Fecal Immunochemical Test (FIT)  * Fecal DNA/Cologuard Test  * Flexible Sigmoidoscopy Age: 43-73 years old   Colonoscopy: every 10 years (may be performed more frequently if at higher risk)  OR  FOBT/FIT: every 1 year  OR  Cologuard: every 3 years  OR  Sigmoidoscopy: every 5 years  Screening may be recommended earlier than age 39 if at higher risk for colorectal cancer. Also, an individualized decision between you and your healthcare provider will decide whether screening between the ages of 77-80 would be appropriate. Colonoscopy: 11/02/2009  FOBT/FIT: Not on file  Cologuard: Not on file  Sigmoidoscopy: Not on file          Breast Cancer Screening Age: 36 years old  Frequency: every 1-2 years  Not required if history of left and right mastectomy Mammogram: 06/02/2023    Screening Current   Cervical Cancer Screening Between the ages of 21-29, pap smear recommended once every 3 years. Between the ages of 32-69, can perform pap smear with HPV co-testing every 5 years.    Recommendations may differ for women with a history of total hysterectomy, cervical cancer, or abnormal pap smears in past. Pap Smear: 09/12/2023    Screening Not Indicated   Hepatitis C Screening Once for adults born between 1945 and 1965  More frequently in patients at high risk for Hepatitis C Hep C Antibody: 12/11/2018    Screening Current   Diabetes Screening 1-2 times per year if you're at risk for diabetes or have pre-diabetes Fasting glucose: 90 mg/dL (11/22/2022)  A1C: 5.3 % (4/28/2021)  Screening Current   Cholesterol Screening Once every 5 years if you don't have a lipid disorder. May order more often based on risk factors. Lipid panel: 11/22/2022    Screening Not Indicated  History Lipid Disorder     Other Preventive Screenings Covered by Medicare:  Abdominal Aortic Aneurysm (AAA) Screening: covered once if your at risk. You're considered to be at risk if you have a family history of AAA. Lung Cancer Screening: covers low dose CT scan once per year if you meet all of the following conditions: (1) Age 48-67; (2) No signs or symptoms of lung cancer; (3) Current smoker or have quit smoking within the last 15 years; (4) You have a tobacco smoking history of at least 20 pack years (packs per day multiplied by number of years you smoked); (5) You get a written order from a healthcare provider. Glaucoma Screening: covered annually if you're considered high risk: (1) You have diabetes OR (2) Family history of glaucoma OR (3)  aged 48 and older OR (3)  American aged 72 and older  Osteoporosis Screening: covered every 2 years if you meet one of the following conditions: (1) You're estrogen deficient and at risk for osteoporosis based off medical history and other findings; (2) Have a vertebral abnormality; (3) On glucocorticoid therapy for more than 3 months; (4) Have primary hyperparathyroidism; (5) On osteoporosis medications and need to assess response to drug therapy. Last bone density test (DXA Scan): Not on file. HIV Screening: covered annually if you're between the age of 14-79.  Also covered annually if you are younger than 13 and older than 72 with risk factors for HIV infection. For pregnant patients, it is covered up to 3 times per pregnancy. Immunizations:  Immunization Recommendations   Influenza Vaccine Annual influenza vaccination during flu season is recommended for all persons aged >= 6 months who do not have contraindications   Pneumococcal Vaccine   * Pneumococcal conjugate vaccine = PCV13 (Prevnar 13), PCV15 (Vaxneuvance), PCV20 (Prevnar 20)  * Pneumococcal polysaccharide vaccine = PPSV23 (Pneumovax) Adults 20-63 years old: 1-3 doses may be recommended based on certain risk factors  Adults 72 years old: 1-2 doses may be recommended based off what pneumonia vaccine you previously received   Hepatitis B Vaccine 3 dose series if at intermediate or high risk (ex: diabetes, end stage renal disease, liver disease)   Tetanus (Td) Vaccine - COST NOT COVERED BY MEDICARE PART B Following completion of primary series, a booster dose should be given every 10 years to maintain immunity against tetanus. Td may also be given as tetanus wound prophylaxis. Tdap Vaccine - COST NOT COVERED BY MEDICARE PART B Recommended at least once for all adults. For pregnant patients, recommended with each pregnancy. Shingles Vaccine (Shingrix) - COST NOT COVERED BY MEDICARE PART B  2 shot series recommended in those aged 48 and above     Health Maintenance Due:      Topic Date Due    Colorectal Cancer Screening  03/16/2023    Breast Cancer Screening: Mammogram  06/02/2024    Hepatitis C Screening  Completed     Immunizations Due:      Topic Date Due    COVID-19 Vaccine (4 - Moderna series) 02/04/2022    Influenza Vaccine (1) 09/01/2023     Advance Directives   What are advance directives? Advance directives are legal documents that state your wishes and plans for medical care. These plans are made ahead of time in case you lose your ability to make decisions for yourself.  Advance directives can apply to any medical decision, such as the treatments you want, and if you want to donate organs. What are the types of advance directives? There are many types of advance directives, and each state has rules about how to use them. You may choose a combination of any of the following:  Living will: This is a written record of the treatment you want. You can also choose which treatments you do not want, which to limit, and which to stop at a certain time. This includes surgery, medicine, IV fluid, and tube feedings. Durable power of  for [facility]): This is a written record that states who you want to make healthcare choices for you when you are unable to make them for yourself. This person, called a proxy, is usually a family member or a friend. You may choose more than 1 proxy. Do not resuscitate (DNR) order:  A DNR order is used in case your heart stops beating or you stop breathing. It is a request not to have certain forms of treatment, such as CPR. A DNR order may be included in other types of advance directives. Medical directive: This covers the care that you want if you are in a coma, near death, or unable to make decisions for yourself. You can list the treatments you want for each condition. Treatment may include pain medicine, surgery, blood transfusions, dialysis, IV or tube feedings, and a ventilator (breathing machine). Values history: This document has questions about your views, beliefs, and how you feel and think about life. This information can help others choose the care that you would choose. Why are advance directives important? An advance directive helps you control your care. Although spoken wishes may be used, it is better to have your wishes written down. Spoken wishes can be misunderstood, or not followed. Treatments may be given even if you do not want them. An advance directive may make it easier for your family to make difficult choices about your care.    Weight Management   Why it is important to manage your weight:  Being overweight increases your risk of health conditions such as heart disease, high blood pressure, type 2 diabetes, and certain types of cancer. It can also increase your risk for osteoarthritis, sleep apnea, and other respiratory problems. Aim for a slow, steady weight loss. Even a small amount of weight loss can lower your risk of health problems. How to lose weight safely:  A safe and healthy way to lose weight is to eat fewer calories and get regular exercise. You can lose up about 1 pound a week by decreasing the number of calories you eat by 500 calories each day. Healthy meal plan for weight management:  A healthy meal plan includes a variety of foods, contains fewer calories, and helps you stay healthy. A healthy meal plan includes the following:  Eat whole-grain foods more often. A healthy meal plan should contain fiber. Fiber is the part of grains, fruits, and vegetables that is not broken down by your body. Whole-grain foods are healthy and provide extra fiber in your diet. Some examples of whole-grain foods are whole-wheat breads and pastas, oatmeal, brown rice, and bulgur. Eat a variety of vegetables every day. Include dark, leafy greens such as spinach, kale, rebel greens, and mustard greens. Eat yellow and orange vegetables such as carrots, sweet potatoes, and winter squash. Eat a variety of fruits every day. Choose fresh or canned fruit (canned in its own juice or light syrup) instead of juice. Fruit juice has very little or no fiber. Eat low-fat dairy foods. Drink fat-free (skim) milk or 1% milk. Eat fat-free yogurt and low-fat cottage cheese. Try low-fat cheeses such as mozzarella and other reduced-fat cheeses. Choose meat and other protein foods that are low in fat. Choose beans or other legumes such as split peas or lentils. Choose fish, skinless poultry (chicken or turkey), or lean cuts of red meat (beef or pork).  Before you cook meat or poultry, cut off any visible fat. Use less fat and oil. Try baking foods instead of frying them. Add less fat, such as margarine, sour cream, regular salad dressing and mayonnaise to foods. Eat fewer high-fat foods. Some examples of high-fat foods include french fries, doughnuts, ice cream, and cakes. Eat fewer sweets. Limit foods and drinks that are high in sugar. This includes candy, cookies, regular soda, and sweetened drinks. Exercise:  Exercise at least 30 minutes per day on most days of the week. Some examples of exercise include walking, biking, dancing, and swimming. You can also fit in more physical activity by taking the stairs instead of the elevator or parking farther away from stores. Ask your healthcare provider about the best exercise plan for you. © Copyright [employer] 2018 Information is for End User's use only and may not be sold, redistributed or otherwise used for commercial purposes.  All illustrations and images included in CareNotes® are the copyrighted property of [employer] or  [employer]

## 2023-10-16 ENCOUNTER — TELEPHONE (OUTPATIENT)
Dept: OBGYN CLINIC | Facility: MEDICAL CENTER | Age: 68
End: 2023-10-16

## 2023-10-16 NOTE — TELEPHONE ENCOUNTER
Returned patient's call. All concerns discussed with patient. Patient has appointment scheduled with provider 10/23 for further discussion and colpo.

## 2023-10-16 NOTE — TELEPHONE ENCOUNTER
Pt has questions about HPV positive test, and whether she is contagious. Is she allowed to have intercourse, is she going to give this to her ? She is concerned about possibilities of cancer and her colposcopy scheduled.

## 2023-10-23 ENCOUNTER — PROCEDURE VISIT (OUTPATIENT)
Dept: OBGYN CLINIC | Facility: CLINIC | Age: 68
End: 2023-10-23
Payer: MEDICARE

## 2023-10-23 VITALS
DIASTOLIC BLOOD PRESSURE: 68 MMHG | SYSTOLIC BLOOD PRESSURE: 112 MMHG | HEIGHT: 61 IN | WEIGHT: 151.4 LBS | BODY MASS INDEX: 28.58 KG/M2

## 2023-10-23 DIAGNOSIS — R87.810 HUMAN PAPILLOMAVIRUS (HPV) TYPE 16 DNA DETECTED IN CERVICAL SPECIMEN: ICD-10-CM

## 2023-10-23 DIAGNOSIS — R87.610 ATYPICAL SQUAMOUS CELLS OF UNDETERMINED SIGNIFICANCE ON CYTOLOGIC SMEAR OF CERVIX (ASC-US): Primary | ICD-10-CM

## 2023-10-23 PROCEDURE — 88344 IMHCHEM/IMCYTCHM EA MLT ANTB: CPT | Performed by: STUDENT IN AN ORGANIZED HEALTH CARE EDUCATION/TRAINING PROGRAM

## 2023-10-23 PROCEDURE — 88305 TISSUE EXAM BY PATHOLOGIST: CPT | Performed by: STUDENT IN AN ORGANIZED HEALTH CARE EDUCATION/TRAINING PROGRAM

## 2023-10-23 PROCEDURE — 57455 BIOPSY OF CERVIX W/SCOPE: CPT | Performed by: OBSTETRICS & GYNECOLOGY

## 2023-10-23 NOTE — PROGRESS NOTES
Colposcopy     Date/Time  10/23/2023 10:30 AM     Universal Protocol   Consent: Verbal consent obtained. Written consent obtained. Risks and benefits: risks, benefits and alternatives were discussed  Consent given by: patient  Patient understanding: patient states understanding of the procedure being performed  Patient consent: the patient's understanding of the procedure matches consent given  Procedure consent: procedure consent matches procedure scheduled  Patient identity confirmed: verbally with patient     Performed by  Saurabh Najera MD   Authorized by  Saurabh Najera MD     Pre-procedure details      Prepped with: acetic acid     Indication    ASC-US (HPV 16 +)   Procedure Details   Procedure: Colposcopy w/ biopsy of cervix      Under satisfactory analgesia the patient was prepped and draped in the dorsal lithotomy position: yes      Holden speculum was placed in the vagina: yes      Under colposcopic examination the transition zone was seen in entirety: yes      Cervical biopsy performed with a cervical biopsy punch: no      Tampon inserted: no      Biopsy(s): yes      Location:  2    Specimen to pathology: yes     Post-procedure      Impression: Low grade cervical dysplasia      Patient tolerance of procedure:   Tolerated well, no immediate complications

## 2023-10-27 PROCEDURE — 88305 TISSUE EXAM BY PATHOLOGIST: CPT | Performed by: STUDENT IN AN ORGANIZED HEALTH CARE EDUCATION/TRAINING PROGRAM

## 2023-10-27 PROCEDURE — 88344 IMHCHEM/IMCYTCHM EA MLT ANTB: CPT | Performed by: STUDENT IN AN ORGANIZED HEALTH CARE EDUCATION/TRAINING PROGRAM

## 2023-12-07 ENCOUNTER — LAB (OUTPATIENT)
Dept: LAB | Facility: MEDICAL CENTER | Age: 68
End: 2023-12-07
Payer: MEDICARE

## 2023-12-07 DIAGNOSIS — E78.2 MIXED HYPERLIPIDEMIA: ICD-10-CM

## 2023-12-07 LAB
ALBUMIN SERPL BCP-MCNC: 4.4 G/DL (ref 3.5–5)
ALP SERPL-CCNC: 48 U/L (ref 34–104)
ALT SERPL W P-5'-P-CCNC: 18 U/L (ref 7–52)
ANION GAP SERPL CALCULATED.3IONS-SCNC: 7 MMOL/L
AST SERPL W P-5'-P-CCNC: 18 U/L (ref 13–39)
BILIRUB SERPL-MCNC: 0.53 MG/DL (ref 0.2–1)
BUN SERPL-MCNC: 22 MG/DL (ref 5–25)
CALCIUM SERPL-MCNC: 9.7 MG/DL (ref 8.4–10.2)
CHLORIDE SERPL-SCNC: 105 MMOL/L (ref 96–108)
CHOLEST SERPL-MCNC: 235 MG/DL
CO2 SERPL-SCNC: 30 MMOL/L (ref 21–32)
CREAT SERPL-MCNC: 0.79 MG/DL (ref 0.6–1.3)
GFR SERPL CREATININE-BSD FRML MDRD: 76 ML/MIN/1.73SQ M
GLUCOSE P FAST SERPL-MCNC: 89 MG/DL (ref 65–99)
HDLC SERPL-MCNC: 62 MG/DL
LDLC SERPL CALC-MCNC: 147 MG/DL (ref 0–100)
NONHDLC SERPL-MCNC: 173 MG/DL
POTASSIUM SERPL-SCNC: 4.2 MMOL/L (ref 3.5–5.3)
PROT SERPL-MCNC: 7.2 G/DL (ref 6.4–8.4)
SODIUM SERPL-SCNC: 142 MMOL/L (ref 135–147)
TRIGL SERPL-MCNC: 130 MG/DL

## 2023-12-07 PROCEDURE — 80053 COMPREHEN METABOLIC PANEL: CPT

## 2023-12-07 PROCEDURE — 80061 LIPID PANEL: CPT

## 2023-12-07 PROCEDURE — 36415 COLL VENOUS BLD VENIPUNCTURE: CPT

## 2023-12-09 NOTE — RESULT ENCOUNTER NOTE
Good morning  There has been an overall worsening of your cholesterol compared to prior testing. I would recommend consideration for statin therapy  Please let me know your thoughts.   Dr Cassia Pitt

## 2024-02-19 DIAGNOSIS — G43.109 MIGRAINE WITH AURA AND WITHOUT STATUS MIGRAINOSUS, NOT INTRACTABLE: ICD-10-CM

## 2024-02-19 RX ORDER — RIZATRIPTAN BENZOATE 10 MG/1
TABLET ORAL
Qty: 9 TABLET | Refills: 3 | Status: SHIPPED | OUTPATIENT
Start: 2024-02-19

## 2024-02-19 NOTE — TELEPHONE ENCOUNTER
Could you please help her get in for an appointment, I will start with a 3-month supply but I have not seen her in over a year and typically like to see my patients at least once a year to make sure not doing any harm

## 2024-02-26 ENCOUNTER — PATIENT MESSAGE (OUTPATIENT)
Dept: NEUROLOGY | Facility: CLINIC | Age: 69
End: 2024-02-26

## 2024-02-26 DIAGNOSIS — G43.109 MIGRAINE WITH AURA AND WITHOUT STATUS MIGRAINOSUS, NOT INTRACTABLE: ICD-10-CM

## 2024-02-27 RX ORDER — NAPROXEN 500 MG/1
TABLET ORAL
Qty: 30 TABLET | Refills: 6 | Status: SHIPPED | OUTPATIENT
Start: 2024-02-27

## 2024-03-13 ENCOUNTER — LAB (OUTPATIENT)
Dept: LAB | Facility: MEDICAL CENTER | Age: 69
End: 2024-03-13
Payer: MEDICARE

## 2024-03-13 DIAGNOSIS — E78.2 MIXED HYPERLIPIDEMIA: ICD-10-CM

## 2024-03-13 LAB
CHOLEST SERPL-MCNC: 211 MG/DL
HDLC SERPL-MCNC: 63 MG/DL
LDLC SERPL CALC-MCNC: 129 MG/DL (ref 0–100)
NONHDLC SERPL-MCNC: 148 MG/DL
TRIGL SERPL-MCNC: 95 MG/DL

## 2024-03-13 PROCEDURE — 80061 LIPID PANEL: CPT

## 2024-03-13 PROCEDURE — 36415 COLL VENOUS BLD VENIPUNCTURE: CPT

## 2024-03-19 ENCOUNTER — LAB (OUTPATIENT)
Dept: LAB | Facility: MEDICAL CENTER | Age: 69
End: 2024-03-19
Payer: MEDICARE

## 2024-03-19 ENCOUNTER — OFFICE VISIT (OUTPATIENT)
Dept: FAMILY MEDICINE CLINIC | Facility: CLINIC | Age: 69
End: 2024-03-19
Payer: MEDICARE

## 2024-03-19 VITALS
HEART RATE: 76 BPM | OXYGEN SATURATION: 95 % | BODY MASS INDEX: 29.34 KG/M2 | TEMPERATURE: 96.5 F | WEIGHT: 155.4 LBS | DIASTOLIC BLOOD PRESSURE: 60 MMHG | RESPIRATION RATE: 16 BRPM | SYSTOLIC BLOOD PRESSURE: 118 MMHG | HEIGHT: 61 IN

## 2024-03-19 DIAGNOSIS — F32.A DEPRESSION, UNSPECIFIED DEPRESSION TYPE: ICD-10-CM

## 2024-03-19 DIAGNOSIS — E78.2 MIXED HYPERLIPIDEMIA: ICD-10-CM

## 2024-03-19 DIAGNOSIS — Z12.12 SCREENING FOR COLORECTAL CANCER: ICD-10-CM

## 2024-03-19 DIAGNOSIS — R87.610 ATYPICAL SQUAMOUS CELLS OF UNDETERMINED SIGNIFICANCE ON CYTOLOGIC SMEAR OF CERVIX (ASC-US): ICD-10-CM

## 2024-03-19 DIAGNOSIS — Z12.11 SCREENING FOR COLORECTAL CANCER: ICD-10-CM

## 2024-03-19 DIAGNOSIS — Z87.891 PERSONAL HISTORY OF TOBACCO USE, PRESENTING HAZARDS TO HEALTH: ICD-10-CM

## 2024-03-19 DIAGNOSIS — M54.50 CHRONIC BILATERAL LOW BACK PAIN WITHOUT SCIATICA: ICD-10-CM

## 2024-03-19 DIAGNOSIS — G89.29 CHRONIC BILATERAL LOW BACK PAIN WITHOUT SCIATICA: ICD-10-CM

## 2024-03-19 DIAGNOSIS — F41.9 ANXIETY DISORDER, UNSPECIFIED TYPE: Primary | ICD-10-CM

## 2024-03-19 DIAGNOSIS — G43.109 MIGRAINE AURA WITHOUT HEADACHE: ICD-10-CM

## 2024-03-19 DIAGNOSIS — F33.9 DEPRESSION, RECURRENT (HCC): ICD-10-CM

## 2024-03-19 DIAGNOSIS — K21.9 GASTROESOPHAGEAL REFLUX DISEASE WITHOUT ESOPHAGITIS: ICD-10-CM

## 2024-03-19 DIAGNOSIS — R73.01 IMPAIRED FASTING GLUCOSE: ICD-10-CM

## 2024-03-19 DIAGNOSIS — R19.4 BOWEL HABIT CHANGES: ICD-10-CM

## 2024-03-19 LAB
BILIRUB UR QL STRIP: NEGATIVE
CLARITY UR: CLEAR
COLOR UR: NORMAL
GLUCOSE UR STRIP-MCNC: NEGATIVE MG/DL
HGB UR QL STRIP.AUTO: NEGATIVE
KETONES UR STRIP-MCNC: NEGATIVE MG/DL
LEUKOCYTE ESTERASE UR QL STRIP: NEGATIVE
NITRITE UR QL STRIP: NEGATIVE
PH UR STRIP.AUTO: 6.5 [PH]
PROT UR STRIP-MCNC: NEGATIVE MG/DL
SP GR UR STRIP.AUTO: 1.01 (ref 1–1.03)
UROBILINOGEN UR STRIP-ACNC: <2 MG/DL

## 2024-03-19 PROCEDURE — 81003 URINALYSIS AUTO W/O SCOPE: CPT

## 2024-03-19 PROCEDURE — 99214 OFFICE O/P EST MOD 30 MIN: CPT | Performed by: FAMILY MEDICINE

## 2024-03-19 PROCEDURE — G2211 COMPLEX E/M VISIT ADD ON: HCPCS | Performed by: FAMILY MEDICINE

## 2024-03-19 RX ORDER — CHOLESTYRAMINE 4 G/9G
POWDER, FOR SUSPENSION ORAL
Start: 2024-03-19

## 2024-03-19 NOTE — PROGRESS NOTES
FAMILY PRACTICE OFFICE VISIT    NAME: Kaylan Young    AGE: 69 y.o.   SEX: female  : 1955   MRN: [id number]    DATE: 3/19/2024  TIME: 2:58 PM    Assessment and Plan   1. Anxiety disorder, unspecified type  Recently started on wellbutrin   Seems to be helping with her sleep      2. Depression, unspecified depression type  As above.      3. Bowel habit changes  Pt following with gi  Urge colonoscopy      4. Migraine aura without headache  Stable.      5. Gastroesophageal reflux disease without esophagitis  Stable.      6. Impaired fasting glucose  Normal in 2023      7. Chronic bilateral low back pain without sciatica  Does well while using caution with activities      8. Atypical squamous cells of undetermined significance on cytologic smear of cervix (ASC-US)  Had colposcopy in the 2023  Repeat pap and hpv in 1 year with gyn      9. Mixed hyperlipidemia  Questran for both stool issues and lipids.    10.  H/o tob use  Urge pt to get CT lungs    Also reminded pt to get dexa  H/o taking fosamax in past.    Vaccines up to date    Mammo as scheduled.    Demonstrated hand exercises and hand yoga        Patient Instructions   Due to history of colon polyps - you will need colonoscopy   - referral given to GI at [address]    Schedule CT chest for lung cancer screening  And also dexa scan.    Get a urine test done today            Chief Complaint     Chief Complaint   Patient presents with    Follow-up     6m       History of Present Illness   Kaylan Young is a 69 y.o.-year-old female who presents today for routine f/u visit  Started about a mo ago on wellbutrin         Review of Systems   Review of Systems   Constitutional:  Negative for unexpected weight change.        Not exercising regularly  Occasional chair yoga.     Respiratory:  Negative for cough, shortness of breath and wheezing.    Cardiovascular:  Negative for chest pain and palpitations.   Gastrointestinal:  Negative for abdominal  pain, blood in stool, constipation, diarrhea, nausea and vomiting.        Eating nondairy cheese.    Occasional GERD symptoms - relieved with pepcid.         Musculoskeletal:         Uses voltaren on hands for OA  Hands get stiff and swelling.     Neurological:         Migraines not frequent  Takes nsaid prn but not on a regular basis.     Psychiatric/Behavioral:  Negative for self-injury and suicidal ideas.         Takes ativan only when flying for situational anxiety         Active Problem List     Patient Active Problem List   Diagnosis    Chronic bilateral low back pain without sciatica    Esophageal reflux    Arthritis    Duodenum disorder    Hyperlipidemia    Depression    Abnormal MRI of abdomen    Vitamin D deficiency    Anxiety disorder    Migraine aura without headache    Thyroid nodule    Impaired fasting glucose    Chronic left shoulder pain    Lumbar spondylosis    Sleep disorder    Status post laparoscopic cholecystectomy    Pruritus    Post-menopausal bleeding    Trigger finger of left thumb    Abnormal mammogram    Loose stools    Abnormal cervical Papanicolaou smear         Past Medical History:  Past Medical History:   Diagnosis Date    Abdominal discomfort     Acute cholecystitis due to biliary calculus 12/15/2020    Anemia     Arthritis     Bronchitis     Chronic pain disorder     Current moderate episode of major depressive disorder without prior episode (HCC) 2020    Dyslipidemia     Last Assessed 2012    Fibroid when I was teenager    breasts    GERD (gastroesophageal reflux disease)     Headache, migraine     History of transfusion     when I gave birth to son    Hypotension     Low back pain     Low back pain     Rheumatoid arthritis (HCC)     Thyroid nodule 2019       Past Surgical History:  Past Surgical History:   Procedure Laterality Date    CATARACT EXTRACTION       SECTION      CHOLECYSTECTOMY      When in ER at [facility]    CHOLECYSTECTOMY  LAPAROSCOPIC N/A 12/15/2020    Procedure: CHOLECYSTECTOMY LAPAROSCOPIC;  Surgeon: Bridger Echeverria MD;  Location: [facility];  Service: General    ESOPHAGOGASTRODUODENOSCOPY N/A 11/28/2018    Procedure: ESOPHAGOGASTRODUODENOSCOPY (EGD) with push enteroscopy and bx;  Surgeon: Rachel Spencer DO;  Location: [facility];  Service: Gastroenterology    EYE SURGERY      NERVE SURGERY Right     Hand    NC EDG US EXAM SURGICAL ALTER STOM DUODENUM/JEJUNUM N/A 03/06/2019    Procedure: LINEAR ENDOSCOPIC U/S;  Surgeon: Tony Brand MD;  Location:  GI LAB;  Service: Gastroenterology    SEPTOPLASTY  around age 19    UPPER GASTROINTESTINAL ENDOSCOPY         Family History:  Family History   Problem Relation Age of Onset    Hypertension Mother     Other Mother         Dyslipidemia    Hearing loss Mother         101 years old    Diabetes Father         Mellitus    Stomach cancer Father     Arthritis Father     No Known Problems Maternal Grandmother     No Known Problems Maternal Grandfather     No Known Problems Paternal Grandmother     No Known Problems Paternal Grandfather     No Known Problems Brother     No Known Problems Brother     No Known Problems Maternal Aunt     No Known Problems Maternal Aunt     No Known Problems Maternal Aunt     No Known Problems Maternal Aunt     No Known Problems Maternal Aunt     No Known Problems Paternal Aunt     No Known Problems Paternal Aunt     No Known Problems Paternal Aunt     No Known Problems Paternal Aunt     No Known Problems Paternal Aunt     Cancer Neg Hx     Heart disease Neg Hx     Stroke Neg Hx     Breast cancer Neg Hx     Colon cancer Neg Hx     Endometrial cancer Neg Hx        Social History:  Social History     Socioeconomic History    Marital status: /Civil Union     Spouse name: Not on file    Number of children: Not on file    Years of education: Not on file    Highest education level: Not on file   Occupational History    Occupation: retired   Tobacco Use    Smoking  status: Former     Current packs/day: 0.00     Types: Cigarettes     Quit date: 1985     Years since quittin.2    Smokeless tobacco: Never   Vaping Use    Vaping status: Never Used   Substance and Sexual Activity    Alcohol use: Yes     Comment: occasionally    Drug use: No    Sexual activity: Yes     Partners: Male     Birth control/protection: Post-menopausal   Other Topics Concern    Not on file   Social History Narrative    Use Safety Equipment - Seatbelts        Lives with spouse        Consumes 3 cups of coffee per day     Social Determinants of Health     Financial Resource Strain: Medium Risk (2023)    Overall Financial Resource Strain (CARDIA)     Difficulty of Paying Living Expenses: Somewhat hard   Food Insecurity: Not on file   Transportation Needs: No Transportation Needs (2023)    PRAPARE - Transportation     Lack of Transportation (Medical): No     Lack of Transportation (Non-Medical): No   Physical Activity: Not on file   Stress: Not on file   Social Connections: Not on file   Intimate Partner Violence: Not on file   Housing Stability: Not on file       Objective     Vitals:    24 1453   BP: 118/60   Pulse: 76   Resp: 16   Temp: (!) 96.5 °F (35.8 °C)   SpO2: 95%     Wt Readings from Last 3 Encounters:   24 70.5 kg (155 lb 6.4 oz)   24 69.9 kg (154 lb)   10/23/23 68.7 kg (151 lb 6.4 oz)       Physical Exam  Vitals and nursing note reviewed.   Constitutional:       General: She is not in acute distress.     Appearance: Normal appearance. She is not ill-appearing or toxic-appearing.      Comments: Appears younger than stated age     Eyes:      General: No scleral icterus.  Neck:      Vascular: No carotid bruit.   Cardiovascular:      Rate and Rhythm: Normal rate and regular rhythm.      Pulses: Normal pulses.      Heart sounds: Normal heart sounds. No murmur heard.  Pulmonary:      Effort: Pulmonary effort is normal. No respiratory distress.      Breath sounds:  "Normal breath sounds. No wheezing, rhonchi or rales.   Abdominal:      General: There is no distension.      Palpations: Abdomen is soft. There is no mass.      Tenderness: There is no abdominal tenderness. There is no guarding or rebound.   Musculoskeletal:      Cervical back: Neck supple. No tenderness.      Right lower leg: No edema.      Left lower leg: No edema.   Lymphadenopathy:      Cervical: No cervical adenopathy.   Skin:     General: Skin is warm.      Coloration: Skin is not jaundiced.   Neurological:      General: No focal deficit present.      Mental Status: She is alert and oriented to person, place, and time.   Psychiatric:         Mood and Affect: Mood normal.         Behavior: Behavior normal.         Thought Content: Thought content normal.         Judgment: Judgment normal.         Pertinent Laboratory/Diagnostic Studies:  Lab Results   Component Value Date    BUN 22 12/07/2023    CREATININE 0.79 12/07/2023    CALCIUM 9.7 12/07/2023    K 4.2 12/07/2023    CO2 30 12/07/2023     12/07/2023     Lab Results   Component Value Date    ALT 18 12/07/2023    AST 18 12/07/2023    ALKPHOS 48 12/07/2023       Lab Results   Component Value Date    WBC 5.85 11/22/2022    HGB 13.2 11/22/2022    HCT 42.0 11/22/2022    MCV 97 11/22/2022     11/22/2022       No results found for: \"TSH\"    No results found for: \"CHOL\"  Lab Results   Component Value Date    TRIG 95 03/13/2024     Lab Results   Component Value Date    HDL 63 03/13/2024     Lab Results   Component Value Date    LDLCALC 129 (H) 03/13/2024     Lab Results   Component Value Date    HGBA1C 5.3 04/28/2021       Results for orders placed or performed in visit on 03/13/24   Lipid panel   Result Value Ref Range    Cholesterol 211 (H) See Comment mg/dL    Triglycerides 95 See Comment mg/dL    HDL, Direct 63 >=50 mg/dL    LDL Calculated 129 (H) 0 - 100 mg/dL    Non-HDL-Chol (CHOL-HDL) 148 mg/dl       No orders of the defined types were placed in " this encounter.      ALLERGIES:  Allergies   Allergen Reactions    Erythromycin Rash       Current Medications     Current Outpatient Medications   Medication Sig Dispense Refill    acetaminophen (TYLENOL) 325 mg tablet Take 2 tablets (650 mg total) by mouth every 6 (six) hours as needed for mild pain or fever 30 tablet 0    ASHWAGANDHA PO ashwagandha root extract      buPROPion (WELLBUTRIN SR) 100 mg 12 hr tablet       Cholecalciferol (Vitamin D) 50 MCG (2000 UT) CAPS       Cholecalciferol (VITAMIN D3) 5000 units CAPS Take 5,000 Units by mouth daily       cholestyramine (QUESTRAN) 4 g packet Take 1 packet (4 g total) by mouth 3 (three) times a day with meals 90 packet 1    diclofenac sodium (VOLTAREN) 1 % Apply topically every 6 (six) hours      Diclofenac Sodium 3 % CREA compounded medication   Lidocaine 5% and Diclofenac 3%   Apply 1-3 grams to the affected area 2-4 times per day as needed.      docusate sodium (COLACE) 100 mg capsule Take 1 capsule (100 mg total) by mouth daily 30 capsule 5    hydrocortisone 2.5 % cream Apply topically 3 (three) times a day 30 g 0    ibuprofen (MOTRIN) 800 mg tablet every 8 (eight) hours as needed      ipratropium (ATROVENT) 0.03 % nasal spray INSTILL TWO SPRAYS IN EACH NOSTRIL THREE TIMES A DAY AS NEEDED for rhinitis 30 mL 11    LORazepam (ATIVAN) 1 mg tablet Take 1 tablet po 1 hour before procedure.  May repeat dose x 1 if needed 2 tablet 0    naproxen (NAPROSYN) 500 mg tablet Take one tab up to twice a day as needed for migraine, no more than 3 times a week 30 tablet 6    olopatadine HCl (PATADAY) 0.2 % opth drops Administer 1 drop to both eyes daily 2.5 mL 11    Omega-3 Fatty Acids (OMEGA-3 CF PO) Omega 3      omega-3-acid ethyl esters (LOVAZA) 1 g capsule Take 2 g by mouth daily      ondansetron (ZOFRAN) 4 mg tablet Take 1 tablet (4 mg total) by mouth every 6 (six) hours 12 tablet 0    polyethylene glycol (MIRALAX) 17 g packet Take 17 g by mouth daily 30 each 5     rizatriptan (MAXALT) 10 mg tablet take 1 tablet (10mg) by mouth once as needed for migraine. may repeat in 2 hours if needed.  max 2/24 hours, 9/month. 9 tablet 3     No current facility-administered medications for this visit.         Health Maintenance     Health Maintenance   Topic Date Due    Hepatitis A Vaccine (1 of 2 - Risk 2-dose series) Never done    Osteoporosis Screening  Never done    PT PLAN OF CARE  03/01/2020    Colorectal Cancer Screening  03/16/2023    COVID-19 Vaccine (4 - 2023-24 season) 09/01/2023    Breast Cancer Screening: Mammogram  06/02/2024    Fall Risk  09/26/2024    Urinary Incontinence Screening  09/26/2024    Medicare Annual Wellness Visit (AWV)  09/26/2024    Depression Screening  03/19/2025    Hepatitis C Screening  Completed    Zoster Vaccine  Completed    Pneumococcal Vaccine: 65+ Years  Completed    Influenza Vaccine  Completed    HIB Vaccine  Aged Out    IPV Vaccine  Aged Out    Meningococcal ACWY Vaccine  Aged Out    HPV Vaccine  Aged Out     Immunization History   Administered Date(s) Administered    COVID-19 MODERNA VACC 0.5 ML IM 03/08/2021, 04/05/2021, 12/10/2021    INFLUENZA 12/28/2015, 11/22/2016, 09/22/2022    Influenza, high dose seasonal 0.7 mL 11/11/2019, 10/15/2020, 09/29/2021, 09/22/2022, 09/26/2023    Influenza, recombinant, quadrivalent,injectable, preservative free 11/29/2018    Pneumococcal Conjugate 13-Valent 03/05/2020    Pneumococcal Polysaccharide PPV23 10/07/2021    Tdap 12/28/2015    Zoster Vaccine Recombinant 08/15/2019, 11/01/2019          Zuleyma Carney DO

## 2024-03-19 NOTE — PATIENT INSTRUCTIONS
Due to history of colon polyps - you will need colonoscopy   - [address]    Schedule CT chest for lung cancer screening  And also dexa scan.    Get a urine test done today

## 2024-04-25 ENCOUNTER — OFFICE VISIT (OUTPATIENT)
Dept: NEUROLOGY | Facility: CLINIC | Age: 69
End: 2024-04-25
Payer: MEDICARE

## 2024-04-25 VITALS
HEART RATE: 65 BPM | HEIGHT: 61 IN | DIASTOLIC BLOOD PRESSURE: 64 MMHG | BODY MASS INDEX: 28.43 KG/M2 | TEMPERATURE: 97.2 F | WEIGHT: 150.6 LBS | SYSTOLIC BLOOD PRESSURE: 118 MMHG

## 2024-04-25 DIAGNOSIS — R29.818 SUSPECTED SLEEP APNEA: ICD-10-CM

## 2024-04-25 DIAGNOSIS — G43.109 MIGRAINE WITH AURA AND WITHOUT STATUS MIGRAINOSUS, NOT INTRACTABLE: Primary | ICD-10-CM

## 2024-04-25 PROCEDURE — G2212 PROLONG OUTPT/OFFICE VIS: HCPCS | Performed by: PSYCHIATRY & NEUROLOGY

## 2024-04-25 PROCEDURE — 99215 OFFICE O/P EST HI 40 MIN: CPT | Performed by: PSYCHIATRY & NEUROLOGY

## 2024-04-25 PROCEDURE — G2211 COMPLEX E/M VISIT ADD ON: HCPCS | Performed by: PSYCHIATRY & NEUROLOGY

## 2024-04-25 RX ORDER — NAPROXEN 500 MG/1
TABLET ORAL
Qty: 30 TABLET | Refills: 11 | Status: SHIPPED | OUTPATIENT
Start: 2024-04-25

## 2024-04-25 RX ORDER — RIZATRIPTAN BENZOATE 5 MG/1
5 TABLET ORAL AS NEEDED
Qty: 9 TABLET | Refills: 11 | Status: SHIPPED | OUTPATIENT
Start: 2024-04-25

## 2024-04-25 NOTE — PROGRESS NOTES
[facility] Neurology Concussion[facility]Headache [facility] Consult - Follow up   PATIENT:  Kaylan Young  MRN:  [id number]  :  1955  DATE OF SERVICE:  2024  REFERRED BY: No ref. provider found  PMD: Zuleyma Carney DO    Assessment/Plan:   Kaylan Young is a very pleasant 69 y.o. female with a past medical history includes  Chronic back pain, chronic left shoulder pain, chronic pain disorder, migraines, GERD, hyperlipidemia, depression, arthritis, low blood pressure, bilateral carpal tunnel, abdominal pain,  Sleep disorder,  Vitamin-D deficiency, anemia, glaucoma, cataracts referred here for evaluation of headache.  My initial evaluation 2019     Migraine without aura and without status migrainosus, not intractable  Acephalgic migraine with aura  Vertiginous migraine  BPPV - Benign paroxysmal positional vertigo   The patient reports headaches/migraines since her teenage years.     She reports various locations of her migraines, but typically left-sided, quality varies.  Typical associated migrainous features.  - as of 2019: She reports migraines currently occur about twice a month, but in January she had an increase in visual auras occured daily for a week.  - as of 2019:  Only 2 migraines in the past 3 months which is improvement, she has been keeping track of her visual symptoms and she was worried they have increased although it seems to me they are occurring less often - and there seems to be a component of anxiety triggering them in some cases  - as of 2019: No auras or migraines at least the past 2 months. Overall improved. Mood and anxiety improved with therapist. Taking mg, b2. Sleep not great due to frozen shoulder pain. Will consider melatonin. Also discussed considering venlafaxine low dose if needed for mood/headaches at next visit.   - as of 20:  Headaches/migraines are less frequent, with weather change in Oct had 2 migraines, 3rd caught early with the rizatriptan.  Aura only 2 times in the past 4-5 months. Still taking magnesium (although some nausea sometimes, other times forgets), B2.  - As of 7/7/2020: she remains well controlled. Only one migraine in 6 months that resolved with rizatriptan. About 4 auras in 6 months. Sometimes forgets to take aspirin.   - as of 1/20/2021: She remains well controlled. Only one migraine in 6 months that resolved with rizatriptan.   - as of 7/26/2021: She reports she continues to do well. No significant migraines requiring rizatriptan. 3 milder per month that improve with naproxen. 3-4 bilateral visual auras without headache in 6 months, that do not last as long as before. Recent episode that sounds like BPPV with negative HINTs exam today, likely resolved.     - as of 1/26/2022: Doing better, about 2 mild headaches per month that resolve with naproxen, has not taken rizatriptan in over a year. Migraine visual aura maybe 0-2 a month that self resolves in minutes. Not on preventative.   - as of 12/20/2022: She reports about 3 migraine aura without headache a month that respond to 5 mg rizatriptan. About 1 milder headache a week that improves with naproxen. Not interested in prescription preventative or resuming supplements and overall comfortable with current number of migraines.   - as of 4/25/2024: She reports returning only to check-in for refills however we discussed from history does sound like she potentially has sleep apnea and as detailed taking naps during the day, sleep study ordered.  She reports migraine aura with or without headache is much less often and maybe once every other month and still completely resolved with rizatriptan 5 mg.  Milder migraines maybe once a month or less and less severe.  Milder headaches 1-2 times a month with weather changes.  No big vertigo episodes sometimes brief vertigo looking up, but nothing impairing and quickly self resolves.  Reviewed CTA head over read together.  Discussed missing doses of  Wellbutrin absolutely can cause headaches.    Work up:  - CTA head and neck with without contrast 03/13/2019: I have personally reviewed imaging and radiology read   1.  No acute findings.  Unremarkable CT appearance of the brain.  2.  Unremarkable CT angiogram of the head and neck.  *As of my retrospective review 4/25/2024 we discussed there are some nonspecific features per radiology that I am commenting on including partially empty sella, difficult to appreciate over CT but appears to have some prominence and tortuosity to optic nerves in my opinion, normal ventricles although a bit tighter than expected for age 63, cerebellar tonsils overlie the foramen magnum  - follows closely with Ophthalmology every 4 months due to other eye issues including history of glaucoma and cataracts  - have asked her to try and get old MRI Brain from 10-15 years ago, but she doubts she would be able to   -MRI brain without contrast 6/20/2012 (per [facility] EMR as images not available)  There is no intraparenchymal hemorrhage or acute territorial infarction.  There is no midline shift or hydrocephalus.  There is no subdural or epidural hematoma.      Preventive:  - discussed headache hygiene and lifestyle factors that could improve her headaches including mental Health Care, sleep quality and quantity, increasing hydration  - Through other providers: Wellbutrin/bupropion 100 mg (started around early April 2024) - then forgot to take a few days recently and had headaches, melatonin, sleepy time tea, aschwaganda   - not interested in prescription preventative at this time - could resume headache preventative supplements if needed  -  Past: Nortriptyline 10 mg, propranolol and verapamil contraindicated due to history of hypotension, amitriptyline contraindicated due to age  - future options: topiramate, cyproheptadine, acetazolamide/Diamox, verapamil, CGRP     Abortive:  - previously patient was taking pain medication  "daily, however now no more than twice a week.  We discussed medication overuse/rebound headache and recommended no more than 3 days per week.  - through other providers: naproxen (I took over prescription), acetaminophen, ativan prn anxiety, hyroxyzine, cyclobenzaprine, ondansetron  -     rizatriptan (MAXALT) 5 mg tablet; Take 1 tablet (5 mg total) by mouth as needed for migraine 5-10 mg po at migraine onset, may repeat 5-10 mg in 2 hours if needed, max 20 mg/24 hours, 9 per month  -     naproxen (NAPROSYN) 500 mg tablet; Take one tab up to twice a day as needed for migraine, no more than 3 times a week  - future options:  Alternative Triptan,  prochlorperazine, Toradol IM or p.o., could consider trial for 5 days of Depakote or dexamethasone 4 prolonged migraine, ubrelvy, reyvow, nurtec    Suspected sleep apnea  -     Ambulatory Referral to Sleep Medicine; Future -this is not a referral for sleep medicine only, it is referral for a in lab sleep study and includes referral to sleep medicine if abnormal.    Patient instructions      If vertigo  returns, we can refer you to PT or you can call in and see if I happen to have an opening that day.       I am placing a referral for a sleep study and if it is abnormal we will place one to the sleep medicine specialist team to further evaluate your sleep issues.    Please call [phone] to schedule the sleep study and we discussed due to the new order being labeled \" ambulatory referral to sleep medicine\" you have to wait 2 days(in my experience sometimes can be a 6-days) for the sleep team to turn this referral into the actual sleep study that you can schedule, otherwise if you call now they will say there is no order for sleep study, just an order for a referral, because since the update even the scheduling team may not all realize that the order is included in the referral to sleep medicine.  The referral to sleep medicine piece is only if there is abnormalities and " you need to see them afterwards.    After the sleep study is completed if there is abnormal results that need treatment, I recommend you see one of the following providers:  Harpal Richardson PA-C       Headache/migraine treatment:   Abortive medications (for immediate treatment of a headache):   It is ok to take ibuprofen, acetaminophen or naproxen (Advil, Tylenol,  Aleve, Excedrin) if they help your headaches you should limit these to No more than 3 times a week to avoid medication overuse/rebound headaches.      For nausea  Ondansetron 4 mg ok to take as needed for nausea      For your more moderate to severe migraines take this medication early:  Continue Maxalt (rizatriptan) 10mg tabs - take one at the onset of headache. May repeat one time after 1-2 hours if pain has not resolved.   (Max 2 a day and 9 a month)      -     naproxen (NAPROSYN) 500 mg tablet; Take one tab up to twice a day as needed for migraine, no more than 3 times a week      Prescription preventive medications for headaches/migraines   (to take every day to help prevent headaches - not to take at the time of headache):  [x] Could consider in the future if needed     Lifestyle Recommendations:  [x] SLEEP - Maintain a regular sleep schedule: Adults need at least 7-8 hours of uninterrupted a night. Maintain good sleep hygiene:  Going to bed and waking up at consistent times, avoiding excessive daytime naps, avoiding caffeinated beverages in the evening, avoid excessive stimulation in the evening and generally using bed primarily for sleeping.  One hour before bedtime would recommend turning lights down lower, decreasing your activity (may read quietly, listen to music at a low volume). When you get into bed, should eliminate all technology (no texting, emailing, playing with your phone, iPad or tablet in bed).  [x]  HYDRATION - Maintain good hydration.  Drink  2L of fluid a day (4 typical small water bottles)  [x] DIET - Maintain good nutrition. In particular don't skip meals and try and eat healthy balanced meals regularly.  [x] TRIGGERS - Look for other triggers and avoid them: Limit caffeine to 1-2 cups a day or less. Avoid dietary triggers that you have noticed bring on your headaches (this could include aged cheese, peanuts, MSG, aspartame and nitrates).  [x] EXERCISE - physical exercise as we all know is good for you in many ways, and not only is good for your heart, but also is beneficial for your mental health, cognitive health and  chronic pain/headaches. I would encourage at the least 5 days of physical exercise weekly for at least 30 minutes.      Education and Follow-up  [x] Please call with any questions or concerns.   [x] Follow up 6-7 months to go over sleep study for 60 mins, sooner as needed         CC:   We had the pleasure of evaluating Kaylan Young in neurological consultation today. Kaylan Young is right handed female who presents today for evaluation of headaches.      History of Present Illness:   Interval history as of 4/25/2024  - no significant new or concerning neurologic symptoms since last visit   - only here because I asked her to follow up for monitoring   - following with counselor and psychiatry nurse for anxiety and waking in the morning feels not great and therefore they started wellbutrin   - writing again   - sleep - takes a two hour nap, stays up late and more productive at night and bed around 11/12 and some trouble falling asleep so takes melatonin, sleepy time tea, aschwaganda  and falls asleep in about 20 mins, wakes 1-2 times possibly due to apneas and then brian helps her fall asleep again and then wakes up around 6/7 and out of bed 730, snores, has woken herself up snoring, class 3-4 Mallampati score  - eye doctor - fall 2023 and returning in May, two different doctors, s/p  cataract surgery mid 60s bilaterally, had narrow glaucoma also fixed with the cataract surgery, monitoring the floaters, has significant photophobia and sometimes wears sunglasses indoors, last prescription change was approximately 7 to 8 years ago  -ENT EMILE Rapp left the practice but she was given ipratropium nasal spray for rhinitis from him and we discussed I would recommend refilling this and using it daily to see if it can help with the rhinitis with regular use    Headaches and migraines   Migraine aura with or without headache is less often  - once every other month now. Can feel it coming from the neck and feels like pressure and rizatriptan 5 mg works well.    Milder migraine maybe once a month - even less severe these days, no major migraines   Milder pain in head with weather changes maybe 1-2 times a month     No big vertigo episodes since last visit, sometimes brief vertigo with looks up but not impairing function or anything that she would want to treat necessary    Preventative:   Through other providers: Wellbutrin/bupropion 100 mg (started around early April 2024) - then forgot to take     Abortive:   - naproxen - takes maybe 3 times a month   - rizatriptan 5 mg with migraine aura and then works well, no SE (10 mg SE like tongue numb, feels horrible)  Denies bothersome side effects      Interval history as of 12/20/2022  - denies any new or concerning neurologic symptoms since last visit  - a little anxious this morning and we did a deep breathing technique this am  - vertigo maybe 1-2 days that self resolved maybe 2 months ago    Headaches and migraines   She reports about 3 migraine aura without headache a month that respond to 5 mg rizatriptan faster and can do things   About 1 milder headache a week that improves with naproxen    Preventative: none for migraine   Abortive: naproxen  rizatriptan works well - typically takes 5 mg since 10 mg sometimes caused SE    Interval history as of  1/26/2022  - denies any new or concerning neurologic symptoms since last visit   - saw ENT and given nasal spray   - saw eye doctor and given a med for dry eye   - drinking a bottle of water a day   - taking classes online, writing blog  - joined a group online, mood is much better     Headaches and migraines   - Doing better, about 2 mild headaches per month that resolve with naproxen, has not taken rizatriptan in over a year.   - Acephalgic Migraine with aura more often than migraine - lasts a few mins up to 30 mins with mild pressure not severe, maybe 0-2 a month, previous providers started asa 81 mg for prevention.     Preventative: magnesium occasionally as makes her stomach upset   Abortive: naproxen     Interval history as of 7/26/2021  - mood - anxiety, depression  - following with PCP and counselor   - doing meditation   - for months feels like itchy/creepy crawling or stinging or pricks on anywhere of entire body at night  - rec'd talk to PCP    - a week ago had vertigo that started in bed and after rolling had room spinning and took a meclizine and then this made her sleepy (has had BPPV once before so tried to do the maneuvers) still feeling it slightly if she turns     Headaches and migraines   -  3 headaches per month -   - Auras without headache - 3-4 times in 6 months (lights and zig zags in both eyes)     Preventative:   Magnesium, riboflavin - not taking lately, upsets stomach   - melatonin at night is helping with sleep     Abortive: naproxen for milder headaches - helps, rizatriptan - hasn't taken in 6 months (has to lay in bed afterwards)    Interval history as of 01/20/2021  She denies any new or concerning symptoms  Headaches and migraines -  only 1 migraine in 6 months that resolved with rizatriptan and naproxen, one aura    - abortive: rizatriptan.   - prevention: restarted magnesium, and restarting B2     - has lost weight, no sugar  - mood up and down overall better, following with  counselor, denies depression     Interval history as of 7/7/2020:    Headaches/migraines   - migraine - only 1 in 6 months, took rizatriptan and it helped  - Aura - 4 times in 6 months without headache afterwards related to stress, lasting about 20 minutes       - abortive: rizatriptan.   - prevention: no longer taking magnesium, B2 - because she is on a diet with supplements      - has lost some weight on a diet and feeling much better as far as mood, movements, sleep   - talking to counselor every 3 weeks      Interval history as of 01/06/2020  She reports improvement, no concerns     Headaches/migraines are less frequent  - weather change in Oct had 2 migraines, 3rd caught early with the rizatriptan  - aura only 2 times in the past 4-5 months  - taking magnesium, B2     Sleep - still not great due to frozen shoulder pain (also may hurt all day long). - following with orthopedics  - tried melatonin and did not help     Seeing a therapist and it is helping a lot with multiple issues, mood much better  Not wearing sunglasses everywhere anymore     Interval history as of 09/24/2019:  - 07/15/2019 patient called in requesting rizatriptan that was previously prescribed by her past neurologist and works well for her migraines. Ok since history no longer sounds like retinal migraine - both eyes   - following with orthopedics for frozen shoulder, doing therapies  - sometimes the therapies with PT was causing cervicogenic headaches   - had some floaters a few weeks ago and saw eye doctor who confirmed floaters and nothing to worry about      - worried about this time of year because may get sinus headaches that turn into migraines      No auras or migraines at least the past 2 months  Saw counselor and helping mood - her therapist suggested considering low dose mood medications. discussed with patient and may consider in the future   - wearing glasses less, realizing that she may have been hiding behind them      Going to  start yoga class soon       - Supplements - taking magnesium and B2      Interval history as of 05/22/2019:  - CTA head and neck 03/13/2019 was unremarkable except for incidental thyroid nodule with follow-up ultrasound by primary care  - has followed up with Pain Medicine and weight management  - 5/8/19 - dilated eye exam was unremarkable except for dry eyes   - our  provider a list of therapists, mood improved since wheather improved     - Rizatriptan - continues to help  - Supplements - taking magnesium and B2   - Prescription medication - not interested although         Headaches started at what age? teenager  How often do the headaches occur?   - as of 3/2019: 2/month  - as of 5/22/19: 2 migraines in past 3 ,   - Acephalgic Migraine with aura more often than migraine - vs nonspecific visual symptoms due to other reason 4/14, 21, 23, 25, and 5/8, 21 - she reports these seem to be even triggered by stress/anxiety  - as of 09/24/2019:  No auras or migraines at least the past 2 months  - as of 1/6/20:  Headaches/migraines are less frequent, with weather change in Oct had 2 migraines, 3rd caught early with the rizatriptan. Aura only 2 times in the past 4-5 months. Still taking magnesium (although some nausea sometimes, other times forgets), B2  What time of the day do the headaches start? Can wake up with them or anytime during the day   How long do the headaches last? 3-4 hours because she takes medicine, up to all day   Are you ever headache free? Yes     Aura? with aura  Acephalgic Migraine with aura more often than migraine     Jan 2019:  For a week straight 1-2 times a day had increase in visual aura/retinal migraine  Zigzags, decreased peripheral vision, blurry without strong migraine, milder headache - for 2-3 hours   - seems to be in both eyes, not like a curtain coming down (both eyes - not retinal migraine)  - gradually faded out and this month she is fine, started mag a few weeks ago   -  would take medicine and go away  *last saw an eye doctor 2 months ago, goes every 4 months for glaucoma, cataracts, dry eye       Prior to that once in a while that would happen      Describe your usual headache pain quality? Dull all day sometimes, sharp and pulsating, shooting   Where is your headache located?   When full blown more on left side, frontal to parietal and then to the back  Lately zig zag around head  Numbness change in sensation to bioccipital region   Sinus pressure     What is the intensity of pain? 4-5/10, up to higher   Associated symptoms:   [x] Nausea       [x] Vomiting        [] Diarrhea  [x] Insomnia    [] Stiff or sore neck   [x] Problems with concentration  [x] Photophobia     [x]Phonophobia      [] Osmophobia  [x] Blurred vision   [x] Prefer quiet, dark room  [] Light-headed or dizzy     [] Tinnitus   [] Hands or feet tingle or feel numb/paresthesias       [] Red ear      [] Ptosis      [] Facial droop  [] Lacrimation  [] Nasal congestion/rhinorrhea   [] Flushing of face  [] Change in pupil size      Things that make the headache worse? Bending down, any movement     Headache triggers:  Pressure when it rains/dr and cold  What time of the year do headaches occur more frequently?   More in Oct/Nov     Have you seen someone else for headaches or pain? Neurology   Have you had trigger point injection performed and how often? No  Have you had Botox injection performed and how often? No   Have you had epidural injections or transforaminal injections performed? Yes for lower back slipped disc   Have you used CBD or THC for your headaches and how often? No  Are you current pregnant or planning on getting pregnant? No  Have you ever had any Brain imaging? MRI Brain - maybe over 15-20 years ago      What medications do you take or have you taken for your headaches?   ABORTIVE:    2-3 times a week some sort of pain medication lately, previously was taking daily      Acetaminophen - a few days a  week for back pain  Ibuprofen 800 mg - not as much now, takes naproxen instead  Rizatriptan 10 mg   Naproxen 500 mg - takes once a week, used to take every day      Diclofenac gel - for back   Lidocaine 5% gel - for back      Past for headache and back pain   Cyclobenzaprine 5 mg  - not taking (for back)  Tramadol 50 mg   - not taking (for back)  Meloxicam 15 mg - not taking   Ondansetron - does not take it      PREVENTIVE:   Magnesium 400 mg  Riboflavin 400 mg      Past:  Nortriptyline 10 mg - for 2 years, and did well as far as headaches not being as bad, then wanted to get off it, came off 6 months ago, increase in symptoms     Alternative therapies used in the past for headaches? no other headache interventions have been tried     LIFESTYLE  Sleep - averages 6-7 hours   - takes naps      Physical activity:  tries to get her to the gym twice a week   Water: a bottle a week   Mood:   - lately feels tired mentally tired  and depressed   - do not want to get up   - never has talked to a counselor - she used to do mentoring herself, never had depression in the past   - thinks going on for past 6 months, started after mom passed away   - PHQ-9 depression screen 02/27/2019:  Score of 17, denies suicidal ideation        The following portions of the patient's history were reviewed and updated as appropriate: allergies, current medications, past family history, past medical history, past social history, past surgical history and problem list.        Pertinent family history:    Family history of headaches:  no known family members with significant headaches  Any family history of aneurysms - No  Mom lived to 101.5 and got dementia in her lates 90s   Dad lived until 92 and dementia in his late 80s      Pertinent social history:  Work:  Retired  Education: some college  Lives with   One trial     Illicit Drugs: denies  Alcohol/tobacco: Denies tobacco use, alcohol intake: social drinker       Past Medical  History:     Past Medical History:   Diagnosis Date    Abdominal discomfort     Acute cholecystitis due to biliary calculus 12/15/2020    Anemia     Arthritis     Bronchitis     Chronic pain disorder     Cluster headache     CTS (carpal tunnel syndrome)     Current moderate episode of major depressive disorder without prior episode (HCC) 03/05/2020    Dyslipidemia     Last Assessed 07/09/2012    Fibroid when I was teenager    breasts    GERD (gastroesophageal reflux disease)     Headache, migraine     Headache, tension-type     History of transfusion 1986    when I gave birth to son    Hypotension     Low back pain     Low back pain     Rheumatoid arthritis (HCC)     Thyroid nodule 04/09/2019       Patient Active Problem List   Diagnosis    Chronic bilateral low back pain without sciatica    Esophageal reflux    Arthritis    Duodenum disorder    Hyperlipidemia    Depression    Abnormal MRI of abdomen    Vitamin D deficiency    Anxiety disorder    Migraine aura without headache    Thyroid nodule    Impaired fasting glucose    Chronic left shoulder pain    Lumbar spondylosis    Sleep disorder    Status post laparoscopic cholecystectomy    Pruritus    Post-menopausal bleeding    Trigger finger of left thumb    Abnormal mammogram    Loose stools    Abnormal cervical Papanicolaou smear    Personal history of tobacco use, presenting hazards to health       Medications:      Current Outpatient Medications   Medication Sig Dispense Refill    acetaminophen (TYLENOL) 325 mg tablet Take 2 tablets (650 mg total) by mouth every 6 (six) hours as needed for mild pain or fever 30 tablet 0    ASHWAGANDHA PO Take 2 tablets by mouth in the morning Gummies      buPROPion (WELLBUTRIN SR) 100 mg 12 hr tablet Take 100 mg by mouth in the morning      Cholecalciferol (Vitamin D) 50 MCG (2000 UT) CAPS Take 2,000 Units by mouth in the morning      Cholecalciferol (VITAMIN D3) 5000 units CAPS Take 5,000 Units by mouth daily        cholestyramine (QUESTRAN) 4 g packet 1/2 pack once daily      Diclofenac Sodium 3 % CREA Apply 1 Application topically if needed      docusate sodium (COLACE) 100 mg capsule Take 1 capsule (100 mg total) by mouth daily 30 capsule 5    hydrocortisone 2.5 % cream Apply topically 3 (three) times a day 30 g 0    ipratropium (ATROVENT) 0.03 % nasal spray INSTILL TWO SPRAYS IN EACH NOSTRIL THREE TIMES A DAY AS NEEDED for rhinitis 30 mL 11    naproxen (NAPROSYN) 500 mg tablet Take one tab up to twice a day as needed for migraine, no more than 3 times a week 30 tablet 11    olopatadine HCl (PATADAY) 0.2 % opth drops Administer 1 drop to both eyes daily (Patient taking differently: Administer 1 drop to both eyes daily as needed) 2.5 mL 11    Omega-3 Fatty Acids (OMEGA-3 CF PO) Take 1 capsule by mouth in the morning      ondansetron (ZOFRAN) 4 mg tablet Take 1 tablet (4 mg total) by mouth every 6 (six) hours 12 tablet 0    polyethylene glycol (MIRALAX) 17 g packet Take 17 g by mouth daily 30 each 5    rizatriptan (MAXALT) 5 mg tablet Take 1 tablet (5 mg total) by mouth as needed for migraine 5-10 mg po at migraine onset, may repeat 5-10 mg in 2 hours if needed, max 20 mg/24 hours, 9 per month 9 tablet 11    diclofenac sodium (VOLTAREN) 1 % Apply topically every 6 (six) hours (Patient not taking: Reported on 4/25/2024)      omega-3-acid ethyl esters (LOVAZA) 1 g capsule Take 2 g by mouth daily (Patient not taking: Reported on 4/25/2024)       No current facility-administered medications for this visit.        Allergies:      Allergies   Allergen Reactions    Erythromycin Rash       Family History:     Family History   Problem Relation Age of Onset    Hypertension Mother     Other Mother         Dyslipidemia    Hearing loss Mother         101 years old    Dementia Mother     Diabetes Father         Mellitus    Stomach cancer Father     Arthritis Father     Dementia Father         he was 92 years had slight case    No Known  Problems Maternal Grandmother     No Known Problems Maternal Grandfather     No Known Problems Paternal Grandmother     No Known Problems Paternal Grandfather     No Known Problems Brother     No Known Problems Brother     No Known Problems Maternal Aunt     No Known Problems Maternal Aunt     No Known Problems Maternal Aunt     No Known Problems Maternal Aunt     No Known Problems Maternal Aunt     No Known Problems Paternal Aunt     No Known Problems Paternal Aunt     No Known Problems Paternal Aunt     No Known Problems Paternal Aunt     No Known Problems Paternal Aunt     Cancer Neg Hx     Heart disease Neg Hx     Stroke Neg Hx     Breast cancer Neg Hx     Colon cancer Neg Hx     Endometrial cancer Neg Hx        Social History:     Social History     Socioeconomic History    Marital status: /Civil Union     Spouse name: Not on file    Number of children: Not on file    Years of education: Not on file    Highest education level: Not on file   Occupational History    Occupation: retired   Tobacco Use    Smoking status: Former     Current packs/day: 0.00     Types: Cigarettes     Quit date: 1985     Years since quittin.3    Smokeless tobacco: Never   Vaping Use    Vaping status: Never Used   Substance and Sexual Activity    Alcohol use: Yes     Comment: occasionally    Drug use: No    Sexual activity: Yes     Partners: Male     Birth control/protection: Post-menopausal   Other Topics Concern    Not on file   Social History Narrative    Use Safety Equipment - Seatbelts        Lives with spouse        Consumes 3 cups of coffee per day     Social Determinants of Health     Financial Resource Strain: Medium Risk (2023)    Overall Financial Resource Strain (CARDIA)     Difficulty of Paying Living Expenses: Somewhat hard   Food Insecurity: Not on file   Transportation Needs: No Transportation Needs (2023)    PRAPARE - Transportation     Lack of Transportation (Medical): No     Lack of  "Transportation (Non-Medical): No   Physical Activity: Not on file   Stress: Not on file   Social Connections: Not on file   Intimate Partner Violence: Not on file   Housing Stability: Not on file         Objective:           Physical Exam:                                                                 Vitals:            Constitutional:    /64 (BP Location: Left arm, Patient Position: Sitting, Cuff Size: Standard)   Pulse 65   Temp (!) 97.2 °F (36.2 °C) (Temporal)   Ht 5' 1\" (1.549 m)   Wt 68.3 kg (150 lb 9.6 oz)   BMI 28.46 kg/m²   BP Readings from Last 3 Encounters:   04/25/24 118/64   03/19/24 118/60   01/30/24 128/80     Pulse Readings from Last 3 Encounters:   04/25/24 65   03/19/24 76   01/30/24 76         Well developed, well nourished, well groomed.        Psychiatric:  Normal behavior and appropriate affect        Neurological Examination:     Mental status/cognitive function:   Recent and remote memory appear intact. Attention span and concentration as well as fund of knowledge are appropriate for age. Normal language and spontaneous speech.  Cranial Nerves:   VII-facial expression symmetric  Motor Exam: symmetric bulk throughout. no atrophy, fasciculations or abnormal movements noted.   Coordination:  no apparent dysmetria, ataxia or tremor noted  Gait: steady casual gait         Pertinent lab results:   See EMR for recent labs     03/11/2020 CMP unremarkable except for glucose 101, CBC unremarkable  TSH normal  HIV 2.7     4/09/2019:  A1c 5.7     03/08/2019:  BMP and CBC unremarkable  Vitamin-D 44.2  B12 2, 557  TSH 2.02     12/11/18: CMP unremarkable   11/29/18: CBC significant for hgb 10.8     Imaging:   - CTA head and neck with without contrast 03/13/2019: I have personally reviewed imaging and radiology read   1.  No acute findings.  Unremarkable CT appearance of the brain.  2.  Unremarkable CT angiogram of the head and neck.  *As of my retrospective review 4/25/2024 we discussed there are " some nonspecific features per radiology that I am commenting on including partially empty sella, difficult to appreciate over CT but appears to have some prominence and tortuosity to optic nerves in my opinion, normal ventricles although a bit tighter than expected for age 63, cerebellar tonsils overlie the foramen magnum      -MRI brain without contrast 6/20/2012 (per [facility] EMR as images not available)  There is no intraparenchymal hemorrhage or acute territorial infarction.  There is no midline shift or hydrocephalus.  There is no subdural or epidural hematoma.     Review of Systems:   ROS obtained by medical assistant and reviewed, but if any symptoms listed below say negative, does not mean patient has not had this symptom since last visit, please see HPI for details of symptoms discussed this visit.  Regarding any abnormal or positive findings in ROS that are not neurologic related, patient instructed to address these issues with PCP or go to the ER if they are severe.      Review of Systems   Constitutional: Negative.    HENT: Negative.     Eyes: Negative.    Respiratory: Negative.     Cardiovascular: Negative.    Gastrointestinal: Negative.    Endocrine: Negative.    Genitourinary: Negative.    Musculoskeletal: Negative.    Skin: Negative.    Allergic/Immunologic: Negative.    Neurological:  Positive for headaches.   Hematological: Negative.    Psychiatric/Behavioral: Negative         I have spent 63 minutes with Patient  today in which greater than 50% of this time was spent in counseling/coordination of care regarding Diagnostic results, Prognosis, Risks and benefits of tx options, Instructions for management, Patient education, Importance of tx compliance, Risk factor reductions, Impressions, Counseling / Coordination of care, Documenting in the medical record, Reviewing / ordering tests, medicine, procedures  , Obtaining or reviewing history  , and Communicating with other healthcare  professionals . I also spent 25 minutes non face to face for this patient the same day.       Author:  Sindy Guzmán MD 4/25/2024 4:04 PM

## 2024-04-25 NOTE — PATIENT INSTRUCTIONS
"If vertigo  returns, we can refer you to PT or you can call in and see if I happen to have an opening that day.       I am placing a referral for a sleep study and if it is abnormal we will place one to the sleep medicine specialist team to further evaluate your sleep issues.    Please call [phone] to schedule the sleep study and we discussed due to the new order being labeled \" ambulatory referral to sleep medicine\" you have to wait 2 days(in my experience sometimes can be a 6-days) for the sleep team to turn this referral into the actual sleep study that you can schedule, otherwise if you call now they will say there is no order for sleep study, just an order for a referral, because since the update even the scheduling team may not all realize that the order is included in the referral to sleep medicine.  The referral to sleep medicine piece is only if there is abnormalities and you need to see them afterwards.    After the sleep study is completed if there is abnormal results that need treatment, I recommend you see one of the following providers:  Harpal Richardson PA-C       Headache/migraine treatment:   Abortive medications (for immediate treatment of a headache):   It is ok to take ibuprofen, acetaminophen or naproxen (Advil, Tylenol,  Aleve, Excedrin) if they help your headaches you should limit these to No more than 3 times a week to avoid medication overuse/rebound headaches.      For nausea  Ondansetron 4 mg ok to take as needed for nausea      For your more moderate to severe migraines take this medication early:  Continue Maxalt (rizatriptan) 10mg tabs - take one at the onset of headache. May repeat one time after 1-2 hours if pain has not resolved.   (Max 2 a day and 9 a month)      -     naproxen (NAPROSYN) 500 mg tablet; Take one tab up to twice a day as needed " for migraine, no more than 3 times a week      Prescription preventive medications for headaches/migraines   (to take every day to help prevent headaches - not to take at the time of headache):  [x] Could consider in the future if needed     Lifestyle Recommendations:  [x] SLEEP - Maintain a regular sleep schedule: Adults need at least 7-8 hours of uninterrupted a night. Maintain good sleep hygiene:  Going to bed and waking up at consistent times, avoiding excessive daytime naps, avoiding caffeinated beverages in the evening, avoid excessive stimulation in the evening and generally using bed primarily for sleeping.  One hour before bedtime would recommend turning lights down lower, decreasing your activity (may read quietly, listen to music at a low volume). When you get into bed, should eliminate all technology (no texting, emailing, playing with your phone, iPad or tablet in bed).  [x] HYDRATION - Maintain good hydration.  Drink  2L of fluid a day (4 typical small water bottles)  [x] DIET - Maintain good nutrition. In particular don't skip meals and try and eat healthy balanced meals regularly.  [x] TRIGGERS - Look for other triggers and avoid them: Limit caffeine to 1-2 cups a day or less. Avoid dietary triggers that you have noticed bring on your headaches (this could include aged cheese, peanuts, MSG, aspartame and nitrates).  [x] EXERCISE - physical exercise as we all know is good for you in many ways, and not only is good for your heart, but also is beneficial for your mental health, cognitive health and  chronic pain/headaches. I would encourage at the least 5 days of physical exercise weekly for at least 30 minutes.      Education and Follow-up  [x] Please call with any questions or concerns.   [x] Follow up 6-7 months to go over sleep study for 60 mins, sooner as needed

## 2024-04-30 DIAGNOSIS — R29.818 SUSPECTED SLEEP APNEA: ICD-10-CM

## 2024-04-30 DIAGNOSIS — G47.8 OTHER SLEEP DISORDERS: Primary | ICD-10-CM

## 2024-05-29 ENCOUNTER — OFFICE VISIT (OUTPATIENT)
Dept: FAMILY MEDICINE CLINIC | Facility: CLINIC | Age: 69
End: 2024-05-29
Payer: MEDICARE

## 2024-05-29 VITALS
DIASTOLIC BLOOD PRESSURE: 62 MMHG | BODY MASS INDEX: 28.77 KG/M2 | WEIGHT: 152.4 LBS | SYSTOLIC BLOOD PRESSURE: 128 MMHG | OXYGEN SATURATION: 95 % | RESPIRATION RATE: 20 BRPM | TEMPERATURE: 95.1 F | HEART RATE: 63 BPM | HEIGHT: 61 IN

## 2024-05-29 DIAGNOSIS — J06.9 UPPER RESPIRATORY TRACT INFECTION, UNSPECIFIED TYPE: Primary | ICD-10-CM

## 2024-05-29 PROCEDURE — 99213 OFFICE O/P EST LOW 20 MIN: CPT | Performed by: FAMILY MEDICINE

## 2024-05-29 PROCEDURE — G2211 COMPLEX E/M VISIT ADD ON: HCPCS | Performed by: FAMILY MEDICINE

## 2024-05-29 RX ORDER — BENZONATATE 100 MG/1
100 CAPSULE ORAL 3 TIMES DAILY PRN
Qty: 20 CAPSULE | Refills: 0 | Status: SHIPPED | OUTPATIENT
Start: 2024-05-29

## 2024-05-29 NOTE — PATIENT INSTRUCTIONS
Call us if you are not improving    1. Upper respiratory tract infection, unspecified type  -     benzonatate (TESSALON PERLES) 100 mg capsule; Take 1 capsule (100 mg total) by mouth 3 (three) times a day as needed for cough

## 2024-05-29 NOTE — PROGRESS NOTES
Assessment/Plan:       Problem List Items Addressed This Visit    None  Visit Diagnoses       Upper respiratory tract infection, unspecified type    -  Primary    Relevant Medications    benzonatate (TESSALON PERLES) 100 mg capsule          1. Upper respiratory tract infection, unspecified type  Symptoms consistent with URI, advised rest symptomatic treatment, call if not improving.    - benzonatate (TESSALON PERLES) 100 mg capsule; Take 1 capsule (100 mg total) by mouth 3 (three) times a day as needed for cough  Dispense: 20 capsule; Refill: 0      Subjective:      Patient ID: Kaylan Young is a 69 y.o. female.    Sore Throat   The current episode started in the past 7 days. The problem has been gradually improving. The pain is worse on the left side. There has been no fever. The pain is at a severity of 7/10. The pain is moderate. Associated symptoms include congestion, coughing, ear pain, headaches and a hoarse voice. Pertinent negatives include no abdominal pain. Treatments tried: mucinex. The treatment provided mild relief.       69 year old female presenting for sore throat and cough for 5 days.    Voice was hoarse but now improved.    At first fatiuged but now energy returned.    Cough that is mostly dry.        The following portions of the patient's history were reviewed and updated as appropriate: allergies, current medications, past family history, past medical history, past social history, past surgical history and problem list.      Current Outpatient Medications:     benzonatate (TESSALON PERLES) 100 mg capsule, Take 1 capsule (100 mg total) by mouth 3 (three) times a day as needed for cough, Disp: 20 capsule, Rfl: 0    acetaminophen (TYLENOL) 325 mg tablet, Take 2 tablets (650 mg total) by mouth every 6 (six) hours as needed for mild pain or fever, Disp: 30 tablet, Rfl: 0    ASHWAGANDHA PO, Take 2 tablets by mouth in the morning Gummies, Disp: , Rfl:     buPROPion (WELLBUTRIN SR) 100 mg 12 hr  tablet, Take 100 mg by mouth in the morning, Disp: , Rfl:     Cholecalciferol (Vitamin D) 50 MCG (2000 UT) CAPS, Take 2,000 Units by mouth in the morning, Disp: , Rfl:     Cholecalciferol (VITAMIN D3) 5000 units CAPS, Take 5,000 Units by mouth daily , Disp: , Rfl:     cholestyramine (QUESTRAN) 4 g packet, 1/2 pack once daily, Disp: , Rfl:     diclofenac sodium (VOLTAREN) 1 %, Apply topically every 6 (six) hours (Patient not taking: Reported on 4/25/2024), Disp: , Rfl:     Diclofenac Sodium 3 % CREA, Apply 1 Application topically if needed, Disp: , Rfl:     docusate sodium (COLACE) 100 mg capsule, Take 1 capsule (100 mg total) by mouth daily, Disp: 30 capsule, Rfl: 5    hydrocortisone 2.5 % cream, Apply topically 3 (three) times a day, Disp: 30 g, Rfl: 0    ipratropium (ATROVENT) 0.03 % nasal spray, INSTILL TWO SPRAYS IN EACH NOSTRIL THREE TIMES A DAY AS NEEDED for rhinitis, Disp: 30 mL, Rfl: 11    naproxen (NAPROSYN) 500 mg tablet, Take one tab up to twice a day as needed for migraine, no more than 3 times a week, Disp: 30 tablet, Rfl: 11    olopatadine HCl (PATADAY) 0.2 % opth drops, Administer 1 drop to both eyes daily (Patient taking differently: Administer 1 drop to both eyes daily as needed), Disp: 2.5 mL, Rfl: 11    Omega-3 Fatty Acids (OMEGA-3 CF PO), Take 1 capsule by mouth in the morning, Disp: , Rfl:     omega-3-acid ethyl esters (LOVAZA) 1 g capsule, Take 2 g by mouth daily (Patient not taking: Reported on 4/25/2024), Disp: , Rfl:     ondansetron (ZOFRAN) 4 mg tablet, Take 1 tablet (4 mg total) by mouth every 6 (six) hours, Disp: 12 tablet, Rfl: 0    polyethylene glycol (MIRALAX) 17 g packet, Take 17 g by mouth daily, Disp: 30 each, Rfl: 5    rizatriptan (MAXALT) 5 mg tablet, Take 1 tablet (5 mg total) by mouth as needed for migraine 5-10 mg po at migraine onset, may repeat 5-10 mg in 2 hours if needed, max 20 mg/24 hours, 9 per month, Disp: 9 tablet, Rfl: 11     Review of Systems   HENT:  Positive for  "congestion, ear pain, hoarse voice and sore throat.    Respiratory:  Positive for cough. Negative for choking and wheezing.    Cardiovascular:  Negative for chest pain, palpitations and leg swelling.   Gastrointestinal:  Negative for abdominal distention and abdominal pain.   Neurological:  Positive for headaches.         Objective:      /62 (BP Location: Left arm, Patient Position: Sitting, Cuff Size: Large)   Pulse 63   Temp (!) 95.1 °F (35.1 °C) (Tympanic)   Resp 20   Ht 5' 1\" (1.549 m)   Wt 69.1 kg (152 lb 6.4 oz)   SpO2 95%   BMI 28.80 kg/m²          Physical Exam  Constitutional:       Appearance: She is well-developed.   HENT:      Head: Normocephalic.      Nose: No congestion.      Mouth/Throat:      Mouth: Mucous membranes are moist.      Pharynx: No oropharyngeal exudate.   Eyes:      Conjunctiva/sclera: Conjunctivae normal.   Cardiovascular:      Rate and Rhythm: Normal rate and regular rhythm.   Pulmonary:      Effort: Pulmonary effort is normal.      Breath sounds: Normal breath sounds.   Neurological:      General: No focal deficit present.      Mental Status: She is alert.           Darion Nugent MD  "

## 2024-06-12 ENCOUNTER — OFFICE VISIT (OUTPATIENT)
Dept: GASTROENTEROLOGY | Facility: MEDICAL CENTER | Age: 69
End: 2024-06-12
Payer: MEDICARE

## 2024-06-12 ENCOUNTER — TELEPHONE (OUTPATIENT)
Dept: GASTROENTEROLOGY | Facility: MEDICAL CENTER | Age: 69
End: 2024-06-12

## 2024-06-12 ENCOUNTER — PREP FOR PROCEDURE (OUTPATIENT)
Dept: GASTROENTEROLOGY | Facility: MEDICAL CENTER | Age: 69
End: 2024-06-12

## 2024-06-12 VITALS
DIASTOLIC BLOOD PRESSURE: 62 MMHG | HEART RATE: 79 BPM | TEMPERATURE: 98.2 F | WEIGHT: 150 LBS | BODY MASS INDEX: 28.32 KG/M2 | OXYGEN SATURATION: 97 % | SYSTOLIC BLOOD PRESSURE: 122 MMHG | HEIGHT: 61 IN

## 2024-06-12 DIAGNOSIS — Z12.11 SCREENING FOR COLORECTAL CANCER: ICD-10-CM

## 2024-06-12 DIAGNOSIS — K21.9 GASTROESOPHAGEAL REFLUX DISEASE WITHOUT ESOPHAGITIS: ICD-10-CM

## 2024-06-12 DIAGNOSIS — R13.10 DYSPHAGIA, UNSPECIFIED TYPE: ICD-10-CM

## 2024-06-12 DIAGNOSIS — Z12.12 SCREENING FOR COLORECTAL CANCER: ICD-10-CM

## 2024-06-12 DIAGNOSIS — Z86.010 HISTORY OF COLON POLYPS: Primary | ICD-10-CM

## 2024-06-12 PROCEDURE — 99214 OFFICE O/P EST MOD 30 MIN: CPT | Performed by: NURSE PRACTITIONER

## 2024-06-12 RX ORDER — PANTOPRAZOLE SODIUM 40 MG/1
40 TABLET, DELAYED RELEASE ORAL DAILY
Qty: 30 TABLET | Refills: 3 | Status: SHIPPED | OUTPATIENT
Start: 2024-06-12

## 2024-06-12 NOTE — PROGRESS NOTES
[facility] - Outpatient Follow-up Note  Kaylan Young 69 y.o. female MRN: [id number]  Encounter: [id number]          ASSESSMENT AND PLAN:      Personal hx of colon polyps  Diarrhea    History of chronic loose stools for several years.  She has been using cholestyramine powder on and off which helps.  Recently she has only been taking it when she gets loose stools.  She stopped it after several days and then restarts it 1 loose stools occur again.  She is taking a half a capful for several days in a row and then stools become harder.  We did discuss doing about 1/4 packet daily to help control loose stools.  Denies any melena hematochezia.  Last colonoscopy was 2015.  She believes she might of had polyps at that time.  No family history of any colon cancers or polyps.    -Cholestyramine 1/4 packet daily  -Colonoscopy with MiraLAX/Dulcolax prep    3.  Dysphagia  4.  GERD    History of chronic reflux.  Not sure if she is ever had an EGD.  Does well on pantoprazole 40 mg daily.  She does drink up to 4 cups of caffeine per day.  Occasional NSAID use.  No alcohol use.  She does also have occasional dysphagia in the midesophagus with solids.  Occasionally gets odynophagia.  Not pinpoint specific triggers.  Will rule out PUD, gastritis/esophagitis, EOE and H. pylori.    -Pantoprazole 40 mg daily  -Antireflux diet.  Reduce caffeine intake  -EGD with colonoscopy  -Follow-up in office after testing    I obtained informed consent from the patient. The risks/benefits/alternatives of the procedure were discussed with the patient. Risks included, but not limited to, infection, bleeding, perforation, injury to organs in the abdomen, missed lesion and incomplete procedure were discussed. Patient was agreeable and electronic signature was obtained.       ______________________________________________________________________    SUBJECTIVE: 69-year-old female here for follow-up.  She was last seen by  myself 7/6/2023 for chronic diarrhea.    History of chronic diarrhea for several years. Cholestyramine has worked in the past for her but a full packet makes her nauseated.  Her last colonoscopy was 2015 and it was negative.  Her last Cologuard test was 2020 which was also negative.  She is using cholestyramine half a packet 1 packet as needed for looser stools.  She occasionally alternates to harder stools or no BM.  Requires MiraLAX at that time to have a BM.  Denies any melena or hematochezia.  Denies any abdominal pain.  No weight loss. She has never tried fiber.  She has not been taking her cholestyramine regularly.  She takes it when she develops loose stools and then stopped it several days later.     History of chronic reflux.  Not sure if she is ever had an EGD.  Does well on pantoprazole 40 mg daily.  She does drink up to 4 cups of caffeine per day.  Occasional NSAID use.  No alcohol use.  She does also have occasional dysphagia in the midesophagus with solids.  Occasionally gets odynophagia.  Not pinpoint specific triggers.    No recent abdominal imaging to review.  No recent labs to review.    Prior EGD/colonoscopy     Colonoscopy 2015- colon polyps    REVIEW OF SYSTEMS IS OTHERWISE NEGATIVE.  10 point review of systems negative other than per HPI      Historical Information   Past Medical History:   Diagnosis Date   • Abdominal discomfort    • Acute cholecystitis due to biliary calculus 12/15/2020   • Anemia    • Arthritis    • Bronchitis    • Chronic pain disorder    • Cluster headache    • CTS (carpal tunnel syndrome)    • Current moderate episode of major depressive disorder without prior episode ([facility]) 03/05/2020   • Dyslipidemia     Last Assessed 07/09/2012   • Fibroid when I was teenager    breasts   • GERD (gastroesophageal reflux disease)    • Headache, migraine    • Headache, tension-type    • History of transfusion 1986    when I gave birth to son   • Hypotension    • Low back pain    • Low  back pain    • Rheumatoid arthritis (HCC)    • Thyroid nodule 2019     Past Surgical History:   Procedure Laterality Date   • CATARACT EXTRACTION     •  SECTION     • CHOLECYSTECTOMY      When in ER at [facility]   • CHOLECYSTECTOMY LAPAROSCOPIC N/A 12/15/2020    Procedure: CHOLECYSTECTOMY LAPAROSCOPIC;  Surgeon: Bridger Echeverria MD;  Location: [facility];  Service: General   • ESOPHAGOGASTRODUODENOSCOPY N/A 2018    Procedure: ESOPHAGOGASTRODUODENOSCOPY (EGD) with push enteroscopy and bx;  Surgeon: Rachel Spencer DO;  Location: [facility];  Service: Gastroenterology   • EYE SURGERY     • NERVE SURGERY Right     Hand   • NH EDG US EXAM SURGICAL ALTER STOM DUODENUM/JEJUNUM N/A 2019    Procedure: LINEAR ENDOSCOPIC U/S;  Surgeon: Tony Brand MD;  Location:  GI LAB;  Service: Gastroenterology   • SEPTOPLASTY  around age 19   • UPPER GASTROINTESTINAL ENDOSCOPY       Social History   Social History     Substance and Sexual Activity   Alcohol Use Yes    Comment: occasionally     Social History     Substance and Sexual Activity   Drug Use No     Social History     Tobacco Use   Smoking Status Former   • Current packs/day: 0.00   • Types: Cigarettes   • Quit date: 1985   • Years since quittin.4   Smokeless Tobacco Never     Family History   Problem Relation Age of Onset   • Hypertension Mother    • Other Mother         Dyslipidemia   • Hearing loss Mother         101 years old   • Dementia Mother    • Diabetes Father         Mellitus   • Stomach cancer Father    • Arthritis Father    • Dementia Father         he was 92 years had slight case   • Kidney failure Brother    • No Known Problems Brother    • No Known Problems Maternal Grandmother    • No Known Problems Maternal Grandfather    • No Known Problems Paternal Grandmother    • No Known Problems Paternal Grandfather    • No Known Problems Maternal Aunt    • No Known Problems Maternal Aunt    • No Known Problems Maternal Aunt    • No  "Known Problems Maternal Aunt    • No Known Problems Maternal Aunt    • No Known Problems Paternal Aunt    • No Known Problems Paternal Aunt    • No Known Problems Paternal Aunt    • No Known Problems Paternal Aunt    • No Known Problems Paternal Aunt    • Cancer Neg Hx    • Heart disease Neg Hx    • Stroke Neg Hx    • Breast cancer Neg Hx    • Colon cancer Neg Hx    • Endometrial cancer Neg Hx        Meds/Allergies       Current Outpatient Medications:   •  acetaminophen (TYLENOL) 325 mg tablet  •  ASHWAGANDHA PO  •  buPROPion (WELLBUTRIN SR) 100 mg 12 hr tablet  •  Cholecalciferol (Vitamin D) 50 MCG (2000 UT) CAPS  •  Cholecalciferol (VITAMIN D3) 5000 units CAPS  •  cholestyramine (QUESTRAN) 4 g packet  •  diclofenac sodium (VOLTAREN) 1 %  •  hydrocortisone 2.5 % cream  •  ipratropium (ATROVENT) 0.03 % nasal spray  •  Melatonin 2.5 MG CHEW  •  naproxen (NAPROSYN) 500 mg tablet  •  olopatadine HCl (PATADAY) 0.2 % opth drops  •  Omega-3 Fatty Acids (OMEGA-3 CF PO)  •  ondansetron (ZOFRAN) 4 mg tablet  •  pantoprazole (PROTONIX) 40 mg tablet  •  polyethylene glycol (MIRALAX) 17 g packet  •  rizatriptan (MAXALT) 5 mg tablet  •  benzonatate (TESSALON PERLES) 100 mg capsule  •  Diclofenac Sodium 3 % CREA  •  docusate sodium (COLACE) 100 mg capsule  •  omega-3-acid ethyl esters (LOVAZA) 1 g capsule    Allergies   Allergen Reactions   • Erythromycin Rash           Objective     Blood pressure 122/62, pulse 79, temperature 98.2 °F (36.8 °C), temperature source Tympanic, height 5' 1\" (1.549 m), weight 68 kg (150 lb), SpO2 97%. Body mass index is 28.34 kg/m².      PHYSICAL EXAM:      General Appearance:   Alert, cooperative, no distress   HEENT:   Normocephalic, atraumatic, anicteric.     Neck:  Supple, symmetrical, trachea midline   Lungs:   Clear to auscultation bilaterally; no rales, rhonchi or wheezing; respirations unlabored    Heart::   Regular rate and rhythm; no murmur, rub, or gallop.   Abdomen:   Soft, non-tender, " non-distended; normal bowel sounds; no masses, no organomegaly    Genitalia:   Deferred    Rectal:   Deferred    Extremities:  No cyanosis, clubbing or edema    Pulses:  2+ and symmetric    Skin:  No jaundice, rashes, or lesions    Lymph nodes:  No palpable cervical lymphadenopathy        Lab Results:   No visits with results within 1 Day(s) from this visit.   Latest known visit with results is:   Lab on 03/19/2024   Component Date Value   • Color, UA 03/19/2024 Light Yellow    • Clarity, UA 03/19/2024 Clear    • Specific Gravity, UA 03/19/2024 1.015    • pH, UA 03/19/2024 6.5    • Leukocytes, UA 03/19/2024 Negative    • Nitrite, UA 03/19/2024 Negative    • Protein, UA 03/19/2024 Negative    • Glucose, UA 03/19/2024 Negative    • Ketones, UA 03/19/2024 Negative    • Urobilinogen, UA 03/19/2024 <2.0    • Bilirubin, UA 03/19/2024 Negative    • Occult Blood, UA 03/19/2024 Negative          Radiology Results:   No results found.

## 2024-07-03 ENCOUNTER — HOSPITAL ENCOUNTER (OUTPATIENT)
Dept: MAMMOGRAPHY | Facility: MEDICAL CENTER | Age: 69
Discharge: HOME | End: 2024-07-03
Payer: MEDICARE

## 2024-07-03 VITALS — WEIGHT: 150 LBS | HEIGHT: 61 IN | BODY MASS INDEX: 28.32 KG/M2

## 2024-07-03 DIAGNOSIS — Z12.31 ENCOUNTER FOR SCREENING MAMMOGRAM FOR MALIGNANT NEOPLASM OF BREAST: ICD-10-CM

## 2024-07-03 PROCEDURE — 77067 SCR MAMMO BI INCL CAD: CPT

## 2024-07-03 PROCEDURE — 77063 BREAST TOMOSYNTHESIS BI: CPT

## 2024-09-20 ENCOUNTER — RA CDI HCC (OUTPATIENT)
Dept: OTHER | Facility: HOSPITAL | Age: 69
End: 2024-09-20

## 2024-09-23 ENCOUNTER — TELEPHONE (OUTPATIENT)
Dept: GASTROENTEROLOGY | Facility: MEDICAL CENTER | Age: 69
End: 2024-09-23

## 2024-09-23 NOTE — TELEPHONE ENCOUNTER
Left voicemail and requested call back     Called patient to confirm procedure on 10/08/2024 with Dr. Barrera at [facility], please give us call to confirm. I will send a message on [employer] so the patient can confirm procedure. I will attach the prep instructions to the message.

## 2024-09-24 ENCOUNTER — ANESTHESIA EVENT (OUTPATIENT)
Dept: ANESTHESIOLOGY | Facility: HOSPITAL | Age: 69
End: 2024-09-24

## 2024-09-24 ENCOUNTER — ANESTHESIA (OUTPATIENT)
Dept: ANESTHESIOLOGY | Facility: HOSPITAL | Age: 69
End: 2024-09-24

## 2024-10-01 ENCOUNTER — OFFICE VISIT (OUTPATIENT)
Dept: FAMILY MEDICINE CLINIC | Facility: CLINIC | Age: 69
End: 2024-10-01
Payer: MEDICARE

## 2024-10-01 VITALS
SYSTOLIC BLOOD PRESSURE: 116 MMHG | BODY MASS INDEX: 28.46 KG/M2 | WEIGHT: 150.6 LBS | DIASTOLIC BLOOD PRESSURE: 68 MMHG | HEART RATE: 70 BPM | OXYGEN SATURATION: 97 % | RESPIRATION RATE: 16 BRPM

## 2024-10-01 DIAGNOSIS — M65.4 TENOSYNOVITIS, DE QUERVAIN: ICD-10-CM

## 2024-10-01 DIAGNOSIS — K21.9 GASTROESOPHAGEAL REFLUX DISEASE WITHOUT ESOPHAGITIS: ICD-10-CM

## 2024-10-01 DIAGNOSIS — Z00.00 MEDICARE ANNUAL WELLNESS VISIT, SUBSEQUENT: ICD-10-CM

## 2024-10-01 DIAGNOSIS — F32.A DEPRESSION, UNSPECIFIED DEPRESSION TYPE: ICD-10-CM

## 2024-10-01 DIAGNOSIS — R73.01 IMPAIRED FASTING GLUCOSE: ICD-10-CM

## 2024-10-01 DIAGNOSIS — R87.610 ATYPICAL SQUAMOUS CELLS OF UNDETERMINED SIGNIFICANCE ON CYTOLOGIC SMEAR OF CERVIX (ASC-US): ICD-10-CM

## 2024-10-01 DIAGNOSIS — Z23 ENCOUNTER FOR IMMUNIZATION: Primary | ICD-10-CM

## 2024-10-01 DIAGNOSIS — F17.211 CIGARETTE NICOTINE DEPENDENCE IN REMISSION: ICD-10-CM

## 2024-10-01 DIAGNOSIS — E78.2 MIXED HYPERLIPIDEMIA: ICD-10-CM

## 2024-10-01 DIAGNOSIS — G43.109 MIGRAINE AURA WITHOUT HEADACHE: ICD-10-CM

## 2024-10-01 DIAGNOSIS — Z87.891 PERSONAL HISTORY OF TOBACCO USE, PRESENTING HAZARDS TO HEALTH: ICD-10-CM

## 2024-10-01 DIAGNOSIS — F41.9 ANXIETY DISORDER, UNSPECIFIED TYPE: ICD-10-CM

## 2024-10-01 DIAGNOSIS — Z78.0 POST-MENOPAUSAL: ICD-10-CM

## 2024-10-01 PROCEDURE — G0439 PPPS, SUBSEQ VISIT: HCPCS | Performed by: FAMILY MEDICINE

## 2024-10-01 PROCEDURE — G0008 ADMIN INFLUENZA VIRUS VAC: HCPCS

## 2024-10-01 PROCEDURE — 90662 IIV NO PRSV INCREASED AG IM: CPT

## 2024-10-01 PROCEDURE — 99214 OFFICE O/P EST MOD 30 MIN: CPT | Performed by: FAMILY MEDICINE

## 2024-10-01 RX ORDER — LATANOPROSTENE BUNOD 0.24 MG/ML
SOLUTION/ DROPS OPHTHALMIC
COMMUNITY
Start: 2024-09-30

## 2024-10-01 NOTE — PATIENT INSTRUCTIONS
Schedule repeat pap with gyn - due this month in followup to abnormal findings last year    Flu shot today    Egd and colonoscopy as scheduled.    Fasting labs end of year    Schedule the following studies -   Dexa  And  Ct lung cancer screening    Refer to PT for thumbs.  And if not helpful - may need another set of injections

## 2024-10-01 NOTE — PROGRESS NOTES
Ambulatory Visit  Name: Kaylan Young      : 1955      MRN: [id number]  Encounter Provider: Zuleyma Carney DO  Encounter Date: 10/1/2024   Encounter department: [facility]    Assessment & Plan  Encounter for immunization    Orders:    influenza vaccine, high-dose, PF 0.5 mL (Fluzone High Dose)    Medicare annual wellness visit, subsequent  Anticipatory guidance and preventative medicine discussed    Patient Instructions   Schedule repeat pap with gyn - due this month in followup to abnormal findings last year    Flu shot today    Egd and colonoscopy as scheduled.    Fasting labs end of year    Schedule the following studies -   Dexa  And  Ct lung cancer screening    Refer to PT for thumbs.  And if not helpful - may need another set of injections           Migraine aura without headache  Stable.         Gastroesophageal reflux disease without esophagitis  Scheduled for egd and colonoscopy       Impaired fasting glucose  Repeat labs end of year         Personal history of tobacco use, presenting hazards to health         Anxiety disorder, unspecified type  Controlled substance agreement today  Taking ativen very infrequently         Depression, unspecified depression type           Atypical squamous cells of undetermined significance on cytologic smear of cervix (ASC-US)  Urged to repeat exam withgyn - duethis month         Mixed hyperlipidemia    Orders:    Comprehensive metabolic panel; Future    Lipid panel; Future    Cigarette nicotine dependence in remission  Again advise CT scan  Pt did not go prior  Orders:    CT lung screening program; Future    Post-menopausal  H/o taking fosamax in past  Dexa due      Orders:    DXA bone density spine hip and pelvis; Future    Tenosynovitis, de Quervain  Had injections with ortho - not much help  Does try exercises.  No formal PT    Orders:    Ambulatory Referral to Physical Therapy; Future       Preventive health issues  were discussed with patient, and age appropriate screening tests were ordered as noted in patient's After Visit Summary. Personalized health advice and appropriate referrals for health education or preventive services given if needed, as noted in patient's After Visit Summary.    History of Present Illness     HPI   Patient Care Team:  Zuleyma Carney DO as PCP - General (Family Medicine)    Review of Systems   Constitutional:  Negative for chills, diaphoresis, fatigue, fever and unexpected weight change.        Walks the dog.     HENT:          Uses atrovent nasal spray prn  Does have some post-nasal drip.     Eyes:         Using eye drops - recently started with eye dr.     Respiratory:  Negative for cough, shortness of breath and wheezing.    Cardiovascular:  Negative for chest pain and palpitations.   Gastrointestinal:         Taking questran for HL and also bowels  Not having many bowel issues.  And is going to be getting egd and colonoscopy    Miralax prn     Musculoskeletal:  Positive for arthralgias and joint swelling.        Painful in base of both thumbs  Wore brace nighttime - was seen by specialist  Had injections.  May get some swelling and overlying itching      Allergic/Immunologic: Positive for environmental allergies.   Neurological:         Takes naproxen for onset of migraines  Up to a couple times/month  Usually worse this time of year due to allergies.     Psychiatric/Behavioral:          Taking wellbutrin only once daily and doing well  Did not feel that she needed bid dosing  Rx by psychiatry    Taking ativan for traveling only - situational anxiety     Medical History Reviewed by provider this encounter:       Annual Wellness Visit Questionnaire   Kaylan is here for her Subsequent Wellness visit.     Health Risk Assessment:   Patient rates overall health as good. Patient feels that their physical health rating is same. Patient is satisfied with their life. Eyesight was rated as same.  Hearing was rated as same. Patient feels that their emotional and mental health rating is slightly better. Patients states they are never, rarely angry. Patient states they are never, rarely unusually tired/fatigued. Pain experienced in the last 7 days has been none. Patient states that she has experienced no weight loss or gain in last 6 months.     Depression Screening:   PHQ-9 Score: 1      Fall Risk Screening:   In the past year, patient has experienced: no history of falling in past year      Urinary Incontinence Screening:   Patient has not leaked urine accidently in the last six months.     Home Safety:  Patient does not have trouble with stairs inside or outside of their home. Patient has working smoke alarms and has working carbon monoxide detector. Home safety hazards include: none.     Nutrition:   Current diet is Regular, Low Carb, No Added Salt and Limited junk food.     Medications:   Patient is currently taking over-the-counter supplements. OTC medications include: see medication list. Patient is able to manage medications.     Activities of Daily Living (ADLs)/Instrumental Activities of Daily Living (IADLs):   Walk and transfer into and out of bed and chair?: Yes  Dress and groom yourself?: Yes    Bathe or shower yourself?: Yes    Feed yourself? Yes  Do your laundry/housekeeping?: Yes  Manage your money, pay your bills and track your expenses?: Yes  Make your own meals?: Yes    Do your own shopping?: Yes    Durable Medical Equipment Suppliers  hand for tumbs both hands n back brace    Previous Hospitalizations:   Any hospitalizations or ED visits within the last 12 months?: No      Advance Care Planning:   Living will: No    Durable POA for healthcare: No    Advanced directive: No      PREVENTIVE SCREENINGS      Cardiovascular Screening:    General: Screening Not Indicated and History Lipid Disorder      Diabetes Screening:     General: Screening Current      Breast Cancer Screening:     General:  Screening Current      Cervical Cancer Screening:    General: Screening Not Indicated      Lung Cancer Screening:     General: Screening Not Indicated      Hepatitis C Screening:    General: Screening Current    Screening, Brief Intervention, and Referral to Treatment (SBIRT)    Screening  Typical number of drinks in a day: 0  Typical number of drinks in a week: 0  Interpretation: Low risk drinking behavior.    AUDIT-C Screenin) How often did you have a drink containing alcohol in the past year? monthly or less  2) How many drinks did you have on a typical day when you were drinking in the past year? 1 to 2  3) How often did you have 6 or more drinks on one occasion in the past year? never    AUDIT-C Score: 1  Interpretation: Score 0-2 (female): Negative screen for alcohol misuse    Single Item Drug Screening:  How often have you used an illegal drug (including marijuana) or a prescription medication for non-medical reasons in the past year? never    Single Item Drug Screen Score: 0  Interpretation: Negative screen for possible drug use disorder    Social Determinants of Health     Financial Resource Strain: Medium Risk (2023)    Overall Financial Resource Strain (CARDIA)     Difficulty of Paying Living Expenses: Somewhat hard   Food Insecurity: Unknown (2024)    Hunger Vital Sign     Worried About Running Out of Food in the Last Year: Never true     Ran Out of Food in the Last Year: Patient declined   Transportation Needs: No Transportation Needs (2024)    PRAPARE - Transportation     Lack of Transportation (Medical): No     Lack of Transportation (Non-Medical): No   Housing Stability: Unknown (2024)    Housing Stability Vital Sign     Unable to Pay for Housing in the Last Year: Patient declined     Homeless in the Last Year: No   Utilities: Not At Risk (2024)    [facility] Utilities     Threatened with loss of utilities: No     No results found.    Objective     There were no vitals taken  for this visit.    Physical Exam  Vitals and nursing note reviewed.   Constitutional:       General: She is not in acute distress.     Appearance: Normal appearance. She is not ill-appearing or toxic-appearing.      Comments: Looks younger than stated age.     HENT:      Right Ear: Tympanic membrane normal. There is no impacted cerumen.      Left Ear: Tympanic membrane normal. There is no impacted cerumen.      Nose: Nose normal. No congestion.      Mouth/Throat:      Mouth: Mucous membranes are moist.      Pharynx: No oropharyngeal exudate or posterior oropharyngeal erythema.   Eyes:      General: No scleral icterus.     Extraocular Movements: Extraocular movements intact.      Pupils: Pupils are equal, round, and reactive to light.   Neck:      Vascular: No carotid bruit.   Cardiovascular:      Rate and Rhythm: Normal rate and regular rhythm.      Pulses: Normal pulses.      Heart sounds: Normal heart sounds. No murmur heard.  Pulmonary:      Effort: Pulmonary effort is normal. No respiratory distress.      Breath sounds: Normal breath sounds. No wheezing, rhonchi or rales.   Abdominal:      General: There is no distension.      Palpations: Abdomen is soft. There is no mass.      Tenderness: There is no abdominal tenderness. There is no guarding or rebound.   Musculoskeletal:      Cervical back: Neck supple.      Right lower leg: No edema.      Left lower leg: No edema.   Lymphadenopathy:      Cervical: No cervical adenopathy.   Skin:     General: Skin is warm.      Coloration: Skin is not jaundiced.      Findings: No rash.   Neurological:      General: No focal deficit present.      Mental Status: She is alert and oriented to person, place, and time.   Psychiatric:         Mood and Affect: Mood normal.         Behavior: Behavior normal.         Thought Content: Thought content normal.         Judgment: Judgment normal.

## 2024-10-07 RX ORDER — SODIUM CHLORIDE 9 MG/ML
125 INJECTION, SOLUTION INTRAVENOUS CONTINUOUS
Status: CANCELLED | OUTPATIENT
Start: 2024-10-08

## 2024-10-08 ENCOUNTER — HOSPITAL ENCOUNTER (OUTPATIENT)
Dept: GASTROENTEROLOGY | Facility: MEDICAL CENTER | Age: 69
Setting detail: OUTPATIENT SURGERY
Discharge: HOME | End: 2024-10-08
Payer: MEDICARE

## 2024-10-08 ENCOUNTER — ANESTHESIA EVENT (OUTPATIENT)
Dept: GASTROENTEROLOGY | Facility: MEDICAL CENTER | Age: 69
End: 2024-10-08
Payer: MEDICARE

## 2024-10-08 ENCOUNTER — ANESTHESIA (OUTPATIENT)
Dept: GASTROENTEROLOGY | Facility: MEDICAL CENTER | Age: 69
End: 2024-10-08
Payer: MEDICARE

## 2024-10-08 VITALS
RESPIRATION RATE: 18 BRPM | OXYGEN SATURATION: 99 % | TEMPERATURE: 97.4 F | SYSTOLIC BLOOD PRESSURE: 99 MMHG | HEART RATE: 68 BPM | DIASTOLIC BLOOD PRESSURE: 58 MMHG

## 2024-10-08 DIAGNOSIS — Z86.0100 HISTORY OF COLON POLYPS: ICD-10-CM

## 2024-10-08 DIAGNOSIS — K21.9 GASTROESOPHAGEAL REFLUX DISEASE WITHOUT ESOPHAGITIS: ICD-10-CM

## 2024-10-08 DIAGNOSIS — R13.10 DYSPHAGIA, UNSPECIFIED TYPE: ICD-10-CM

## 2024-10-08 PROCEDURE — 43239 EGD BIOPSY SINGLE/MULTIPLE: CPT | Performed by: INTERNAL MEDICINE

## 2024-10-08 PROCEDURE — 45385 COLONOSCOPY W/LESION REMOVAL: CPT | Performed by: INTERNAL MEDICINE

## 2024-10-08 PROCEDURE — 88305 TISSUE EXAM BY PATHOLOGIST: CPT | Performed by: PATHOLOGY

## 2024-10-08 RX ORDER — LIDOCAINE HYDROCHLORIDE 20 MG/ML
INJECTION, SOLUTION EPIDURAL; INFILTRATION; INTRACAUDAL; PERINEURAL AS NEEDED
Status: DISCONTINUED | OUTPATIENT
Start: 2024-10-08 | End: 2024-10-08

## 2024-10-08 RX ORDER — SODIUM CHLORIDE 9 MG/ML
125 INJECTION, SOLUTION INTRAVENOUS CONTINUOUS
Status: DISCONTINUED | OUTPATIENT
Start: 2024-10-08 | End: 2024-10-12 | Stop reason: HOSPADM

## 2024-10-08 RX ORDER — PROPOFOL 10 MG/ML
INJECTION, EMULSION INTRAVENOUS AS NEEDED
Status: DISCONTINUED | OUTPATIENT
Start: 2024-10-08 | End: 2024-10-08

## 2024-10-08 RX ADMIN — PROPOFOL 20 MG: 10 INJECTION, EMULSION INTRAVENOUS at 09:57

## 2024-10-08 RX ADMIN — PROPOFOL 140 MG: 10 INJECTION, EMULSION INTRAVENOUS at 09:48

## 2024-10-08 RX ADMIN — PROPOFOL 20 MG: 10 INJECTION, EMULSION INTRAVENOUS at 09:52

## 2024-10-08 RX ADMIN — PROPOFOL 30 MG: 10 INJECTION, EMULSION INTRAVENOUS at 10:00

## 2024-10-08 RX ADMIN — PROPOFOL 20 MG: 10 INJECTION, EMULSION INTRAVENOUS at 09:51

## 2024-10-08 RX ADMIN — PROPOFOL 30 MG: 10 INJECTION, EMULSION INTRAVENOUS at 10:05

## 2024-10-08 RX ADMIN — SODIUM CHLORIDE 125 ML/HR: 0.9 INJECTION, SOLUTION INTRAVENOUS at 09:22

## 2024-10-08 RX ADMIN — LIDOCAINE HYDROCHLORIDE 100 MG: 20 INJECTION, SOLUTION EPIDURAL; INFILTRATION; INTRACAUDAL at 09:48

## 2024-10-08 RX ADMIN — PROPOFOL 20 MG: 10 INJECTION, EMULSION INTRAVENOUS at 09:50

## 2024-10-08 NOTE — ANESTHESIA PREPROCEDURE EVALUATION
Procedure:  COLONOSCOPY  EGD    Relevant Problems   CARDIO   (+) Hyperlipidemia   (+) Migraine aura without headache      GI/HEPATIC   (+) Esophageal reflux      MUSCULOSKELETAL   (+) Arthritis   (+) Chronic bilateral low back pain without sciatica   (+) Lumbar spondylosis      NEURO/PSYCH   (+) Anxiety disorder   (+) Chronic bilateral low back pain without sciatica   (+) Chronic left shoulder pain   (+) Depression   (+) Migraine aura without headache        Physical Exam    Airway    Mallampati score: II  TM Distance: >3 FB  Neck ROM: full     Dental       Cardiovascular  Cardiovascular exam normal    Pulmonary  Pulmonary exam normal     Other Findings  post-pubertal.      Anesthesia Plan  ASA Score- 2     Anesthesia Type- IV sedation with anesthesia with ASA Monitors.         Additional Monitors:     Airway Plan:            Plan Factors-Exercise tolerance (METS): >4 METS.    Chart reviewed.        Patient is not a current smoker.  Patient instructed to abstain from smoking on day of procedure. Patient did not smoke on day of surgery.    Obstructive sleep apnea risk education given perioperatively.        Induction- intravenous.    Postoperative Plan- Plan for postoperative opioid use. Planned trial extubation        Informed Consent- Anesthetic plan and risks discussed with patient.

## 2024-10-08 NOTE — H&P
History and Physical - [facility]  Kaylan Young 69 y.o. female MRN: [id number]                  HPI: Kaylan Young is a 69 y.o. year old female who presents for GERD and colon cancer screening      REVIEW OF SYSTEMS: Per the HPI, and otherwise unremarkable.    Historical Information   Past Medical History:   Diagnosis Date    Abdominal discomfort     Acute cholecystitis due to biliary calculus 12/15/2020    Anemia     Arthritis     Bronchitis     Chronic pain disorder     Cluster headache     CTS (carpal tunnel syndrome)     Current moderate episode of major depressive disorder without prior episode (HCC) 2020    Dyslipidemia     Last Assessed 2012    Fibroid when I was teenager    breasts    GERD (gastroesophageal reflux disease)     Headache, migraine     Headache, tension-type     History of transfusion     when I gave birth to son    Hypotension     Low back pain     Low back pain     Rheumatoid arthritis (HCC)     Thyroid nodule 2019     Past Surgical History:   Procedure Laterality Date    CATARACT EXTRACTION       SECTION      CHOLECYSTECTOMY      When in ER at [facility]    CHOLECYSTECTOMY LAPAROSCOPIC N/A 12/15/2020    Procedure: CHOLECYSTECTOMY LAPAROSCOPIC;  Surgeon: Bridger Echeverria MD;  Location: [facility];  Service: General    ESOPHAGOGASTRODUODENOSCOPY N/A 2018    Procedure: ESOPHAGOGASTRODUODENOSCOPY (EGD) with push enteroscopy and bx;  Surgeon: Rachel Spencer DO;  Location: [facility];  Service: Gastroenterology    EYE SURGERY      NERVE SURGERY Right     Hand    TX EDG US EXAM SURGICAL ALTER STOM DUODENUM/JEJUNUM N/A 2019    Procedure: LINEAR ENDOSCOPIC U/S;  Surgeon: Tony Brand MD;  Location:  GI LAB;  Service: Gastroenterology    SEPTOPLASTY  around age 19    UPPER GASTROINTESTINAL ENDOSCOPY       Social History   Social History     Substance and Sexual Activity   Alcohol Use Yes    Comment: occasionally     Social  History     Substance and Sexual Activity   Drug Use No     Social History     Tobacco Use   Smoking Status Former    Current packs/day: 0.00    Types: Cigarettes    Quit date: 1985    Years since quittin.7   Smokeless Tobacco Never     Family History   Problem Relation Age of Onset    Hypertension Mother     Other Mother         Dyslipidemia    Hearing loss Mother         101 years old    Dementia Mother     Diabetes Father         Mellitus    Stomach cancer Father     Arthritis Father     Dementia Father         he was 92 years had slight case    Kidney failure Brother     No Known Problems Brother     No Known Problems Maternal Grandmother     No Known Problems Maternal Grandfather     No Known Problems Paternal Grandmother     No Known Problems Paternal Grandfather     No Known Problems Maternal Aunt     No Known Problems Maternal Aunt     No Known Problems Maternal Aunt     No Known Problems Maternal Aunt     No Known Problems Maternal Aunt     No Known Problems Paternal Aunt     No Known Problems Paternal Aunt     No Known Problems Paternal Aunt     No Known Problems Paternal Aunt     No Known Problems Paternal Aunt     Cancer Neg Hx     Heart disease Neg Hx     Stroke Neg Hx     Breast cancer Neg Hx     Colon cancer Neg Hx     Endometrial cancer Neg Hx        Meds/Allergies       Current Outpatient Medications:     acetaminophen (TYLENOL) 325 mg tablet    ASHWAGANDHA PO    buPROPion (WELLBUTRIN SR) 100 mg 12 hr tablet    Cholecalciferol (Vitamin D) 50 MCG ( UT) CAPS    Cholecalciferol (VITAMIN D3) 5000 units CAPS    cholestyramine (QUESTRAN) 4 g packet    diclofenac sodium (VOLTAREN) 1 %    Diclofenac Sodium 3 % CREA    hydrocortisone 2.5 % cream    ipratropium (ATROVENT) 0.03 % nasal spray    Melatonin 2.5 MG CHEW    Omega-3 Fatty Acids (OMEGA-3 CF PO)    omega-3-acid ethyl esters (LOVAZA) 1 g capsule    ondansetron (ZOFRAN) 4 mg tablet    pantoprazole (PROTONIX) 40 mg tablet    rizatriptan  (MAXALT) 5 mg tablet    Vyzulta 0.024 % SOLN    LORazepam (Ativan) 0.5 mg tablet    naproxen (NAPROSYN) 500 mg tablet    olopatadine HCl (PATADAY) 0.2 % opth drops    polyethylene glycol (MIRALAX) 17 g packet    Current Facility-Administered Medications:     sodium chloride 0.9 % infusion, 125 mL/hr, Intravenous, Continuous, 125 mL/hr at 10/08/24 0922    Allergies   Allergen Reactions    Erythromycin Rash       Objective     /58   Pulse 74   Temp (!) 97.4 °F (36.3 °C) (Temporal)   Resp 18   SpO2 99%       PHYSICAL EXAM    Gen: NAD  Head: NCAT  CV: RRR  CHEST: Clear  ABD: soft, NT/ND  EXT: no edema      ASSESSMENT/PLAN:  This is a 69 y.o. year old female here for GERD and colon cancer screening, and she is stable and optimized for her procedure.

## 2024-10-08 NOTE — ANESTHESIA POSTPROCEDURE EVALUATION
Post-Op Assessment Note    CV Status:  Stable    Pain management: satisfactory to patient       Mental Status:  Alert and awake   Hydration Status:  Stable   PONV Controlled:  None   Airway Patency:  Patent     Post Op Vitals Reviewed: Yes    No anethesia notable event occurred.    Staff: CRNA           Last Filed PACU Vitals:  Vitals Value Taken Time   Temp     Pulse 72 1016   BP 97/49 1016   Resp 14 1016   SpO2 100 1016       Modified Des:  Activity: 2 (10/8/2024  9:11 AM)  Respiration: 2 (10/8/2024  9:11 AM)  Circulation: 2 (10/8/2024  9:11 AM)  Consciousness: 2 (10/8/2024  9:11 AM)  Oxygen Saturation: 2 (10/8/2024  9:11 AM)  Modified Des Score: 10 (10/8/2024  9:11 AM)

## 2024-10-14 ENCOUNTER — NURSE TRIAGE (OUTPATIENT)
Age: 69
End: 2024-10-14

## 2024-10-14 PROCEDURE — 88305 TISSUE EXAM BY PATHOLOGIST: CPT | Performed by: PATHOLOGY

## 2024-10-14 NOTE — TELEPHONE ENCOUNTER
"Patient calling regarding rash since colonoscopy prep.  Patient has had red, itchy, burning, painful bumpy rash around anus since doing prep.  Has tried Desitin cream and hemorrhoid wipes with no relief.  Any recommendations?     Reason for Disposition   Mild localized rash    Answer Assessment - Initial Assessment Questions  1. APPEARANCE of RASH: \"Describe the rash.\"       Red, inflammed, bumps   2. LOCATION: \"Where is the rash located?\"       anus  5. ONSET: \"When did the rash start?\"       During colonoscopy prep  6. ITCHING: \"Does the rash itch?\" If Yes, ask: \"How bad is the itch?\"  (Scale 1-10; or mild, moderate, severe)      yes  7. PAIN: \"Does the rash hurt?\" If Yes, ask: \"How bad is the pain?\"  (Scale 1-10; or mild, moderate, severe)      Burns, hurts and itches  8. OTHER SYMPTOMS: \"Do you have any other symptoms?\" (e.g., fever)      Denies    Protocols used: Rash or Redness - Localized-ADULT-[place]    "

## 2024-10-14 NOTE — TELEPHONE ENCOUNTER
I would recommend that he try Preparation H cream twice a day.  If no help in a few days please have him contact the office.

## 2024-10-15 ENCOUNTER — OFFICE VISIT (OUTPATIENT)
Dept: GASTROENTEROLOGY | Facility: MEDICAL CENTER | Age: 69
End: 2024-10-15
Payer: MEDICARE

## 2024-10-15 VITALS
WEIGHT: 150 LBS | BODY MASS INDEX: 28.32 KG/M2 | HEIGHT: 61 IN | HEART RATE: 73 BPM | TEMPERATURE: 97.6 F | SYSTOLIC BLOOD PRESSURE: 113 MMHG | DIASTOLIC BLOOD PRESSURE: 72 MMHG | OXYGEN SATURATION: 98 %

## 2024-10-15 DIAGNOSIS — K62.89 ANAL IRRITATION: Primary | ICD-10-CM

## 2024-10-15 DIAGNOSIS — K21.9 GASTROESOPHAGEAL REFLUX DISEASE WITHOUT ESOPHAGITIS: ICD-10-CM

## 2024-10-15 DIAGNOSIS — Z86.0100 PERSONAL HISTORY OF COLON POLYPS, UNSPECIFIED: ICD-10-CM

## 2024-10-15 DIAGNOSIS — K52.9 CHRONIC DIARRHEA: ICD-10-CM

## 2024-10-15 PROCEDURE — 99214 OFFICE O/P EST MOD 30 MIN: CPT | Performed by: NURSE PRACTITIONER

## 2024-10-15 RX ORDER — HYDROCORTISONE 25 MG/G
CREAM TOPICAL 2 TIMES DAILY
Qty: 3 G | Refills: 3 | Status: SHIPPED | OUTPATIENT
Start: 2024-10-15

## 2024-10-15 RX ORDER — CLOTRIMAZOLE AND BETAMETHASONE DIPROPIONATE 10; .64 MG/G; MG/G
CREAM TOPICAL 2 TIMES DAILY
Qty: 3 G | Refills: 2 | Status: SHIPPED | OUTPATIENT
Start: 2024-10-15

## 2024-10-15 NOTE — TELEPHONE ENCOUNTER
Pt calling back in, reports she is not doing any better. She looked at rectum with mirror and around the anus is pimple like rash- it is itchy/ burning rash. Does not look like hemorrhoids. Pt is going away tomorrow and is concerned with rash ongoing for a week now.   She has tried cortisone, A&D ointment and prep H and it is not working.     I scheduled appt for today to see provider in office to examine area.

## 2024-10-15 NOTE — PROGRESS NOTES
Ambulatory Visit  Name: Kaylan Young      : 1955      MRN: [id number]  Encounter Provider: JIMBO Howard  Encounter Date: 10/15/2024   Encounter department: [facility] [place]    Assessment & Plan  Anal irritation  Reports she has had some anal irritation after she prep for her colonoscopy.  She used Desitin/A&E ointment with no help.  She did try Preparation H for few days with no help.  She currently is taking hydrocortisone cream 2.5% daily with little help.  Rectal exam today noted some redness around the rectum but no anal fissures, no inflamed hemorrhoids.  Will have patient continue the hydrocortisone cream for the next 7 to 10 days.  If no help she can trial the clotrimazole/Betamethasone cream.  Instructed to contact office in a few weeks with update.    Orders:    clotrimazole-betamethasone (LOTRISONE) 1-0.05 % cream; Apply topically 2 (two) times a day    hydrocortisone (ANUSOL-HC) 2.5 % rectal cream; Apply topically 2 (two) times a day  -Call office in few weeks with update  -Follow-up in office in 3 to 4 months or sooner if needed  Chronic diarrhea  History of chronic loose stools for several years.  Recent colonoscopy noted benign polyps, diverticulosis and small hemorrhoids.  Reports since her colonoscopy she has had minimal to no loose stools.  She had been on cholestyramine oh quarter packet daily with some help in the past.  She would like to hold on any medicine at this time to see how the bowel movement pattern is since colonoscopy.    -Fiber supplement daily       Personal history of colon polyps, unspecified  Recent colonoscopy noted benign polyps, diverticulosis and small hemorrhoids.  Repeat colonoscopy recommended in 5 years.       Gastroesophageal reflux disease without esophagitis  History of chronic reflux.  Recent EGD noted gastritis only.  Biopsies were negative for celiac, Campuzano's esophagus and H. pylori.  She is doing well on  pantoprazole 40 mg daily.  Denies any further dysphagia.    -Continue pantoprazole 40 mg daily  -Antireflux diet         History of Present Illness     Kaylan Young is a 69 y.o. female who presents for follow-up.  She was last seen by myself 6/12/2024 for a history of diarrhea, personal history of colon polyps, dysphagia and GERD.    History of chronic loose stools for several years. She has been using cholestyramine powder on and off which helps. Recently she has only been taking it when she gets loose stools. She stopped it after several days and then restarts it 1 loose stools occur again. She is taking a half a capful for several days in a row and then stools become harder. We did discuss doing about 1/4 packet daily to help control loose stools. Denies any melena hematochezia.     History of chronic diarrhea for several years. Cholestyramine has worked in the past for her but a full packet makes her nauseated.  Her last colonoscopy was 2015 and it was negative.  Her last Cologuard test was 2020 which was also negative.  She is using cholestyramine half a packet 1 packet as needed for looser stools.  She occasionally alternates to harder stools or no BM.  Requires MiraLAX at that time to have a BM.  Denies any melena or hematochezia.  Denies any abdominal pain.  No weight loss. She has never tried fiber.  She has not been taking her cholestyramine regularly.  She takes it when she develops loose stools and then stopped it several days later.     Interval history: Recent colonoscopy noted diverticulosis, small hemorrhoids and 1 subcentimeter polyp.  Biopsies noted normal colonic mucosa.  Repeat colonoscopy recommended in 5 years recent EGD noted gastritis.  Biopsies were negative for celiac, Campuzano's and H. pylori.  Reports she has had some anal irritation after she prep for her colonoscopy.  She used Desitin/A&E ointment with no help.  She did try Preparation H for few days with no help.  She currently is  taking hydrocortisone cream 2.5% daily with little help.  Rectal exam today noted some redness around the rectum but no anal fissures, no inflamed hemorrhoids.      Prior EGD/colonoscopy     Colonoscopy 10/24-Subcentimeter polyp in the distal ascending colon was removed with cold snare  Diverticulosis of mild severity in the transverse colon  Small hemorrhoids    Polyps were colonic mucosa with no pathologic changes.  Repeat colonoscopy in 5 years.      EGD 10/24-gastritis.  Biopsies were negative for celiac, Campuzano's esophagus and H. pylori.     Colonoscopy 2015- colon polyps.      History obtained from : patient  Review of Systems   All other systems reviewed and are negative.    Medical History Reviewed by provider this encounter:  Tobacco  Allergies  Meds  Problems  Med Hx  Surg Hx  Fam Hx       Current Outpatient Medications on File Prior to Visit   Medication Sig Dispense Refill    acetaminophen (TYLENOL) 325 mg tablet Take 2 tablets (650 mg total) by mouth every 6 (six) hours as needed for mild pain or fever 30 tablet 0    ASHWAGANDHA PO Take 2 tablets by mouth in the morning Gummies      buPROPion (WELLBUTRIN SR) 100 mg 12 hr tablet Take 100 mg by mouth in the morning      Cholecalciferol (Vitamin D) 50 MCG (2000 UT) CAPS Take 2,000 Units by mouth in the morning      Cholecalciferol (VITAMIN D3) 5000 units CAPS Take 5,000 Units by mouth daily       cholestyramine (QUESTRAN) 4 g packet 1/2 pack once daily      diclofenac sodium (VOLTAREN) 1 % Apply topically if needed      Diclofenac Sodium 3 % CREA Apply 1 Application topically if needed      hydrocortisone 2.5 % cream Apply topically 3 (three) times a day 30 g 0    ipratropium (ATROVENT) 0.03 % nasal spray INSTILL TWO SPRAYS IN EACH NOSTRIL THREE TIMES A DAY AS NEEDED for rhinitis 30 mL 11    LORazepam (Ativan) 0.5 mg tablet Take 1/2 to 1 tab po up to once daily prn severe anxiety; caution for sedation; avoid driving/working while taking 10 tablet 0  "   Melatonin 2.5 MG CHEW Chew 2.5 mg daily at bedtime      naproxen (NAPROSYN) 500 mg tablet Take one tab up to twice a day as needed for migraine, no more than 3 times a week 30 tablet 11    olopatadine HCl (PATADAY) 0.2 % opth drops Administer 1 drop to both eyes daily (Patient taking differently: Administer 1 drop to both eyes daily as needed) 2.5 mL 11    Omega-3 Fatty Acids (OMEGA-3 CF PO) Take 1 capsule by mouth in the morning      omega-3-acid ethyl esters (LOVAZA) 1 g capsule Take 2 g by mouth daily      ondansetron (ZOFRAN) 4 mg tablet Take 1 tablet (4 mg total) by mouth every 6 (six) hours 12 tablet 0    pantoprazole (PROTONIX) 40 mg tablet Take 1 tablet (40 mg total) by mouth daily 30 tablet 3    polyethylene glycol (MIRALAX) 17 g packet Take 17 g by mouth daily 30 each 5    rizatriptan (MAXALT) 5 mg tablet Take 1 tablet (5 mg total) by mouth as needed for migraine 5-10 mg po at migraine onset, may repeat 5-10 mg in 2 hours if needed, max 20 mg/24 hours, 9 per month 9 tablet 11    Vyzulta 0.024 % SOLN        No current facility-administered medications on file prior to visit.      Social History     Tobacco Use    Smoking status: Former     Current packs/day: 0.00     Types: Cigarettes     Quit date: 1985     Years since quittin.8    Smokeless tobacco: Never   Vaping Use    Vaping status: Never Used   Substance and Sexual Activity    Alcohol use: Yes     Comment: occasionally    Drug use: No    Sexual activity: Yes     Partners: Male     Birth control/protection: Post-menopausal         Objective     /72 (BP Location: Left arm)   Pulse 73   Temp 97.6 °F (36.4 °C)   Ht 5' 1\" (1.549 m)   Wt 68 kg (150 lb)   SpO2 98%   BMI 28.34 kg/m²     Physical Exam  Vitals reviewed. Exam conducted with a chaperone present.   Constitutional:       Appearance: Normal appearance.   HENT:      Head: Normocephalic.   Cardiovascular:      Rate and Rhythm: Normal rate and regular rhythm.      Heart sounds: " Normal heart sounds.   Pulmonary:      Breath sounds: Normal breath sounds.   Abdominal:      General: Bowel sounds are normal.      Palpations: Abdomen is soft.   Genitourinary:     Exam position: Knee-chest position.      Rectum: Tenderness present.      Comments: Area around rectum reddened.  No anal fissures noted.  Some loose skin around the rectum noninflamed.  Neurological:      Mental Status: She is alert.

## 2024-10-16 NOTE — ASSESSMENT & PLAN NOTE
History of chronic reflux.  Recent EGD noted gastritis only.  Biopsies were negative for celiac, Campuzano's esophagus and H. pylori.  She is doing well on pantoprazole 40 mg daily.  Denies any further dysphagia.    -Continue pantoprazole 40 mg daily  -Antireflux diet

## 2024-10-24 ENCOUNTER — TELEPHONE (OUTPATIENT)
Age: 69
End: 2024-10-24

## 2024-10-24 NOTE — TELEPHONE ENCOUNTER
Pt called returning a call from [place]. Warm transferred over to triage nurse for further assistance

## 2024-10-24 NOTE — TELEPHONE ENCOUNTER
Pt transferred to nurses line.       Pt calling in regarding anal irritation. She tried the lotrisone which did not help so she went back to anusol cream and desitin which now has no more itching but still has constant painful burning in rectum. It is difficult to wipe herself due to the pain.     Pt was asking to see Dr. Spencer for a second opinion as she reports she can feel bumps in rectum.     Please advise next steps for pt.

## 2024-10-24 NOTE — TELEPHONE ENCOUNTER
"Pt. Called back, unable to get appointment with Dr. Spencer until December, pt. Unhappy with appointment and is having rectal pain, advised pt. To keep using Anusol ointment, pt. Also tried using hemorrhoid cream otc and Lotrisone cream with made symptoms worse, pt. Complaining of \"lumps\" with pain, advised to keep area clean and dry, warm sitz bath for rectal pain, keep using Anusol cream, please advise if pt. Needs a sooner appointment with Dr. Spencer as she wants to be seen   "

## 2024-10-24 NOTE — TELEPHONE ENCOUNTER
Patients GI provider:  JEFF Espinal    Number to return call: [phone]    Reason for call: Pt calling stating she was seen for anal rash and given ointment. No improvement in pain and burning. Transferred to triage nurse.    Scheduled procedure/appointment date if applicable: 10/15/24

## 2024-10-28 ENCOUNTER — OFFICE VISIT (OUTPATIENT)
Dept: NEUROLOGY | Facility: CLINIC | Age: 69
End: 2024-10-28
Payer: MEDICARE

## 2024-10-28 VITALS
HEIGHT: 61 IN | BODY MASS INDEX: 28.13 KG/M2 | HEART RATE: 67 BPM | SYSTOLIC BLOOD PRESSURE: 124 MMHG | WEIGHT: 149 LBS | DIASTOLIC BLOOD PRESSURE: 77 MMHG | TEMPERATURE: 98.1 F

## 2024-10-28 DIAGNOSIS — G43.109 MIGRAINE WITH AURA AND WITHOUT STATUS MIGRAINOSUS, NOT INTRACTABLE: ICD-10-CM

## 2024-10-28 PROCEDURE — G2211 COMPLEX E/M VISIT ADD ON: HCPCS | Performed by: PSYCHIATRY & NEUROLOGY

## 2024-10-28 PROCEDURE — 99213 OFFICE O/P EST LOW 20 MIN: CPT | Performed by: PSYCHIATRY & NEUROLOGY

## 2024-10-28 RX ORDER — RIZATRIPTAN BENZOATE 5 MG/1
5 TABLET ORAL AS NEEDED
Qty: 9 TABLET | Refills: 11 | Status: SHIPPED | OUTPATIENT
Start: 2024-10-28

## 2024-10-28 NOTE — PROGRESS NOTES
Review of Systems   Constitutional:  Negative for appetite change and fever.   HENT: Negative.  Negative for hearing loss, tinnitus, trouble swallowing and voice change.    Eyes: Negative.  Negative for photophobia and pain.   Respiratory: Negative.  Negative for shortness of breath.    Cardiovascular: Negative.  Negative for palpitations.   Gastrointestinal: Negative.  Negative for nausea and vomiting.   Endocrine: Negative.  Negative for cold intolerance.   Genitourinary: Negative.  Negative for dysuria, frequency and urgency.   Musculoskeletal: Negative.  Negative for myalgias and neck pain.   Skin: Negative.  Negative for rash.   Neurological: Negative.  Negative for dizziness, tremors, seizures, syncope, facial asymmetry, speech difficulty, weakness, light-headedness, numbness and headaches.   Hematological: Negative.  Does not bruise/bleed easily.   Psychiatric/Behavioral: Negative.  Negative for confusion, hallucinations and sleep disturbance.    All other systems reviewed and are negative.

## 2024-10-28 NOTE — PATIENT INSTRUCTIONS
If vertigo  returns, we can refer you to PT or you can call in and see if I happen to have an opening that day.     Please let me know if you would ever want me to order the sleep study over since it .      Headache/migraine treatment:   Abortive medications (for immediate treatment of a headache):   It is ok to take ibuprofen, acetaminophen or naproxen (Advil, Tylenol,  Aleve, Excedrin) if they help your headaches you should limit these to No more than 3 times a week to avoid medication overuse/rebound headaches.      For nausea  Ondansetron 4 mg ok to take as needed for nausea      For your more moderate to severe migraines take this medication early:  Continue Maxalt (rizatriptan) 2.5- 5 mg  - take one at the onset of headache. May repeat one time after 1-2 hours if pain has not resolved.   (Max 2 a day and 9 a month)      -     naproxen (NAPROSYN) 500 mg tablet; Take one tab up to twice a day as needed for migraine, no more than 3 times a week      Prescription preventive medications for headaches/migraines   (to take every day to help prevent headaches - not to take at the time of headache):  [x] Could consider in the future if needed     Lifestyle Recommendations:  [x] SLEEP - Maintain a regular sleep schedule: Adults need at least 7-8 hours of uninterrupted a night. Maintain good sleep hygiene:  Going to bed and waking up at consistent times, avoiding excessive daytime naps, avoiding caffeinated beverages in the evening, avoid excessive stimulation in the evening and generally using bed primarily for sleeping.  One hour before bedtime would recommend turning lights down lower, decreasing your activity (may read quietly, listen to music at a low volume). When you get into bed, should eliminate all technology (no texting, emailing, playing with your phone, iPad or tablet in bed).  [x] HYDRATION - Maintain good hydration.  Drink  2L of fluid a day (4 typical small water bottles)  [x] DIET - Maintain good  nutrition. In particular don't skip meals and try and eat healthy balanced meals regularly.  [x] TRIGGERS - Look for other triggers and avoid them: Limit caffeine to 1-2 cups a day or less. Avoid dietary triggers that you have noticed bring on your headaches (this could include aged cheese, peanuts, MSG, aspartame and nitrates).  [x] EXERCISE - physical exercise as we all know is good for you in many ways, and not only is good for your heart, but also is beneficial for your mental health, cognitive health and  chronic pain/headaches. I would encourage at the least 5 days of physical exercise weekly for at least 30 minutes.      Education and Follow-up  [x] Please call with any questions or concerns.   [x] Follow up 1 year, sooner as needed

## 2024-10-28 NOTE — PROGRESS NOTES
Neurology Ambulatory Visit  Name: Kaylan Young       : 1955       MRN: [id number]   Encounter Provider: Sindy Guzmán MD   Encounter Date: 10/28/2024  Encounter department: NEUROLOGY [facility]      Assessment/Plan:   Kaylan Young is a very pleasant 69 y.o. female with a past medical history includes  Chronic back pain, chronic left shoulder pain, chronic pain disorder, migraines, GERD, hyperlipidemia, depression, arthritis, low blood pressure, bilateral carpal tunnel, abdominal pain,  Sleep disorder,  Vitamin-D deficiency, anemia, glaucoma, cataracts referred here for evaluation of headache.  My initial evaluation 2019     Migraine without aura and without status migrainosus, not intractable  Acephalgic migraine with aura  Vertiginous migraine  BPPV - Benign paroxysmal positional vertigo   The patient reports headaches/migraines since her teenage years.     She reports various locations of her migraines, but typically left-sided, quality varies.  Typical associated migrainous features.  - as of 2019: She reports migraines currently occur about twice a month, but in January she had an increase in visual auras occured daily for a week.  - as of 2019:  Only 2 migraines in the past 3 months which is improvement, she has been keeping track of her visual symptoms and she was worried they have increased although it seems to me they are occurring less often - and there seems to be a component of anxiety triggering them in some cases  - as of 2019: No auras or migraines at least the past 2 months. Overall improved. Mood and anxiety improved with therapist. Taking mg, b2. Sleep not great due to frozen shoulder pain. Will consider melatonin. Also discussed considering venlafaxine low dose if needed for mood/headaches at next visit.   - as of 20:  Headaches/migraines are less frequent, with weather change in Oct had 2 migraines, 3rd caught early with the  rizatriptan. Aura only 2 times in the past 4-5 months. Still taking magnesium (although some nausea sometimes, other times forgets), B2.  - As of 7/7/2020: she remains well controlled. Only one migraine in 6 months that resolved with rizatriptan. About 4 auras in 6 months. Sometimes forgets to take aspirin.   - as of 1/20/2021: She remains well controlled. Only one migraine in 6 months that resolved with rizatriptan.   - as of 7/26/2021: She reports she continues to do well. No significant migraines requiring rizatriptan. 3 milder per month that improve with naproxen. 3-4 bilateral visual auras without headache in 6 months, that do not last as long as before. Recent episode that sounds like BPPV with negative HINTs exam today, likely resolved.     - as of 1/26/2022: Doing better, about 2 mild headaches per month that resolve with naproxen, has not taken rizatriptan in over a year. Migraine visual aura maybe 0-2 a month that self resolves in minutes. Not on preventative.   - as of 12/20/2022: She reports about 3 migraine aura without headache a month that respond to 5 mg rizatriptan. About 1 milder headache a week that improves with naproxen. Not interested in prescription preventative or resuming supplements and overall comfortable with current number of migraines.   - as of 4/25/2024: She reports returning only to check-in for refills however we discussed from history does sound like she potentially has sleep apnea and as detailed taking naps during the day, sleep study ordered.  She reports migraine aura with or without headache is much less often and maybe once every other month and still completely resolved with rizatriptan 5 mg.  Milder migraines maybe once a month or less and less severe.  Milder headaches 1-2 times a month with weather changes.  No big vertigo episodes sometimes brief vertigo looking up, but nothing impairing and quickly self resolves.  Reviewed CTA head over read together.  Discussed missing  doses of Wellbutrin absolutely can cause headaches.  - as of 10/28/2024: She reports she has been doing well and did not obtain the sleep study that this visit was set up to go over and is no longer interested in obtaining which is her choice.  She reports no migraines in the past 3 months or more off of prescription preventatives and had 1 migraine aura that did not turn into a migraine when she took 2.5 mg rizatriptan.  Followed up with eye Dr. May 2024 and they added eyedrops for glaucoma.  No questions or concerns, enjoyed traveling throughout [place] last month without significant headaches or migraines.  No significant vertigo since last visit.    Work up:  - CTA head and neck with without contrast 03/13/2019: I have personally reviewed imaging and radiology read   1.  No acute findings.  Unremarkable CT appearance of the brain.  2.  Unremarkable CT angiogram of the head and neck.  *As of my retrospective review 4/25/2024 we discussed there are some nonspecific features per radiology that I am commenting on including partially empty sella, difficult to appreciate over CT but appears to have some prominence and tortuosity to optic nerves in my opinion, normal ventricles although a bit tighter than expected for age 63, cerebellar tonsils overlie the foramen magnum  - follows closely with Ophthalmology every 4 months due to other eye issues including history of glaucoma and cataracts  - have asked her to try and get old MRI Brain from 10-15 years ago, but she doubts she would be able to   -MRI brain without contrast 6/20/2012 (per [facility] EMR as images not available)  There is no intraparenchymal hemorrhage or acute territorial infarction.  There is no midline shift or hydrocephalus.  There is no subdural or epidural hematoma.      Preventive:  - discussed headache hygiene and lifestyle factors that could improve her headaches including mental Health Care, sleep quality and quantity, increasing  hydration  - Through other providers: Wellbutrin/bupropion 100 mg (started around early 2024) - then forgot to take a few days recently and had headaches, melatonin, sleepy time tea, aschwaganda   - not interested in prescription preventative at this time - could resume headache preventative supplements if needed  -  Past: Nortriptyline 10 mg, propranolol and verapamil contraindicated due to history of hypotension, amitriptyline contraindicated due to age  - future options: topiramate, cyproheptadine, acetazolamide/Diamox, verapamil, CGRP     Abortive:  - previously patient was taking pain medication daily, however now no more than twice a week.  We discussed medication overuse/rebound headache and recommended no more than 3 days per week.  - through other providers: naproxen (I took over prescription), acetaminophen, ativan prn anxiety, hyroxyzine, cyclobenzaprine, ondansetron  -     rizatriptan (MAXALT) 5 mg tablet; Take 1 tablet (5 mg total) by mouth as needed for migraine 5-10 mg po at migraine onset, may repeat 5-10 mg in 2 hours if needed, max 20 mg/24 hours, 9 per month  -     naproxen (NAPROSYN) 500 mg tablet; Take one tab up to twice a day as needed for migraine, no more than 3 times a week  - future options:  Alternative Triptan,  prochlorperazine, Toradol IM or p.o., could consider trial for 5 days of Depakote or dexamethasone 4 prolonged migraine, ubrelvy, reyvow, nurtec    Suspected sleep apnea  -     Ambulatory Referral to Sleep Medicine; Future -this is not a referral for sleep medicine only, it is referral for a in lab sleep study and includes referral to sleep medicine if abnormal.  - as of 10/28/2024: no longer interested       Patient instructions      If vertigo  returns, we can refer you to PT or you can call in and see if I happen to have an opening that day.     Please let me know if you would ever want me to order the sleep study over since it .      Headache/migraine treatment:    Abortive medications (for immediate treatment of a headache):   It is ok to take ibuprofen, acetaminophen or naproxen (Advil, Tylenol,  Aleve, Excedrin) if they help your headaches you should limit these to No more than 3 times a week to avoid medication overuse/rebound headaches.      For nausea  Ondansetron 4 mg ok to take as needed for nausea      For your more moderate to severe migraines take this medication early:  Continue Maxalt (rizatriptan) 2.5- 5 mg  - take one at the onset of headache. May repeat one time after 1-2 hours if pain has not resolved.   (Max 2 a day and 9 a month)      -     naproxen (NAPROSYN) 500 mg tablet; Take one tab up to twice a day as needed for migraine, no more than 3 times a week      Prescription preventive medications for headaches/migraines   (to take every day to help prevent headaches - not to take at the time of headache):  [x] Could consider in the future if needed     Lifestyle Recommendations:  [x] SLEEP - Maintain a regular sleep schedule: Adults need at least 7-8 hours of uninterrupted a night. Maintain good sleep hygiene:  Going to bed and waking up at consistent times, avoiding excessive daytime naps, avoiding caffeinated beverages in the evening, avoid excessive stimulation in the evening and generally using bed primarily for sleeping.  One hour before bedtime would recommend turning lights down lower, decreasing your activity (may read quietly, listen to music at a low volume). When you get into bed, should eliminate all technology (no texting, emailing, playing with your phone, iPad or tablet in bed).  [x] HYDRATION - Maintain good hydration.  Drink  2L of fluid a day (4 typical small water bottles)  [x] DIET - Maintain good nutrition. In particular don't skip meals and try and eat healthy balanced meals regularly.  [x] TRIGGERS - Look for other triggers and avoid them: Limit caffeine to 1-2 cups a day or less. Avoid dietary triggers that you have noticed bring on  your headaches (this could include aged cheese, peanuts, MSG, aspartame and nitrates).  [x] EXERCISE - physical exercise as we all know is good for you in many ways, and not only is good for your heart, but also is beneficial for your mental health, cognitive health and  chronic pain/headaches. I would encourage at the least 5 days of physical exercise weekly for at least 30 minutes.      Education and Follow-up  [x] Please call with any questions or concerns.   [x] Follow up 1 year, sooner as needed        CC:   We had the pleasure of evaluating Kaylan Young in neurological consultation today. Kaylan Young is right handed female who presents today for evaluation of headaches.      History of Present Illness:   Interval history as of 10/28/2024  - Of note/managed by others:   - went to [place] in Sept the 19th, been going to [place] a lot, friend had surgery and walking with her   - walking some her sons dog - 3 times a day   - had a colonoscopy and had a reaction, following with GI and dealing   - no significant new or concerning neurologic symptoms since last visit reported  - Patient concerns or questions: none  - last eye exam: May 2024 they added eye drops for glaucoma, photophobia unchanged and increase in prescription slightly both eye she thinks    - sleep: thinks she is doing better, leaves meditation on all night and TV on and feels the noise helps, didn't get sleep study and not interested in obtaining, side sleeper     Headaches and migraines   No migraines in 3 months or more, had one aura took 2.5 mg and it did not turn in to a migraine  Often this time of year is worse   No significant vertigo     Preventative:   Through other providers: Wellbutrin/bupropion 100 mg am (started around early April 2024, was up to BID and went back down since better over the summer)  - naproxen - has not needed in over a month  - rizatriptan 2.5 mg with migraine aura and then works well, no SE at this dose (10 mg SE  like tongue paresthesias)  No reported bothersome side effects     Interval history as of 4/25/2024  - no significant new or concerning neurologic symptoms since last visit   - only here because I asked her to follow up for monitoring   - following with counselor and psychiatry nurse for anxiety and waking in the morning feels not great and therefore they started wellbutrin   - writing again   - sleep - takes a two hour nap, stays up late and more productive at night and bed around 11/12 and some trouble falling asleep so takes melatonin, sleepy time tea, aschwaganda  and falls asleep in about 20 mins, wakes 1-2 times possibly due to apneas and then brian helps her fall asleep again and then wakes up around 6/7 and out of bed 730, snores, has woken herself up snoring, class 3-4 Mallampati score  - eye doctor - fall 2023 and returning in May, two different doctors, s/p cataract surgery mid 60s bilaterally, had narrow glaucoma also fixed with the cataract surgery, monitoring the floaters, has significant photophobia and sometimes wears sunglasses indoors, last prescription change was approximately 7 to 8 years ago  -ENT EMILE Rapp left the practice but she was given ipratropium nasal spray for rhinitis from him and we discussed I would recommend refilling this and using it daily to see if it can help with the rhinitis with regular use    Headaches and migraines   Migraine aura with or without headache is less often  - once every other month now. Can feel it coming from the neck and feels like pressure and rizatriptan 5 mg works well.    Milder migraine maybe once a month - even less severe these days, no major migraines   Milder pain in head with weather changes maybe 1-2 times a month     No big vertigo episodes since last visit, sometimes brief vertigo with looks up but not impairing function or anything that she would want to treat necessary    Preventative:   Through other providers: Wellbutrin/bupropion 100 mg  (started around early April 2024) - then forgot to take     Abortive:   - naproxen - takes maybe 3 times a month   - rizatriptan 5 mg with migraine aura and then works well, no SE (10 mg SE like tongue numb, feels horrible)  Denies bothersome side effects      Interval history as of 12/20/2022  - denies any new or concerning neurologic symptoms since last visit  - a little anxious this morning and we did a deep breathing technique this am  - vertigo maybe 1-2 days that self resolved maybe 2 months ago    Headaches and migraines   She reports about 3 migraine aura without headache a month that respond to 5 mg rizatriptan faster and can do things   About 1 milder headache a week that improves with naproxen    Preventative: none for migraine   Abortive: naproxen  rizatriptan works well - typically takes 5 mg since 10 mg sometimes caused SE    Interval history as of 1/26/2022  - denies any new or concerning neurologic symptoms since last visit   - saw ENT and given nasal spray   - saw eye doctor and given a med for dry eye   - drinking a bottle of water a day   - taking classes online, writing blog  - joined a group online, mood is much better     Headaches and migraines   - Doing better, about 2 mild headaches per month that resolve with naproxen, has not taken rizatriptan in over a year.   - Acephalgic Migraine with aura more often than migraine - lasts a few mins up to 30 mins with mild pressure not severe, maybe 0-2 a month, previous providers started asa 81 mg for prevention.     Preventative: magnesium occasionally as makes her stomach upset   Abortive: naproxen     Interval history as of 7/26/2021  - mood - anxiety, depression  - following with PCP and counselor   - doing meditation   - for months feels like itchy/creepy crawling or stinging or pricks on anywhere of entire body at night  - rec'd talk to PCP    - a week ago had vertigo that started in bed and after rolling had room spinning and took a meclizine  and then this made her sleepy (has had BPPV once before so tried to do the maneuvers) still feeling it slightly if she turns     Headaches and migraines   -  3 headaches per month -   - Auras without headache - 3-4 times in 6 months (lights and zig zags in both eyes)     Preventative:   Magnesium, riboflavin - not taking lately, upsets stomach   - melatonin at night is helping with sleep     Abortive: naproxen for milder headaches - helps, rizatriptan - hasn't taken in 6 months (has to lay in bed afterwards)    Interval history as of 01/20/2021  She denies any new or concerning symptoms  Headaches and migraines -  only 1 migraine in 6 months that resolved with rizatriptan and naproxen, one aura    - abortive: rizatriptan.   - prevention: restarted magnesium, and restarting B2     - has lost weight, no sugar  - mood up and down overall better, following with counselor, denies depression     Interval history as of 7/7/2020:    Headaches/migraines   - migraine - only 1 in 6 months, took rizatriptan and it helped  - Aura - 4 times in 6 months without headache afterwards related to stress, lasting about 20 minutes       - abortive: rizatriptan.   - prevention: no longer taking magnesium, B2 - because she is on a diet with supplements      - has lost some weight on a diet and feeling much better as far as mood, movements, sleep   - talking to counselor every 3 weeks      Interval history as of 01/06/2020  She reports improvement, no concerns     Headaches/migraines are less frequent  - weather change in Oct had 2 migraines, 3rd caught early with the rizatriptan  - aura only 2 times in the past 4-5 months  - taking magnesium, B2     Sleep - still not great due to frozen shoulder pain (also may hurt all day long). - following with orthopedics  - tried melatonin and did not help     Seeing a therapist and it is helping a lot with multiple issues, mood much better  Not wearing sunglasses everywhere anymore     Interval  history as of 09/24/2019:  - 07/15/2019 patient called in requesting rizatriptan that was previously prescribed by her past neurologist and works well for her migraines. Ok since history no longer sounds like retinal migraine - both eyes   - following with orthopedics for frozen shoulder, doing therapies  - sometimes the therapies with PT was causing cervicogenic headaches   - had some floaters a few weeks ago and saw eye doctor who confirmed floaters and nothing to worry about      - worried about this time of year because may get sinus headaches that turn into migraines      No auras or migraines at least the past 2 months  Saw counselor and helping mood - her therapist suggested considering low dose mood medications. discussed with patient and may consider in the future   - wearing glasses less, realizing that she may have been hiding behind them      Going to start yoga class soon       - Supplements - taking magnesium and B2      Interval history as of 05/22/2019:  - CTA head and neck 03/13/2019 was unremarkable except for incidental thyroid nodule with follow-up ultrasound by primary care  - has followed up with Pain Medicine and weight management  - 5/8/19 - dilated eye exam was unremarkable except for dry eyes   - our  provider a list of therapists, mood improved since wheather improved     - Rizatriptan - continues to help  - Supplements - taking magnesium and B2   - Prescription medication - not interested although         Headaches started at what age? teenager  How often do the headaches occur?   - as of 3/2019: 2/month  - as of 5/22/19: 2 migraines in past 3 ,   - Acephalgic Migraine with aura more often than migraine - vs nonspecific visual symptoms due to other reason 4/14, 21, 23, 25, and 5/8, 21 - she reports these seem to be even triggered by stress/anxiety  - as of 09/24/2019:  No auras or migraines at least the past 2 months  - as of 1/6/20:  Headaches/migraines are less frequent,  with weather change in Oct had 2 migraines, 3rd caught early with the rizatriptan. Aura only 2 times in the past 4-5 months. Still taking magnesium (although some nausea sometimes, other times forgets), B2  What time of the day do the headaches start? Can wake up with them or anytime during the day   How long do the headaches last? 3-4 hours because she takes medicine, up to all day   Are you ever headache free? Yes     Aura? with aura  Acephalgic Migraine with aura more often than migraine     Jan 2019:  For a week straight 1-2 times a day had increase in visual aura/retinal migraine  Zigzags, decreased peripheral vision, blurry without strong migraine, milder headache - for 2-3 hours   - seems to be in both eyes, not like a curtain coming down (both eyes - not retinal migraine)  - gradually faded out and this month she is fine, started mag a few weeks ago   - would take medicine and go away  *last saw an eye doctor 2 months ago, goes every 4 months for glaucoma, cataracts, dry eye       Prior to that once in a while that would happen      Describe your usual headache pain quality? Dull all day sometimes, sharp and pulsating, shooting   Where is your headache located?   When full blown more on left side, frontal to parietal and then to the back  Lately zig zag around head  Numbness change in sensation to bioccipital region   Sinus pressure     What is the intensity of pain? 4-5/10, up to higher   Associated symptoms:   [x] Nausea       [x] Vomiting        [] Diarrhea  [x] Insomnia    [] Stiff or sore neck   [x] Problems with concentration  [x] Photophobia     [x]Phonophobia      [] Osmophobia  [x] Blurred vision   [x] Prefer quiet, dark room  [] Light-headed or dizzy     [] Tinnitus   [] Hands or feet tingle or feel numb/paresthesias       [] Red ear      [] Ptosis      [] Facial droop  [] Lacrimation  [] Nasal congestion/rhinorrhea   [] Flushing of face  [] Change in pupil size      Things that make the headache  worse? Bending down, any movement     Headache triggers:  Pressure when it rains/dr and cold  What time of the year do headaches occur more frequently?   More in Oct/Nov     Have you seen someone else for headaches or pain? Neurology   Have you had trigger point injection performed and how often? No  Have you had Botox injection performed and how often? No   Have you had epidural injections or transforaminal injections performed? Yes for lower back slipped disc   Have you used CBD or THC for your headaches and how often? No  Are you current pregnant or planning on getting pregnant? No  Have you ever had any Brain imaging? MRI Brain - maybe over 15-20 years ago      What medications do you take or have you taken for your headaches?   ABORTIVE:    2-3 times a week some sort of pain medication lately, previously was taking daily      Acetaminophen - a few days a week for back pain  Ibuprofen 800 mg - not as much now, takes naproxen instead  Rizatriptan 10 mg   Naproxen 500 mg - takes once a week, used to take every day      Diclofenac gel - for back   Lidocaine 5% gel - for back      Past for headache and back pain   Cyclobenzaprine 5 mg  - not taking (for back)  Tramadol 50 mg   - not taking (for back)  Meloxicam 15 mg - not taking   Ondansetron - does not take it      PREVENTIVE:   Magnesium 400 mg  Riboflavin 400 mg      Past:  Nortriptyline 10 mg - for 2 years, and did well as far as headaches not being as bad, then wanted to get off it, came off 6 months ago, increase in symptoms     Alternative therapies used in the past for headaches? no other headache interventions have been tried     LIFESTYLE  Sleep - averages 6-7 hours   - takes naps      Physical activity:  tries to get her to the gym twice a week   Water: a bottle a week   Mood:   - lately feels tired mentally tired  and depressed   - do not want to get up   - never has talked to a counselor - she used to do mentoring herself, never had depression  in the past   - thinks going on for past 6 months, started after mom passed away   - PHQ-9 depression screen 02/27/2019:  Score of 17, denies suicidal ideation        The following portions of the patient's history were reviewed and updated as appropriate: allergies, current medications, past family history, past medical history, past social history, past surgical history and problem list.        Pertinent family history:    Family history of headaches:  no known family members with significant headaches  Any family history of aneurysms - No  Mom lived to 101.5 and got dementia in her lates 90s   Dad lived until 92 and dementia in his late 80s      Pertinent social history:  Work:  Retired  Education: some college  Lives with   One trial     Illicit Drugs: denies  Alcohol/tobacco: Denies tobacco use, alcohol intake: social drinker         Pertinent lab results:   See EMR for recent labs     03/11/2020 CMP unremarkable except for glucose 101, CBC unremarkable  TSH normal  HIV 2.7     4/09/2019:  A1c 5.7     03/08/2019:  BMP and CBC unremarkable  Vitamin-D 44.2  B12 2, 557  TSH 2.02     12/11/18: CMP unremarkable   11/29/18: CBC significant for hgb 10.8     Imaging:   - CTA head and neck with without contrast 03/13/2019: I have personally reviewed imaging and radiology read   1.  No acute findings.  Unremarkable CT appearance of the brain.  2.  Unremarkable CT angiogram of the head and neck.  *As of my retrospective review 4/25/2024 we discussed there are some nonspecific features per radiology that I am commenting on including partially empty sella, difficult to appreciate over CT but appears to have some prominence and tortuosity to optic nerves in my opinion, normal ventricles although a bit tighter than expected for age 63, cerebellar tonsils overlie the foramen magnum      -MRI brain without contrast 6/20/2012 (per [facility] EMR as images not available)  There is no intraparenchymal  "hemorrhage or acute territorial infarction.  There is no midline shift or hydrocephalus.  There is no subdural or epidural hematoma.         Objective       Physical Exam:                                                                 Vitals:            Constitutional:    /77 (BP Location: Right arm, Patient Position: Sitting, Cuff Size: Standard)   Pulse 67   Temp 98.1 °F (36.7 °C) (Temporal)   Ht 5' 1\" (1.549 m)   Wt 67.6 kg (149 lb)   BMI 28.15 kg/m²   BP Readings from Last 3 Encounters:   10/28/24 124/77   10/15/24 113/72   10/08/24 99/58     Pulse Readings from Last 3 Encounters:   10/28/24 67   10/15/24 73   10/08/24 68         Well developed, well nourished, well groomed.        Psychiatric:  Normal behavior and appropriate affect        Able to answer questions appropriately, provide history of recent events   Normal language and spontaneous speech.  facial expression symmetric  symmetric bulk throughout. no atrophy, fasciculations or significant abnormal movements noted during our visit from observation.   steady casual gait       ROS:  ROS obtained by medical assistant and reviewed, but if any symptoms listed below say negative, does not mean patient has not had this symptom since last visit, please see HPI for details of symptoms discussed this visit.  Regarding any abnormal or positive findings in ROS that are not neurologic related, patient instructed to address these issues with PCP or go to the ER if they are severe.       Review of Systems   Constitutional:  Negative for appetite change and fever.   HENT: Negative.  Negative for hearing loss, tinnitus, trouble swallowing and voice change.    Eyes: Negative.  Negative for photophobia and pain.   Respiratory: Negative.  Negative for shortness of breath.    Cardiovascular: Negative.  Negative for palpitations.   Gastrointestinal: Negative.  Negative for nausea and vomiting.   Endocrine: Negative.  Negative for cold intolerance. "   Genitourinary: Negative.  Negative for dysuria, frequency and urgency.   Musculoskeletal: Negative.  Negative for myalgias and neck pain.   Skin: Negative.  Negative for rash.   Neurological: Negative.  Negative for dizziness, tremors, seizures, syncope, facial asymmetry, speech difficulty, weakness, light-headedness, numbness and headaches.   Hematological: Negative.  Does not bruise/bleed easily.   Psychiatric/Behavioral: Negative.  Negative for confusion, hallucinations and sleep disturbance.    All other systems reviewed and are negative.            Administrative Statements  I have spent 8 minutes prior to or after the visit and 14 minutes during the visit, for a total time of 22 minutes in caring for this patient on the day of the visit/encounter including Risks and benefits of tx options, Instructions for management, Importance of tx compliance, Risk factor reductions, Impressions, Counseling / Coordination of care, Documenting in the medical record, Obtaining or reviewing history  , and Communicating with other healthcare professionals .

## 2024-10-29 ENCOUNTER — OFFICE VISIT (OUTPATIENT)
Dept: GASTROENTEROLOGY | Facility: CLINIC | Age: 69
End: 2024-10-29
Payer: MEDICARE

## 2024-10-29 VITALS
WEIGHT: 150 LBS | HEIGHT: 61 IN | BODY MASS INDEX: 28.32 KG/M2 | HEART RATE: 64 BPM | DIASTOLIC BLOOD PRESSURE: 68 MMHG | SYSTOLIC BLOOD PRESSURE: 116 MMHG

## 2024-10-29 DIAGNOSIS — Z12.11 COLON CANCER SCREENING: ICD-10-CM

## 2024-10-29 DIAGNOSIS — K60.2 ANAL FISSURE: Primary | ICD-10-CM

## 2024-10-29 PROCEDURE — G2211 COMPLEX E/M VISIT ADD ON: HCPCS | Performed by: INTERNAL MEDICINE

## 2024-10-29 PROCEDURE — 99214 OFFICE O/P EST MOD 30 MIN: CPT | Performed by: INTERNAL MEDICINE

## 2024-10-29 RX ORDER — ANORECTAL OINTMENT 15.7; .44; 24; 20.6 G/100G; G/100G; G/100G; G/100G
OINTMENT TOPICAL 2 TIMES DAILY
Qty: 30 G | Refills: 1 | Status: SHIPPED | OUTPATIENT
Start: 2024-10-29

## 2024-10-29 NOTE — PROGRESS NOTES
"Ambulatory Visit  Name: Kaylan Young      : 1955      MRN: [id number]  Encounter Provider: Rachel Spencer DO  Encounter Date: 10/29/2024   Encounter department: [facility] [place]    Assessment & Plan  Colon cancer screening  Uptodate        Anal fissure  -Was seen on rectal exam, likely due to constipation and straining vs diarrhea from prep for colonoscopy  Orders:    menthol-zinc oxide (Calmoseptine) 0.44-20.6 % OINT; Apply topically 2 (two) times a day    Follow-up in 3 months time  History of Present Illness     Kaylan Young is a 69 y.o. female who presents for follow up.  The pt reports she had a colonoscopy on 10-8-24 and reports that since that time she has had anal itching, worse at night, also with pain in the anus.  Has tried anusol cream and reports that she is now improving with the pain.  Now issue is she is seeing blood when she wipes on the tissue paper.  Feels something in the anus.  Saw the NP after the colonoscopy.    Feels a \"pimple\" at the anus and also feels something up the buttock as well.  Is using desitin.  Denies any fevers.    Symptoms of pain is improving.  It was horrible but is now improving.      History obtained from : patient  Review of Systems        Objective     /68   Pulse 64   Ht 5' 1\" (1.549 m)   Wt 68 kg (150 lb)   BMI 28.34 kg/m²     Physical Exam  Rectal exam with small anal fissure found, my MA Suzanne as a chaperone, no internal exam performed given likely cause of anal fissure     "

## 2024-10-29 NOTE — ASSESSMENT & PLAN NOTE
-Was seen on rectal exam, likely due to constipation and straining vs diarrhea from prep for colonoscopy  Orders:    menthol-zinc oxide (Calmoseptine) 0.44-20.6 % OINT; Apply topically 2 (two) times a day

## 2025-01-31 ENCOUNTER — HOSPITAL ENCOUNTER (EMERGENCY)
Facility: HOSPITAL | Age: 70
Discharge: HOME | End: 2025-02-01
Attending: EMERGENCY MEDICINE
Payer: MEDICARE

## 2025-01-31 ENCOUNTER — APPOINTMENT (EMERGENCY)
Dept: CT IMAGING | Facility: HOSPITAL | Age: 70
End: 2025-01-31
Payer: MEDICARE

## 2025-01-31 DIAGNOSIS — S20.211A CONTUSION OF RIGHT CHEST WALL, INITIAL ENCOUNTER: Primary | ICD-10-CM

## 2025-01-31 DIAGNOSIS — S30.0XXA CONTUSION OF BUTTOCK, INITIAL ENCOUNTER: ICD-10-CM

## 2025-01-31 LAB
ANION GAP SERPL CALCULATED.3IONS-SCNC: 7 MMOL/L (ref 4–13)
BASOPHILS # BLD AUTO: 0.05 THOUSANDS/ΜL (ref 0–0.1)
BASOPHILS NFR BLD AUTO: 1 % (ref 0–1)
BUN SERPL-MCNC: 18 MG/DL (ref 5–25)
CALCIUM SERPL-MCNC: 9 MG/DL (ref 8.4–10.2)
CHLORIDE SERPL-SCNC: 108 MMOL/L (ref 96–108)
CO2 SERPL-SCNC: 26 MMOL/L (ref 21–32)
CREAT SERPL-MCNC: 0.75 MG/DL (ref 0.6–1.3)
EOSINOPHIL # BLD AUTO: 0.08 THOUSAND/ΜL (ref 0–0.61)
EOSINOPHIL NFR BLD AUTO: 1 % (ref 0–6)
ERYTHROCYTE [DISTWIDTH] IN BLOOD BY AUTOMATED COUNT: 12.8 % (ref 11.6–15.1)
GFR SERPL CREATININE-BSD FRML MDRD: 81 ML/MIN/1.73SQ M
GLUCOSE SERPL-MCNC: 82 MG/DL (ref 65–140)
HCT VFR BLD AUTO: 41.5 % (ref 34.8–46.1)
HGB BLD-MCNC: 13.5 G/DL (ref 11.5–15.4)
IMM GRANULOCYTES # BLD AUTO: 0.03 THOUSAND/UL (ref 0–0.2)
IMM GRANULOCYTES NFR BLD AUTO: 0 % (ref 0–2)
LYMPHOCYTES # BLD AUTO: 1.73 THOUSANDS/ΜL (ref 0.6–4.47)
LYMPHOCYTES NFR BLD AUTO: 22 % (ref 14–44)
MCH RBC QN AUTO: 29.6 PG (ref 26.8–34.3)
MCHC RBC AUTO-ENTMCNC: 32.5 G/DL (ref 31.4–37.4)
MCV RBC AUTO: 91 FL (ref 82–98)
MONOCYTES # BLD AUTO: 0.69 THOUSAND/ΜL (ref 0.17–1.22)
MONOCYTES NFR BLD AUTO: 9 % (ref 4–12)
NEUTROPHILS # BLD AUTO: 5.19 THOUSANDS/ΜL (ref 1.85–7.62)
NEUTS SEG NFR BLD AUTO: 67 % (ref 43–75)
NRBC BLD AUTO-RTO: 0 /100 WBCS
PLATELET # BLD AUTO: 221 THOUSANDS/UL (ref 149–390)
PMV BLD AUTO: 9.7 FL (ref 8.9–12.7)
POTASSIUM SERPL-SCNC: 3.8 MMOL/L (ref 3.5–5.3)
RBC # BLD AUTO: 4.56 MILLION/UL (ref 3.81–5.12)
SODIUM SERPL-SCNC: 141 MMOL/L (ref 135–147)
WBC # BLD AUTO: 7.77 THOUSAND/UL (ref 4.31–10.16)

## 2025-01-31 PROCEDURE — 71260 CT THORAX DX C+: CPT

## 2025-01-31 PROCEDURE — 96374 THER/PROPH/DIAG INJ IV PUSH: CPT

## 2025-01-31 PROCEDURE — 99284 EMERGENCY DEPT VISIT MOD MDM: CPT | Performed by: EMERGENCY MEDICINE

## 2025-01-31 PROCEDURE — 36415 COLL VENOUS BLD VENIPUNCTURE: CPT | Performed by: EMERGENCY MEDICINE

## 2025-01-31 PROCEDURE — 74177 CT ABD & PELVIS W/CONTRAST: CPT

## 2025-01-31 PROCEDURE — 80048 BASIC METABOLIC PNL TOTAL CA: CPT | Performed by: EMERGENCY MEDICINE

## 2025-01-31 PROCEDURE — 99284 EMERGENCY DEPT VISIT MOD MDM: CPT

## 2025-01-31 PROCEDURE — 85025 COMPLETE CBC W/AUTO DIFF WBC: CPT | Performed by: EMERGENCY MEDICINE

## 2025-01-31 RX ORDER — KETOROLAC TROMETHAMINE 30 MG/ML
15 INJECTION, SOLUTION INTRAMUSCULAR; INTRAVENOUS ONCE
Status: COMPLETED | OUTPATIENT
Start: 2025-01-31 | End: 2025-01-31

## 2025-01-31 RX ADMIN — KETOROLAC TROMETHAMINE 15 MG: 30 INJECTION, SOLUTION INTRAMUSCULAR; INTRAVENOUS at 22:09

## 2025-01-31 RX ADMIN — IOHEXOL 100 ML: 350 INJECTION, SOLUTION INTRAVENOUS at 23:18

## 2025-02-01 VITALS
HEART RATE: 65 BPM | OXYGEN SATURATION: 98 % | BODY MASS INDEX: 28.83 KG/M2 | TEMPERATURE: 97.5 F | SYSTOLIC BLOOD PRESSURE: 120 MMHG | WEIGHT: 152.56 LBS | DIASTOLIC BLOOD PRESSURE: 62 MMHG | RESPIRATION RATE: 18 BRPM

## 2025-02-01 RX ORDER — METHOCARBAMOL 500 MG/1
500 TABLET, FILM COATED ORAL ONCE
Status: COMPLETED | OUTPATIENT
Start: 2025-02-01 | End: 2025-02-01

## 2025-02-01 RX ORDER — LIDOCAINE 50 MG/G
1 PATCH TOPICAL ONCE
Status: DISCONTINUED | OUTPATIENT
Start: 2025-02-01 | End: 2025-02-01 | Stop reason: HOSPADM

## 2025-02-01 RX ORDER — METHOCARBAMOL 750 MG/1
750 TABLET, FILM COATED ORAL 3 TIMES DAILY PRN
Qty: 42 TABLET | Refills: 0 | Status: SHIPPED | OUTPATIENT
Start: 2025-02-01

## 2025-02-01 RX ORDER — LIDOCAINE 50 MG/G
1 PATCH TOPICAL EVERY 24 HOURS
Qty: 6 PATCH | Refills: 0 | Status: SHIPPED | OUTPATIENT
Start: 2025-02-01

## 2025-02-01 RX ADMIN — LIDOCAINE 1 PATCH: 50 PATCH TOPICAL at 00:33

## 2025-02-01 RX ADMIN — METHOCARBAMOL 500 MG: 500 TABLET ORAL at 00:33

## 2025-02-01 NOTE — DISCHARGE INSTRUCTIONS
Bruised or fracture Ribs  WHAT YOU NEED TO KNOW:   A rib contusion is a bruise on one or more of your ribs.   DISCHARGE INSTRUCTIONS:   Return to the emergency department if:   You have increased chest pain.     You have shortness of breath.     You start to cough up blood.     Your pain does not improve with pain medicine.  Contact your healthcare provider if:   You have a cough.    You have a fever.     You have questions or concerns about your condition or care.  Medicines:  You may need any of the following:  NSAIDs , Take your medicine as directed.  Contact your healthcare provider if you think your medicine is not helping or if you have side effects. Tell him of her if you are allergic to any medicine. Keep a list of the medicines, vitamins, and herbs you take. Include the amounts, and when and why you take them. Bring the list or the pill bottles to follow-up visits. Carry your medicine list with you in case of an emergency.  Deep breathing:   To help prevent pneumonia, take 10 deep breaths every hour, even when you wake up during the night. Brace your ribs with your hands or a pillow while you take deep breaths or cough. This will help decrease your pain.    Rest:  Rest your ribs to decrease swelling and allow the injury to heal faster. Avoid activities that may cause more pain or damage to your ribs. As your pain decreases, begin movements slowly.  Ice:  Ice helps decrease swelling and pain. Ice may also help prevent tissue damage. Use an ice pack or put crushed ice in a plastic bag. Cover it with a towel and place it on your bruised area for 15 to 20 minutes every hour as directed.

## 2025-02-01 NOTE — ED CARE HANDOFF
Emergency Department Sign Out Note        Sign out and transfer of care from Dr. De. See Separate Emergency Department note.     The patient, Kaylan Young, was evaluated by the previous provider for Fall (Pt reports slipping on ice, falling and landing on her buttocks and back. Pt denies headstrike, and LOC. Negative thinners. Pt complaining of buttocks pain and lower/mid back pain. )    .    Workup Completed:  Labs Reviewed   CBC AND DIFFERENTIAL       Result Value Ref Range Status    WBC 7.77  4.31 - 10.16 Thousand/uL Final    RBC 4.56  3.81 - 5.12 Million/uL Final    Hemoglobin 13.5  11.5 - 15.4 g/dL Final    Hematocrit 41.5  34.8 - 46.1 % Final    MCV 91  82 - 98 fL Final    MCH 29.6  26.8 - 34.3 pg Final    MCHC 32.5  31.4 - 37.4 g/dL Final    RDW 12.8  11.6 - 15.1 % Final    MPV 9.7  8.9 - 12.7 fL Final    Platelets 221  149 - 390 Thousands/uL Final    nRBC 0  /100 WBCs Final    Segmented % 67  43 - 75 % Final    Immature Grans % 0  0 - 2 % Final    Lymphocytes % 22  14 - 44 % Final    Monocytes % 9  4 - 12 % Final    Eosinophils Relative 1  0 - 6 % Final    Basophils Relative 1  0 - 1 % Final    Absolute Neutrophils 5.19  1.85 - 7.62 Thousands/µL Final    Absolute Immature Grans 0.03  0.00 - 0.20 Thousand/uL Final    Absolute Lymphocytes 1.73  0.60 - 4.47 Thousands/µL Final    Absolute Monocytes 0.69  0.17 - 1.22 Thousand/µL Final    Eosinophils Absolute 0.08  0.00 - 0.61 Thousand/µL Final    Basophils Absolute 0.05  0.00 - 0.10 Thousands/µL Final   BASIC METABOLIC PANEL    Sodium 141  135 - 147 mmol/L Final    Potassium 3.8  3.5 - 5.3 mmol/L Final    Chloride 108  96 - 108 mmol/L Final    CO2 26  21 - 32 mmol/L Final    ANION GAP 7  4 - 13 mmol/L Final    BUN 18  5 - 25 mg/dL Final    Creatinine 0.75  0.60 - 1.30 mg/dL Final    Comment: Standardized to IDMS reference method    Glucose 82  65 - 140 mg/dL Final    Comment: If the patient is fasting, the ADA then defines impaired fasting glucose as  > 100 mg/dL and diabetes as > or equal to 123 mg/dL.    Calcium 9.0  8.4 - 10.2 mg/dL Final    eGFR 81  ml/min/1.73sq m Final    Narrative:     National Kidney Disease Foundation guidelines for Chronic Kidney Disease (CKD):     Stage 1 with normal or high GFR (GFR > 90 mL/min/1.73 square meters)    Stage 2 Mild CKD (GFR = 60-89 mL/min/1.73 square meters)    Stage 3A Moderate CKD (GFR = 45-59 mL/min/1.73 square meters)    Stage 3B Moderate CKD (GFR = 30-44 mL/min/1.73 square meters)    Stage 4 Severe CKD (GFR = 15-29 mL/min/1.73 square meters)    Stage 5 End Stage CKD (GFR <15 mL/min/1.73 square meters)  Note: GFR calculation is accurate only with a steady state creatinine         ED Course / Workup Pending (followup):  CT chest, abdomen and pelvis    CT chest abdomen pelvis w contrast   ED Interpretation   Per [facility]:    IMPRESSION:  No acute traumatic findings.            12:11 AM  Signed out to me.  Will follow-up on the CT scan for disposition        1:19 AM  No acute traumatic findings on CT.  Will discharge patient home.                         Procedures  Medical Decision Making  Amount and/or Complexity of Data Reviewed  Labs: ordered.  Radiology: ordered and independent interpretation performed.    Risk  Prescription drug management.            Disposition  Final diagnoses:   Contusion of right chest wall, initial encounter   Contusion of buttock, initial encounter     Time reflects when diagnosis was documented in both MDM as applicable and the Disposition within this note       Time User Action Codes Description Comment    2/1/2025 12:02 AM Finn De Add [S20.211A] Contusion of right chest wall, initial encounter     2/1/2025 12:02 AM Finn De Add [S30.0XXA] Contusion of buttock, initial encounter           ED Disposition       ED Disposition   Discharge    Condition   Stable    Date/Time   Sat Feb 1, 2025  1:15 AM    Comment   Kaylan Young discharge to home/self care.                    Follow-up Information       Follow up With Specialties Details Why Contact Info    Zuleyma Carney DO Family Medicine Call  If symptoms worsen [address]  [address]  [place] [address] [phone]            Patient's Medications   Discharge Prescriptions    LIDOCAINE (LIDODERM) 5 %    Apply 1 patch topically over 12 hours every 24 hours Remove & Discard patch within 12 hours or as directed by MD       Start Date: 2/1/2025  End Date: --       Order Dose: 1 patch       Quantity: 6 patch    Refills: 0    METHOCARBAMOL (ROBAXIN) 750 MG TABLET    Take 1 tablet (750 mg total) by mouth 3 (three) times a day as needed for muscle spasms       Start Date: 2/1/2025  End Date: --       Order Dose: 750 mg       Quantity: 42 tablet    Refills: 0     No discharge procedures on file.       ED Provider  Electronically Signed by     Luc Delaney Jr., DO  02/01/25 0119

## 2025-02-01 NOTE — ED PROVIDER NOTES
Time reflects when diagnosis was documented in both MDM as applicable and the Disposition within this note       Time User Action Codes Description Comment    2/1/2025 12:02 AM Finn De Add [S20.211A] Contusion of right chest wall, initial encounter     2/1/2025 12:02 AM Finn De Add [S30.0XXA] Contusion of buttock, initial encounter           ED Disposition       None          Assessment & Plan       Medical Decision Making  Amount and/or Complexity of Data Reviewed  Labs: ordered.  Radiology: ordered.    Risk  Prescription drug management.        ED Course as of 02/01/25 0011   Sat Feb 01, 2025   0000 CT read is pending.  She is improved with NSAID.  I am handing over care to Dr. Delaney for disposition with CT results.         Medications   methocarbamol (ROBAXIN) tablet 500 mg (has no administration in time range)   lidocaine (LIDODERM) 5 % patch 1 patch (has no administration in time range)   ketorolac (TORADOL) injection 15 mg (15 mg Intravenous Given 1/31/25 2209)   iohexol (OMNIPAQUE) 350 MG/ML injection (MULTI-DOSE) 100 mL (100 mL Intravenous Given 1/31/25 2318)       ED Risk Strat Scores                          SBIRT 22yo+      Flowsheet Row Most Recent Value   Initial Alcohol Screen: US AUDIT-C     1. How often do you have a drink containing alcohol? 0 Filed at: 01/31/2025 2131   2. How many drinks containing alcohol do you have on a typical day you are drinking?  0 Filed at: 01/31/2025 2131   3a. Male UNDER 65: How often do you have five or more drinks on one occasion? 0 Filed at: 01/31/2025 2131   3b. FEMALE Any Age, or MALE 65+: How often do you have 4 or more drinks on one occassion? 0 Filed at: 01/31/2025 2131   Audit-C Score 0 Filed at: 01/31/2025 2131   YARIEL: How many times in the past year have you...    Used an illegal drug or used a prescription medication for non-medical reasons? Never Filed at: 01/31/2025 2131                            History of Present Illness        Chief Complaint   Patient presents with    Fall     Pt reports slipping on ice, falling and landing on her buttocks and back. Pt denies headstrike, and LOC. Negative thinners. Pt complaining of buttocks pain and lower/mid back pain.        Past Medical History:   Diagnosis Date    Abdominal discomfort     Acute cholecystitis due to biliary calculus 12/15/2020    Allergic rhinitis     Anemia     Arthritis     Bronchitis     Chronic pain disorder     Cluster headache     CTS (carpal tunnel syndrome)     Current moderate episode of major depressive disorder without prior episode (HCC) 2020    Disease of thyroid gland 2019    Dyslipidemia     Last Assessed 2012    Ear problems     Fibroid when I was teenager    breasts    GERD (gastroesophageal reflux disease)     Headache(784.0)     Headache, migraine     Headache, tension-type     History of transfusion     when I gave birth to son    Hypotension     Low back pain     Low back pain     Rheumatoid arthritis (HCC)     Thyroid nodule 2019      Past Surgical History:   Procedure Laterality Date    CATARACT EXTRACTION       SECTION      CHOLECYSTECTOMY      When in ER at [facility]    CHOLECYSTECTOMY LAPAROSCOPIC N/A 12/15/2020    Procedure: CHOLECYSTECTOMY LAPAROSCOPIC;  Surgeon: Bridger Echeverria MD;  Location: [facility];  Service: General    ESOPHAGOGASTRODUODENOSCOPY N/A 2018    Procedure: ESOPHAGOGASTRODUODENOSCOPY (EGD) with push enteroscopy and bx;  Surgeon: Rachel Spencer DO;  Location: [facility];  Service: Gastroenterology    EYE SURGERY      NERVE SURGERY Right     Hand    IN EDG US EXAM SURGICAL ALTER STOM DUODENUM/JEJUNUM N/A 2019    Procedure: LINEAR ENDOSCOPIC U/S;  Surgeon: Tony Brand MD;  Location:  GI LAB;  Service: Gastroenterology    SEPTOPLASTY  around age 19    UPPER GASTROINTESTINAL ENDOSCOPY        Family History   Problem Relation Age of Onset    Hypertension Mother     Hearing loss Mother          101 years old    Dementia Mother     Diabetes Father         Mellitus    Stomach cancer Father     Arthritis Father     Dementia Father         he was 92 years had slight case    Rheum arthritis Father     Kidney failure Brother     No Known Problems Brother     No Known Problems Maternal Grandmother     No Known Problems Maternal Grandfather     No Known Problems Paternal Grandmother     No Known Problems Paternal Grandfather     No Known Problems Maternal Aunt     No Known Problems Maternal Aunt     No Known Problems Maternal Aunt     No Known Problems Maternal Aunt     No Known Problems Maternal Aunt     No Known Problems Paternal Aunt     No Known Problems Paternal Aunt     No Known Problems Paternal Aunt     No Known Problems Paternal Aunt     No Known Problems Paternal Aunt     Cancer Neg Hx     Heart disease Neg Hx     Stroke Neg Hx     Breast cancer Neg Hx     Colon cancer Neg Hx     Endometrial cancer Neg Hx       Social History     Tobacco Use    Smoking status: Former     Current packs/day: 0.00     Types: Cigarettes     Quit date: 1985     Years since quittin.1    Smokeless tobacco: Never   Vaping Use    Vaping status: Never Used   Substance Use Topics    Alcohol use: Yes     Comment: occasionally    Drug use: No      E-Cigarette/Vaping    E-Cigarette Use Never User       E-Cigarette/Vaping Substances    Nicotine No     THC No     CBD No     Flavoring No     Other No     Unknown No       I have reviewed and agree with the history as documented.     68 yo F slipped on icy landing at the bottom of her outside stair this morning, impacting her bottom and and back on the concrete.  She felt dazed but denies LOC. She was helped to her feet by her  who slipped on the same steps just prior to that. She has been ambulatory since then, but developing increased back pain, localizing to right flank, exacerbated by turning, breathing, and lying flat.  She has some pain in the right buttocks,  without radiation pain down either leg      History provided by:  Patient  Fall      Review of Systems        Objective       ED Triage Vitals   Temperature Pulse Blood Pressure Respirations SpO2 Patient Position - Orthostatic VS   01/31/25 2126 01/31/25 2126 01/31/25 2126 01/31/25 2126 01/31/25 2126 01/31/25 2126   97.5 °F (36.4 °C) 71 151/65 18 98 % Sitting      Temp Source Heart Rate Source BP Location FiO2 (%) Pain Score    01/31/25 2126 01/31/25 2126 01/31/25 2126 -- 01/31/25 2209    Oral Monitor Right arm  6      Vitals      Date and Time Temp Pulse SpO2 Resp BP Pain Score FACES Pain Rating User   01/31/25 2209 -- -- -- -- -- 6 -- SS   01/31/25 2126 97.5 °F (36.4 °C) 71 98 % 18 151/65 -- -- RM            Physical Exam  Vitals and nursing note reviewed.   Constitutional:       General: She is not in acute distress.     Appearance: Normal appearance. She is well-developed. She is not diaphoretic.   HENT:      Head: Normocephalic. No raccoon eyes, De Leon's sign, abrasion, contusion, right periorbital erythema, left periorbital erythema or laceration.      Right Ear: Tympanic membrane and external ear normal. No laceration.      Left Ear: Tympanic membrane and external ear normal. No laceration.      Nose: Nose normal. No nasal deformity, septal deviation or laceration.      Mouth/Throat:      Dentition: Normal dentition.   Eyes:      Conjunctiva/sclera: Conjunctivae normal.      Pupils: Pupils are equal, round, and reactive to light.   Neck:      Trachea: Trachea and phonation normal.   Cardiovascular:      Rate and Rhythm: Normal rate and regular rhythm.      Pulses: Normal pulses.      Heart sounds: Normal heart sounds.   Pulmonary:      Effort: No respiratory distress.      Breath sounds: Normal breath sounds. No decreased breath sounds.   Chest:      Chest wall: No deformity, tenderness or crepitus.   Abdominal:      Tenderness: There is no abdominal tenderness. There is right CVA tenderness.    Musculoskeletal:      Right shoulder: Normal.      Left shoulder: Normal.      Right elbow: Normal.      Left elbow: Normal.      Right wrist: Normal.      Left wrist: Normal.      Cervical back: Normal, full passive range of motion without pain, normal range of motion and neck supple. No spinous process tenderness (Cspine cleared).      Thoracic back: Decreased range of motion.      Lumbar back: Decreased range of motion. Negative right straight leg raise test and negative left straight leg raise test.        Back:       Right hip: Normal.      Left hip: Normal.      Right knee: Normal.      Left knee: Normal.      Right ankle: Normal.      Left ankle: Normal.      Right foot: Normal.      Left foot: Normal.   Skin:     General: Skin is warm and dry.   Neurological:      Mental Status: She is alert.      GCS: GCS eye subscore is 4. GCS verbal subscore is 5. GCS motor subscore is 6.      Cranial Nerves: No cranial nerve deficit.      Sensory: No sensory deficit.      Deep Tendon Reflexes:      Reflex Scores:       Patellar reflexes are 2+ on the right side and 2+ on the left side.  Psychiatric:         Speech: Speech normal.         Results Reviewed       Procedure Component Value Units Date/Time    Basic metabolic panel [[id number]] Collected: 01/31/25 2210    Lab Status: Final result Specimen: Blood from Arm, Left Updated: 01/31/25 2238     Sodium 141 mmol/L      Potassium 3.8 mmol/L      Chloride 108 mmol/L      CO2 26 mmol/L      ANION GAP 7 mmol/L      BUN 18 mg/dL      Creatinine 0.75 mg/dL      Glucose 82 mg/dL      Calcium 9.0 mg/dL      eGFR 81 ml/min/1.73sq m     Narrative:      National Kidney Disease Foundation guidelines for Chronic Kidney Disease (CKD):     Stage 1 with normal or high GFR (GFR > 90 mL/min/1.73 square meters)    Stage 2 Mild CKD (GFR = 60-89 mL/min/1.73 square meters)    Stage 3A Moderate CKD (GFR = 45-59 mL/min/1.73 square meters)    Stage 3B Moderate CKD (GFR = 30-44 mL/min/1.73  square meters)    Stage 4 Severe CKD (GFR = 15-29 mL/min/1.73 square meters)    Stage 5 End Stage CKD (GFR <15 mL/min/1.73 square meters)  Note: GFR calculation is accurate only with a steady state creatinine    CBC and differential [[id number]] Collected: 01/31/25 2210    Lab Status: Final result Specimen: Blood from Arm, Left Updated: 01/31/25 2224     WBC 7.77 Thousand/uL      RBC 4.56 Million/uL      Hemoglobin 13.5 g/dL      Hematocrit 41.5 %      MCV 91 fL      MCH 29.6 pg      MCHC 32.5 g/dL      RDW 12.8 %      MPV 9.7 fL      Platelets 221 Thousands/uL      nRBC 0 /100 WBCs      Segmented % 67 %      Immature Grans % 0 %      Lymphocytes % 22 %      Monocytes % 9 %      Eosinophils Relative 1 %      Basophils Relative 1 %      Absolute Neutrophils 5.19 Thousands/µL      Absolute Immature Grans 0.03 Thousand/uL      Absolute Lymphocytes 1.73 Thousands/µL      Absolute Monocytes 0.69 Thousand/µL      Eosinophils Absolute 0.08 Thousand/µL      Basophils Absolute 0.05 Thousands/µL             CT chest abdomen pelvis w contrast    (Results Pending)       Procedures    ED Medication and Procedure Management   Prior to Admission Medications   Prescriptions Last Dose Informant Patient Reported? Taking?   ASHWAGANDHA PO  Self Yes No   Sig: Take 2 tablets by mouth in the morning Gummies   Cholecalciferol (VITAMIN D3) 5000 units CAPS  Self Yes No   Sig: Take 5,000 Units by mouth daily    Patient not taking: Reported on 10/29/2024   Cholecalciferol (Vitamin D) 50 MCG (2000 UT) CAPS  Self Yes No   Sig: Take 2,000 Units by mouth in the morning   Diclofenac Sodium 3 % CREA  Self Yes No   Sig: Apply 1 Application topically if needed   LORazepam (Ativan) 0.5 mg tablet   No No   Sig: Take 1/2 to 1 tab po up to once daily prn severe anxiety; caution for sedation; avoid driving/working while taking   Melatonin 2.5 MG CHEW   Yes No   Sig: Chew 2.5 mg daily at bedtime   Omega-3 Fatty Acids (OMEGA-3 CF PO)  Self Yes No   Sig: Take  1 capsule by mouth in the morning   Vyzulta 0.024 % SOLN   Yes No   acetaminophen (TYLENOL) 325 mg tablet  Self No No   Sig: Take 2 tablets (650 mg total) by mouth every 6 (six) hours as needed for mild pain or fever   buPROPion (WELLBUTRIN SR) 100 mg 12 hr tablet  Self Yes No   Sig: Take 100 mg by mouth in the morning   cholestyramine (QUESTRAN) 4 g packet   No No   Si/2 pack once daily   clotrimazole-betamethasone (LOTRISONE) 1-0.05 % cream   No No   Sig: Apply topically 2 (two) times a day   diclofenac sodium (VOLTAREN) 1 %  Self Yes No   Sig: Apply topically if needed   hydrocortisone (ANUSOL-HC) 2.5 % rectal cream   No No   Sig: Apply topically 2 (two) times a day   hydrocortisone 2.5 % cream  Self No No   Sig: Apply topically 3 (three) times a day   ipratropium (ATROVENT) 0.03 % nasal spray   No No   Si sprays into each nostril 2 (two) times a day   menthol-zinc oxide (Calmoseptine) 0.44-20.6 % OINT   No No   Sig: Apply topically 2 (two) times a day   naproxen (NAPROSYN) 500 mg tablet   No No   Sig: Take one tab up to twice a day as needed for migraine, no more than 3 times a week   olopatadine HCl (PATADAY) 0.2 % opth drops  Self No No   Sig: Administer 1 drop to both eyes daily   Patient taking differently: Administer 1 drop to both eyes daily as needed   ondansetron (ZOFRAN) 4 mg tablet  Self No No   Sig: Take 1 tablet (4 mg total) by mouth every 6 (six) hours   pantoprazole (PROTONIX) 40 mg tablet   No No   Sig: Take 1 tablet (40 mg total) by mouth daily   polyethylene glycol (MIRALAX) 17 g packet  Self No No   Sig: Take 17 g by mouth daily   rizatriptan (MAXALT) 5 mg tablet   No No   Sig: Take 1 tablet (5 mg total) by mouth as needed for migraine 5-10 mg po at migraine onset, may repeat 5-10 mg in 2 hours if needed, max 20 mg/24 hours, 9 per month      Facility-Administered Medications: None     Patient's Medications   Discharge Prescriptions    LIDOCAINE (LIDODERM) 5 %    Apply 1 patch topically  over 12 hours every 24 hours Remove & Discard patch within 12 hours or as directed by MD       Start Date: 2/1/2025  End Date: --       Order Dose: 1 patch       Quantity: 6 patch    Refills: 0    METHOCARBAMOL (ROBAXIN) 750 MG TABLET    Take 1 tablet (750 mg total) by mouth 3 (three) times a day as needed for muscle spasms       Start Date: 2/1/2025  End Date: --       Order Dose: 750 mg       Quantity: 42 tablet    Refills: 0     No discharge procedures on file.  ED SEPSIS DOCUMENTATION   Time reflects when diagnosis was documented in both MDM as applicable and the Disposition within this note       Time User Action Codes Description Comment    2/1/2025 12:02 AM Finn De Add [S20.211A] Contusion of right chest wall, initial encounter     2/1/2025 12:02 AM Finn De [S30.0XXA] Contusion of buttock, initial encounter                  Finn De MD  02/01/25 0011

## 2025-02-03 ENCOUNTER — TELEPHONE (OUTPATIENT)
Age: 70
End: 2025-02-03

## 2025-02-03 NOTE — TELEPHONE ENCOUNTER
Pt called stated she fell on the ice on 1/31 and went to ER.  CT scan done.  They gave her Methocarbomol, however that doesn't seem to be helping.    Pain is in back and side around ribs and into buttocks.  Spasms are more in back.      She is asking if anything else can be prescribed.  At home she has Naproxen and didn't know if that would help.  She doesn't want to mix if not ok.  She also has ibuprofen.  She is looking for something else for the pain.    Offered appt for f/u but she declined right now.  She doesn't have transportation.    Please advise - Best number [phone]

## 2025-03-24 ENCOUNTER — RA CDI HCC (OUTPATIENT)
Dept: OTHER | Facility: HOSPITAL | Age: 70
End: 2025-03-24

## 2025-03-26 ENCOUNTER — APPOINTMENT (OUTPATIENT)
Dept: LAB | Facility: MEDICAL CENTER | Age: 70
End: 2025-03-26
Payer: MEDICARE

## 2025-03-26 DIAGNOSIS — E78.2 MIXED HYPERLIPIDEMIA: ICD-10-CM

## 2025-03-26 LAB
ALBUMIN SERPL BCG-MCNC: 4.3 G/DL (ref 3.5–5)
ALP SERPL-CCNC: 66 U/L (ref 34–104)
ALT SERPL W P-5'-P-CCNC: 14 U/L (ref 7–52)
ANION GAP SERPL CALCULATED.3IONS-SCNC: 10 MMOL/L (ref 4–13)
AST SERPL W P-5'-P-CCNC: 15 U/L (ref 13–39)
BILIRUB SERPL-MCNC: 0.46 MG/DL (ref 0.2–1)
BUN SERPL-MCNC: 23 MG/DL (ref 5–25)
CALCIUM SERPL-MCNC: 9.5 MG/DL (ref 8.4–10.2)
CHLORIDE SERPL-SCNC: 106 MMOL/L (ref 96–108)
CHOLEST SERPL-MCNC: 228 MG/DL (ref ?–200)
CO2 SERPL-SCNC: 27 MMOL/L (ref 21–32)
CREAT SERPL-MCNC: 0.75 MG/DL (ref 0.6–1.3)
GFR SERPL CREATININE-BSD FRML MDRD: 80 ML/MIN/1.73SQ M
GLUCOSE P FAST SERPL-MCNC: 99 MG/DL (ref 65–99)
HDLC SERPL-MCNC: 62 MG/DL
LDLC SERPL CALC-MCNC: 141 MG/DL (ref 0–100)
NONHDLC SERPL-MCNC: 166 MG/DL
POTASSIUM SERPL-SCNC: 4.1 MMOL/L (ref 3.5–5.3)
PROT SERPL-MCNC: 6.9 G/DL (ref 6.4–8.4)
SODIUM SERPL-SCNC: 143 MMOL/L (ref 135–147)
TRIGL SERPL-MCNC: 126 MG/DL (ref ?–150)

## 2025-03-26 PROCEDURE — 80061 LIPID PANEL: CPT

## 2025-03-26 PROCEDURE — 36415 COLL VENOUS BLD VENIPUNCTURE: CPT

## 2025-03-26 PROCEDURE — 80053 COMPREHEN METABOLIC PANEL: CPT

## 2025-03-27 ENCOUNTER — RESULTS FOLLOW-UP (OUTPATIENT)
Dept: FAMILY MEDICINE CLINIC | Facility: CLINIC | Age: 70
End: 2025-03-27

## 2025-04-01 ENCOUNTER — OFFICE VISIT (OUTPATIENT)
Dept: FAMILY MEDICINE CLINIC | Facility: CLINIC | Age: 70
End: 2025-04-01
Payer: MEDICARE

## 2025-04-01 VITALS
SYSTOLIC BLOOD PRESSURE: 116 MMHG | DIASTOLIC BLOOD PRESSURE: 78 MMHG | HEART RATE: 78 BPM | HEIGHT: 60 IN | BODY MASS INDEX: 30.43 KG/M2 | OXYGEN SATURATION: 98 % | WEIGHT: 155 LBS

## 2025-04-01 DIAGNOSIS — E04.1 THYROID NODULE: ICD-10-CM

## 2025-04-01 DIAGNOSIS — F41.9 ANXIETY DISORDER, UNSPECIFIED TYPE: ICD-10-CM

## 2025-04-01 DIAGNOSIS — G43.109 MIGRAINE AURA WITHOUT HEADACHE: ICD-10-CM

## 2025-04-01 DIAGNOSIS — R19.5 LOOSE STOOLS: ICD-10-CM

## 2025-04-01 DIAGNOSIS — Z91.81 HISTORY OF FALLING: Primary | ICD-10-CM

## 2025-04-01 DIAGNOSIS — M54.50 ACUTE RIGHT-SIDED LOW BACK PAIN WITHOUT SCIATICA: ICD-10-CM

## 2025-04-01 DIAGNOSIS — K21.9 GASTROESOPHAGEAL REFLUX DISEASE WITHOUT ESOPHAGITIS: ICD-10-CM

## 2025-04-01 DIAGNOSIS — E78.2 MIXED HYPERLIPIDEMIA: ICD-10-CM

## 2025-04-01 DIAGNOSIS — Z87.891 PERSONAL HISTORY OF TOBACCO USE, PRESENTING HAZARDS TO HEALTH: ICD-10-CM

## 2025-04-01 PROCEDURE — 99214 OFFICE O/P EST MOD 30 MIN: CPT | Performed by: FAMILY MEDICINE

## 2025-04-01 PROCEDURE — G2211 COMPLEX E/M VISIT ADD ON: HCPCS | Performed by: FAMILY MEDICINE

## 2025-04-01 RX ORDER — LORAZEPAM 0.5 MG/1
TABLET ORAL
Qty: 10 TABLET | Refills: 0 | Status: SHIPPED | OUTPATIENT
Start: 2025-04-01

## 2025-04-01 RX ORDER — BUPROPION HYDROCHLORIDE 150 MG/1
TABLET, EXTENDED RELEASE ORAL
COMMUNITY
Start: 2025-01-25

## 2025-04-01 RX ORDER — PERFLUOROHEXYLOCTANE 1 MG/MG
SOLUTION OPHTHALMIC
COMMUNITY
Start: 2025-03-30

## 2025-04-01 NOTE — PROGRESS NOTES
Name: Kaylan Young      : 1955      MRN: [id number]  Encounter Provider: Zuleyma Carney DO  Encounter Date: 2025   Encounter department: [facility]  :  Assessment & Plan  History of falling    Orders:    Ambulatory Referral to Physical Therapy; Future    Acute right-sided low back pain without sciatica  S/p fall   Suspect intercostal muscle strain    And ED visit 2025   Reviewed records  And imaging - CT C/A/P:  Narrative & Impression   CT CHEST, ABDOMEN AND PELVIS WITH IV CONTRAST     INDICATION: trauma.     COMPARISON: CT abdomen/pelvis 12/15/2020.     TECHNIQUE: CT examination of the chest, abdomen and pelvis was performed. Multiplanar 2D reformatted images were created from the source data. 3D reconstructions of the bony thorax were performed in order to improve sensitivity of evaluation for rib   fractures.     This examination, like all CT scans performed in the [facility], was performed utilizing techniques to minimize radiation dose exposure, including the use of iterative reconstruction and automated exposure control. Radiation dose length   product (DLP) for this visit: 552 mGy-cm     IV Contrast: 100 mL of iohexol (OMNIPAQUE)  Enteric Contrast: Not administered.     FINDINGS:     CHEST     LUNGS: Mild right greater than left basilar atelectasis. Lungs are otherwise clear. No tracheal or endobronchial lesion. Tiny dependent soft tissue focus in the mid trachea, likely secretions.     PLEURA: Unremarkable.     HEART/GREAT VESSELS: Heart is unremarkable for patient's age. No thoracic aortic aneurysm.     MEDIASTINUM AND CLAY: Unremarkable.     CHEST WALL AND LOWER NECK: Right thyroid lobe nodule, better evaluated on prior thyroid ultrasound.     ABDOMEN     LIVER/BILIARY TREE: Unremarkable.     GALLBLADDER: Post cholecystectomy.     SPLEEN: Unremarkable.     PANCREAS: Unremarkable.     ADRENAL GLANDS: Unremarkable.     KIDNEYS/URETERS:  Parapelvic cysts.. No hydronephrosis.     STOMACH AND BOWEL: Colonic diverticulosis without findings of acute diverticulitis.     APPENDIX: No findings to suggest appendicitis.     ABDOMINOPELVIC CAVITY: No ascites. No pneumoperitoneum. No lymphadenopathy.     VESSELS: Unremarkable for patient's age.     PELVIS     REPRODUCTIVE ORGANS: Calcified uterine fibroids.     URINARY BLADDER: Unremarkable.     ABDOMINAL WALL/INGUINAL REGIONS: Unremarkable.     BONES: No acute fracture or suspicious osseous lesion. Spinal degenerative changes. Degenerative anterolisthesis of L4 on L5 noted there are only 11 pairs of full ribs and a sacralized L5     IMPRESSION:     No evidence of traumatic injury in the chest, abdomen or pelvis.     Findings are consistent with the preliminary report from [facility] which was provided shortly after completion of the exam.     No loc or head strike    Orders:    Ambulatory Referral to Physical Therapy; Future  exam revealed some tenderness over right lower posterior mid rib -   Described as 'hurts' - not sharp but interferes with deep breaths and sleeping - duet o discomfort over the area  Has to reposition during sleep; feels like a 'knot'  Robaxin did not help  Will send for PT  Limit nsaids given potential for toxicity  Pt denies h/o GI bleed or ulcer      Migraine aura without headache  Sees neuro - had recent visit - 10/2024  Opted against preventative med at this time  Has abortive meds on hand         Gastroesophageal reflux disease without esophagitis  Stable.  Avoid regular use of nsaids  Pt denies h/o GI bleed or ulcer         Thyroid nodule  No f/u imaging required to 2019        Personal history of tobacco use, presenting hazards to health         Mixed hyperlipidemia  Slight overall worsening  Given elevated ASCVD risk - recommend CT CAC  Pt will then cosnider statin prn    Orders:    CT coronary calcium score; Future    Loose stools  Takes questran prn  Colonoscopy up  to date         Anxiety disorder, unspecified type    Orders:    LORazepam (Ativan) 0.5 mg tablet; Take 1/2 to 1 tab po up to once daily prn severe anxiety; caution for sedation; avoid driving/working while taking      Return to discuss what sounds like ulnar neuropathy - left hand  Nothing new since fall.  Could be stemming from neck      Depression Screening and Follow-up Plan: Patient was screened for depression during today's encounter. They screened negative with a PHQ-9 score of 0.        History of Present Illness   HPI  Here for routine visit  Had a fall in 1/2025 on ice  Went to ED - and had imaging.  Injured right side low back  Pt did not come in here for f/u and continues with pain (had gotten a little better but then worse again recently - denies re-injury)  Taking motrin - 800 mg bid  Not bothering her stomach  Not taking naproxen (uses prn migraines)  Taking robaxin prn - but does not seem to be helping  Also taking tylenol prn      Review of Systems   Respiratory:  Negative for cough and wheezing.         Feels some chest wall pain if taking a deep breath since the fall.  No rib fractures    Cardiovascular:  Negative for chest pain and palpitations.   Gastrointestinal:  Negative for abdominal pain, blood in stool, constipation, nausea and vomiting.        Taking questran - prn - but has not been taking regularly  Takes if needed for frequent stooling.  Colonoscopy up to date  Anal fissure healed.    No saddle anesthesia.    Has not needed to take PPI   Taking prn only and infrequently     Musculoskeletal:  Positive for back pain. Negative for joint swelling.        Using topical voltaren gel - once monthly  On avg  But has used more frequently to the right low back since fall 1/2025    Right low back pain since fall - nonradiating     Neurological:  Negative for weakness and numbness.        No radicular pain or numbness into legs from low back pain     Psychiatric/Behavioral:          Sees  "psychiatry at [facility]  And had recent wellbutrin increase to 150 mg daily  Seeing therapist       Objective   /78   Pulse 78   Ht 5' 0.25\" (1.53 m)   Wt 70.3 kg (155 lb)   SpO2 98%   BMI 30.02 kg/m²      Physical Exam  Vitals and nursing note reviewed.   Constitutional:       General: She is not in acute distress.     Appearance: Normal appearance. She is not ill-appearing or toxic-appearing.   Cardiovascular:      Rate and Rhythm: Normal rate and regular rhythm.      Pulses: Normal pulses.      Heart sounds: Normal heart sounds. No murmur heard.  Pulmonary:      Effort: Pulmonary effort is normal. No respiratory distress.      Breath sounds: Normal breath sounds. No wheezing, rhonchi or rales.   Abdominal:      General: There is no distension.      Palpations: Abdomen is soft. There is no mass.      Tenderness: There is no abdominal tenderness. There is no guarding or rebound.   Musculoskeletal:      Comments: Tenderness over the right posterior mid lower ribcage  No palpable lump or crepitus  Feels the discomfort when lifting arms overhead or out front of her  No discrete tenderness over bone/rib.     Neurological:      General: No focal deficit present.      Mental Status: She is alert and oriented to person, place, and time.   Psychiatric:         Mood and Affect: Mood normal.         Behavior: Behavior normal.         Thought Content: Thought content normal.         Judgment: Judgment normal.         "

## 2025-04-01 NOTE — PATIENT INSTRUCTIONS
Reminder to schedule the following:  CT chest for lung cancer screening  Dexa scan - particularly since losing 2 inches.  Schedule CT coronary arteries.      Return 6 mos for physical.

## 2025-04-01 NOTE — ASSESSMENT & PLAN NOTE
Orders:    LORazepam (Ativan) 0.5 mg tablet; Take 1/2 to 1 tab po up to once daily prn severe anxiety; caution for sedation; avoid driving/working while taking

## 2025-04-01 NOTE — ASSESSMENT & PLAN NOTE
Sees neuro - had recent visit - 10/2024  Opted against preventative med at this time  Has abortive meds on hand

## 2025-04-01 NOTE — ASSESSMENT & PLAN NOTE
Slight overall worsening  Given elevated ASCVD risk - recommend CT CAC  Pt will then cosnider statin prn    Orders:    CT coronary calcium score; Future

## 2025-04-10 ENCOUNTER — EVALUATION (OUTPATIENT)
Dept: PHYSICAL THERAPY | Facility: REHABILITATION | Age: 70
End: 2025-04-10
Payer: MEDICARE

## 2025-04-10 DIAGNOSIS — Z91.81 HISTORY OF FALLING: ICD-10-CM

## 2025-04-10 DIAGNOSIS — M54.50 ACUTE RIGHT-SIDED LOW BACK PAIN WITHOUT SCIATICA: ICD-10-CM

## 2025-04-10 PROCEDURE — 97161 PT EVAL LOW COMPLEX 20 MIN: CPT | Performed by: PHYSICAL THERAPIST

## 2025-04-10 PROCEDURE — 97110 THERAPEUTIC EXERCISES: CPT | Performed by: PHYSICAL THERAPIST

## 2025-04-10 PROCEDURE — 97535 SELF CARE MNGMENT TRAINING: CPT | Performed by: PHYSICAL THERAPIST

## 2025-04-10 NOTE — PROGRESS NOTES
PT Evaluation     Today's date: 4/10/2025  Patient name: Kaylan Young  : 1955  MRN: [id number]  Referring provider: Zuleyma Carney DO  Dx:   Encounter Diagnosis     ICD-10-CM    1. History of falling  Z91.81 Ambulatory Referral to Physical Therapy      2. Acute right-sided low back pain without sciatica  M54.50 Ambulatory Referral to Physical Therapy          Start Time: 1030  Stop Time: 1115  Total time in clinic (min): 45 minutes    Assessment  Impairments: abnormal or restricted ROM, activity intolerance, impaired physical strength, lacks appropriate home exercise program and pain with function  Symptom irritability: moderate    Assessment details: Kaylan Young presents to outpatient physical with complaints of <principal problem not specified>. Pt presents with decreased thoracic rotation ROM, poor scapular MMT and decreased rib excursion with inhalation and TTP along paraspinal L>R leading to activity intolerance. All red flag and neurological screen are negative at this time. No significant TTP noted throughout assessment.    Based on patient's age and motivation, patient is a positive candidate to respond favorably to physical therapy with a fair to good prognosis. Patient was educated on examination and techniques, plan of care, goals of therapy, and HEP. Patient was provided an opportunity to ask any questions. Provided Pt initial HEP. Patient demonstrated and verbalized understanding. Verbally reported to hold exercises if increased pain or discomfort. Pt will be seen 1-2x/week for 6-12 weeks. Patient will be re-assessed regularly in order to document progress and limit any regression. Thank you for this referral.    Understanding of Dx/Px/POC: good     Prognosis: good    Goals  ST. Pt will improve thoracic ROM by 25% in 4 weeks  2. Pt will report reduction of pain at worst of 4/10 in 4 weeks  3. Pt will be able to sit for 30-45 min with pain at worst of 4/10 in 4 weeks    LT.  "Pt will be independent with HEP by discharge  2. Pt will improve thoracic ROM to WFL by discharge  3. Pt will improve scapular strength to at least 4+/5 by discharge  4. Pt will be able to perform all functional activities without pain by discharge    Plan  Patient would benefit from: PT eval and skilled physical therapy  Planned modality interventions: cryotherapy and thermotherapy: hydrocollator packs    Planned therapy interventions: abdominal trunk stabilization, manual therapy, joint mobilization, neuromuscular re-education, nerve gliding, patient/caregiver education, postural training, self care, strengthening, stretching, therapeutic activities, therapeutic exercise, home exercise program, graded exercise, graded activity, flexibility and functional ROM exercises    Frequency: 2x week  Duration in weeks: 6  Treatment plan discussed with: patient      Subjective Evaluation    History of Present Illness  Mechanism of injury: trauma  Mechanism of injury: Kaylan Young is a 70 y.o. female that presents to outpatient physical therapy stating that she fell on ice on 1/31/2025 and followed-up with ED. She received CT scan which was unremarkable. She fell onto her buttock and mid-back and feels that she has a \"bowling ball\" into R thoracic spine. She notes symptoms were improving however now is having difficulty with prolonged sitting, sleeping, and taking a deep breath. Symptoms improve with movement and finding the right position when sleeping. Pt notes symptoms will radiate into anterior chest wall but denies SOB. Pt reports that she sits for prolonged periods of time throughout the day with occasional standing and walking her dogs. Pt was referred for DXA scan of pelvis and spine however did not feel that it was indicated and has not received imaging. Pt denies N/T into thoracic spine or into b/l UEs.     Pt did receive CT in ED which indicated degenerative anterolisthesis of L4 on L5 noted there are only 11 " pairs of full ribs and a sacralized L5    Quality of life: good    Patient Goals  Patient goals for therapy: increased strength, decreased pain, increased motion, return to sport/leisure activities and independence with ADLs/IADLs    Pain  Current pain ratin  At worst pain ratin (at night)  Location: R thoracic spine  Quality: dull ache, sharp and discomfort      Diagnostic Tests  CT scan: normal  Treatments  Previous treatment: medication      Objective     Concurrent Complaints  Negative for night pain, disturbed sleep, dizziness, faints, headaches, nausea/motion sickness, tinnitus, trouble swallowing, difficulty breathing, shortness of breath, respiratory pain, visual change, cardiac problem, kidney problem, gallbladder problem, stomach problem, ulcer, appendix problem, spleen problem, pancreas problem, history of cancer, history of trauma and infection    Neurological Testing     Sensation   Cervical/Thoracic   Left   Intact: light touch    Right   Intact: light touch    Active Range of Motion   Cervical/Thoracic Spine       Cervical  Subcranial protraction:  WFL   Subcranial retraction:  WFL   Flexion:  WFL  Extension:  WFL  Left lateral flexion:  WFL  Right lateral flexion:  WFL  Left rotation:  WFL  Right rotation:  WFL    Thoracic    Flexion:  WFL  Extension:  with pain Restriction level: minimal  Left lateral flexion:  Restriction level: minimal  Right lateral flexion:  with pain Restriction level: minimal  Left rotation:  Restriction level: moderate  Right rotation:  with pain Restriction level: moderate  Left Shoulder   Normal active range of motion    Right Shoulder   Normal active range of motion    Joint Play     Hypomobile: T1, T2, T3, 10th rib and 11th rib     Strength/Myotome Testing     Left Shoulder     Isolated Muscles   Lower trapezius: 4+   Middle trapezius: 4+     Right Shoulder     Isolated Muscles   Lower trapezius: 4   Middle trapezius: 4   Neuro Exam:     Headaches   Patient  "reports headaches: No.                Precautions:  Patient Active Problem List   Diagnosis    Chronic bilateral low back pain without sciatica    Esophageal reflux    Arthritis    Duodenum disorder    Hyperlipidemia    Depression    Abnormal MRI of abdomen    Vitamin D deficiency    Anxiety disorder    Migraine aura without headache    Thyroid nodule    Impaired fasting glucose    Chronic left shoulder pain    Lumbar spondylosis    Sleep disorder    Status post laparoscopic cholecystectomy    Pruritus    Post-menopausal bleeding    Trigger finger of left thumb    Abnormal mammogram    Loose stools    Abnormal cervical Papanicolaou smear    Personal history of tobacco use, presenting hazards to health    Anal fissure       Patient's Goals:    Date 4/10            Visits 1 2 3 4 5 6 7 8 9 10   Visits approved              FOTO             Eval/Re-eval                          Education             HEP/POC TERRI            Manuals                                                    Ther Ex             Scap retract 5\"x10            Thoracic ext stretch 5\"x10            SL thoracic rot  5\"x10            L SL with arm OH x10            Thoracic rotation in sitting x10                                                                             Neuro Re-ed             Ther Activity                                       Gait Training                                       Modalities                                       Access Code: [email or web address]  URL: [email or web address]  Date: 04/10/2025  Prepared by: Ann Long    Exercises  - Seated Scapular Retraction  - 1 x daily - 7 x weekly - 3 sets - 10 reps  - Prone Press Up  - 1 x daily - 7 x weekly - 3 sets - 10 reps  - Sidelying Thoracic Rotation with Open Book  - 1 x daily - 7 x weekly - 3 sets - 10 reps  - Seated Thoracic Lumbar Extension  - 1 x daily - 7 x weekly - 3 sets - 10 reps  - Seated Thoracic Flexion and Rotation with Arms Crossed  - 1 x daily - 7 x weekly - 3 " sets - 10 reps

## 2025-04-14 ENCOUNTER — OFFICE VISIT (OUTPATIENT)
Dept: PHYSICAL THERAPY | Facility: REHABILITATION | Age: 70
End: 2025-04-14
Payer: MEDICARE

## 2025-04-14 DIAGNOSIS — M54.50 ACUTE RIGHT-SIDED LOW BACK PAIN WITHOUT SCIATICA: Primary | ICD-10-CM

## 2025-04-14 DIAGNOSIS — Z91.81 HISTORY OF FALLING: ICD-10-CM

## 2025-04-14 PROCEDURE — 97530 THERAPEUTIC ACTIVITIES: CPT | Performed by: PHYSICAL THERAPIST

## 2025-04-14 PROCEDURE — 97140 MANUAL THERAPY 1/> REGIONS: CPT | Performed by: PHYSICAL THERAPIST

## 2025-04-14 PROCEDURE — 97110 THERAPEUTIC EXERCISES: CPT | Performed by: PHYSICAL THERAPIST

## 2025-04-14 NOTE — PROGRESS NOTES
Daily Note     Today's date: 2025  Patient name: Kaylan Young  : 1955  MRN: [id number]  Referring provider: Zuleyma Carney DO  Dx:   Encounter Diagnosis     ICD-10-CM    1. Acute right-sided low back pain without sciatica  M54.50       2. History of falling  Z91.81           Start Time: 0945  Stop Time: 1023  Total time in clinic (min): 38 minutes    Subjective: Pt reports that she continues to have rib pain mostly at night. Denies pain upon arrival. Has been compliant with HEP.      Objective: See treatment diary below      Assessment: Pt tolerated treatment well this visit with favorable response to manual rib mobilizations with movement. She did experience slight increase in pain with inhalation post-session and with active thoracic rotation performed in sitting. She demonstrated good challenge with scapular strengthening. She was provided updated HEP with monitoring for adverse symptoms and to make PT aware if worsening of sxs noted. Pt would continue to benefit from skilled PT to address deficits and improve sleep pattern.      Plan: Continue per plan of care.      Precautions:  Patient Active Problem List   Diagnosis    Chronic bilateral low back pain without sciatica    Esophageal reflux    Arthritis    Duodenum disorder    Hyperlipidemia    Depression    Abnormal MRI of abdomen    Vitamin D deficiency    Anxiety disorder    Migraine aura without headache    Thyroid nodule    Impaired fasting glucose    Chronic left shoulder pain    Lumbar spondylosis    Sleep disorder    Status post laparoscopic cholecystectomy    Pruritus    Post-menopausal bleeding    Trigger finger of left thumb    Abnormal mammogram    Loose stools    Abnormal cervical Papanicolaou smear    Personal history of tobacco use, presenting hazards to health    Anal fissure       Patient's Goals:    Date 4/10 4/14           Visits 1 2 3 4 5 6 7 8 9 10   Visits approved              FOTO             Eval/Re-eval                "           Education             HEP/POC TERRI            Manuals             Inf rib mobs  TERRI  Rib 9-11           Block mobs   Rib 9-11                        Ther Ex             Scap retract 5\"x10            Thoracic ext stretch 5\"x10 10\"X10           SL thoracic rot  5\"x10 5\"x10 with mobs           L SL with arm OH x10 x10           Thoracic rotation in sitting x10 X10 w/ OP           Rows/ext  GTB 5\" 2x10           Scap with ER  GTB 5\" 2x10                                                  Neuro Re-ed             Ther Activity                                       Gait Training                                       Modalities                                       Access Code: [email or web address]  URL: [email or web address]  Date: 04/10/2025  Prepared by: Ann Long    Exercises  - Seated Scapular Retraction  - 1 x daily - 7 x weekly - 3 sets - 10 reps  - Prone Press Up  - 1 x daily - 7 x weekly - 3 sets - 10 reps  - Sidelying Thoracic Rotation with Open Book  - 1 x daily - 7 x weekly - 3 sets - 10 reps  - Seated Thoracic Lumbar Extension  - 1 x daily - 7 x weekly - 3 sets - 10 reps  - Seated Thoracic Flexion and Rotation with Arms Crossed  - 1 x daily - 7 x weekly - 3 sets - 10 reps         "

## 2025-04-17 ENCOUNTER — OFFICE VISIT (OUTPATIENT)
Dept: PHYSICAL THERAPY | Facility: REHABILITATION | Age: 70
End: 2025-04-17
Payer: MEDICARE

## 2025-04-17 DIAGNOSIS — Z91.81 HISTORY OF FALLING: ICD-10-CM

## 2025-04-17 DIAGNOSIS — M54.50 ACUTE RIGHT-SIDED LOW BACK PAIN WITHOUT SCIATICA: Primary | ICD-10-CM

## 2025-04-17 PROCEDURE — 97140 MANUAL THERAPY 1/> REGIONS: CPT | Performed by: PHYSICAL THERAPIST

## 2025-04-17 PROCEDURE — 97110 THERAPEUTIC EXERCISES: CPT | Performed by: PHYSICAL THERAPIST

## 2025-04-17 NOTE — PROGRESS NOTES
Daily Note     Today's date: 2025  Patient name: Kaylan Young  : 1955  MRN: [id number]  Referring provider: Zuleyma Carney DO  Dx:   Encounter Diagnosis     ICD-10-CM    1. Acute right-sided low back pain without sciatica  M54.50       2. History of falling  Z91.81           Start Time: 1345  Stop Time: 1425  Total time in clinic (min): 40 minutes    Subjective: Pt reports that she continues to have pain mostly at night that has not changed intensity since initiation of PT.      Objective: See treatment diary below      Assessment: Pt tolerated treatment well this visit with emphasis on serratus anterior and rhomboid strengthening to improve rib positioning. Pt denied pain of localized rib pain however did note muscular soreness post-session. Pt educated on monitoring for adverse symptoms with updated HEP and with L sidelying over pillows. Pt would continue to benefit from skilled PT to address deficits and improve pain.      Plan: Continue per plan of care.      Precautions:  Patient Active Problem List   Diagnosis    Chronic bilateral low back pain without sciatica    Esophageal reflux    Arthritis    Duodenum disorder    Hyperlipidemia    Depression    Abnormal MRI of abdomen    Vitamin D deficiency    Anxiety disorder    Migraine aura without headache    Thyroid nodule    Impaired fasting glucose    Chronic left shoulder pain    Lumbar spondylosis    Sleep disorder    Status post laparoscopic cholecystectomy    Pruritus    Post-menopausal bleeding    Trigger finger of left thumb    Abnormal mammogram    Loose stools    Abnormal cervical Papanicolaou smear    Personal history of tobacco use, presenting hazards to health    Anal fissure       Patient's Goals:    Date 4/10 4/14 4/17          Visits 1 2 3 4 5 6 7 8 9 10   Visits approved              FOTO             Eval/Re-eval                          Education             HEP/POC TERRI            Manuals             Inf rib mobs  TERRI  Rib 9-11     "       Block mobs   Rib 9-11           Prone PA mobs   TERRI          Ther Ex             Scap retract 5\"x10            Thoracic ext stretch 5\"x10 10\"X10 With belt 5\"x10          SL thoracic rot  5\"x10 5\"x10 with mobs 10\"x10          L SL with arm OH x10 x10 x10          Thoracic rotation in sitting x10 X10 w/ OP X10 w/ OP          Rows/ext  GTB 5\" 2x10 GTB 5\" 2x10          Scap with ER  GTB 5\" 2x10 GTB 5\" 2x10          SA slides   PTB x5                                    Neuro Re-ed             Ther Activity                                       Gait Training                                       Modalities                                       Access Code: [email or web address]  URL: [email or web address]  Date: 04/10/2025  Prepared by: Ann Long    Exercises  - Seated Scapular Retraction  - 1 x daily - 7 x weekly - 3 sets - 10 reps  - Prone Press Up  - 1 x daily - 7 x weekly - 3 sets - 10 reps  - Sidelying Thoracic Rotation with Open Book  - 1 x daily - 7 x weekly - 3 sets - 10 reps  - Seated Thoracic Lumbar Extension  - 1 x daily - 7 x weekly - 3 sets - 10 reps  - Seated Thoracic Flexion and Rotation with Arms Crossed  - 1 x daily - 7 x weekly - 3 sets - 10 reps           "

## 2025-04-21 ENCOUNTER — OFFICE VISIT (OUTPATIENT)
Dept: PHYSICAL THERAPY | Facility: REHABILITATION | Age: 70
End: 2025-04-21
Payer: MEDICARE

## 2025-04-21 DIAGNOSIS — Z91.81 HISTORY OF FALLING: ICD-10-CM

## 2025-04-21 DIAGNOSIS — M54.50 ACUTE RIGHT-SIDED LOW BACK PAIN WITHOUT SCIATICA: Primary | ICD-10-CM

## 2025-04-21 PROCEDURE — 97110 THERAPEUTIC EXERCISES: CPT | Performed by: PHYSICAL THERAPIST

## 2025-04-21 PROCEDURE — 97140 MANUAL THERAPY 1/> REGIONS: CPT | Performed by: PHYSICAL THERAPIST

## 2025-04-21 NOTE — PROGRESS NOTES
Daily Note     Today's date: 2025  Patient name: Kaylan Young  : 1955  MRN: [id number]  Referring provider: Zuleyma Carney DO  Dx:   Encounter Diagnosis     ICD-10-CM    1. Acute right-sided low back pain without sciatica  M54.50       2. History of falling  Z91.81           Start Time: 0945  Stop Time: 1025  Total time in clinic (min): 40 minutes    Subjective: Pt reports that she has noticed improvement in symptoms at night while sleeping but they are still present.      Objective: See treatment diary below      Assessment: Pt tolerated treatment well this visit with continued emphasis on thoracic extension ROM and scapular strengthening. Pt denied pain throughout and noted improvement in thoracic rotation ROM following stretches and manual interventions. Pt educated on increasing frequency of thoracic extension self-mobilization to reduce FHRS posture and minimize pain noted. Pt would continue to benefit from skilled PT to address deficits and improve tolerance to prolonged positioning.       Plan: Continue per plan of care.      Precautions:  Patient Active Problem List   Diagnosis    Chronic bilateral low back pain without sciatica    Esophageal reflux    Arthritis    Duodenum disorder    Hyperlipidemia    Depression    Abnormal MRI of abdomen    Vitamin D deficiency    Anxiety disorder    Migraine aura without headache    Thyroid nodule    Impaired fasting glucose    Chronic left shoulder pain    Lumbar spondylosis    Sleep disorder    Status post laparoscopic cholecystectomy    Pruritus    Post-menopausal bleeding    Trigger finger of left thumb    Abnormal mammogram    Loose stools    Abnormal cervical Papanicolaou smear    Personal history of tobacco use, presenting hazards to health    Anal fissure       Patient's Goals:    Date 4/10 4/14 4/17 4/21         Visits 1 2 3 4 5 6 7 8 9 10   Visits approved              FOTO             Eval/Re-eval                          Education           "   HEP/POC TERRI            Manuals             Inf rib mobs  TERRI  Rib 9-11           Block mobs   Rib 9-11           Prone PA mobs   TERRI TERRI         Ther Ex             Scap retract 5\"x10            Thoracic ext stretch 5\"x10 10\"X10 With belt 5\"x10 With belt 5\" 2x10         SL thoracic rot  5\"x10 5\"x10 with mobs 10\"x10 10\"x10         Repeated thoracic ext    x10         L SL with arm OH x10 x10 x10 x10         Thoracic rotation in sitting x10 X10 w/ OP X10 w/ OP X10 w/ OP         Rows/ext  GTB 5\" 2x10 GTB 5\" 2x10 GTB 5\" 2x10         Scap with ER  GTB 5\" 2x10 GTB 5\" 2x10 GTB 5\" 2x10         SA slides   PTB x5 PTB x5                                   Neuro Re-ed             Ther Activity                                       Gait Training                                       Modalities                                       Access Code: [email or web address]  URL: [email or web address]  Date: 04/10/2025  Prepared by: Ann Long    Exercises  - Seated Scapular Retraction  - 1 x daily - 7 x weekly - 3 sets - 10 reps  - Prone Press Up  - 1 x daily - 7 x weekly - 3 sets - 10 reps  - Sidelying Thoracic Rotation with Open Book  - 1 x daily - 7 x weekly - 3 sets - 10 reps  - Seated Thoracic Lumbar Extension  - 1 x daily - 7 x weekly - 3 sets - 10 reps  - Seated Thoracic Flexion and Rotation with Arms Crossed  - 1 x daily - 7 x weekly - 3 sets - 10 reps             "

## 2025-04-22 ENCOUNTER — HOSPITAL ENCOUNTER (OUTPATIENT)
Dept: CT IMAGING | Facility: HOSPITAL | Age: 70
Discharge: HOME | End: 2025-04-22
Attending: FAMILY MEDICINE
Payer: COMMERCIAL

## 2025-04-22 DIAGNOSIS — E78.2 MIXED HYPERLIPIDEMIA: ICD-10-CM

## 2025-04-22 PROCEDURE — 75571 CT HRT W/O DYE W/CA TEST: CPT

## 2025-04-24 ENCOUNTER — OFFICE VISIT (OUTPATIENT)
Dept: PHYSICAL THERAPY | Facility: REHABILITATION | Age: 70
End: 2025-04-24
Payer: MEDICARE

## 2025-04-24 DIAGNOSIS — Z91.81 HISTORY OF FALLING: ICD-10-CM

## 2025-04-24 DIAGNOSIS — M54.50 ACUTE RIGHT-SIDED LOW BACK PAIN WITHOUT SCIATICA: Primary | ICD-10-CM

## 2025-04-24 PROCEDURE — 97110 THERAPEUTIC EXERCISES: CPT | Performed by: PHYSICAL THERAPIST

## 2025-04-24 NOTE — PROGRESS NOTES
Daily Note     Today's date: 2025  Patient name: Kaylan Young  : 1955  MRN: [id number]  Referring provider: Zuleyma Carney DO  Dx:   Encounter Diagnosis     ICD-10-CM    1. Acute right-sided low back pain without sciatica  M54.50       2. History of falling  Z91.81           Start Time: 0945  Stop Time: 1020  Total time in clinic (min): 35 minutes    Subjective: Pt reports that her rib pain is slowly improving specifically at night      Objective: See treatment diary below      Assessment: Pt tolerated treatment well this visit. Pt demonstrates improvement in thoracic mobility with manual interventions and improvement in scapular strength leading to increased repetitions completed this visit. She noted muscular fatigue post-session with improvement in flexibility. Pt would continue to benefit from skilled PT to address deficits and improve scapular strength to improve tolerance to prolonged positioning including sleep.       Plan: Continue per plan of care.      Precautions:  Patient Active Problem List   Diagnosis    Chronic bilateral low back pain without sciatica    Esophageal reflux    Arthritis    Duodenum disorder    Hyperlipidemia    Depression    Abnormal MRI of abdomen    Vitamin D deficiency    Anxiety disorder    Migraine aura without headache    Thyroid nodule    Impaired fasting glucose    Chronic left shoulder pain    Lumbar spondylosis    Sleep disorder    Status post laparoscopic cholecystectomy    Pruritus    Post-menopausal bleeding    Trigger finger of left thumb    Abnormal mammogram    Loose stools    Abnormal cervical Papanicolaou smear    Personal history of tobacco use, presenting hazards to health    Anal fissure       Patient's Goals:    Date 4/10 4/14 4/17 4/21 4/24        Visits 1 2 3 4 5 6 7 8 9 10   Visits approved              FOTO             Eval/Re-eval                          Education             HEP/POC TERRI            Manuals             Inf rib mobs  TERRI  Rib  "9-11           Block mobs   Rib 9-11           Prone PA mobs   TERRI TERRI TERRI        Ther Ex             Scap retract 5\"x10            Thoracic ext stretch 5\"x10 10\"X10 With belt 5\"x10 With belt 5\" 2x10 np        SL thoracic rot  5\"x10 5\"x10 with mobs 10\"x10 10\"x10 10\"x10        Repeated thoracic ext    x10 x10        L SL with arm OH x10 x10 x10 x10 np        Thoracic rotation in sitting x10 X10 w/ OP X10 w/ OP X10 w/ OP np        Rows/ext  GTB 5\" 2x10 GTB 5\" 2x10 GTB 5\" 2x10 GTB 5\" 3x10        Scap with ER  GTB 5\" 2x10 GTB 5\" 2x10 GTB 5\" 2x10 GTB 5\" 3x10        SA slides   PTB x5 PTB x5                                   Neuro Re-ed             Ther Activity                                       Gait Training                                       Modalities                                       Access Code: [email or web address]  URL: [email or web address]  Date: 04/10/2025  Prepared by: Ann Long    Exercises  - Seated Scapular Retraction  - 1 x daily - 7 x weekly - 3 sets - 10 reps  - Prone Press Up  - 1 x daily - 7 x weekly - 3 sets - 10 reps  - Sidelying Thoracic Rotation with Open Book  - 1 x daily - 7 x weekly - 3 sets - 10 reps  - Seated Thoracic Lumbar Extension  - 1 x daily - 7 x weekly - 3 sets - 10 reps  - Seated Thoracic Flexion and Rotation with Arms Crossed  - 1 x daily - 7 x weekly - 3 sets - 10 reps               "

## 2025-04-28 ENCOUNTER — OFFICE VISIT (OUTPATIENT)
Dept: PHYSICAL THERAPY | Facility: REHABILITATION | Age: 70
End: 2025-04-28
Payer: MEDICARE

## 2025-04-28 DIAGNOSIS — M54.50 ACUTE RIGHT-SIDED LOW BACK PAIN WITHOUT SCIATICA: Primary | ICD-10-CM

## 2025-04-28 DIAGNOSIS — Z91.81 HISTORY OF FALLING: ICD-10-CM

## 2025-04-28 PROCEDURE — 97112 NEUROMUSCULAR REEDUCATION: CPT | Performed by: PHYSICAL THERAPIST

## 2025-04-28 PROCEDURE — 97110 THERAPEUTIC EXERCISES: CPT | Performed by: PHYSICAL THERAPIST

## 2025-04-28 NOTE — PROGRESS NOTES
Daily Note     Today's date: 2025  Patient name: Kaylan Young  : 1955  MRN: [id number]  Referring provider: Zuleyma Carney DO  Dx:   Encounter Diagnosis     ICD-10-CM    1. Acute right-sided low back pain without sciatica  M54.50       2. History of falling  Z91.81           Start Time: 1340  Stop Time: 1420  Total time in clinic (min): 40 minutes    Subjective: Pt reports that her pain is slowly improving over time.      Objective: See treatment diary below      Assessment: Pt tolerated treatment well this visit. Pt demonstrates improvement in thoracic mobility with increased extension ROM noted with initiation of PT. Held on manual interventions to determine if appropriate for manuals to be discharged from current program and progress strengthening. Good challenge noted with scapular strengthening and muscular fatigue post-session. She would continue to benefit from skilled PT to address deficits.      Plan: Continue per plan of care.      Precautions:  Patient Active Problem List   Diagnosis    Chronic bilateral low back pain without sciatica    Esophageal reflux    Arthritis    Duodenum disorder    Hyperlipidemia    Depression    Abnormal MRI of abdomen    Vitamin D deficiency    Anxiety disorder    Migraine aura without headache    Thyroid nodule    Impaired fasting glucose    Chronic left shoulder pain    Lumbar spondylosis    Sleep disorder    Status post laparoscopic cholecystectomy    Pruritus    Post-menopausal bleeding    Trigger finger of left thumb    Abnormal mammogram    Loose stools    Abnormal cervical Papanicolaou smear    Personal history of tobacco use, presenting hazards to health    Anal fissure       Patient's Goals:    Date 4/10 4/14 4/17 4/21 4/24 4/28       Visits 1 2 3 4 5 6 7 8 9 10   Visits approved              FOTO             Eval/Re-eval                          Education             HEP/POC TERRI            Manuals             Inf rib mobs  TERRI  Rib 9-11          "  Block mobs   Rib 9-11           Prone PA mobs   TERRI TERRI TERRI        Ther Ex             Scap retract 5\"x10            Thoracic ext stretch 5\"x10 10\"X10 With belt 5\"x10 With belt 5\" 2x10 np        SL thoracic rot  5\"x10 5\"x10 with mobs 10\"x10 10\"x10 10\"x10 10\"x10       Repeated thoracic ext    x10 x10 5\"x10       L SL with arm OH x10 x10 x10 x10 np        Thoracic rotation in sitting x10 X10 w/ OP X10 w/ OP X10 w/ OP np        Rows/ext  GTB 5\" 2x10 GTB 5\" 2x10 GTB 5\" 2x10 GTB 5\" 3x10 BTB 5\" 3x10       Scap with ER  GTB 5\" 2x10 GTB 5\" 2x10 GTB 5\" 2x10 GTB 5\" 3x10 GTB 5\" 3x10       SA slides   PTB x5 PTB x5  5\" 2x10ea       Prone Is, Ts                          Neuro Re-ed             Ther Activity                                       Gait Training                                       Modalities                                       Access Code: [email or web address]  URL: [email or web address]  Date: 04/10/2025  Prepared by: Ann Long    Exercises  - Seated Scapular Retraction  - 1 x daily - 7 x weekly - 3 sets - 10 reps  - Prone Press Up  - 1 x daily - 7 x weekly - 3 sets - 10 reps  - Sidelying Thoracic Rotation with Open Book  - 1 x daily - 7 x weekly - 3 sets - 10 reps  - Seated Thoracic Lumbar Extension  - 1 x daily - 7 x weekly - 3 sets - 10 reps  - Seated Thoracic Flexion and Rotation with Arms Crossed  - 1 x daily - 7 x weekly - 3 sets - 10 reps                 "

## 2025-04-30 ENCOUNTER — RESULTS FOLLOW-UP (OUTPATIENT)
Dept: FAMILY MEDICINE CLINIC | Facility: CLINIC | Age: 70
End: 2025-04-30

## 2025-05-01 ENCOUNTER — OFFICE VISIT (OUTPATIENT)
Dept: PHYSICAL THERAPY | Facility: REHABILITATION | Age: 70
End: 2025-05-01
Payer: MEDICARE

## 2025-05-01 DIAGNOSIS — Z91.81 HISTORY OF FALLING: ICD-10-CM

## 2025-05-01 DIAGNOSIS — M54.50 ACUTE RIGHT-SIDED LOW BACK PAIN WITHOUT SCIATICA: Primary | ICD-10-CM

## 2025-05-01 PROCEDURE — 97110 THERAPEUTIC EXERCISES: CPT | Performed by: PHYSICAL THERAPIST

## 2025-05-01 PROCEDURE — 97112 NEUROMUSCULAR REEDUCATION: CPT | Performed by: PHYSICAL THERAPIST

## 2025-05-01 NOTE — PROGRESS NOTES
Daily Note     Today's date: 2025  Patient name: Kaylan Young  : 1955  MRN: [id number]  Referring provider: Zuleyma Carney DO  Dx:   Encounter Diagnosis     ICD-10-CM    1. Acute right-sided low back pain without sciatica  M54.50       2. History of falling  Z91.81           Start Time: 0945  Stop Time: 1025  Total time in clinic (min): 40 minutes    Subjective: Pt provides no new complaints upon arrival this date.       Objective: See treatment diary below      Assessment: Pt tolerated treatment well this visit. Slight increase in thoracic/rib pain with increased repetitions of scapular strengthening which was relieved with seated thoracic extension stretch. Relief of symptoms noted with grade III PA thoracic mobilizations and will be completed in subsequent visits. Pt educated on increasing frequency of thoracic extension with towel roll while at home specifically when she is experiencing symptoms. Pt would continue to benefit from skilled PT to address symptoms and improve sleeping tolerance.       Plan: Continue per plan of care.      Precautions:  Patient Active Problem List   Diagnosis    Chronic bilateral low back pain without sciatica    Esophageal reflux    Arthritis    Duodenum disorder    Hyperlipidemia    Depression    Abnormal MRI of abdomen    Vitamin D deficiency    Anxiety disorder    Migraine aura without headache    Thyroid nodule    Impaired fasting glucose    Chronic left shoulder pain    Lumbar spondylosis    Sleep disorder    Status post laparoscopic cholecystectomy    Pruritus    Post-menopausal bleeding    Trigger finger of left thumb    Abnormal mammogram    Loose stools    Abnormal cervical Papanicolaou smear    Personal history of tobacco use, presenting hazards to health    Anal fissure       Patient's Goals:    Date 4/10 4/14 4/17 4/21 4/24 4/28 5/1      Visits 1 2 3 4 5 6 7 8 9 10   Visits approved              FOTO             Eval/Re-eval                         "  Education             HEP/POC TERRI            Manuals             Inf rib mobs  TERRI  Rib 9-11           Block mobs   Rib 9-11           Prone PA mobs   TERRI TERRI TERRI  gradeIII TERRI      Ther Ex             Scap retract 5\"x10            Thoracic ext stretch 5\"x10 10\"X10 With belt 5\"x10 With belt 5\" 2x10 np        SL thoracic rot  5\"x10 5\"x10 with mobs 10\"x10 10\"x10 10\"x10 10\"x10 10\"x10      Repeated thoracic ext    x10 x10 5\"x10 5\"x10      L SL with arm OH x10 x10 x10 x10 np        Thoracic rotation in sitting x10 X10 w/ OP X10 w/ OP X10 w/ OP np        Rows/ext  GTB 5\" 2x10 GTB 5\" 2x10 GTB 5\" 2x10 GTB 5\" 3x10 BTB 5\" 3x10 BTB 5\" 3x10      Scap with ER  GTB 5\" 2x10 GTB 5\" 2x10 GTB 5\" 2x10 GTB 5\" 3x10 GTB 5\" 3x10 GTB 5\" 3x10      SA slides   PTB x5 PTB x5  5\" 2x10ea 5\" 2x10ea      Prone Is, Ts                          Neuro Re-ed             Ther Activity                                       Gait Training                                       Modalities                                       Access Code: [email or web address]  URL: [email or web address]  Date: 04/10/2025  Prepared by: Ann Long    Exercises  - Seated Scapular Retraction  - 1 x daily - 7 x weekly - 3 sets - 10 reps  - Prone Press Up  - 1 x daily - 7 x weekly - 3 sets - 10 reps  - Sidelying Thoracic Rotation with Open Book  - 1 x daily - 7 x weekly - 3 sets - 10 reps  - Seated Thoracic Lumbar Extension  - 1 x daily - 7 x weekly - 3 sets - 10 reps  - Seated Thoracic Flexion and Rotation with Arms Crossed  - 1 x daily - 7 x weekly - 3 sets - 10 reps                   "

## 2025-05-05 ENCOUNTER — OFFICE VISIT (OUTPATIENT)
Dept: PHYSICAL THERAPY | Facility: REHABILITATION | Age: 70
End: 2025-05-05
Payer: MEDICARE

## 2025-05-05 DIAGNOSIS — Z91.81 HISTORY OF FALLING: ICD-10-CM

## 2025-05-05 DIAGNOSIS — M54.50 ACUTE RIGHT-SIDED LOW BACK PAIN WITHOUT SCIATICA: Primary | ICD-10-CM

## 2025-05-05 PROCEDURE — 97112 NEUROMUSCULAR REEDUCATION: CPT | Performed by: PHYSICAL THERAPIST

## 2025-05-05 PROCEDURE — 97110 THERAPEUTIC EXERCISES: CPT | Performed by: PHYSICAL THERAPIST

## 2025-05-05 NOTE — PROGRESS NOTES
Daily Note     Today's date: 2025  Patient name: Kaylan Young  : 1955  MRN: [id number]  Referring provider: Zuleyma Carney DO  Dx:   Encounter Diagnosis     ICD-10-CM    1. Acute right-sided low back pain without sciatica  M54.50       2. History of falling  Z91.81           Start Time: 0945  Stop Time: 1023  Total time in clinic (min): 38 minutes    Subjective: Pt reports that she has been doing well. Still continues to deny pain at night with sleeping.      Objective: See treatment diary below      Assessment: Pt tolerated treatment well this visit with emphasis on scapular strengthening and improving endurance. Able to increase repetitions this visit without exacerbation of pain reported. Pt educated on monitoring for adverse symptoms following this visit and will progress next visit as able to tolerate. Pt would continue to benefit from skilled PT to address deficits and optimize overall function.       Plan: Continue per plan of care.      Precautions:  Patient Active Problem List   Diagnosis    Chronic bilateral low back pain without sciatica    Esophageal reflux    Arthritis    Duodenum disorder    Hyperlipidemia    Depression    Abnormal MRI of abdomen    Vitamin D deficiency    Anxiety disorder    Migraine aura without headache    Thyroid nodule    Impaired fasting glucose    Chronic left shoulder pain    Lumbar spondylosis    Sleep disorder    Status post laparoscopic cholecystectomy    Pruritus    Post-menopausal bleeding    Trigger finger of left thumb    Abnormal mammogram    Loose stools    Abnormal cervical Papanicolaou smear    Personal history of tobacco use, presenting hazards to health    Anal fissure       Patient's Goals:    Date 4/10 4/14 4/17 4/21 4/24 4/28 5/1 5/5     Visits 1 2 3 4 5 6 7 8 9 10   Visits approved              FOTO             Eval/Re-eval                          Education             HEP/POC TERRI            Manuals             Inf rib mobs  TERRI  Rib 9-11       "     Block mobs   Rib 9-11           Prone PA mobs   TERRI TERRI TERRI  gradeIII TERRI      Ther Ex             Scap retract 5\"x10            Thoracic ext stretch 5\"x10 10\"X10 With belt 5\"x10 With belt 5\" 2x10 np        SL thoracic rot  5\"x10 5\"x10 with mobs 10\"x10 10\"x10 10\"x10 10\"x10 10\"x10 10\"x10     Repeated thoracic ext    x10 x10 5\"x10 5\"x10 10\"x10     L SL with arm OH x10 x10 x10 x10 np        Thoracic rotation in sitting x10 X10 w/ OP X10 w/ OP X10 w/ OP np        Rows/ext  GTB 5\" 2x10 GTB 5\" 2x10 GTB 5\" 2x10 GTB 5\" 3x10 BTB 5\" 3x10 BTB 5\" 3x10 BTB 5\" 3x10     Scap with ER  GTB 5\" 2x10 GTB 5\" 2x10 GTB 5\" 2x10 GTB 5\" 3x10 GTB 5\" 3x10 GTB 5\" 3x10 BTB 5\" 3x10     SA slides   PTB x5 PTB x5  5\" 2x10ea 5\" 2x10ea PTB 5\" 2x10     Prone Is, Ts        5\" 3x10ea                  Neuro Re-ed             Ther Activity                                       Gait Training                                       Modalities                                       Access Code: [email or web address]  URL: [email or web address]  Date: 04/10/2025  Prepared by: Ann Long    Exercises  - Seated Scapular Retraction  - 1 x daily - 7 x weekly - 3 sets - 10 reps  - Prone Press Up  - 1 x daily - 7 x weekly - 3 sets - 10 reps  - Sidelying Thoracic Rotation with Open Book  - 1 x daily - 7 x weekly - 3 sets - 10 reps  - Seated Thoracic Lumbar Extension  - 1 x daily - 7 x weekly - 3 sets - 10 reps  - Seated Thoracic Flexion and Rotation with Arms Crossed  - 1 x daily - 7 x weekly - 3 sets - 10 reps                     "

## 2025-05-08 ENCOUNTER — OFFICE VISIT (OUTPATIENT)
Dept: PHYSICAL THERAPY | Facility: REHABILITATION | Age: 70
End: 2025-05-08
Attending: FAMILY MEDICINE
Payer: MEDICARE

## 2025-05-08 DIAGNOSIS — M54.50 ACUTE RIGHT-SIDED LOW BACK PAIN WITHOUT SCIATICA: Primary | ICD-10-CM

## 2025-05-08 PROCEDURE — 97112 NEUROMUSCULAR REEDUCATION: CPT

## 2025-05-08 PROCEDURE — 97110 THERAPEUTIC EXERCISES: CPT

## 2025-05-08 NOTE — PROGRESS NOTES
Daily Note     Today's date: 2025  Patient name: Kaylan Young  : 1955  MRN: [id number]  Referring provider: Zuleyma Carney DO  Dx:   Encounter Diagnosis     ICD-10-CM    1. Acute right-sided low back pain without sciatica  M54.50           Start Time: 0950  Stop Time: 1030  Total time in clinic (min): 40 minutes    Subjective: Patient reports no new complaints.       Objective: See treatment diary below      Assessment:  Pt tolerated treatment well this visit. Cueing needed for serratus walk walks and prone exercises today. Pt educated on monitoring for adverse symptoms following this visit and will progress next visit as able to tolerate. S/L thoracic rotation performed bilaterally today. Pt would continue to benefit from skilled PT to address deficits and optimize overall function. No increased pain reported post treatment.          Plan: Continue per plan of care.      Precautions:  Patient Active Problem List   Diagnosis    Chronic bilateral low back pain without sciatica    Esophageal reflux    Arthritis    Duodenum disorder    Hyperlipidemia    Depression    Abnormal MRI of abdomen    Vitamin D deficiency    Anxiety disorder    Migraine aura without headache    Thyroid nodule    Impaired fasting glucose    Chronic left shoulder pain    Lumbar spondylosis    Sleep disorder    Status post laparoscopic cholecystectomy    Pruritus    Post-menopausal bleeding    Trigger finger of left thumb    Abnormal mammogram    Loose stools    Abnormal cervical Papanicolaou smear    Personal history of tobacco use, presenting hazards to health    Anal fissure       Patient's Goals:    Date 4/10 4/14 4/17 4/21 4/24 4/28 5/1 5/5 5/8    Visits 1 2 3 4 5 6 7 8 9 10   Visits approved              FOTO             Eval/Re-eval                          Education             HEP/POC TERRI            Manuals             Inf rib mobs  TERRI  Rib 9-11           Block mobs   Rib 9-11           Prone PA mobs   TERRI TERRI TERRI   "gradeIII TERRI      Ther Ex             Scap retract 5\"x10        UBE 3'/3'    Thoracic ext stretch 5\"x10 10\"X10 With belt 5\"x10 With belt 5\" 2x10 np        SL thoracic rot  5\"x10 5\"x10 with mobs 10\"x10 10\"x10 10\"x10 10\"x10 10\"x10 10\"x10 10\"x10    Repeated thoracic ext    x10 x10 5\"x10 5\"x10 10\"x10 10\"x10    L SL with arm OH x10 x10 x10 x10 np        Thoracic rotation in sitting x10 X10 w/ OP X10 w/ OP X10 w/ OP np        Rows/ext  GTB 5\" 2x10 GTB 5\" 2x10 GTB 5\" 2x10 GTB 5\" 3x10 BTB 5\" 3x10 BTB 5\" 3x10 BTB 5\" 3x10 BTB 5\" 3x10    Scap with ER  GTB 5\" 2x10 GTB 5\" 2x10 GTB 5\" 2x10 GTB 5\" 3x10 GTB 5\" 3x10 GTB 5\" 3x10 BTB 5\" 3x10 BTB 5\" 3x10    SA slides   PTB x5 PTB x5  5\" 2x10ea 5\" 2x10ea PTB 5\" 2x10 PTB wall walks x5     Prone Is, Ts        5\" 3x10ea 5\" 3x10ea                 Neuro Re-ed             Ther Activity                                       Gait Training                                       Modalities                                       Access Code: [email or web address]  URL: [email or web address]  Date: 04/10/2025  Prepared by: Ann Long    Exercises  - Seated Scapular Retraction  - 1 x daily - 7 x weekly - 3 sets - 10 reps  - Prone Press Up  - 1 x daily - 7 x weekly - 3 sets - 10 reps  - Sidelying Thoracic Rotation with Open Book  - 1 x daily - 7 x weekly - 3 sets - 10 reps  - Seated Thoracic Lumbar Extension  - 1 x daily - 7 x weekly - 3 sets - 10 reps  - Seated Thoracic Flexion and Rotation with Arms Crossed  - 1 x daily - 7 x weekly - 3 sets - 10 reps                       "

## 2025-05-12 ENCOUNTER — OFFICE VISIT (OUTPATIENT)
Dept: PHYSICAL THERAPY | Facility: REHABILITATION | Age: 70
End: 2025-05-12
Payer: MEDICARE

## 2025-05-12 DIAGNOSIS — M54.50 ACUTE RIGHT-SIDED LOW BACK PAIN WITHOUT SCIATICA: Primary | ICD-10-CM

## 2025-05-12 DIAGNOSIS — Z91.81 HISTORY OF FALLING: ICD-10-CM

## 2025-05-12 PROCEDURE — 97110 THERAPEUTIC EXERCISES: CPT | Performed by: PHYSICAL THERAPIST

## 2025-05-12 PROCEDURE — 97112 NEUROMUSCULAR REEDUCATION: CPT | Performed by: PHYSICAL THERAPIST

## 2025-05-12 NOTE — PROGRESS NOTES
Daily Note     Today's date: 2025  Patient name: Kaylan Young  : 1955  MRN: [id number]  Referring provider: Zuleyma Carney DO  Dx:   Encounter Diagnosis     ICD-10-CM    1. Acute right-sided low back pain without sciatica  M54.50       2. History of falling  Z91.81           Start Time: 0945  Stop Time: 1023  Total time in clinic (min): 38 minutes    Subjective: Pt reports soreness into bilateral ribcage upon waking up this morning which she contributes to the way she slept.       Objective: See treatment diary below      Assessment: Pt tolerated treatment well this visit with challenge noted with increased repetitions and resistance provided with interventions as documented below. Pt denied pain of R posterior rib with all interventions. She has met all physical therapy and functional goals. She would benefit from skilled PT to obtain appropriate and comprehensive HEP for self-management and to maintain progress.      Plan: Continue per plan of care.      Precautions:  Patient Active Problem List   Diagnosis    Chronic bilateral low back pain without sciatica    Esophageal reflux    Arthritis    Duodenum disorder    Hyperlipidemia    Depression    Abnormal MRI of abdomen    Vitamin D deficiency    Anxiety disorder    Migraine aura without headache    Thyroid nodule    Impaired fasting glucose    Chronic left shoulder pain    Lumbar spondylosis    Sleep disorder    Status post laparoscopic cholecystectomy    Pruritus    Post-menopausal bleeding    Trigger finger of left thumb    Abnormal mammogram    Loose stools    Abnormal cervical Papanicolaou smear    Personal history of tobacco use, presenting hazards to health    Anal fissure       Patient's Goals:    Date 4/10 4/14 4/17 4/21 4/24 4/28 5/1 5 5   Visits 1 2 3 4 5 6 7 8 9 10   Visits approved              FOTO             Eval/Re-eval                          Education             HEP/POC TERRI            Manuals             Inf rib  "mobs  TERRI  Rib 9-11           Block mobs   Rib 9-11           Prone PA mobs   TERRI TERRI TERRI  gradeIII TERRI      Ther Ex             Scap retract 5\"x10        UBE 3'/3' UBE 3'/3'   Thoracic ext stretch 5\"x10 10\"X10 With belt 5\"x10 With belt 5\" 2x10 np        SL thoracic rot  5\"x10 5\"x10 with mobs 10\"x10 10\"x10 10\"x10 10\"x10 10\"x10 10\"x10 10\"x10 10\"x10   Repeated thoracic ext    x10 x10 5\"x10 5\"x10 10\"x10 10\"x10 10\"x10   L SL with arm OH x10 x10 x10 x10 np        Thoracic rotation in sitting x10 X10 w/ OP X10 w/ OP X10 w/ OP np        Rows/ext  GTB 5\" 2x10 GTB 5\" 2x10 GTB 5\" 2x10 GTB 5\" 3x10 BTB 5\" 3x10 BTB 5\" 3x10 BTB 5\" 3x10 BTB 5\" 3x10 BTB 5\" 3x10   Scap with ER  GTB 5\" 2x10 GTB 5\" 2x10 GTB 5\" 2x10 GTB 5\" 3x10 GTB 5\" 3x10 GTB 5\" 3x10 BTB 5\" 3x10 BTB 5\" 3x10 GTB 5\" 3x10   SA slides   PTB x5 PTB x5  5\" 2x10ea 5\" 2x10ea PTB 5\" 2x10 PTB wall walks x5  PTB 2x5   Prone Is, Ts        5\" 3x10ea 5\" 3x10ea 5\" 3x10 ea                Neuro Re-ed             Ther Activity                                       Gait Training                                       Modalities                                       Access Code: [email or web address]  URL: [email or web address]  Date: 04/10/2025  Prepared by: Ann Long    Exercises  - Seated Scapular Retraction  - 1 x daily - 7 x weekly - 3 sets - 10 reps  - Prone Press Up  - 1 x daily - 7 x weekly - 3 sets - 10 reps  - Sidelying Thoracic Rotation with Open Book  - 1 x daily - 7 x weekly - 3 sets - 10 reps  - Seated Thoracic Lumbar Extension  - 1 x daily - 7 x weekly - 3 sets - 10 reps  - Seated Thoracic Flexion and Rotation with Arms Crossed  - 1 x daily - 7 x weekly - 3 sets - 10 reps                         "

## 2025-05-13 ENCOUNTER — OFFICE VISIT (OUTPATIENT)
Dept: GASTROENTEROLOGY | Facility: CLINIC | Age: 70
End: 2025-05-13

## 2025-05-13 VITALS
WEIGHT: 157 LBS | SYSTOLIC BLOOD PRESSURE: 110 MMHG | HEIGHT: 60 IN | BODY MASS INDEX: 30.82 KG/M2 | HEART RATE: 64 BPM | DIASTOLIC BLOOD PRESSURE: 62 MMHG

## 2025-05-13 DIAGNOSIS — Z12.11 COLON CANCER SCREENING: ICD-10-CM

## 2025-05-13 DIAGNOSIS — K21.9 GASTROESOPHAGEAL REFLUX DISEASE WITHOUT ESOPHAGITIS: Primary | ICD-10-CM

## 2025-05-13 DIAGNOSIS — K58.0 IRRITABLE BOWEL SYNDROME WITH DIARRHEA: ICD-10-CM

## 2025-05-13 NOTE — PROGRESS NOTES
"Name: Kaylan Young      : 1955      MRN: [id number]  Encounter Provider: Rachel Spencer DO  Encounter Date: 2025   Encounter department: [facility] [place]  :  Assessment & Plan  Gastroesophageal reflux disease without esophagitis  -would recommend resuming ppi and follow up in a few months  -rec avoiding food triggers, also not laying flat 2-3 hours after eating   -recent egd in Oct of 2024 without erosive esophagitis        Colon cancer screening  Uptodate, last colonoscopy       Irritable bowel syndrome with diarrhea  Increase the cholesytramine to one dose every other day   Continue to avoid dairy if symptoms improve but symptoms occur with other foods           History of Present Illness   Kaylan Young is a 70 y.o. female who presents for follow up.  Her anal fissure is improved.  Reports quitting her PPI for a long time but reports for the past two months her symptoms worsening againg with occasional heartburn, acid taste, with increasing symptoms of burning occurring 1-2 times per week.  Denies any dysphagia, odynophagia or weight loss.    Denies any food triggers and reports that eating crackers makes it feel better.      Reports still having IBS symptoms with diarrhea predominant.  Forgets to take her cholesytramine, but when she takes it it helps her symptoms.  HPI    Review of Systems A complete review of systems is negative other than that noted above in the HPI.      Current Medications[1]    Objective   /62   Pulse 64   Ht 5' 0.25\" (1.53 m)   Wt 71.2 kg (157 lb)   BMI 30.41 kg/m²     Physical Exam    Cardiovascular:      Rate and Rhythm: Normal rate and regular rhythm.   Pulmonary:      Effort: Pulmonary effort is normal.      Breath sounds: Normal breath sounds.   Abdominal:      General: Abdomen is flat.      Palpations: Abdomen is soft.            Lab Results: I personally reviewed relevant lab results.       Results for " orders placed during the hospital encounter of 10/08/24    EGD    Impression  Mild edematous mucosa in the Z-line; performed cold forceps biopsy  The stomach and duodenum appeared normal. Performed random biopsy to rule out celiac disease and H. pylori.      RECOMMENDATION:  Await pathology results  Follow up with GI Clinic            Danny Barrera MD               [1]  Current Outpatient Medications   Medication Sig Dispense Refill   • acetaminophen (TYLENOL) 325 mg tablet Take 2 tablets (650 mg total) by mouth every 6 (six) hours as needed for mild pain or fever 30 tablet 0   • Cholecalciferol (Vitamin D) 50 MCG (2000 UT) CAPS Take 2,000 Units by mouth in the morning     • cholestyramine (QUESTRAN) 4 g packet 1/2 pack once daily     • diclofenac sodium (VOLTAREN) 1 % Apply topically if needed     • Diclofenac Sodium 3 % CREA Apply 1 Application topically if needed     • hydrocortisone 2.5 % cream Apply topically 3 (three) times a day 30 g 0   • ipratropium (ATROVENT) 0.03 % nasal spray 2 sprays into each nostril 2 (two) times a day 30 mL 11   • lidocaine (LIDODERM) 5 % Apply 1 patch topically over 12 hours every 24 hours Remove & Discard patch within 12 hours or as directed by MD 6 patch 0   • LORazepam (Ativan) 0.5 mg tablet Take 1/2 to 1 tab po up to once daily prn severe anxiety; caution for sedation; avoid driving/working while taking 10 tablet 0   • Melatonin 2.5 MG CHEW Chew 2.5 mg daily at bedtime     • methocarbamol (ROBAXIN) 750 mg tablet Take 1 tablet (750 mg total) by mouth 3 (three) times a day as needed for muscle spasms 42 tablet 0   • Miebo 1.338 GM/ML SOLN      • naproxen (NAPROSYN) 500 mg tablet Take one tab up to twice a day as needed for migraine, no more than 3 times a week 30 tablet 11   • olopatadine HCl (PATADAY) 0.2 % opth drops Administer 1 drop to both eyes daily 2.5 mL 11   • Omega-3 Fatty Acids (OMEGA-3 CF PO) Take 1 capsule by mouth in the morning     • ondansetron (ZOFRAN) 4 mg tablet  Take 1 tablet (4 mg total) by mouth every 6 (six) hours 12 tablet 0   • pantoprazole (PROTONIX) 40 mg tablet Take 1 tablet (40 mg total) by mouth daily 30 tablet 3   • polyethylene glycol (MIRALAX) 17 g packet Take 17 g by mouth daily 30 each 5   • rizatriptan (MAXALT) 5 mg tablet Take 1 tablet (5 mg total) by mouth as needed for migraine 5-10 mg po at migraine onset, may repeat 5-10 mg in 2 hours if needed, max 20 mg/24 hours, 9 per month 9 tablet 11   • Vyzulta 0.024 % SOLN      • ASHWAGANDHA PO Take 2 tablets by mouth in the morning Gummies (Patient not taking: Reported on 5/13/2025)     • buPROPion (WELLBUTRIN SR) 150 mg 12 hr tablet  (Patient not taking: Reported on 5/13/2025)       No current facility-administered medications for this visit.

## 2025-05-13 NOTE — ASSESSMENT & PLAN NOTE
-would recommend resuming ppi and follow up in a few months  -rec avoiding food triggers, also not laying flat 2-3 hours after eating   -recent egd in Oct of 2024 without erosive esophagitis

## 2025-05-15 ENCOUNTER — OFFICE VISIT (OUTPATIENT)
Dept: PHYSICAL THERAPY | Facility: REHABILITATION | Age: 70
End: 2025-05-15
Attending: FAMILY MEDICINE
Payer: MEDICARE

## 2025-05-15 DIAGNOSIS — Z91.81 HISTORY OF FALLING: ICD-10-CM

## 2025-05-15 DIAGNOSIS — M54.50 ACUTE RIGHT-SIDED LOW BACK PAIN WITHOUT SCIATICA: Primary | ICD-10-CM

## 2025-05-15 PROCEDURE — 97110 THERAPEUTIC EXERCISES: CPT | Performed by: PHYSICAL THERAPIST

## 2025-05-15 PROCEDURE — 97112 NEUROMUSCULAR REEDUCATION: CPT | Performed by: PHYSICAL THERAPIST

## 2025-05-15 NOTE — PROGRESS NOTES
Daily Note     Today's date: 5/15/2025  Patient name: Kaylan Young  : 1955  MRN: [id number]  Referring provider: Zuleyma Carney DO  Dx:   Encounter Diagnosis     ICD-10-CM    1. Acute right-sided low back pain without sciatica  M54.50       2. History of falling  Z91.81           Start Time: 0945  Stop Time: 1025  Total time in clinic (min): 40 minutes    Subjective: Pt provides no new complaints upon arrival this date.      Objective: See treatment diary below      Assessment: Pt tolerated treatment well this visit. She has met all physical therapy functional and personal goals without pain noted. Pt is in agreement with current POC of d/c to comprehensive [facility] for self-management. Pt was encouraged to contact primary PT with any questions or concerns following discharge this date.       Plan: discharge to comprehensive [facility] for self-management     Precautions:  Patient Active Problem List   Diagnosis    Chronic bilateral low back pain without sciatica    Esophageal reflux    Arthritis    Duodenum disorder    Hyperlipidemia    Depression    Abnormal MRI of abdomen    Vitamin D deficiency    Anxiety disorder    Migraine aura without headache    Thyroid nodule    Impaired fasting glucose    Chronic left shoulder pain    Lumbar spondylosis    Sleep disorder    Status post laparoscopic cholecystectomy    Pruritus    Post-menopausal bleeding    Trigger finger of left thumb    Abnormal mammogram    Loose stools    Abnormal cervical Papanicolaou smear    Personal history of tobacco use, presenting hazards to health    Anal fissure       Patient's Goals:    Date  5   Visits   3 4 5 6 7 8 9 10   Visits approved              FOTO             Eval/Re-eval                          Education             HEP/POC             Manuals             Inf rib mobs             Block mobs              Prone PA mobs   TERRI TERRI TERRI  gradeIII TERRI      Ther Ex             Scap retract UBE 3'/3'   "      UBE 3'/3' UBE 3'/3'   Thoracic ext stretch   With belt 5\"x10 With belt 5\" 2x10 np        SL thoracic rot  10\"x10  10\"x10 10\"x10 10\"x10 10\"x10 10\"x10 10\"x10 10\"x10 10\"x10   Repeated thoracic ext 10\"x10   x10 x10 5\"x10 5\"x10 10\"x10 10\"x10 10\"x10   L SL with arm OH   x10 x10 np        Thoracic rotation in sitting x10 X10 w/ OP X10 w/ OP X10 w/ OP np        Rows/ext BTB 5\" 3x10 GTB 5\" 2x10 GTB 5\" 2x10 GTB 5\" 2x10 GTB 5\" 3x10 BTB 5\" 3x10 BTB 5\" 3x10 BTB 5\" 3x10 BTB 5\" 3x10 BTB 5\" 3x10   Scap with ER GTB 5\" 3x10 GTB 5\" 2x10 GTB 5\" 2x10 GTB 5\" 2x10 GTB 5\" 3x10 GTB 5\" 3x10 GTB 5\" 3x10 BTB 5\" 3x10 BTB 5\" 3x10 GTB 5\" 3x10   SA slides PTB x10  PTB x5 PTB x5  5\" 2x10ea 5\" 2x10ea PTB 5\" 2x10 PTB wall walks x5  PTB 2x5   Prone Is, Ts 5\" 3x10ea       5\" 3x10ea 5\" 3x10ea 5\" 3x10 ea                Neuro Re-ed             Ther Activity                                       Gait Training                                       Modalities                                       Access Code: [email or web address]  URL: [email or web address]  Date: 04/10/2025  Prepared by: Ann Long    Exercises  - Seated Scapular Retraction  - 1 x daily - 7 x weekly - 3 sets - 10 reps  - Prone Press Up  - 1 x daily - 7 x weekly - 3 sets - 10 reps  - Sidelying Thoracic Rotation with Open Book  - 1 x daily - 7 x weekly - 3 sets - 10 reps  - Seated Thoracic Lumbar Extension  - 1 x daily - 7 x weekly - 3 sets - 10 reps  - Seated Thoracic Flexion and Rotation with Arms Crossed  - 1 x daily - 7 x weekly - 3 sets - 10 reps                           "

## 2025-06-10 ENCOUNTER — HOSPITAL ENCOUNTER (OUTPATIENT)
Dept: BONE DENSITY | Facility: MEDICAL CENTER | Age: 70
Discharge: HOME | End: 2025-06-10
Attending: FAMILY MEDICINE
Payer: MEDICARE

## 2025-06-10 VITALS — WEIGHT: 157 LBS | BODY MASS INDEX: 30.82 KG/M2 | HEIGHT: 60 IN

## 2025-06-10 DIAGNOSIS — Z78.0 POST-MENOPAUSAL: ICD-10-CM

## 2025-06-10 PROCEDURE — 77080 DXA BONE DENSITY AXIAL: CPT

## 2025-07-08 ENCOUNTER — APPOINTMENT (OUTPATIENT)
Dept: LAB | Facility: MEDICAL CENTER | Age: 70
End: 2025-07-08
Payer: MEDICARE

## 2025-07-08 ENCOUNTER — OFFICE VISIT (OUTPATIENT)
Dept: FAMILY MEDICINE CLINIC | Facility: CLINIC | Age: 70
End: 2025-07-08
Payer: MEDICARE

## 2025-07-08 VITALS
OXYGEN SATURATION: 98 % | WEIGHT: 155.8 LBS | SYSTOLIC BLOOD PRESSURE: 110 MMHG | TEMPERATURE: 97.6 F | BODY MASS INDEX: 30.18 KG/M2 | HEART RATE: 76 BPM | DIASTOLIC BLOOD PRESSURE: 60 MMHG

## 2025-07-08 DIAGNOSIS — M81.6 LOCALIZED OSTEOPOROSIS WITHOUT CURRENT PATHOLOGICAL FRACTURE: ICD-10-CM

## 2025-07-08 DIAGNOSIS — R30.0 BURNING WITH URINATION: Primary | ICD-10-CM

## 2025-07-08 DIAGNOSIS — M25.552 PELVIC JOINT PAIN, LEFT: ICD-10-CM

## 2025-07-08 LAB
BACTERIA UR QL AUTO: NORMAL /HPF
BILIRUB UR QL STRIP: NEGATIVE
CA-I BLD-SCNC: 1.21 MMOL/L (ref 1.12–1.32)
CLARITY UR: CLEAR
COLOR UR: ABNORMAL
GLUCOSE UR STRIP-MCNC: NEGATIVE MG/DL
HGB UR QL STRIP.AUTO: NEGATIVE
KETONES UR STRIP-MCNC: NEGATIVE MG/DL
LEUKOCYTE ESTERASE UR QL STRIP: ABNORMAL
NITRITE UR QL STRIP: NEGATIVE
NON-SQ EPI CELLS URNS QL MICRO: NORMAL /HPF
PH UR STRIP.AUTO: 5.5 [PH]
PHOSPHATE SERPL-MCNC: 3.7 MG/DL (ref 2.3–4.1)
PROT UR STRIP-MCNC: NEGATIVE MG/DL
PTH-INTACT SERPL-MCNC: 100.2 PG/ML (ref 12–88)
RBC #/AREA URNS AUTO: NORMAL /HPF
SL AMB  POCT GLUCOSE, UA: ABNORMAL
SL AMB LEUKOCYTE ESTERASE,UA: 15
SL AMB POCT BILIRUBIN,UA: ABNORMAL
SL AMB POCT BLOOD,UA: ABNORMAL
SL AMB POCT CLARITY,UA: CLEAR
SL AMB POCT COLOR,UA: YELLOW
SL AMB POCT KETONES,UA: ABNORMAL
SL AMB POCT NITRITE,UA: ABNORMAL
SL AMB POCT PH,UA: 5
SL AMB POCT SPECIFIC GRAVITY,UA: 1010
SL AMB POCT URINE PROTEIN: 15
SL AMB POCT UROBILINOGEN: 0.2
SP GR UR STRIP.AUTO: 1.02 (ref 1–1.03)
UROBILINOGEN UR STRIP-ACNC: <2 MG/DL
WBC #/AREA URNS AUTO: NORMAL /HPF

## 2025-07-08 PROCEDURE — 99214 OFFICE O/P EST MOD 30 MIN: CPT | Performed by: FAMILY MEDICINE

## 2025-07-08 PROCEDURE — 82330 ASSAY OF CALCIUM: CPT

## 2025-07-08 PROCEDURE — 84100 ASSAY OF PHOSPHORUS: CPT

## 2025-07-08 PROCEDURE — 81001 URINALYSIS AUTO W/SCOPE: CPT | Performed by: FAMILY MEDICINE

## 2025-07-08 PROCEDURE — 36415 COLL VENOUS BLD VENIPUNCTURE: CPT

## 2025-07-08 PROCEDURE — 81002 URINALYSIS NONAUTO W/O SCOPE: CPT | Performed by: FAMILY MEDICINE

## 2025-07-08 PROCEDURE — 83970 ASSAY OF PARATHORMONE: CPT

## 2025-07-08 PROCEDURE — 87086 URINE CULTURE/COLONY COUNT: CPT | Performed by: FAMILY MEDICINE

## 2025-07-08 PROCEDURE — G2211 COMPLEX E/M VISIT ADD ON: HCPCS | Performed by: FAMILY MEDICINE

## 2025-07-08 RX ORDER — NITROFURANTOIN 25; 75 MG/1; MG/1
100 CAPSULE ORAL 2 TIMES DAILY
Qty: 10 CAPSULE | Refills: 0 | Status: SHIPPED | OUTPATIENT
Start: 2025-07-08 | End: 2025-07-13

## 2025-07-08 NOTE — PROGRESS NOTES
Name: Kaylan Young      : 1955      MRN: [id number]  Encounter Provider: Zuleyma Carney DO  Encounter Date: 2025   Encounter department: [facility]  :  Assessment & Plan  Burning with urination  Urine dip today  Will send out for culture based on preliminary findings  Given symptoms - will empirically start antibiotic  Macrobid 100 mg bid for 5 days  Increase po fluid intake, wipe front to back, wear cotton panties, void after intercourse  If urine culture (-) will stop antibiotic  But pt will be traveling so would prefer to start now.  Eat yogurt with antibiotic    Orders:    POCT urine dip    Urine culture    UA (URINE) with reflex to Scope    nitrofurantoin (MACROBID) 100 mg capsule; Take 1 capsule (100 mg total) by mouth 2 (two) times a day for 5 days    Pelvic joint pain, left  Possible hip discomfort/low back pain  Vs intrapelvic source -I e: ovarian  No significant abnormalities on 2025 CT scan  Will have pt monitor for worsening of s/s  Trial of naproxen - which pt has on hand for migraines  Can try for 3- 5 days with food  Pt denies h/o GI bleed or ulcer.  Remain on PPI    Patient Instructions   Trial of naproxen 2x/day for the next 3-5 days  Can also try moist heat or ice to the affected area  Keep well hydrated  If not improving/worsening - or bowel changes  Notify dr campos- and may pursue ultrasound imaging of pelvis           reminder to get additional labs to r/u secondary causes of OP  Then return to discuss possible treatment options           History of Present Illness   Urinary Tract Infection   Pertinent negatives include no frequency, hematuria or urgency.     Pt here with LLQ pain, left low back pain and radiates into left groin X 2 days  And burning with urination (X 2 days)  Review of Systems   Constitutional:  Negative for fever.   HENT:  Positive for postnasal drip.    Respiratory:  Positive for cough. Negative for shortness of breath.          Mild cough - may be post-nasal drip.     Cardiovascular:  Negative for chest pain and palpitations.   Gastrointestinal:  Positive for abdominal pain. Negative for blood in stool.        No h/o diverticulitis    No changes in bowels the past 2 days.    Mild chronic indigestion issues  Not worse     Genitourinary:  Positive for dysuria. Negative for decreased urine volume, frequency, hematuria, urgency and vaginal bleeding.        No h/o renal calculi.  Not Drinking enough fluids.  Drinking caffeine       Musculoskeletal:  Negative for arthralgias and back pain.       Objective   /60   Pulse 76   Temp 97.6 °F (36.4 °C)   Wt 70.7 kg (155 lb 12.8 oz)   SpO2 98%   BMI 30.18 kg/m²      Physical Exam  Vitals and nursing note reviewed.   Constitutional:       General: She is not in acute distress.     Appearance: Normal appearance. She is not ill-appearing or toxic-appearing.   HENT:      Mouth/Throat:      Mouth: Mucous membranes are moist.     Eyes:      General: No scleral icterus.      Cardiovascular:      Rate and Rhythm: Normal rate and regular rhythm.      Heart sounds: Normal heart sounds. No murmur heard.  Pulmonary:      Effort: Pulmonary effort is normal. No respiratory distress.      Breath sounds: Normal breath sounds. No wheezing, rhonchi or rales.   Abdominal:      General: There is no distension.      Palpations: Abdomen is soft. There is no mass.      Tenderness: There is abdominal tenderness. There is no right CVA tenderness, left CVA tenderness, guarding or rebound.      Comments: Mild epigastric tenderness  No peritoneal signs  No suprapubic tenderness       Musculoskeletal:         General: Tenderness present. No swelling, deformity or signs of injury.      Right lower leg: No edema.      Left lower leg: No edema.      Comments: Full ROM b/l hips  Some discomfort right lower back when abducting left lower ext from midline.  No tendernss over SI joint (L)     Lymphadenopathy:      Cervical: No  cervical adenopathy.     Skin:     Findings: No rash.     Neurological:      General: No focal deficit present.      Mental Status: She is alert and oriented to person, place, and time.

## 2025-07-08 NOTE — PATIENT INSTRUCTIONS
Trial of naproxen 2x/day for the next 3-5 days  Can also try moist heat or ice to the affected area  Keep well hydrated  If not improving/worsening - or bowel changes  Notify dr campos- and may pursue ultrasound imaging of pelvis    Macrobid 100 mg - 2x/day with food  Eat yogurt while taking    Increase po fluid intake, wipe front to back, wear cotton panties, void after intercourse    Await final results of urine culture

## 2025-07-09 LAB — BACTERIA UR CULT: NORMAL

## 2025-08-01 ENCOUNTER — HOSPITAL ENCOUNTER (OUTPATIENT)
Dept: MAMMOGRAPHY | Facility: MEDICAL CENTER | Age: 70
Discharge: HOME | End: 2025-08-01
Attending: FAMILY MEDICINE
Payer: MEDICARE

## 2025-08-01 VITALS — WEIGHT: 155.8 LBS | HEIGHT: 60 IN | BODY MASS INDEX: 30.59 KG/M2

## 2025-08-01 DIAGNOSIS — Z12.31 ENCOUNTER FOR SCREENING MAMMOGRAM FOR MALIGNANT NEOPLASM OF BREAST: ICD-10-CM

## 2025-08-01 PROCEDURE — 77067 SCR MAMMO BI INCL CAD: CPT

## 2025-08-01 PROCEDURE — 77063 BREAST TOMOSYNTHESIS BI: CPT

## (undated) DEVICE — GLOVE SRG BIOGEL 8

## (undated) DEVICE — SUT VICRYL 0 UR-6 27 IN J603H

## (undated) DEVICE — GLOVE INDICATOR PI UNDERGLOVE SZ 7.5 BLUE

## (undated) DEVICE — TROCAR: Brand: KII SLEEVE

## (undated) DEVICE — IRRIG ENDO FLO TUBING

## (undated) DEVICE — TISSUE RETRIEVAL SYSTEM: Brand: INZII RETRIEVAL SYSTEM

## (undated) DEVICE — BLUE HEAT SCOPE WARMER

## (undated) DEVICE — INTENDED FOR TISSUE SEPARATION, AND OTHER PROCEDURES THAT REQUIRE A SHARP SURGICAL BLADE TO PUNCTURE OR CUT.: Brand: BARD-PARKER SAFETY BLADES SIZE 11, STERILE

## (undated) DEVICE — GLOVE SRG BIOGEL 7.5

## (undated) DEVICE — CHLORAPREP HI-LITE 26ML ORANGE

## (undated) DEVICE — GLOVE INDICATOR PI UNDERGLOVE SZ 8 BLUE

## (undated) DEVICE — SINGLE-USE BIOPSY FORCEPS: Brand: RADIAL JAW 4

## (undated) DEVICE — SUT MONOCRYL 4-0 PS-2 27 IN Y426H

## (undated) DEVICE — ALLENTOWN LAP CHOLE APP PACK: Brand: CARDINAL HEALTH

## (undated) DEVICE — TROCAR: Brand: KII FIOS FIRST ENTRY

## (undated) DEVICE — ELECTRODE LAP J HOOK SPLIT STEM E-Z CLEAN 33CM -0021S

## (undated) DEVICE — PMI DISPOSABLE PUNCTURE CLOSURE DEVICE / SUTURE GRASPER: Brand: PMI

## (undated) DEVICE — ADHESIVE SKIN HIGH VISCOSITY EXOFIN 1ML

## (undated) DEVICE — SCD SEQUENTIAL COMPRESSION COMFORT SLEEVE MEDIUM KNEE LENGTH: Brand: KENDALL SCD

## (undated) DEVICE — LIGAMAX 5 MM ENDOSCOPIC MULTIPLE CLIP APPLIER: Brand: LIGAMAX

## (undated) DEVICE — [HIGH FLOW INSUFFLATOR,  DO NOT USE IF PACKAGE IS DAMAGED,  KEEP DRY,  KEEP AWAY FROM SUNLIGHT,  PROTECT FROM HEAT AND RADIOACTIVE SOURCES.]: Brand: PNEUMOSURE

## (undated) DEVICE — ENDOPATH 5MM CURVED SCISSORS WITH MONOPOLAR CAUTERY: Brand: ENDOPATH

## (undated) DEVICE — GLOVE SRG BIOGEL ECLIPSE 7.5

## (undated) DEVICE — 3000CC GUARDIAN II: Brand: GUARDIAN

## (undated) DEVICE — TUBING SMOKE EVAC W/FILTRATION DEVICE PLUMEPORT ACTIV